# Patient Record
Sex: FEMALE | Race: WHITE | NOT HISPANIC OR LATINO | Employment: UNEMPLOYED | URBAN - NONMETROPOLITAN AREA
[De-identification: names, ages, dates, MRNs, and addresses within clinical notes are randomized per-mention and may not be internally consistent; named-entity substitution may affect disease eponyms.]

---

## 2017-02-01 ENCOUNTER — TRANSCRIBE ORDERS (OUTPATIENT)
Dept: ADMINISTRATIVE | Facility: HOSPITAL | Age: 47
End: 2017-02-01

## 2017-02-01 DIAGNOSIS — Z12.31 ENCOUNTER FOR SCREENING MAMMOGRAM FOR MALIGNANT NEOPLASM OF BREAST: Primary | ICD-10-CM

## 2017-03-16 ENCOUNTER — LAB (OUTPATIENT)
Dept: LAB | Facility: HOSPITAL | Age: 47
End: 2017-03-16
Attending: PEDIATRICS

## 2017-03-16 ENCOUNTER — TRANSCRIBE ORDERS (OUTPATIENT)
Dept: LAB | Facility: HOSPITAL | Age: 47
End: 2017-03-16

## 2017-03-16 DIAGNOSIS — R53.83 LETHARGY: ICD-10-CM

## 2017-03-16 DIAGNOSIS — R53.83 LETHARGY: Primary | ICD-10-CM

## 2017-03-16 LAB
ALBUMIN SERPL-MCNC: 4.4 G/DL (ref 3.5–5)
ALBUMIN/GLOB SERPL: 1.4 G/DL (ref 1.1–2.5)
ALP SERPL-CCNC: 56 U/L (ref 24–120)
ALT SERPL W P-5'-P-CCNC: 53 U/L (ref 0–54)
AMYLASE SERPL-CCNC: <30 U/L (ref 30–110)
ANION GAP SERPL CALCULATED.3IONS-SCNC: 9 MMOL/L (ref 4–13)
AST SERPL-CCNC: 31 U/L (ref 7–45)
AUTO MIXED CELLS #: 0.4 10*3/UL (ref 0.1–2.6)
AUTO MIXED CELLS %: 2.7 % (ref 0.1–24)
BILIRUB SERPL-MCNC: 0.5 MG/DL (ref 0.1–1)
BUN BLD-MCNC: 11 MG/DL (ref 5–21)
BUN/CREAT SERPL: 18.6
CALCIUM SPEC-SCNC: 9.2 MG/DL (ref 8.4–10.4)
CHLORIDE SERPL-SCNC: 103 MMOL/L (ref 98–110)
CO2 SERPL-SCNC: 25 MMOL/L (ref 24–31)
CREAT BLD-MCNC: 0.59 MG/DL (ref 0.5–1.4)
ERYTHROCYTE [DISTWIDTH] IN BLOOD BY AUTOMATED COUNT: 12.6 % (ref 12–15)
GFR SERPL CREATININE-BSD FRML MDRD: 110 ML/MIN/1.73
GLOBULIN UR ELPH-MCNC: 3.1 GM/DL
GLUCOSE BLD-MCNC: 118 MG/DL (ref 70–100)
HCT VFR BLD AUTO: 39.3 % (ref 37–47)
HGB BLD-MCNC: 13.9 G/DL (ref 12–16)
LYMPHOCYTES # BLD AUTO: 1.5 10*3/MM3 (ref 0.8–7)
LYMPHOCYTES NFR BLD AUTO: 9.1 % (ref 15–45)
MCH RBC QN AUTO: 31.4 PG (ref 28–32)
MCHC RBC AUTO-ENTMCNC: 35.4 G/DL (ref 33–36)
MCV RBC AUTO: 88.7 FL (ref 82–98)
NEUTROPHILS # BLD AUTO: 14.3 10*3/MM3 (ref 1.5–8.3)
NEUTROPHILS NFR BLD AUTO: 88.2 % (ref 39–78)
PLATELET # BLD AUTO: 212 10*3/MM3 (ref 130–400)
PMV BLD AUTO: 8.5 FL (ref 6–12)
POTASSIUM BLD-SCNC: 4.8 MMOL/L (ref 3.5–5.3)
PROT SERPL-MCNC: 7.5 G/DL (ref 6.3–8.7)
RBC # BLD AUTO: 4.43 10*6/MM3 (ref 4.2–5.4)
SODIUM BLD-SCNC: 137 MMOL/L (ref 135–145)
T4 FREE SERPL-MCNC: 0.71 NG/DL (ref 0.78–2.19)
TSH SERPL DL<=0.05 MIU/L-ACNC: 0.08 MIU/ML (ref 0.47–4.68)
WBC NRBC COR # BLD: 16.2 10*3/MM3 (ref 4.8–10.8)

## 2017-03-16 PROCEDURE — 82150 ASSAY OF AMYLASE: CPT

## 2017-03-16 PROCEDURE — 36415 COLL VENOUS BLD VENIPUNCTURE: CPT

## 2017-03-16 PROCEDURE — 80053 COMPREHEN METABOLIC PANEL: CPT

## 2017-03-16 PROCEDURE — 85025 COMPLETE CBC W/AUTO DIFF WBC: CPT

## 2017-03-16 PROCEDURE — 84443 ASSAY THYROID STIM HORMONE: CPT | Performed by: PEDIATRICS

## 2017-03-16 PROCEDURE — 84439 ASSAY OF FREE THYROXINE: CPT | Performed by: PEDIATRICS

## 2017-04-11 ENCOUNTER — TRANSCRIBE ORDERS (OUTPATIENT)
Dept: LAB | Facility: HOSPITAL | Age: 47
End: 2017-04-11

## 2017-04-11 ENCOUNTER — LAB (OUTPATIENT)
Dept: LAB | Facility: HOSPITAL | Age: 47
End: 2017-04-11
Attending: PEDIATRICS

## 2017-04-11 DIAGNOSIS — R94.6 ABNORMAL RESULTS OF THYROID FUNCTION STUDIES: ICD-10-CM

## 2017-04-11 DIAGNOSIS — R94.6 ABNORMAL RESULTS OF THYROID FUNCTION STUDIES: Primary | ICD-10-CM

## 2017-04-11 LAB
T4 FREE SERPL-MCNC: 0.98 NG/DL (ref 0.78–2.19)
TSH SERPL DL<=0.05 MIU/L-ACNC: 1.21 MIU/ML (ref 0.47–4.68)

## 2017-04-11 PROCEDURE — 84443 ASSAY THYROID STIM HORMONE: CPT

## 2017-04-11 PROCEDURE — 84480 ASSAY TRIIODOTHYRONINE (T3): CPT

## 2017-04-11 PROCEDURE — 36415 COLL VENOUS BLD VENIPUNCTURE: CPT

## 2017-04-11 PROCEDURE — 84439 ASSAY OF FREE THYROXINE: CPT

## 2017-04-13 LAB — T3 SERPL-MCNC: 113 NG/DL (ref 71–180)

## 2017-08-31 ENCOUNTER — LAB (OUTPATIENT)
Dept: LAB | Facility: HOSPITAL | Age: 47
End: 2017-08-31

## 2017-08-31 ENCOUNTER — TRANSCRIBE ORDERS (OUTPATIENT)
Dept: LAB | Facility: HOSPITAL | Age: 47
End: 2017-08-31

## 2017-08-31 DIAGNOSIS — R53.83 OTHER FATIGUE: Primary | ICD-10-CM

## 2017-08-31 DIAGNOSIS — R53.83 OTHER FATIGUE: ICD-10-CM

## 2017-08-31 PROCEDURE — 84443 ASSAY THYROID STIM HORMONE: CPT | Performed by: NURSE PRACTITIONER

## 2017-08-31 PROCEDURE — 82306 VITAMIN D 25 HYDROXY: CPT | Performed by: NURSE PRACTITIONER

## 2017-08-31 PROCEDURE — 82607 VITAMIN B-12: CPT | Performed by: NURSE PRACTITIONER

## 2017-08-31 PROCEDURE — 84439 ASSAY OF FREE THYROXINE: CPT | Performed by: NURSE PRACTITIONER

## 2017-08-31 PROCEDURE — 84481 FREE ASSAY (FT-3): CPT | Performed by: NURSE PRACTITIONER

## 2017-08-31 PROCEDURE — 36415 COLL VENOUS BLD VENIPUNCTURE: CPT

## 2017-09-01 LAB
25(OH)D3 SERPL-MCNC: 49.3 NG/ML (ref 30–100)
T3FREE SERPL-MCNC: 4.33 PG/ML (ref 2.77–5.27)
T4 FREE SERPL-MCNC: 1.17 NG/DL (ref 0.78–2.19)
TSH SERPL DL<=0.05 MIU/L-ACNC: 0.23 MIU/ML (ref 0.47–4.68)
VIT B12 BLD-MCNC: 401 PG/ML (ref 239–931)

## 2018-03-14 ENCOUNTER — TRANSCRIBE ORDERS (OUTPATIENT)
Dept: ADMINISTRATIVE | Facility: HOSPITAL | Age: 48
End: 2018-03-14

## 2018-03-14 DIAGNOSIS — Z12.31 ENCOUNTER FOR SCREENING MAMMOGRAM FOR MALIGNANT NEOPLASM OF BREAST: Primary | ICD-10-CM

## 2018-03-22 ENCOUNTER — HOSPITAL ENCOUNTER (OUTPATIENT)
Dept: GENERAL RADIOLOGY | Facility: HOSPITAL | Age: 48
Discharge: HOME | End: 2018-03-22
Admitting: INTERNAL MEDICINE

## 2018-03-22 ENCOUNTER — TRANSCRIBE ORDERS (OUTPATIENT)
Dept: ADMINISTRATIVE | Facility: HOSPITAL | Age: 48
End: 2018-03-22

## 2018-03-22 DIAGNOSIS — M25.561 RIGHT KNEE PAIN, UNSPECIFIED CHRONICITY: Primary | ICD-10-CM

## 2018-03-22 DIAGNOSIS — M25.561 RIGHT KNEE PAIN, UNSPECIFIED CHRONICITY: ICD-10-CM

## 2018-03-22 PROCEDURE — 73564 X-RAY EXAM KNEE 4 OR MORE: CPT

## 2018-03-28 ENCOUNTER — APPOINTMENT (OUTPATIENT)
Dept: MAMMOGRAPHY | Facility: HOSPITAL | Age: 48
End: 2018-03-28

## 2018-03-29 ENCOUNTER — HOSPITAL ENCOUNTER (OUTPATIENT)
Dept: MAMMOGRAPHY | Facility: HOSPITAL | Age: 48
Discharge: HOME | End: 2018-03-29
Admitting: INTERNAL MEDICINE

## 2018-03-29 DIAGNOSIS — Z12.31 ENCOUNTER FOR SCREENING MAMMOGRAM FOR MALIGNANT NEOPLASM OF BREAST: ICD-10-CM

## 2018-03-29 PROCEDURE — 77063 BREAST TOMOSYNTHESIS BI: CPT

## 2018-03-29 PROCEDURE — 77067 SCR MAMMO BI INCL CAD: CPT

## 2018-11-28 ENCOUNTER — APPOINTMENT (OUTPATIENT)
Dept: GENERAL RADIOLOGY | Age: 48
DRG: 897 | End: 2018-11-28
Payer: COMMERCIAL

## 2018-11-28 ENCOUNTER — HOSPITAL ENCOUNTER (INPATIENT)
Age: 48
LOS: 3 days | Discharge: HOME | DRG: 897 | End: 2018-12-01
Attending: EMERGENCY MEDICINE | Admitting: FAMILY MEDICINE
Payer: COMMERCIAL

## 2018-11-28 DIAGNOSIS — F10.931 ALCOHOL WITHDRAWAL SYNDROME, WITH DELIRIUM (HCC): ICD-10-CM

## 2018-11-28 DIAGNOSIS — F10.930 ALCOHOL WITHDRAWAL SYNDROME WITHOUT COMPLICATION (HCC): Primary | ICD-10-CM

## 2018-11-28 PROBLEM — Z78.9 ALCOHOL USE: Status: ACTIVE | Noted: 2018-11-28

## 2018-11-28 LAB
ALBUMIN SERPL-MCNC: 4.7 G/DL (ref 3.5–5.2)
ALP BLD-CCNC: 224 U/L (ref 35–104)
ALT SERPL-CCNC: 134 U/L (ref 5–33)
AMPHETAMINE SCREEN, URINE: NEGATIVE
ANION GAP SERPL CALCULATED.3IONS-SCNC: 16 MMOL/L (ref 7–19)
APTT: 31 SEC (ref 26–36.2)
AST SERPL-CCNC: 375 U/L (ref 5–32)
BARBITURATE SCREEN URINE: NEGATIVE
BASOPHILS ABSOLUTE: 0 K/UL (ref 0–0.2)
BASOPHILS RELATIVE PERCENT: 0.5 % (ref 0–1)
BENZODIAZEPINE SCREEN, URINE: NEGATIVE
BILIRUB SERPL-MCNC: 1.1 MG/DL (ref 0.2–1.2)
BILIRUBIN URINE: NEGATIVE
BLOOD, URINE: NEGATIVE
BUN BLDV-MCNC: 3 MG/DL (ref 6–20)
CALCIUM SERPL-MCNC: 9 MG/DL (ref 8.6–10)
CANNABINOID SCREEN URINE: NEGATIVE
CHLORIDE BLD-SCNC: 106 MMOL/L (ref 98–111)
CLARITY: CLEAR
CO2: 23 MMOL/L (ref 22–29)
COCAINE METABOLITE SCREEN URINE: NEGATIVE
COLOR: YELLOW
CREAT SERPL-MCNC: <0.5 MG/DL (ref 0.5–0.9)
EOSINOPHILS ABSOLUTE: 0 K/UL (ref 0–0.6)
EOSINOPHILS RELATIVE PERCENT: 0.4 % (ref 0–5)
ETHANOL: 238 MG/DL (ref 0–0.08)
GFR NON-AFRICAN AMERICAN: >60
GLUCOSE BLD-MCNC: 154 MG/DL (ref 74–109)
GLUCOSE URINE: NEGATIVE MG/DL
HCT VFR BLD CALC: 46.3 % (ref 37–47)
HEMOGLOBIN: 15.9 G/DL (ref 12–16)
INR BLD: 1.1 (ref 0.88–1.18)
KETONES, URINE: NEGATIVE MG/DL
LEUKOCYTE ESTERASE, URINE: NEGATIVE
LYMPHOCYTES ABSOLUTE: 2.4 K/UL (ref 1.1–4.5)
LYMPHOCYTES RELATIVE PERCENT: 28.5 % (ref 20–40)
Lab: NORMAL
MAGNESIUM: 1.9 MG/DL (ref 1.6–2.6)
MCH RBC QN AUTO: 34.6 PG (ref 27–31)
MCHC RBC AUTO-ENTMCNC: 34.3 G/DL (ref 33–37)
MCV RBC AUTO: 100.7 FL (ref 81–99)
MONOCYTES ABSOLUTE: 0.4 K/UL (ref 0–0.9)
MONOCYTES RELATIVE PERCENT: 4.8 % (ref 0–10)
NEUTROPHILS ABSOLUTE: 5.4 K/UL (ref 1.5–7.5)
NEUTROPHILS RELATIVE PERCENT: 65.6 % (ref 50–65)
NITRITE, URINE: NEGATIVE
OPIATE SCREEN URINE: NEGATIVE
PDW BLD-RTO: 12.8 % (ref 11.5–14.5)
PH UA: 6
PLATELET # BLD: 68 K/UL (ref 130–400)
PMV BLD AUTO: 9.7 FL (ref 9.4–12.3)
POTASSIUM REFLEX MAGNESIUM: 3.2 MMOL/L (ref 3.5–5)
PROTEIN UA: ABNORMAL MG/DL
PROTHROMBIN TIME: 14.1 SEC (ref 12–14.6)
RBC # BLD: 4.6 M/UL (ref 4.2–5.4)
SODIUM BLD-SCNC: 145 MMOL/L (ref 136–145)
SPECIFIC GRAVITY UA: 1.01
TOTAL PROTEIN: 7.9 G/DL (ref 6.6–8.7)
TROPONIN: <0.01 NG/ML (ref 0–0.03)
TSH REFLEX FT4: 1.18 UIU/ML (ref 0.35–5.5)
URINE REFLEX TO CULTURE: ABNORMAL
UROBILINOGEN, URINE: 0.2 E.U./DL
WBC # BLD: 8.3 K/UL (ref 4.8–10.8)

## 2018-11-28 PROCEDURE — G0480 DRUG TEST DEF 1-7 CLASSES: HCPCS

## 2018-11-28 PROCEDURE — 99285 EMERGENCY DEPT VISIT HI MDM: CPT | Performed by: EMERGENCY MEDICINE

## 2018-11-28 PROCEDURE — 2500000003 HC RX 250 WO HCPCS: Performed by: INTERNAL MEDICINE

## 2018-11-28 PROCEDURE — 83735 ASSAY OF MAGNESIUM: CPT

## 2018-11-28 PROCEDURE — 71045 X-RAY EXAM CHEST 1 VIEW: CPT

## 2018-11-28 PROCEDURE — 96365 THER/PROPH/DIAG IV INF INIT: CPT

## 2018-11-28 PROCEDURE — 36415 COLL VENOUS BLD VENIPUNCTURE: CPT

## 2018-11-28 PROCEDURE — 96368 THER/DIAG CONCURRENT INF: CPT

## 2018-11-28 PROCEDURE — 85730 THROMBOPLASTIN TIME PARTIAL: CPT

## 2018-11-28 PROCEDURE — 2580000003 HC RX 258: Performed by: EMERGENCY MEDICINE

## 2018-11-28 PROCEDURE — 96376 TX/PRO/DX INJ SAME DRUG ADON: CPT

## 2018-11-28 PROCEDURE — 2580000003 HC RX 258: Performed by: INTERNAL MEDICINE

## 2018-11-28 PROCEDURE — 80307 DRUG TEST PRSMV CHEM ANLYZR: CPT

## 2018-11-28 PROCEDURE — 80053 COMPREHEN METABOLIC PANEL: CPT

## 2018-11-28 PROCEDURE — 84443 ASSAY THYROID STIM HORMONE: CPT

## 2018-11-28 PROCEDURE — 99285 EMERGENCY DEPT VISIT HI MDM: CPT

## 2018-11-28 PROCEDURE — 2000000000 HC ICU R&B

## 2018-11-28 PROCEDURE — 6360000002 HC RX W HCPCS: Performed by: INTERNAL MEDICINE

## 2018-11-28 PROCEDURE — 85025 COMPLETE CBC W/AUTO DIFF WBC: CPT

## 2018-11-28 PROCEDURE — 96375 TX/PRO/DX INJ NEW DRUG ADDON: CPT

## 2018-11-28 PROCEDURE — 84484 ASSAY OF TROPONIN QUANT: CPT

## 2018-11-28 PROCEDURE — 6370000000 HC RX 637 (ALT 250 FOR IP): Performed by: EMERGENCY MEDICINE

## 2018-11-28 PROCEDURE — 6360000002 HC RX W HCPCS: Performed by: EMERGENCY MEDICINE

## 2018-11-28 PROCEDURE — 85610 PROTHROMBIN TIME: CPT

## 2018-11-28 PROCEDURE — 93005 ELECTROCARDIOGRAM TRACING: CPT

## 2018-11-28 PROCEDURE — 81003 URINALYSIS AUTO W/O SCOPE: CPT

## 2018-11-28 PROCEDURE — 99291 CRITICAL CARE FIRST HOUR: CPT | Performed by: INTERNAL MEDICINE

## 2018-11-28 RX ORDER — 0.9 % SODIUM CHLORIDE 0.9 %
2000 INTRAVENOUS SOLUTION INTRAVENOUS ONCE
Status: COMPLETED | OUTPATIENT
Start: 2018-11-28 | End: 2018-11-28

## 2018-11-28 RX ORDER — CHLORDIAZEPOXIDE HYDROCHLORIDE 25 MG/1
50 CAPSULE, GELATIN COATED ORAL ONCE
Status: COMPLETED | OUTPATIENT
Start: 2018-11-28 | End: 2018-11-28

## 2018-11-28 RX ORDER — POTASSIUM CHLORIDE 7.45 MG/ML
10 INJECTION INTRAVENOUS ONCE
Status: COMPLETED | OUTPATIENT
Start: 2018-11-28 | End: 2018-11-28

## 2018-11-28 RX ORDER — LORAZEPAM 2 MG/ML
2 INJECTION INTRAMUSCULAR ONCE
Status: COMPLETED | OUTPATIENT
Start: 2018-11-28 | End: 2018-11-28

## 2018-11-28 RX ORDER — LORAZEPAM 2 MG/ML
4 INJECTION INTRAMUSCULAR ONCE
Status: COMPLETED | OUTPATIENT
Start: 2018-11-28 | End: 2018-11-28

## 2018-11-28 RX ADMIN — POTASSIUM CHLORIDE 10 MEQ: 7.46 INJECTION, SOLUTION INTRAVENOUS at 22:30

## 2018-11-28 RX ADMIN — LORAZEPAM 2 MG: 2 INJECTION INTRAMUSCULAR; INTRAVENOUS at 18:12

## 2018-11-28 RX ADMIN — SODIUM CHLORIDE 2000 ML: 9 INJECTION, SOLUTION INTRAVENOUS at 19:48

## 2018-11-28 RX ADMIN — LORAZEPAM 4 MG: 2 INJECTION INTRAMUSCULAR; INTRAVENOUS at 19:01

## 2018-11-28 RX ADMIN — CHLORDIAZEPOXIDE HYDROCHLORIDE 50 MG: 25 CAPSULE ORAL at 18:12

## 2018-11-28 RX ADMIN — FOLIC ACID: 5 INJECTION, SOLUTION INTRAMUSCULAR; INTRAVENOUS; SUBCUTANEOUS at 22:32

## 2018-11-28 ASSESSMENT — ENCOUNTER SYMPTOMS
CHEST TIGHTNESS: 0
COUGH: 0
RHINORRHEA: 0
VOMITING: 0
EYE PAIN: 0
SHORTNESS OF BREATH: 0
BACK PAIN: 0
PHOTOPHOBIA: 0
DIARRHEA: 0
NAUSEA: 0
SORE THROAT: 0
ABDOMINAL PAIN: 0

## 2018-11-28 NOTE — ED NOTES
Denies SI and HI at this time. Patient states she was medically released from [facility] in [facility] after 1 week, relapsed after getting home.       Blank Garcia RN  11/28/18 6795

## 2018-11-29 PROBLEM — E87.6 HYPOKALEMIA: Status: ACTIVE | Noted: 2018-11-29

## 2018-11-29 PROBLEM — R74.01 TRANSAMINITIS: Status: ACTIVE | Noted: 2018-11-29

## 2018-11-29 PROBLEM — F10.931 DELIRIUM TREMENS (HCC): Status: ACTIVE | Noted: 2018-11-29

## 2018-11-29 PROBLEM — E86.0 DEHYDRATION: Status: ACTIVE | Noted: 2018-11-29

## 2018-11-29 LAB
ALBUMIN SERPL-MCNC: 3.8 G/DL (ref 3.5–5.2)
ALP BLD-CCNC: 163 U/L (ref 35–104)
ALT SERPL-CCNC: 104 U/L (ref 5–33)
ANION GAP SERPL CALCULATED.3IONS-SCNC: 16 MMOL/L (ref 7–19)
AST SERPL-CCNC: 316 U/L (ref 5–32)
BILIRUB SERPL-MCNC: 1.3 MG/DL (ref 0.2–1.2)
BUN BLDV-MCNC: 3 MG/DL (ref 6–20)
CALCIUM SERPL-MCNC: 7.8 MG/DL (ref 8.6–10)
CHLORIDE BLD-SCNC: 108 MMOL/L (ref 98–111)
CO2: 17 MMOL/L (ref 22–29)
CREAT SERPL-MCNC: 0.5 MG/DL (ref 0.5–0.9)
GFR NON-AFRICAN AMERICAN: >60
GLUCOSE BLD-MCNC: 103 MG/DL (ref 74–109)
MAGNESIUM: 1.2 MG/DL (ref 1.6–2.6)
PHOSPHORUS: 3.4 MG/DL (ref 2.5–4.5)
POTASSIUM SERPL-SCNC: 3.5 MMOL/L (ref 3.5–5)
SODIUM BLD-SCNC: 141 MMOL/L (ref 136–145)
TOTAL PROTEIN: 5.8 G/DL (ref 6.6–8.7)

## 2018-11-29 PROCEDURE — 2580000003 HC RX 258: Performed by: FAMILY MEDICINE

## 2018-11-29 PROCEDURE — 6370000000 HC RX 637 (ALT 250 FOR IP): Performed by: INTERNAL MEDICINE

## 2018-11-29 PROCEDURE — 2000000000 HC ICU R&B

## 2018-11-29 PROCEDURE — 6360000002 HC RX W HCPCS: Performed by: INTERNAL MEDICINE

## 2018-11-29 PROCEDURE — 36415 COLL VENOUS BLD VENIPUNCTURE: CPT

## 2018-11-29 PROCEDURE — 6360000002 HC RX W HCPCS: Performed by: FAMILY MEDICINE

## 2018-11-29 PROCEDURE — 80053 COMPREHEN METABOLIC PANEL: CPT

## 2018-11-29 PROCEDURE — 2580000003 HC RX 258: Performed by: INTERNAL MEDICINE

## 2018-11-29 PROCEDURE — 83735 ASSAY OF MAGNESIUM: CPT

## 2018-11-29 PROCEDURE — 6370000000 HC RX 637 (ALT 250 FOR IP): Performed by: FAMILY MEDICINE

## 2018-11-29 PROCEDURE — 87081 CULTURE SCREEN ONLY: CPT

## 2018-11-29 PROCEDURE — 6360000002 HC RX W HCPCS

## 2018-11-29 PROCEDURE — 99233 SBSQ HOSP IP/OBS HIGH 50: CPT | Performed by: FAMILY MEDICINE

## 2018-11-29 PROCEDURE — 84100 ASSAY OF PHOSPHORUS: CPT

## 2018-11-29 RX ORDER — THIAMINE MONONITRATE (VIT B1) 100 MG
100 TABLET ORAL DAILY
Status: DISCONTINUED | OUTPATIENT
Start: 2018-11-29 | End: 2018-12-01 | Stop reason: HOSPADM

## 2018-11-29 RX ORDER — FOLIC ACID 1 MG/1
1 TABLET ORAL DAILY
Status: DISCONTINUED | OUTPATIENT
Start: 2018-11-29 | End: 2018-12-01 | Stop reason: HOSPADM

## 2018-11-29 RX ORDER — DIPHENHYDRAMINE HYDROCHLORIDE 50 MG/ML
25 INJECTION INTRAMUSCULAR; INTRAVENOUS ONCE
Status: COMPLETED | OUTPATIENT
Start: 2018-11-29 | End: 2018-11-29

## 2018-11-29 RX ORDER — CLONIDINE HYDROCHLORIDE 0.1 MG/1
0.1 TABLET ORAL 3 TIMES DAILY
Status: DISCONTINUED | OUTPATIENT
Start: 2018-11-29 | End: 2018-12-01 | Stop reason: HOSPADM

## 2018-11-29 RX ORDER — LORAZEPAM 1 MG/1
4 TABLET ORAL
Status: DISCONTINUED | OUTPATIENT
Start: 2018-11-29 | End: 2018-12-01 | Stop reason: HOSPADM

## 2018-11-29 RX ORDER — POTASSIUM CHLORIDE 7.45 MG/ML
10 INJECTION INTRAVENOUS PRN
Status: DISCONTINUED | OUTPATIENT
Start: 2018-11-29 | End: 2018-12-01 | Stop reason: HOSPADM

## 2018-11-29 RX ORDER — SODIUM CHLORIDE 0.9 % (FLUSH) 0.9 %
10 SYRINGE (ML) INJECTION EVERY 12 HOURS SCHEDULED
Status: DISCONTINUED | OUTPATIENT
Start: 2018-11-29 | End: 2018-12-01 | Stop reason: HOSPADM

## 2018-11-29 RX ORDER — BISACODYL 10 MG
10 SUPPOSITORY, RECTAL RECTAL DAILY PRN
Status: DISCONTINUED | OUTPATIENT
Start: 2018-11-29 | End: 2018-12-01 | Stop reason: HOSPADM

## 2018-11-29 RX ORDER — FLUOXETINE HYDROCHLORIDE 20 MG/1
20 CAPSULE ORAL DAILY
Status: DISCONTINUED | OUTPATIENT
Start: 2018-11-29 | End: 2018-12-01 | Stop reason: HOSPADM

## 2018-11-29 RX ORDER — OXYCODONE HYDROCHLORIDE AND ACETAMINOPHEN 5; 325 MG/1; MG/1
1 TABLET ORAL EVERY 8 HOURS PRN
COMMUNITY
End: 2019-10-05 | Stop reason: ALTCHOICE

## 2018-11-29 RX ORDER — HALOPERIDOL 5 MG/ML
5 INJECTION INTRAMUSCULAR EVERY 6 HOURS PRN
Status: DISCONTINUED | OUTPATIENT
Start: 2018-11-29 | End: 2018-11-30

## 2018-11-29 RX ORDER — DIPHENHYDRAMINE HYDROCHLORIDE 50 MG/ML
25 INJECTION INTRAMUSCULAR; INTRAVENOUS EVERY 6 HOURS PRN
Status: DISCONTINUED | OUTPATIENT
Start: 2018-11-29 | End: 2018-12-01 | Stop reason: HOSPADM

## 2018-11-29 RX ORDER — SODIUM CHLORIDE 0.9 % (FLUSH) 0.9 %
10 SYRINGE (ML) INJECTION PRN
Status: DISCONTINUED | OUTPATIENT
Start: 2018-11-29 | End: 2018-12-01 | Stop reason: HOSPADM

## 2018-11-29 RX ORDER — LORAZEPAM 1 MG/1
3 TABLET ORAL
Status: DISCONTINUED | OUTPATIENT
Start: 2018-11-29 | End: 2018-12-01 | Stop reason: HOSPADM

## 2018-11-29 RX ORDER — NEFAZODONE HYDROCHLORIDE 50 MG/1
150 TABLET ORAL 2 TIMES DAILY
Status: DISCONTINUED | OUTPATIENT
Start: 2018-11-29 | End: 2018-12-01 | Stop reason: HOSPADM

## 2018-11-29 RX ORDER — SODIUM CHLORIDE 0.9 % (FLUSH) 0.9 %
10 SYRINGE (ML) INJECTION PRN
Status: DISCONTINUED | OUTPATIENT
Start: 2018-11-29 | End: 2018-11-29 | Stop reason: SDUPTHER

## 2018-11-29 RX ORDER — HALOPERIDOL 5 MG/ML
INJECTION INTRAMUSCULAR
Status: COMPLETED
Start: 2018-11-29 | End: 2018-11-29

## 2018-11-29 RX ORDER — LORAZEPAM 1 MG/1
2 TABLET ORAL
Status: DISCONTINUED | OUTPATIENT
Start: 2018-11-29 | End: 2018-12-01 | Stop reason: HOSPADM

## 2018-11-29 RX ORDER — HALOPERIDOL 5 MG/ML
5 INJECTION INTRAMUSCULAR ONCE
Status: COMPLETED | OUTPATIENT
Start: 2018-11-29 | End: 2018-11-29

## 2018-11-29 RX ORDER — SODIUM CHLORIDE, SODIUM LACTATE, POTASSIUM CHLORIDE, CALCIUM CHLORIDE 600; 310; 30; 20 MG/100ML; MG/100ML; MG/100ML; MG/100ML
INJECTION, SOLUTION INTRAVENOUS CONTINUOUS
Status: DISCONTINUED | OUTPATIENT
Start: 2018-11-29 | End: 2018-12-01 | Stop reason: HOSPADM

## 2018-11-29 RX ORDER — DIPHENHYDRAMINE HYDROCHLORIDE 50 MG/ML
INJECTION INTRAMUSCULAR; INTRAVENOUS
Status: DISPENSED
Start: 2018-11-29 | End: 2018-11-29

## 2018-11-29 RX ORDER — POTASSIUM CHLORIDE 29.8 MG/ML
20 INJECTION INTRAVENOUS PRN
Status: DISCONTINUED | OUTPATIENT
Start: 2018-11-29 | End: 2018-11-29 | Stop reason: SDUPTHER

## 2018-11-29 RX ORDER — LORAZEPAM 1 MG/1
1 TABLET ORAL
Status: DISCONTINUED | OUTPATIENT
Start: 2018-11-29 | End: 2018-12-01 | Stop reason: HOSPADM

## 2018-11-29 RX ORDER — SODIUM CHLORIDE 0.9 % (FLUSH) 0.9 %
10 SYRINGE (ML) INJECTION EVERY 12 HOURS SCHEDULED
Status: DISCONTINUED | OUTPATIENT
Start: 2018-11-29 | End: 2018-11-29 | Stop reason: SDUPTHER

## 2018-11-29 RX ORDER — MAGNESIUM SULFATE 1 G/100ML
1 INJECTION INTRAVENOUS PRN
Status: DISCONTINUED | OUTPATIENT
Start: 2018-11-29 | End: 2018-12-01 | Stop reason: HOSPADM

## 2018-11-29 RX ORDER — ONDANSETRON 2 MG/ML
4 INJECTION INTRAMUSCULAR; INTRAVENOUS EVERY 6 HOURS PRN
Status: DISCONTINUED | OUTPATIENT
Start: 2018-11-29 | End: 2018-12-01 | Stop reason: HOSPADM

## 2018-11-29 RX ORDER — DOCUSATE SODIUM 100 MG/1
100 CAPSULE, LIQUID FILLED ORAL 2 TIMES DAILY
Status: DISCONTINUED | OUTPATIENT
Start: 2018-11-29 | End: 2018-12-01 | Stop reason: HOSPADM

## 2018-11-29 RX ORDER — LORAZEPAM 2 MG/ML
4 INJECTION INTRAMUSCULAR
Status: DISCONTINUED | OUTPATIENT
Start: 2018-11-29 | End: 2018-12-01 | Stop reason: HOSPADM

## 2018-11-29 RX ORDER — LORAZEPAM 2 MG/ML
1 INJECTION INTRAMUSCULAR
Status: DISCONTINUED | OUTPATIENT
Start: 2018-11-29 | End: 2018-12-01 | Stop reason: HOSPADM

## 2018-11-29 RX ORDER — LORAZEPAM 2 MG/ML
2 INJECTION INTRAMUSCULAR
Status: DISCONTINUED | OUTPATIENT
Start: 2018-11-29 | End: 2018-12-01 | Stop reason: HOSPADM

## 2018-11-29 RX ORDER — LORAZEPAM 2 MG/ML
3 INJECTION INTRAMUSCULAR
Status: DISCONTINUED | OUTPATIENT
Start: 2018-11-29 | End: 2018-12-01 | Stop reason: HOSPADM

## 2018-11-29 RX ORDER — GABAPENTIN 300 MG/1
300 CAPSULE ORAL 3 TIMES DAILY
Status: DISCONTINUED | OUTPATIENT
Start: 2018-11-29 | End: 2018-12-01 | Stop reason: HOSPADM

## 2018-11-29 RX ORDER — VALSARTAN 80 MG/1
80 TABLET ORAL DAILY
Status: DISCONTINUED | OUTPATIENT
Start: 2018-11-29 | End: 2018-12-01 | Stop reason: HOSPADM

## 2018-11-29 RX ADMIN — NEFAZODONE HYDROCHLORIDE 150 MG: 50 TABLET ORAL at 02:29

## 2018-11-29 RX ADMIN — MAGNESIUM SULFATE HEPTAHYDRATE 1 G: 1 INJECTION, SOLUTION INTRAVENOUS at 12:16

## 2018-11-29 RX ADMIN — HALOPERIDOL LACTATE 5 MG: 5 INJECTION, SOLUTION INTRAMUSCULAR at 07:53

## 2018-11-29 RX ADMIN — CLONIDINE HYDROCHLORIDE 0.1 MG: 0.1 TABLET ORAL at 20:17

## 2018-11-29 RX ADMIN — ASPIRIN 325 MG: 325 TABLET, COATED ORAL at 07:27

## 2018-11-29 RX ADMIN — LORAZEPAM 4 MG: 2 INJECTION INTRAMUSCULAR; INTRAVENOUS at 10:45

## 2018-11-29 RX ADMIN — CLONIDINE HYDROCHLORIDE 0.1 MG: 0.1 TABLET ORAL at 14:39

## 2018-11-29 RX ADMIN — LORAZEPAM 4 MG: 2 INJECTION INTRAMUSCULAR; INTRAVENOUS at 08:00

## 2018-11-29 RX ADMIN — ENOXAPARIN SODIUM 40 MG: 40 INJECTION SUBCUTANEOUS at 08:00

## 2018-11-29 RX ADMIN — LORAZEPAM 3 MG: 2 INJECTION INTRAMUSCULAR; INTRAVENOUS at 01:37

## 2018-11-29 RX ADMIN — LORAZEPAM 4 MG: 2 INJECTION INTRAMUSCULAR; INTRAVENOUS at 11:44

## 2018-11-29 RX ADMIN — LORAZEPAM 3 MG: 1 TABLET ORAL at 20:17

## 2018-11-29 RX ADMIN — LAMOTRIGINE 150 MG: 100 TABLET ORAL at 07:26

## 2018-11-29 RX ADMIN — HALOPERIDOL LACTATE 5 MG: 5 INJECTION INTRAMUSCULAR at 18:05

## 2018-11-29 RX ADMIN — NEFAZODONE HYDROCHLORIDE 150 MG: 50 TABLET ORAL at 20:17

## 2018-11-29 RX ADMIN — LORAZEPAM 2 MG: 2 INJECTION INTRAMUSCULAR; INTRAVENOUS at 09:30

## 2018-11-29 RX ADMIN — LORAZEPAM 2 MG: 2 INJECTION INTRAMUSCULAR; INTRAVENOUS at 16:59

## 2018-11-29 RX ADMIN — MAGNESIUM SULFATE HEPTAHYDRATE 1 G: 1 INJECTION, SOLUTION INTRAVENOUS at 10:34

## 2018-11-29 RX ADMIN — LORAZEPAM 3 MG: 2 INJECTION INTRAMUSCULAR; INTRAVENOUS at 05:38

## 2018-11-29 RX ADMIN — DIPHENHYDRAMINE HYDROCHLORIDE 25 MG: 50 INJECTION, SOLUTION INTRAMUSCULAR; INTRAVENOUS at 07:52

## 2018-11-29 RX ADMIN — FLUOXETINE 20 MG: 20 CAPSULE ORAL at 07:27

## 2018-11-29 RX ADMIN — GABAPENTIN 300 MG: 300 CAPSULE ORAL at 14:39

## 2018-11-29 RX ADMIN — GABAPENTIN 300 MG: 300 CAPSULE ORAL at 20:37

## 2018-11-29 RX ADMIN — FOLIC ACID 1 MG: 1 TABLET ORAL at 10:28

## 2018-11-29 RX ADMIN — LORAZEPAM 4 MG: 2 INJECTION INTRAMUSCULAR; INTRAVENOUS at 06:56

## 2018-11-29 RX ADMIN — LORAZEPAM 3 MG: 1 TABLET ORAL at 02:34

## 2018-11-29 RX ADMIN — GABAPENTIN 300 MG: 300 CAPSULE ORAL at 07:26

## 2018-11-29 RX ADMIN — Medication 10 ML: at 20:18

## 2018-11-29 RX ADMIN — VALSARTAN 80 MG: 80 TABLET, FILM COATED ORAL at 07:27

## 2018-11-29 RX ADMIN — CLONIDINE HYDROCHLORIDE 0.1 MG: 0.1 TABLET ORAL at 10:08

## 2018-11-29 RX ADMIN — HALOPERIDOL 5 MG: 5 INJECTION INTRAMUSCULAR at 07:53

## 2018-11-29 RX ADMIN — Medication 100 MG: at 10:28

## 2018-11-29 RX ADMIN — SODIUM CHLORIDE, POTASSIUM CHLORIDE, SODIUM LACTATE AND CALCIUM CHLORIDE: 600; 310; 30; 20 INJECTION, SOLUTION INTRAVENOUS at 10:23

## 2018-11-29 RX ADMIN — NEFAZODONE HYDROCHLORIDE 150 MG: 50 TABLET ORAL at 07:27

## 2018-11-29 ASSESSMENT — PAIN DESCRIPTION - PROGRESSION
CLINICAL_PROGRESSION: NOT CHANGED

## 2018-11-29 ASSESSMENT — PAIN DESCRIPTION - FREQUENCY
FREQUENCY: INTERMITTENT

## 2018-11-29 ASSESSMENT — ENCOUNTER SYMPTOMS
SHORTNESS OF BREATH: 0
BACK PAIN: 0
SPUTUM PRODUCTION: 0
HEMOPTYSIS: 0
NAUSEA: 0
HEARTBURN: 0
COUGH: 0
ORTHOPNEA: 0
EYES NEGATIVE: 1
VOMITING: 0

## 2018-11-29 ASSESSMENT — PAIN SCALES - GENERAL
PAINLEVEL_OUTOF10: 3
PAINLEVEL_OUTOF10: 0
PAINLEVEL_OUTOF10: 3
PAINLEVEL_OUTOF10: 0
PAINLEVEL_OUTOF10: 3
PAINLEVEL_OUTOF10: 4
PAINLEVEL_OUTOF10: 3

## 2018-11-29 ASSESSMENT — PAIN DESCRIPTION - PAIN TYPE
TYPE: CHRONIC PAIN

## 2018-11-29 ASSESSMENT — PAIN DESCRIPTION - DESCRIPTORS
DESCRIPTORS: DISCOMFORT;SORE

## 2018-11-29 ASSESSMENT — PAIN DESCRIPTION - LOCATION
LOCATION: BACK

## 2018-11-29 ASSESSMENT — PAIN DESCRIPTION - ONSET
ONSET: GRADUAL

## 2018-11-29 NOTE — ED PROVIDER NOTES
throughout. RADIOLOGY:   Non-plain film images such as CT, Ultrasound and MRI are read by theradiologist. Plain radiographic images are visualized and preliminarily interpreted by the emergency physician with the below findings:    XR CHEST PORTABLE   Final Result   1. No acute disease. Signed by Dr Dionna Davidson on 11/28/2018 8:33 PM          LABS:  Labs Reviewed   CBC WITH AUTO DIFFERENTIAL - Abnormal; Notable for the following:        Result Value    .7 (*)     MCH 34.6 (*)     Platelets 68 (*)     Neutrophils % 65.6 (*)     All other components within normal limits   COMPREHENSIVE METABOLIC PANEL W/ REFLEX TO MG FOR LOW K - Abnormal; Notable for the following:     Potassium reflex Magnesium 3.2 (*)     Glucose 154 (*)     BUN 3 (*)     Alkaline Phosphatase 224 (*)      (*)      (*)     All other components within normal limits   URINE RT REFLEX TO CULTURE - Abnormal; Notable for the following:     Protein, UA TRACE (*)     All other components within normal limits   TSH WITH REFLEX TO FT4   APTT   PROTIME-INR   MAGNESIUM   URINE DRUG SCREEN   TROPONIN   ETHANOL       All other labs were within normal range or not returned as of this dictation. EMERGENCY DEPARTMENT COURSE and DIFFERENTIAL DIAGNOSIS/MDM:   Vitals:    Vitals:    11/28/18 2031 11/28/18 2101 11/28/18 2132 11/28/18 2226   BP: (!) 154/100 (!) 152/97 (!) 155/101 (!) 147/100   Pulse: 121 119 118 110   Resp: 21 22 20 20   Temp:       SpO2: 95% 95% 95% 95%   Weight:       Height:           MDM  Number of Diagnoses or Management Options  Alcohol withdrawal syndrome without complication [facility]):   Diagnosis management comments: 10year-old female with signs and symptoms concerning for alcohol withdrawal including piloerection, tachycardia, hypertension, tremors and fasciculations and anxiety.  We'll treat symptomatically, labs, fluids, likely admission given the patient has history of DTs or withdrawal seizures in the past.      ED Course  Discussed with Dr. Jones Morris after patient was treated. She was given a total of 6 mg of Ativan, 2 L of fluid. Her heart rate improved. She did remain mildly tachycardic. Workup otherwise was unremarkable. Patient resting comfortably. We'll doses of Ativan. Given patient's history and her multiple medications I feel that she would best be served in the ICU prior to transfer to the floor after her tachycardia is more controlled. Patient agreement with admission. I had a detailed discussion with the patient and/or guarding regarding the historical points, exam findings, and any diagnostic results supporting the admission diagnosis. The patient was educated on care and need for admission. Questions were invited and answered. Patient/guardian shows understanding of admission information and agrees to follow. CONSULTS:  IP CONSULT TO HOSPITALIST    PROCEDURES:  Unless otherwise noted below, none     Procedures    FINAL IMPRESSION      1.  Alcohol withdrawal syndrome without complication [facility])          DISPOSITION/PLAN   DISPOSITION Admitted 11/28/2018 10:33:43 PM      PATIENT REFERRED TO:  Mahendra Ramirez MD  [address] [place] [id number]  [phone]            DISCHARGE MEDICATIONS:  New Prescriptions    No medications on file          (Pleasenote that portions of this note were completed with a voice recognition program.  Efforts were made to edit the dictations but occasionally words are mis-transcribed.)    Salas Fuller MD (electronically signed)  Attending Emergency Physician          Salas Fuller MD  11/28/18 2691

## 2018-11-30 ENCOUNTER — APPOINTMENT (OUTPATIENT)
Dept: ULTRASOUND IMAGING | Age: 48
DRG: 897 | End: 2018-11-30
Payer: COMMERCIAL

## 2018-11-30 ENCOUNTER — LAB REQUISITION (OUTPATIENT)
Dept: LAB | Facility: HOSPITAL | Age: 48
End: 2018-11-30

## 2018-11-30 DIAGNOSIS — Z00.00 ROUTINE GENERAL MEDICAL EXAMINATION AT A HEALTH CARE FACILITY: ICD-10-CM

## 2018-11-30 PROBLEM — E83.42 HYPOMAGNESEMIA: Status: ACTIVE | Noted: 2018-11-30

## 2018-11-30 LAB
ALBUMIN SERPL-MCNC: 3.3 G/DL (ref 3.5–5.2)
ALP BLD-CCNC: 146 U/L (ref 35–104)
ALT SERPL-CCNC: 73 U/L (ref 5–33)
ANION GAP SERPL CALCULATED.3IONS-SCNC: 15 MMOL/L (ref 7–19)
AST SERPL-CCNC: 178 U/L (ref 5–32)
BASOPHILS ABSOLUTE: 0 K/UL (ref 0–0.2)
BASOPHILS RELATIVE PERCENT: 0.4 % (ref 0–1)
BILIRUB SERPL-MCNC: 1.6 MG/DL (ref 0.2–1.2)
BUN BLDV-MCNC: 3 MG/DL (ref 6–20)
C DIFF TOX GENS STL QL NAA+PROBE: NEGATIVE
C DIFFICILE TOXIN, EIA: NORMAL
CALCIUM SERPL-MCNC: 8.3 MG/DL (ref 8.6–10)
CHLORIDE BLD-SCNC: 104 MMOL/L (ref 98–111)
CLOSTRIDIUM DIFFICILE DNA AMPLIFICATION: NORMAL
CO2: 21 MMOL/L (ref 22–29)
CREAT SERPL-MCNC: <0.5 MG/DL (ref 0.5–0.9)
EOSINOPHILS ABSOLUTE: 0.1 K/UL (ref 0–0.6)
EOSINOPHILS RELATIVE PERCENT: 1.9 % (ref 0–5)
GFR NON-AFRICAN AMERICAN: >60
GLUCOSE BLD-MCNC: 109 MG/DL (ref 74–109)
HCT VFR BLD CALC: 31.2 % (ref 37–47)
HEMOGLOBIN: 10.8 G/DL (ref 12–16)
LYMPHOCYTES ABSOLUTE: 0.9 K/UL (ref 1.1–4.5)
LYMPHOCYTES RELATIVE PERCENT: 34.8 % (ref 20–40)
MACROCYTES: ABNORMAL
MAGNESIUM: 1.9 MG/DL (ref 1.6–2.6)
MCH RBC QN AUTO: 35 PG (ref 27–31)
MCHC RBC AUTO-ENTMCNC: 34.6 G/DL (ref 33–37)
MCV RBC AUTO: 101 FL (ref 81–99)
MONOCYTES ABSOLUTE: 0.1 K/UL (ref 0–0.9)
MONOCYTES RELATIVE PERCENT: 4.8 % (ref 0–10)
MRSA CULTURE ONLY: NORMAL
NEUTROPHILS ABSOLUTE: 1.6 K/UL (ref 1.5–7.5)
NEUTROPHILS RELATIVE PERCENT: 57.7 % (ref 50–65)
OVALOCYTES: ABNORMAL
PDW BLD-RTO: 12.7 % (ref 11.5–14.5)
PLATELET # BLD: 39 K/UL (ref 130–400)
PLATELET SLIDE REVIEW: ABNORMAL
PMV BLD AUTO: 10 FL (ref 9.4–12.3)
POTASSIUM SERPL-SCNC: 3.2 MMOL/L (ref 3.5–5)
RBC # BLD: 3.09 M/UL (ref 4.2–5.4)
SODIUM BLD-SCNC: 140 MMOL/L (ref 136–145)
TOTAL PROTEIN: 5.7 G/DL (ref 6.6–8.7)
WBC # BLD: 2.7 K/UL (ref 4.8–10.8)

## 2018-11-30 PROCEDURE — 6370000000 HC RX 637 (ALT 250 FOR IP): Performed by: FAMILY MEDICINE

## 2018-11-30 PROCEDURE — 36415 COLL VENOUS BLD VENIPUNCTURE: CPT

## 2018-11-30 PROCEDURE — 87324 CLOSTRIDIUM AG IA: CPT

## 2018-11-30 PROCEDURE — 87493 C DIFF AMPLIFIED PROBE: CPT

## 2018-11-30 PROCEDURE — 2580000003 HC RX 258: Performed by: INTERNAL MEDICINE

## 2018-11-30 PROCEDURE — 6370000000 HC RX 637 (ALT 250 FOR IP): Performed by: INTERNAL MEDICINE

## 2018-11-30 PROCEDURE — 2580000003 HC RX 258: Performed by: FAMILY MEDICINE

## 2018-11-30 PROCEDURE — 76705 ECHO EXAM OF ABDOMEN: CPT

## 2018-11-30 PROCEDURE — 85025 COMPLETE CBC W/AUTO DIFF WBC: CPT

## 2018-11-30 PROCEDURE — 6360000002 HC RX W HCPCS: Performed by: INTERNAL MEDICINE

## 2018-11-30 PROCEDURE — 83735 ASSAY OF MAGNESIUM: CPT

## 2018-11-30 PROCEDURE — 99233 SBSQ HOSP IP/OBS HIGH 50: CPT | Performed by: FAMILY MEDICINE

## 2018-11-30 PROCEDURE — 1210000000 HC MED SURG R&B

## 2018-11-30 PROCEDURE — 80053 COMPREHEN METABOLIC PANEL: CPT

## 2018-11-30 PROCEDURE — 6360000002 HC RX W HCPCS: Performed by: FAMILY MEDICINE

## 2018-11-30 RX ORDER — POTASSIUM CHLORIDE 20MEQ/15ML
40 LIQUID (ML) ORAL
Status: COMPLETED | OUTPATIENT
Start: 2018-11-30 | End: 2018-11-30

## 2018-11-30 RX ORDER — HYDRALAZINE HYDROCHLORIDE 20 MG/ML
5 INJECTION INTRAMUSCULAR; INTRAVENOUS EVERY 6 HOURS PRN
Status: DISCONTINUED | OUTPATIENT
Start: 2018-11-30 | End: 2018-12-01 | Stop reason: HOSPADM

## 2018-11-30 RX ORDER — LAMOTRIGINE 150 MG/1
150 TABLET ORAL DAILY
Status: DISCONTINUED | OUTPATIENT
Start: 2018-12-01 | End: 2018-12-01 | Stop reason: HOSPADM

## 2018-11-30 RX ORDER — OXYCODONE HYDROCHLORIDE AND ACETAMINOPHEN 5; 325 MG/1; MG/1
1 TABLET ORAL EVERY 6 HOURS PRN
Status: DISCONTINUED | OUTPATIENT
Start: 2018-11-30 | End: 2018-12-01 | Stop reason: HOSPADM

## 2018-11-30 RX ADMIN — LORAZEPAM 1 MG: 1 TABLET ORAL at 07:44

## 2018-11-30 RX ADMIN — CLONIDINE HYDROCHLORIDE 0.1 MG: 0.1 TABLET ORAL at 19:55

## 2018-11-30 RX ADMIN — GABAPENTIN 300 MG: 300 CAPSULE ORAL at 19:56

## 2018-11-30 RX ADMIN — GABAPENTIN 300 MG: 300 CAPSULE ORAL at 08:03

## 2018-11-30 RX ADMIN — GABAPENTIN 300 MG: 300 CAPSULE ORAL at 13:08

## 2018-11-30 RX ADMIN — FOLIC ACID 1 MG: 1 TABLET ORAL at 08:03

## 2018-11-30 RX ADMIN — CLONIDINE HYDROCHLORIDE 0.1 MG: 0.1 TABLET ORAL at 13:08

## 2018-11-30 RX ADMIN — NEFAZODONE HYDROCHLORIDE 150 MG: 50 TABLET ORAL at 08:05

## 2018-11-30 RX ADMIN — Medication 10 ML: at 08:04

## 2018-11-30 RX ADMIN — OXYCODONE HYDROCHLORIDE AND ACETAMINOPHEN 1 TABLET: 5; 325 TABLET ORAL at 18:08

## 2018-11-30 RX ADMIN — POTASSIUM CHLORIDE 40 MEQ: 20 SOLUTION ORAL at 09:37

## 2018-11-30 RX ADMIN — Medication 100 MG: at 08:03

## 2018-11-30 RX ADMIN — HYDRALAZINE HYDROCHLORIDE 5 MG: 20 INJECTION INTRAMUSCULAR; INTRAVENOUS at 18:35

## 2018-11-30 RX ADMIN — DIPHENHYDRAMINE HYDROCHLORIDE 25 MG: 50 INJECTION, SOLUTION INTRAMUSCULAR; INTRAVENOUS at 18:08

## 2018-11-30 RX ADMIN — POTASSIUM CHLORIDE 40 MEQ: 20 SOLUTION ORAL at 12:24

## 2018-11-30 RX ADMIN — LORAZEPAM 3 MG: 1 TABLET ORAL at 04:52

## 2018-11-30 RX ADMIN — ENOXAPARIN SODIUM 40 MG: 40 INJECTION SUBCUTANEOUS at 08:03

## 2018-11-30 RX ADMIN — LORAZEPAM 1 MG: 1 TABLET ORAL at 13:31

## 2018-11-30 RX ADMIN — SODIUM CHLORIDE, POTASSIUM CHLORIDE, SODIUM LACTATE AND CALCIUM CHLORIDE: 600; 310; 30; 20 INJECTION, SOLUTION INTRAVENOUS at 19:54

## 2018-11-30 RX ADMIN — LAMOTRIGINE 150 MG: 100 TABLET ORAL at 08:04

## 2018-11-30 RX ADMIN — DIPHENHYDRAMINE HYDROCHLORIDE 25 MG: 50 INJECTION, SOLUTION INTRAMUSCULAR; INTRAVENOUS at 05:46

## 2018-11-30 RX ADMIN — NEFAZODONE HYDROCHLORIDE 150 MG: 50 TABLET ORAL at 19:55

## 2018-11-30 RX ADMIN — POTASSIUM CHLORIDE 10 MEQ: 7.46 INJECTION, SOLUTION INTRAVENOUS at 08:15

## 2018-11-30 RX ADMIN — Medication 10 ML: at 19:56

## 2018-11-30 RX ADMIN — ASPIRIN 325 MG: 325 TABLET, COATED ORAL at 08:03

## 2018-11-30 RX ADMIN — VALSARTAN 80 MG: 80 TABLET, FILM COATED ORAL at 08:03

## 2018-11-30 RX ADMIN — CLONIDINE HYDROCHLORIDE 0.1 MG: 0.1 TABLET ORAL at 08:05

## 2018-11-30 RX ADMIN — LORAZEPAM 1 MG: 1 TABLET ORAL at 16:59

## 2018-11-30 RX ADMIN — DOCUSATE SODIUM 100 MG: 100 CAPSULE, LIQUID FILLED ORAL at 08:02

## 2018-11-30 RX ADMIN — FLUOXETINE 20 MG: 20 CAPSULE ORAL at 08:03

## 2018-11-30 RX ADMIN — LORAZEPAM 1 MG: 1 TABLET ORAL at 20:02

## 2018-11-30 ASSESSMENT — PAIN DESCRIPTION - PAIN TYPE: TYPE: CHRONIC PAIN

## 2018-11-30 ASSESSMENT — PAIN SCALES - GENERAL
PAINLEVEL_OUTOF10: 8
PAINLEVEL_OUTOF10: 6
PAINLEVEL_OUTOF10: 4

## 2018-11-30 ASSESSMENT — PAIN DESCRIPTION - LOCATION: LOCATION: BACK

## 2018-11-30 NOTE — PROGRESS NOTES
Dr. Kandace Olmos stopped giving her the ADHD medicine but wants it back.
Anxious, denies hallucinations. Medications:      lactated ringers 100 mL/hr at 11/29/18 1023      potassium chloride  40 mEq Oral Q2H    sodium chloride flush  10 mL Intravenous 2 times per day    docusate sodium  100 mg Oral BID    enoxaparin  40 mg Subcutaneous Daily    FLUoxetine  20 mg Oral Daily    gabapentin  300 mg Oral TID    lamoTRIgine  150 mg Oral Daily    nefazodone  150 mg Oral BID    valsartan  80 mg Oral Daily    aspirin  325 mg Oral Daily    cloNIDine  0.1 mg Oral TID    thiamine  100 mg Oral Daily    folic acid  1 mg Oral Daily     sodium chloride flush, LORazepam **OR** LORazepam **OR** LORazepam **OR** LORazepam **OR** LORazepam **OR** LORazepam **OR** LORazepam **OR** LORazepam, potassium chloride, magnesium sulfate, bisacodyl, ondansetron, haloperidol lactate, diphenhydrAMINE  DIET CARDIAC;     DVT Prophylaxis: SCD    Lab and other Data:     Recent Labs      11/28/18   1711  11/30/18   0128   WBC  8.3  2.7*   HGB  15.9  10.8*   PLT  68*  39*     Recent Labs      11/28/18   1711  11/29/18   0906  11/30/18   0128   NA  145  141  140   K  3.2*  3.5  3.2*   CL  106  108  104   CO2  23  17*  21*   BUN  3*  3*  3*   CREATININE  <0.5  0.5  <0.5   GLUCOSE  154*  103  109     Recent Labs      11/28/18   1711  11/29/18   0906  11/30/18   0128   AST  375*  316*  178*   ALT  134*  104*  73*   BILITOT  1.1  1.3*  1.6*   ALKPHOS  224*  163*  146*     Troponin T:   Recent Labs      11/28/18   1711   TROPONINI  <0.01     Pro-BNP: No results for input(s): BNP in the last 72 hours. INR:   Recent Labs      11/28/18   1711   INR  1.10     ABGs: No results found for: PHART, PO2ART, IOT4ATW  UA:  Recent Labs      11/28/18   1745   COLORU  YELLOW   PHUR  6.0   CLARITYU  Clear   SPECGRAV  1.015   LEUKOCYTESUR  Negative   UROBILINOGEN  0.2   BILIRUBINUR  Negative   BLOODU  Negative   GLUCOSEU  Negative       RAD:   XR CHEST PORTABLE 11/28/18  1. No acute disease.      Micro: C Diff pending  Patient

## 2018-12-01 VITALS
SYSTOLIC BLOOD PRESSURE: 125 MMHG | TEMPERATURE: 97.7 F | BODY MASS INDEX: 23.87 KG/M2 | OXYGEN SATURATION: 97 % | RESPIRATION RATE: 20 BRPM | WEIGHT: 134.7 LBS | HEIGHT: 63 IN | HEART RATE: 71 BPM | DIASTOLIC BLOOD PRESSURE: 81 MMHG

## 2018-12-01 LAB
ALBUMIN SERPL-MCNC: 3.5 G/DL (ref 3.5–5.2)
ALP BLD-CCNC: 145 U/L (ref 35–104)
ALT SERPL-CCNC: 56 U/L (ref 5–33)
ANION GAP SERPL CALCULATED.3IONS-SCNC: 10 MMOL/L (ref 7–19)
AST SERPL-CCNC: 102 U/L (ref 5–32)
BASOPHILS ABSOLUTE: 0 K/UL (ref 0–0.2)
BASOPHILS RELATIVE PERCENT: 0.3 % (ref 0–1)
BILIRUB SERPL-MCNC: 1.1 MG/DL (ref 0.2–1.2)
BUN BLDV-MCNC: 3 MG/DL (ref 6–20)
CALCIUM SERPL-MCNC: 8.6 MG/DL (ref 8.6–10)
CHLORIDE BLD-SCNC: 106 MMOL/L (ref 98–111)
CO2: 22 MMOL/L (ref 22–29)
CREAT SERPL-MCNC: <0.5 MG/DL (ref 0.5–0.9)
EOSINOPHILS ABSOLUTE: 0.1 K/UL (ref 0–0.6)
EOSINOPHILS RELATIVE PERCENT: 3.1 % (ref 0–5)
GFR NON-AFRICAN AMERICAN: >60
GLUCOSE BLD-MCNC: 115 MG/DL (ref 74–109)
HCT VFR BLD CALC: 32.8 % (ref 37–47)
HEMOGLOBIN: 11.2 G/DL (ref 12–16)
LYMPHOCYTES ABSOLUTE: 1.1 K/UL (ref 1.1–4.5)
LYMPHOCYTES RELATIVE PERCENT: 35.8 % (ref 20–40)
MCH RBC QN AUTO: 35.6 PG (ref 27–31)
MCHC RBC AUTO-ENTMCNC: 34.1 G/DL (ref 33–37)
MCV RBC AUTO: 104.1 FL (ref 81–99)
MONOCYTES ABSOLUTE: 0.1 K/UL (ref 0–0.9)
MONOCYTES RELATIVE PERCENT: 4.8 % (ref 0–10)
NEUTROPHILS ABSOLUTE: 1.6 K/UL (ref 1.5–7.5)
NEUTROPHILS RELATIVE PERCENT: 55.7 % (ref 50–65)
PDW BLD-RTO: 12.8 % (ref 11.5–14.5)
PLATELET # BLD: 43 K/UL (ref 130–400)
PMV BLD AUTO: 11.2 FL (ref 9.4–12.3)
POTASSIUM SERPL-SCNC: 4 MMOL/L (ref 3.5–5)
RBC # BLD: 3.15 M/UL (ref 4.2–5.4)
SODIUM BLD-SCNC: 138 MMOL/L (ref 136–145)
TOTAL PROTEIN: 6 G/DL (ref 6.6–8.7)
WBC # BLD: 2.9 K/UL (ref 4.8–10.8)

## 2018-12-01 PROCEDURE — 6360000002 HC RX W HCPCS: Performed by: INTERNAL MEDICINE

## 2018-12-01 PROCEDURE — 6370000000 HC RX 637 (ALT 250 FOR IP): Performed by: FAMILY MEDICINE

## 2018-12-01 PROCEDURE — 36415 COLL VENOUS BLD VENIPUNCTURE: CPT

## 2018-12-01 PROCEDURE — 99239 HOSP IP/OBS DSCHRG MGMT >30: CPT | Performed by: FAMILY MEDICINE

## 2018-12-01 PROCEDURE — 2580000003 HC RX 258: Performed by: INTERNAL MEDICINE

## 2018-12-01 PROCEDURE — 80053 COMPREHEN METABOLIC PANEL: CPT

## 2018-12-01 PROCEDURE — 6370000000 HC RX 637 (ALT 250 FOR IP): Performed by: INTERNAL MEDICINE

## 2018-12-01 PROCEDURE — 85025 COMPLETE CBC W/AUTO DIFF WBC: CPT

## 2018-12-01 RX ORDER — DIAZEPAM 5 MG/1
5 TABLET ORAL EVERY 8 HOURS PRN
Qty: 30 TABLET | Refills: 0 | Status: SHIPPED | OUTPATIENT
Start: 2018-12-01 | End: 2018-12-11

## 2018-12-01 RX ADMIN — FLUOXETINE 20 MG: 20 CAPSULE ORAL at 09:03

## 2018-12-01 RX ADMIN — LORAZEPAM 1 MG: 1 TABLET ORAL at 03:24

## 2018-12-01 RX ADMIN — LAMOTRIGINE 150 MG: 150 TABLET ORAL at 09:02

## 2018-12-01 RX ADMIN — DOCUSATE SODIUM 100 MG: 100 CAPSULE, LIQUID FILLED ORAL at 09:03

## 2018-12-01 RX ADMIN — OXYCODONE HYDROCHLORIDE AND ACETAMINOPHEN 1 TABLET: 5; 325 TABLET ORAL at 01:50

## 2018-12-01 RX ADMIN — CLONIDINE HYDROCHLORIDE 0.1 MG: 0.1 TABLET ORAL at 09:03

## 2018-12-01 RX ADMIN — ENOXAPARIN SODIUM 40 MG: 40 INJECTION SUBCUTANEOUS at 09:02

## 2018-12-01 RX ADMIN — CLONIDINE HYDROCHLORIDE 0.1 MG: 0.1 TABLET ORAL at 14:35

## 2018-12-01 RX ADMIN — Medication 10 ML: at 09:03

## 2018-12-01 RX ADMIN — FOLIC ACID 1 MG: 1 TABLET ORAL at 09:02

## 2018-12-01 RX ADMIN — NEFAZODONE HYDROCHLORIDE 150 MG: 50 TABLET ORAL at 09:02

## 2018-12-01 RX ADMIN — GABAPENTIN 300 MG: 300 CAPSULE ORAL at 09:03

## 2018-12-01 RX ADMIN — LORAZEPAM 1 MG: 2 INJECTION INTRAMUSCULAR; INTRAVENOUS at 14:04

## 2018-12-01 RX ADMIN — OXYCODONE HYDROCHLORIDE AND ACETAMINOPHEN 1 TABLET: 5; 325 TABLET ORAL at 08:47

## 2018-12-01 RX ADMIN — VALSARTAN 80 MG: 80 TABLET, FILM COATED ORAL at 09:02

## 2018-12-01 RX ADMIN — OXYCODONE HYDROCHLORIDE AND ACETAMINOPHEN 1 TABLET: 5; 325 TABLET ORAL at 15:12

## 2018-12-01 RX ADMIN — GABAPENTIN 300 MG: 300 CAPSULE ORAL at 14:35

## 2018-12-01 RX ADMIN — Medication 100 MG: at 09:02

## 2018-12-01 RX ADMIN — ASPIRIN 325 MG: 325 TABLET, COATED ORAL at 09:03

## 2018-12-01 ASSESSMENT — PAIN SCALES - GENERAL
PAINLEVEL_OUTOF10: 7
PAINLEVEL_OUTOF10: 7
PAINLEVEL_OUTOF10: 4
PAINLEVEL_OUTOF10: 7

## 2018-12-01 NOTE — DISCHARGE INSTR - COC
Continuity of Care Form    Patient Name: Farshad Lambert   :  1970  MRN:  [id number]    Admit date:  2018  Discharge date:  ***    Code Status Order: Full Code   Advance Directives:   Advance Care Flowsheet Documentation     Date/Time Healthcare Directive Type of Healthcare Directive Copy in [address] Agent's Name Healthcare Agent's Phone Number    [phone]  No, patient does not have an advance directive for healthcare treatment -- -- -- -- --          Admitting Physician:  Charlee Figueredo MD  PCP: Shantanu Blackman MD    Discharging Nurse: [facility] Unit/Room#: 7431/961-91  Discharging Unit Phone Number: ***    Emergency Contact:   Extended Emergency Contact Information  Primary Emergency Contact: Shree Gablane of [address] [place] [address] Phone: [phone]  Relation: Other    Past Surgical History:  Past Surgical History:   Procedure Laterality Date    BRAIN SURGERY      brain bypass for moyamoya       Immunization History: There is no immunization history on file for this patient.     Active Problems:  Patient Active Problem List   Diagnosis Code    Insufficient prenatal care O09.30    Supervision of high-risk pregnancy O09.90    Positive urine drug screen R82.5    Depression F32.9    Migraine headache G43.909    Alcohol use Z78.9    Delirium tremens ([address] F10.231    Hypokalemia E87.6    Dehydration E86.0    Transaminitis R74.0    Hypomagnesemia E83.42       Isolation/Infection:   Isolation          No Isolation            Nurse Assessment:  Last Vital Signs: /81   Pulse 71   Temp 97.7 °F (36.5 °C) (Temporal)   Resp 20   Ht 5' 3\" (1.6 m)   Wt 134 lb 11.2 oz (61.1 kg)   SpO2 97%   BMI 23.86 kg/m²     Last documented pain score (0-10 scale): Pain Level: 7  Last Weight:   Wt Readings from Last 1 Encounters:   18 134 lb 11.2 oz (61.1 kg)     Mental Status:  {IP PT MENTAL STATUS:42173}    IV Access:  {[facility] [place]/SOCIAL WORK SECTION    Inpatient Status Date: ***    Readmission Risk Assessment Score:  Readmission Risk              Risk of Unplanned Readmission:        10           Discharging to Facility/ Agency   · Name:   · Address:  · Phone:  · Fax:    Dialysis Facility (if applicable)   · Name:  · Address:  · Dialysis Schedule:  · Phone:  · Fax:    / signature: {Esignature:407191888}    PHYSICIAN SECTION    Prognosis: {Prognosis:6687377648}    Condition at Discharge: [address] [place] [address] Patient Condition:518433878}    Rehab Potential (if transferring to Rehab): {Prognosis:5769618428}    Recommended Labs or Other Treatments After Discharge: ***    Physician Certification: I certify the above information and transfer of [address]  is necessary for the continuing treatment of the diagnosis listed and that she requires {Admit to Appropriate Level of Care:14902} for {GREATER/LESS:164243066} 30 days.      Update Admission H&P: {CHP DME Changes in OXWRX:935496637}    PHYSICIAN SIGNATURE:  {Esignature:732666515}

## 2018-12-03 LAB
EKG P AXIS: 58 DEGREES
EKG P-R INTERVAL: 164 MS
EKG Q-T INTERVAL: 350 MS
EKG QRS DURATION: 78 MS
EKG QTC CALCULATION (BAZETT): 432 MS
EKG T AXIS: 43 DEGREES

## 2018-12-29 PROBLEM — E86.0 DEHYDRATION: Status: RESOLVED | Noted: 2018-11-29 | Resolved: 2018-12-29

## 2019-02-12 ENCOUNTER — HOSPITAL ENCOUNTER (INPATIENT)
Facility: HOSPITAL | Age: 49
LOS: 8 days | Discharge: HOME | End: 2019-02-20
Attending: INTERNAL MEDICINE | Admitting: INTERNAL MEDICINE

## 2019-02-12 ENCOUNTER — APPOINTMENT (OUTPATIENT)
Dept: GENERAL RADIOLOGY | Facility: HOSPITAL | Age: 49
End: 2019-02-12

## 2019-02-12 ENCOUNTER — APPOINTMENT (OUTPATIENT)
Dept: CT IMAGING | Facility: HOSPITAL | Age: 49
End: 2019-02-12

## 2019-02-12 DIAGNOSIS — K85.20 ALCOHOL-INDUCED ACUTE PANCREATITIS, UNSPECIFIED COMPLICATION STATUS: Primary | ICD-10-CM

## 2019-02-12 PROBLEM — F41.9 ANXIETY AND DEPRESSION: Status: ACTIVE | Noted: 2019-02-12

## 2019-02-12 PROBLEM — I10 ESSENTIAL HYPERTENSION: Status: ACTIVE | Noted: 2019-02-12

## 2019-02-12 PROBLEM — F10.931 DTS (DELIRIUM TREMENS) (HCC): Status: ACTIVE | Noted: 2019-02-12

## 2019-02-12 PROBLEM — R10.13 ACUTE EPIGASTRIC PAIN: Status: ACTIVE | Noted: 2019-02-12

## 2019-02-12 PROBLEM — F32.A ANXIETY AND DEPRESSION: Status: ACTIVE | Noted: 2019-02-12

## 2019-02-12 PROBLEM — E78.00 HYPERCHOLESTEREMIA: Status: ACTIVE | Noted: 2019-02-12

## 2019-02-12 LAB
ALBUMIN SERPL-MCNC: 4.9 G/DL (ref 3.5–5)
ALBUMIN/GLOB SERPL: 1.6 G/DL (ref 1.1–2.5)
ALP SERPL-CCNC: 126 U/L (ref 24–120)
ALT SERPL W P-5'-P-CCNC: 76 U/L (ref 0–54)
AMPHET+METHAMPHET UR QL: NEGATIVE
ANION GAP SERPL CALCULATED.3IONS-SCNC: 11 MMOL/L (ref 4–13)
AST SERPL-CCNC: 70 U/L (ref 7–45)
BARBITURATES UR QL SCN: NEGATIVE
BASOPHILS # BLD AUTO: 0.02 10*3/MM3 (ref 0–0.2)
BASOPHILS NFR BLD AUTO: 0.3 % (ref 0–2)
BENZODIAZ UR QL SCN: NEGATIVE
BILIRUB SERPL-MCNC: 1.1 MG/DL (ref 0.1–1)
BILIRUB UR QL STRIP: NEGATIVE
BUN BLD-MCNC: 4 MG/DL (ref 5–21)
BUN/CREAT SERPL: 8.9 (ref 7–25)
CALCIUM SPEC-SCNC: 9.4 MG/DL (ref 8.4–10.4)
CANNABINOIDS SERPL QL: NEGATIVE
CHLORIDE SERPL-SCNC: 93 MMOL/L (ref 98–110)
CLARITY UR: CLEAR
CO2 SERPL-SCNC: 26 MMOL/L (ref 24–31)
COCAINE UR QL: NEGATIVE
COLOR UR: YELLOW
CREAT BLD-MCNC: 0.45 MG/DL (ref 0.5–1.4)
D-LACTATE SERPL-SCNC: 0.7 MMOL/L (ref 0.5–2)
DEPRECATED RDW RBC AUTO: 60.6 FL (ref 40–54)
EOSINOPHIL # BLD AUTO: 0.06 10*3/MM3 (ref 0–0.7)
EOSINOPHIL NFR BLD AUTO: 0.9 % (ref 0–4)
ERYTHROCYTE [DISTWIDTH] IN BLOOD BY AUTOMATED COUNT: 17.2 % (ref 12–15)
ETHANOL UR QL: <0.01 %
GFR SERPL CREATININE-BSD FRML MDRD: 149 ML/MIN/1.73
GLOBULIN UR ELPH-MCNC: 3.1 GM/DL
GLUCOSE BLD-MCNC: 131 MG/DL (ref 70–100)
GLUCOSE UR STRIP-MCNC: NEGATIVE MG/DL
HCT VFR BLD AUTO: 31.5 % (ref 37–47)
HGB BLD-MCNC: 11.4 G/DL (ref 12–16)
HGB UR QL STRIP.AUTO: NEGATIVE
HOLD SPECIMEN: NORMAL
HOLD SPECIMEN: NORMAL
KETONES UR QL STRIP: NEGATIVE
LEUKOCYTE ESTERASE UR QL STRIP.AUTO: NEGATIVE
LIPASE SERPL-CCNC: 7312 U/L (ref 23–203)
LYMPHOCYTES # BLD AUTO: 1.12 10*3/MM3 (ref 0.72–4.86)
LYMPHOCYTES NFR BLD AUTO: 16.4 % (ref 15–45)
MCH RBC QN AUTO: 34.9 PG (ref 28–32)
MCHC RBC AUTO-ENTMCNC: 36.2 G/DL (ref 33–36)
MCV RBC AUTO: 96.3 FL (ref 82–98)
METHADONE UR QL SCN: NEGATIVE
MONOCYTES # BLD AUTO: 0.51 10*3/MM3 (ref 0.19–1.3)
MONOCYTES NFR BLD AUTO: 7.5 % (ref 4–12)
NEUTROPHILS # BLD AUTO: 5.11 10*3/MM3 (ref 1.87–8.4)
NEUTROPHILS NFR BLD AUTO: 74.6 % (ref 39–78)
NITRITE UR QL STRIP: NEGATIVE
OPIATES UR QL: POSITIVE
PCP UR QL SCN: NEGATIVE
PH UR STRIP.AUTO: 7 [PH] (ref 5–8)
PLATELET # BLD AUTO: 108 10*3/MM3 (ref 130–400)
PMV BLD AUTO: 9.6 FL (ref 6–12)
POTASSIUM BLD-SCNC: 4.1 MMOL/L (ref 3.5–5.3)
PROT SERPL-MCNC: 8 G/DL (ref 6.3–8.7)
PROT UR QL STRIP: NEGATIVE
RBC # BLD AUTO: 3.27 10*6/MM3 (ref 4.2–5.4)
SODIUM BLD-SCNC: 130 MMOL/L (ref 135–145)
SP GR UR STRIP: <=1.005 (ref 1–1.03)
TROPONIN I SERPL-MCNC: <0.012 NG/ML (ref 0–0.03)
UROBILINOGEN UR QL STRIP: NORMAL
WBC NRBC COR # BLD: 6.84 10*3/MM3 (ref 4.8–10.8)
WHOLE BLOOD HOLD SPECIMEN: NORMAL
WHOLE BLOOD HOLD SPECIMEN: NORMAL

## 2019-02-12 PROCEDURE — 81003 URINALYSIS AUTO W/O SCOPE: CPT | Performed by: EMERGENCY MEDICINE

## 2019-02-12 PROCEDURE — 83690 ASSAY OF LIPASE: CPT | Performed by: EMERGENCY MEDICINE

## 2019-02-12 PROCEDURE — 25010000002 ONDANSETRON PER 1 MG: Performed by: EMERGENCY MEDICINE

## 2019-02-12 PROCEDURE — 93005 ELECTROCARDIOGRAM TRACING: CPT

## 2019-02-12 PROCEDURE — 80053 COMPREHEN METABOLIC PANEL: CPT | Performed by: EMERGENCY MEDICINE

## 2019-02-12 PROCEDURE — 80307 DRUG TEST PRSMV CHEM ANLYZR: CPT | Performed by: EMERGENCY MEDICINE

## 2019-02-12 PROCEDURE — 84484 ASSAY OF TROPONIN QUANT: CPT | Performed by: EMERGENCY MEDICINE

## 2019-02-12 PROCEDURE — 25010000002 IOPAMIDOL 61 % SOLUTION: Performed by: PHYSICIAN ASSISTANT

## 2019-02-12 PROCEDURE — 93010 ELECTROCARDIOGRAM REPORT: CPT | Performed by: INTERNAL MEDICINE

## 2019-02-12 PROCEDURE — 85025 COMPLETE CBC W/AUTO DIFF WBC: CPT | Performed by: EMERGENCY MEDICINE

## 2019-02-12 PROCEDURE — 74177 CT ABD & PELVIS W/CONTRAST: CPT

## 2019-02-12 PROCEDURE — 25010000002 MORPHINE PER 10 MG: Performed by: EMERGENCY MEDICINE

## 2019-02-12 PROCEDURE — 93005 ELECTROCARDIOGRAM TRACING: CPT | Performed by: INTERNAL MEDICINE

## 2019-02-12 PROCEDURE — 99284 EMERGENCY DEPT VISIT MOD MDM: CPT

## 2019-02-12 PROCEDURE — 83605 ASSAY OF LACTIC ACID: CPT | Performed by: EMERGENCY MEDICINE

## 2019-02-12 PROCEDURE — 71045 X-RAY EXAM CHEST 1 VIEW: CPT

## 2019-02-12 RX ORDER — VALSARTAN 80 MG/1
80 TABLET ORAL DAILY
Status: ON HOLD | COMMUNITY
End: 2019-02-20 | Stop reason: SDUPTHER

## 2019-02-12 RX ORDER — FLUOXETINE HYDROCHLORIDE 20 MG/1
40 CAPSULE ORAL DAILY
Status: ON HOLD | COMMUNITY
End: 2019-02-15

## 2019-02-12 RX ORDER — HYDROCODONE BITARTRATE AND ACETAMINOPHEN 7.5; 325 MG/1; MG/1
1 TABLET ORAL EVERY 8 HOURS PRN
COMMUNITY
End: 2019-03-15 | Stop reason: SDUPTHER

## 2019-02-12 RX ORDER — ROSUVASTATIN CALCIUM 20 MG/1
20 TABLET, COATED ORAL NIGHTLY
Status: ON HOLD | COMMUNITY
End: 2019-04-18 | Stop reason: DRUGHIGH

## 2019-02-12 RX ORDER — GABAPENTIN 600 MG/1
600 TABLET ORAL 3 TIMES DAILY
COMMUNITY

## 2019-02-12 RX ORDER — ONDANSETRON 2 MG/ML
4 INJECTION INTRAMUSCULAR; INTRAVENOUS ONCE
Status: COMPLETED | OUTPATIENT
Start: 2019-02-12 | End: 2019-02-12

## 2019-02-12 RX ORDER — LAMOTRIGINE 150 MG/1
150 TABLET ORAL 2 TIMES DAILY
COMMUNITY

## 2019-02-12 RX ORDER — VENLAFAXINE HYDROCHLORIDE 150 MG/1
150 CAPSULE, EXTENDED RELEASE ORAL DAILY
COMMUNITY

## 2019-02-12 RX ADMIN — ONDANSETRON HYDROCHLORIDE 4 MG: 2 INJECTION INTRAMUSCULAR; INTRAVENOUS at 22:08

## 2019-02-12 RX ADMIN — ONDANSETRON HYDROCHLORIDE 4 MG: 2 INJECTION, SOLUTION INTRAMUSCULAR; INTRAVENOUS at 23:28

## 2019-02-12 RX ADMIN — SODIUM CHLORIDE 1000 ML: 9 INJECTION, SOLUTION INTRAVENOUS at 22:10

## 2019-02-12 RX ADMIN — IOPAMIDOL 75 ML: 612 INJECTION, SOLUTION INTRAVENOUS at 22:19

## 2019-02-12 RX ADMIN — HYDROMORPHONE HYDROCHLORIDE 1 MG: 1 INJECTION, SOLUTION INTRAMUSCULAR; INTRAVENOUS; SUBCUTANEOUS at 23:27

## 2019-02-12 RX ADMIN — MORPHINE SULFATE 4 MG: 4 INJECTION INTRAVENOUS at 22:08

## 2019-02-13 ENCOUNTER — APPOINTMENT (OUTPATIENT)
Dept: MRI IMAGING | Facility: HOSPITAL | Age: 49
End: 2019-02-13

## 2019-02-13 PROBLEM — F10.20 THROMBOCYTOPENIA CONCURRENT WITH AND DUE TO ALCOHOLISM (HCC): Status: ACTIVE | Noted: 2019-02-13

## 2019-02-13 PROBLEM — D69.59 THROMBOCYTOPENIA CONCURRENT WITH AND DUE TO ALCOHOLISM: Status: ACTIVE | Noted: 2019-02-13

## 2019-02-13 LAB
ALBUMIN SERPL-MCNC: 4.6 G/DL (ref 3.5–5)
ALBUMIN/GLOB SERPL: 1.7 G/DL (ref 1.1–2.5)
ALP SERPL-CCNC: 112 U/L (ref 24–120)
ALT SERPL W P-5'-P-CCNC: 62 U/L (ref 0–54)
AMYLASE SERPL-CCNC: 588 U/L (ref 30–110)
ANION GAP SERPL CALCULATED.3IONS-SCNC: 9 MMOL/L (ref 4–13)
ARTICHOKE IGE QN: 112 MG/DL (ref 0–99)
AST SERPL-CCNC: 61 U/L (ref 7–45)
BILIRUB SERPL-MCNC: 0.9 MG/DL (ref 0.1–1)
BUN BLD-MCNC: 3 MG/DL (ref 5–21)
BUN/CREAT SERPL: 7.5 (ref 7–25)
CALCIUM SPEC-SCNC: 9 MG/DL (ref 8.4–10.4)
CHLORIDE SERPL-SCNC: 100 MMOL/L (ref 98–110)
CHOLEST SERPL-MCNC: 184 MG/DL (ref 130–200)
CO2 SERPL-SCNC: 25 MMOL/L (ref 24–31)
CREAT BLD-MCNC: 0.4 MG/DL (ref 0.5–1.4)
DEPRECATED RDW RBC AUTO: 61.8 FL (ref 40–54)
ERYTHROCYTE [DISTWIDTH] IN BLOOD BY AUTOMATED COUNT: 17.2 % (ref 12–15)
GFR SERPL CREATININE-BSD FRML MDRD: >150 ML/MIN/1.73
GLOBULIN UR ELPH-MCNC: 2.7 GM/DL
GLUCOSE BLD-MCNC: 130 MG/DL (ref 70–100)
HBA1C MFR BLD: 4.6 %
HCT VFR BLD AUTO: 31.6 % (ref 37–47)
HDLC SERPL-MCNC: 63 MG/DL
HGB BLD-MCNC: 11.5 G/DL (ref 12–16)
LDLC/HDLC SERPL: 1.68 {RATIO}
LIPASE SERPL-CCNC: 7170 U/L (ref 23–203)
MCH RBC QN AUTO: 35.4 PG (ref 28–32)
MCHC RBC AUTO-ENTMCNC: 36.4 G/DL (ref 33–36)
MCV RBC AUTO: 97.2 FL (ref 82–98)
PLATELET # BLD AUTO: 107 10*3/MM3 (ref 130–400)
PMV BLD AUTO: 10.2 FL (ref 6–12)
POTASSIUM BLD-SCNC: 4.3 MMOL/L (ref 3.5–5.3)
PROT SERPL-MCNC: 7.3 G/DL (ref 6.3–8.7)
RBC # BLD AUTO: 3.25 10*6/MM3 (ref 4.2–5.4)
SODIUM BLD-SCNC: 134 MMOL/L (ref 135–145)
TRIGL SERPL-MCNC: 75 MG/DL (ref 0–149)
WBC NRBC COR # BLD: 6.21 10*3/MM3 (ref 4.8–10.8)

## 2019-02-13 PROCEDURE — 25010000002 HYDROMORPHONE PER 4 MG: Performed by: NURSE PRACTITIONER

## 2019-02-13 PROCEDURE — 94760 N-INVAS EAR/PLS OXIMETRY 1: CPT

## 2019-02-13 PROCEDURE — 99406 BEHAV CHNG SMOKING 3-10 MIN: CPT

## 2019-02-13 PROCEDURE — 83036 HEMOGLOBIN GLYCOSYLATED A1C: CPT | Performed by: NURSE PRACTITIONER

## 2019-02-13 PROCEDURE — A9577 INJ MULTIHANCE: HCPCS | Performed by: INTERNAL MEDICINE

## 2019-02-13 PROCEDURE — 74183 MRI ABD W/O CNTR FLWD CNTR: CPT

## 2019-02-13 PROCEDURE — 94799 UNLISTED PULMONARY SVC/PX: CPT

## 2019-02-13 PROCEDURE — 0 GADOBENATE DIMEGLUMINE 529 MG/ML SOLUTION: Performed by: INTERNAL MEDICINE

## 2019-02-13 PROCEDURE — 83690 ASSAY OF LIPASE: CPT | Performed by: NURSE PRACTITIONER

## 2019-02-13 PROCEDURE — 82150 ASSAY OF AMYLASE: CPT | Performed by: NURSE PRACTITIONER

## 2019-02-13 PROCEDURE — 25010000002 LORAZEPAM PER 2 MG: Performed by: INTERNAL MEDICINE

## 2019-02-13 PROCEDURE — 80061 LIPID PANEL: CPT | Performed by: NURSE PRACTITIONER

## 2019-02-13 PROCEDURE — 25010000002 LORAZEPAM PER 2 MG: Performed by: NURSE PRACTITIONER

## 2019-02-13 PROCEDURE — 25010000002 ENOXAPARIN PER 10 MG: Performed by: NURSE PRACTITIONER

## 2019-02-13 PROCEDURE — 25010000002 THIAMINE PER 100 MG: Performed by: NURSE PRACTITIONER

## 2019-02-13 PROCEDURE — 80053 COMPREHEN METABOLIC PANEL: CPT | Performed by: NURSE PRACTITIONER

## 2019-02-13 PROCEDURE — 85027 COMPLETE CBC AUTOMATED: CPT | Performed by: NURSE PRACTITIONER

## 2019-02-13 RX ORDER — LORAZEPAM 2 MG/ML
2 INJECTION INTRAMUSCULAR
Status: DISCONTINUED | OUTPATIENT
Start: 2019-02-13 | End: 2019-02-20 | Stop reason: HOSPADM

## 2019-02-13 RX ORDER — LABETALOL HYDROCHLORIDE 5 MG/ML
10 INJECTION, SOLUTION INTRAVENOUS EVERY 4 HOURS PRN
Status: DISCONTINUED | OUTPATIENT
Start: 2019-02-13 | End: 2019-02-20 | Stop reason: HOSPADM

## 2019-02-13 RX ORDER — FLUOXETINE HYDROCHLORIDE 20 MG/1
40 CAPSULE ORAL DAILY
Status: DISCONTINUED | OUTPATIENT
Start: 2019-02-13 | End: 2019-02-20 | Stop reason: HOSPADM

## 2019-02-13 RX ORDER — LORAZEPAM 2 MG/ML
1 INJECTION INTRAMUSCULAR EVERY 6 HOURS
Status: DISCONTINUED | OUTPATIENT
Start: 2019-02-13 | End: 2019-02-13

## 2019-02-13 RX ORDER — VENLAFAXINE HYDROCHLORIDE 75 MG/1
150 CAPSULE, EXTENDED RELEASE ORAL DAILY
Status: DISCONTINUED | OUTPATIENT
Start: 2019-02-13 | End: 2019-02-20 | Stop reason: HOSPADM

## 2019-02-13 RX ORDER — DIAZEPAM 2 MG/1
2 TABLET ORAL EVERY 8 HOURS SCHEDULED
Status: DISCONTINUED | OUTPATIENT
Start: 2019-02-13 | End: 2019-02-14

## 2019-02-13 RX ORDER — SODIUM CHLORIDE 9 MG/ML
50 INJECTION, SOLUTION INTRAVENOUS CONTINUOUS
Status: DISCONTINUED | OUTPATIENT
Start: 2019-02-13 | End: 2019-02-19

## 2019-02-13 RX ORDER — ONDANSETRON 4 MG/1
4 TABLET, ORALLY DISINTEGRATING ORAL EVERY 6 HOURS PRN
Status: DISCONTINUED | OUTPATIENT
Start: 2019-02-13 | End: 2019-02-20 | Stop reason: HOSPADM

## 2019-02-13 RX ORDER — SODIUM CHLORIDE 0.9 % (FLUSH) 0.9 %
3 SYRINGE (ML) INJECTION EVERY 12 HOURS SCHEDULED
Status: DISCONTINUED | OUTPATIENT
Start: 2019-02-13 | End: 2019-02-20 | Stop reason: HOSPADM

## 2019-02-13 RX ORDER — NALOXONE HCL 0.4 MG/ML
0.4 VIAL (ML) INJECTION
Status: DISCONTINUED | OUTPATIENT
Start: 2019-02-13 | End: 2019-02-15

## 2019-02-13 RX ORDER — ONDANSETRON 4 MG/1
4 TABLET, FILM COATED ORAL EVERY 6 HOURS PRN
Status: DISCONTINUED | OUTPATIENT
Start: 2019-02-13 | End: 2019-02-20 | Stop reason: HOSPADM

## 2019-02-13 RX ORDER — LABETALOL HYDROCHLORIDE 5 MG/ML
10 INJECTION, SOLUTION INTRAVENOUS EVERY 6 HOURS PRN
Status: DISCONTINUED | OUTPATIENT
Start: 2019-02-13 | End: 2019-02-13

## 2019-02-13 RX ORDER — LORAZEPAM 2 MG/ML
1 INJECTION INTRAMUSCULAR
Status: DISCONTINUED | OUTPATIENT
Start: 2019-02-13 | End: 2019-02-20 | Stop reason: HOSPADM

## 2019-02-13 RX ORDER — ONDANSETRON 2 MG/ML
4 INJECTION INTRAMUSCULAR; INTRAVENOUS EVERY 6 HOURS PRN
Status: DISCONTINUED | OUTPATIENT
Start: 2019-02-13 | End: 2019-02-20 | Stop reason: HOSPADM

## 2019-02-13 RX ORDER — LORAZEPAM 1 MG/1
1 TABLET ORAL
Status: DISCONTINUED | OUTPATIENT
Start: 2019-02-13 | End: 2019-02-20 | Stop reason: HOSPADM

## 2019-02-13 RX ORDER — SODIUM CHLORIDE 0.9 % (FLUSH) 0.9 %
3-10 SYRINGE (ML) INJECTION AS NEEDED
Status: DISCONTINUED | OUTPATIENT
Start: 2019-02-13 | End: 2019-02-20 | Stop reason: HOSPADM

## 2019-02-13 RX ORDER — LORAZEPAM 2 MG/ML
1 INJECTION INTRAMUSCULAR EVERY 8 HOURS
Status: DISCONTINUED | OUTPATIENT
Start: 2019-02-14 | End: 2019-02-13

## 2019-02-13 RX ORDER — NALOXONE HCL 0.4 MG/ML
0.4 VIAL (ML) INJECTION
Status: DISCONTINUED | OUTPATIENT
Start: 2019-02-13 | End: 2019-02-13

## 2019-02-13 RX ORDER — FAMOTIDINE 10 MG/ML
20 INJECTION, SOLUTION INTRAVENOUS EVERY 12 HOURS SCHEDULED
Status: DISCONTINUED | OUTPATIENT
Start: 2019-02-13 | End: 2019-02-15 | Stop reason: ALTCHOICE

## 2019-02-13 RX ORDER — DIAZEPAM 5 MG/ML
5 INJECTION, SOLUTION INTRAMUSCULAR; INTRAVENOUS EVERY 8 HOURS
Status: DISCONTINUED | OUTPATIENT
Start: 2019-02-13 | End: 2019-02-13

## 2019-02-13 RX ORDER — LORAZEPAM 1 MG/1
2 TABLET ORAL
Status: DISCONTINUED | OUTPATIENT
Start: 2019-02-13 | End: 2019-02-20 | Stop reason: HOSPADM

## 2019-02-13 RX ORDER — HYDROMORPHONE HCL 110MG/55ML
2 PATIENT CONTROLLED ANALGESIA SYRINGE INTRAVENOUS
Status: DISCONTINUED | OUTPATIENT
Start: 2019-02-13 | End: 2019-02-15

## 2019-02-13 RX ADMIN — ENOXAPARIN SODIUM 40 MG: 40 INJECTION SUBCUTANEOUS at 05:14

## 2019-02-13 RX ADMIN — HYDROMORPHONE HYDROCHLORIDE 1 MG: 1 INJECTION, SOLUTION INTRAMUSCULAR; INTRAVENOUS; SUBCUTANEOUS at 07:30

## 2019-02-13 RX ADMIN — HYDROMORPHONE HYDROCHLORIDE 1 MG: 1 INJECTION, SOLUTION INTRAMUSCULAR; INTRAVENOUS; SUBCUTANEOUS at 03:16

## 2019-02-13 RX ADMIN — HYDROMORPHONE HYDROCHLORIDE 1 MG: 1 INJECTION, SOLUTION INTRAMUSCULAR; INTRAVENOUS; SUBCUTANEOUS at 05:14

## 2019-02-13 RX ADMIN — LAMOTRIGINE 150 MG: 100 TABLET ORAL at 09:33

## 2019-02-13 RX ADMIN — SODIUM CHLORIDE, PRESERVATIVE FREE 3 ML: 5 INJECTION INTRAVENOUS at 20:22

## 2019-02-13 RX ADMIN — HYDROMORPHONE HYDROCHLORIDE 2 MG: 2 INJECTION, SOLUTION INTRAMUSCULAR; INTRAVENOUS; SUBCUTANEOUS at 14:07

## 2019-02-13 RX ADMIN — HYDROMORPHONE HYDROCHLORIDE 2 MG: 2 INJECTION, SOLUTION INTRAMUSCULAR; INTRAVENOUS; SUBCUTANEOUS at 12:18

## 2019-02-13 RX ADMIN — LORAZEPAM 2 MG: 2 INJECTION INTRAMUSCULAR; INTRAVENOUS at 22:57

## 2019-02-13 RX ADMIN — FAMOTIDINE 20 MG: 10 INJECTION INTRAVENOUS at 12:18

## 2019-02-13 RX ADMIN — SODIUM CHLORIDE 125 ML/HR: 9 INJECTION, SOLUTION INTRAVENOUS at 01:05

## 2019-02-13 RX ADMIN — LORAZEPAM 1 MG: 2 INJECTION, SOLUTION INTRAMUSCULAR; INTRAVENOUS at 15:20

## 2019-02-13 RX ADMIN — VENLAFAXINE HYDROCHLORIDE 150 MG: 75 CAPSULE, EXTENDED RELEASE ORAL at 09:33

## 2019-02-13 RX ADMIN — DIAZEPAM 2 MG: 2 TABLET ORAL at 20:21

## 2019-02-13 RX ADMIN — HYDROMORPHONE HYDROCHLORIDE 2 MG: 2 INJECTION, SOLUTION INTRAMUSCULAR; INTRAVENOUS; SUBCUTANEOUS at 17:11

## 2019-02-13 RX ADMIN — HYDROMORPHONE HYDROCHLORIDE 2 MG: 2 INJECTION, SOLUTION INTRAMUSCULAR; INTRAVENOUS; SUBCUTANEOUS at 09:08

## 2019-02-13 RX ADMIN — LABETALOL 20 MG/4 ML (5 MG/ML) INTRAVENOUS SYRINGE 10 MG: at 22:06

## 2019-02-13 RX ADMIN — FLUOXETINE HYDROCHLORIDE 40 MG: 20 CAPSULE ORAL at 09:33

## 2019-02-13 RX ADMIN — LABETALOL 20 MG/4 ML (5 MG/ML) INTRAVENOUS SYRINGE 10 MG: at 16:03

## 2019-02-13 RX ADMIN — SODIUM CHLORIDE 125 ML/HR: 9 INJECTION, SOLUTION INTRAVENOUS at 18:05

## 2019-02-13 RX ADMIN — GADOBENATE DIMEGLUMINE 14 ML: 529 INJECTION, SOLUTION INTRAVENOUS at 13:45

## 2019-02-13 RX ADMIN — LAMOTRIGINE 150 MG: 100 TABLET ORAL at 21:09

## 2019-02-13 RX ADMIN — THIAMINE HYDROCHLORIDE 100 MG: 100 INJECTION, SOLUTION INTRAMUSCULAR; INTRAVENOUS at 09:34

## 2019-02-13 RX ADMIN — FAMOTIDINE 20 MG: 10 INJECTION INTRAVENOUS at 21:15

## 2019-02-13 RX ADMIN — HYDROMORPHONE HYDROCHLORIDE 2 MG: 2 INJECTION, SOLUTION INTRAMUSCULAR; INTRAVENOUS; SUBCUTANEOUS at 20:21

## 2019-02-13 RX ADMIN — HYDROMORPHONE HYDROCHLORIDE 1 MG: 1 INJECTION, SOLUTION INTRAMUSCULAR; INTRAVENOUS; SUBCUTANEOUS at 01:14

## 2019-02-13 RX ADMIN — LORAZEPAM 1 MG: 2 INJECTION, SOLUTION INTRAMUSCULAR; INTRAVENOUS at 09:16

## 2019-02-13 RX ADMIN — SODIUM CHLORIDE 125 ML/HR: 9 INJECTION, SOLUTION INTRAVENOUS at 09:40

## 2019-02-13 NOTE — PROGRESS NOTES
Discharge Planning Assessment  [facility]     Patient Name: Maureen Best  MRN: [id number]  Today's Date: 2/13/2019    Admit Date: 2/12/2019    Discharge Needs Assessment    No documentation.       Discharge Plan     Row Name 02/13/19 1042       Plan    Plan Comments  SW tried to meet with patient,but patient was moaning. SW didn't want to make the patient more uncomfortable.  SW will follow up with patient at a later time.  (Pended)         Destination      No service coordination in this encounter.      Durable Medical Equipment      No service coordination in this encounter.      Dialysis/Infusion      No service coordination in this encounter.      Home Medical Care      No service coordination in this encounter.      Community Resources      No service coordination in this encounter.          Demographic Summary    No documentation.       Functional Status    No documentation.       Psychosocial    No documentation.       Abuse/Neglect    No documentation.       Legal    No documentation.       Substance Abuse    No documentation.       Patient Forms    No documentation.           Jessi Barboza

## 2019-02-13 NOTE — PROGRESS NOTES
[facility] Medicine Services  INPATIENT PROGRESS NOTE    Length of Stay: 1  Date of Admission: 2/12/2019  Primary Care Physician: Chaitanya Padilla DO    Subjective   Chief Complaint: Follow-up abdominal pain  HPI   The patient is resting in bed with cruz at bedside. She is visibly distressed and crying.  She tells me that the pain medication only works for about 15 minutes at a time. She has had no nausea or vomiting overnight. Her last bowel movement was yesterday and described as normal. She is unable to take deep breaths due to her pain.    She and her fiancee tell me that since an admission to [facility] in October of 2018 the patient has cut back on her drinking and is now only drinking twice weekly at most. Her last alcoholic beverage was reported to be on Friday, although in the ED she reported that she had been drinking daily until 2 days ago.     Review of Systems   All pertinent negatives and positives are as above. All other systems have been reviewed and are negative unless otherwise stated.     Objective    Temp:  [97.8 °F (36.6 °C)-98.2 °F (36.8 °C)] 98.1 °F (36.7 °C)  Heart Rate:  [] 101  Resp:  [18-20] 20  BP: (145-187)/(80-89) 187/89  Physical Exam   Constitutional: She is oriented to person, place, and time. She appears well-developed and well-nourished. She appears distressed.   HENT:   Head: Normocephalic and atraumatic.   Neck: Normal range of motion. Neck supple. No JVD present. No tracheal deviation present. No thyromegaly present.   Cardiovascular: Normal rate, regular rhythm, normal heart sounds and intact distal pulses. Exam reveals no gallop and no friction rub.   No murmur heard.  Pulmonary/Chest: Effort normal. She has no wheezes. She has no rales.   Shallow respirations   Abdominal: Soft. Bowel sounds are normal. She exhibits distension. There is tenderness (Generalized tenderness, worse in JESSE area). There is guarding.   Musculoskeletal:  Normal range of motion. She exhibits no edema or tenderness.   Neurological: She is alert and oriented to person, place, and time. No cranial nerve deficit.   Skin: Skin is warm and dry. No rash noted. No erythema.   Psychiatric:   Patient is anxious and tearful   Vitals reviewed.    Results Review:  I have reviewed the labs, radiology results, and diagnostic studies.    Laboratory Data:   Results from last 7 days   Lab Units 02/13/19  0503 02/12/19 2128   WBC 10*3/mm3 6.21 6.84   HEMOGLOBIN g/dL 11.5* 11.4*   HEMATOCRIT % 31.6* 31.5*   PLATELETS 10*3/mm3 107* 108*     Results from last 7 days   Lab Units 02/13/19  0503 02/12/19 2128   SODIUM mmol/L 134* 130*   POTASSIUM mmol/L 4.3 4.1   CHLORIDE mmol/L 100 93*   CO2 mmol/L 25.0 26.0   BUN mg/dL 3* 4*   CREATININE mg/dL 0.40* 0.45*   CALCIUM mg/dL 9.0 9.4   BILIRUBIN mg/dL 0.9 1.1*   ALK PHOS U/L 112 126*   ALT (SGPT) U/L 62* 76*   AST (SGOT) U/L 61* 70*   GLUCOSE mg/dL 130* 131*     Radiology Data:   Imaging Results (last 24 hours)     Procedure Component Value Units Date/Time    CT Abdomen Pelvis With Contrast [[id number]] Collected:  02/13/19 0704     Updated:  02/13/19 0713    Narrative:          History:  48-year-old with epigastric abdominal pain. Elevated lipase.     Reference:  None.     Technique  Contrast-enhanced CT abdomen/pelvis was performed with coronal and  sagittal reformatted images provided.     For this CT exam, one or more of the following dose reduction techniques  was employed:  -automated exposure control  -mA and/or kVp adjustment for patient size  -iterative reconstruction      mGy-cm     Findings     Chest  Incidental scanning through the lower chest demonstrates a 5 mm nodule  in the left lower lobe base.     Abdomen  The liver masses. Difficult to exclude early cirrhosis. Spleen is mildly  enlarged with a greatest axial dimension 14 cm. Thickness from hilum to  outer cortex is 6 cm. No adrenal mass. Gallbladder is  unremarkable.  There is peripancreatic edema compatible with acute pancreatitis. No  parenchymal calcification or ductal dilatation. There is dilatation of  the common hepatic duct to 12-13 mm on coronal reformats image 23.     Mild atherosclerosis of the abdominal aorta without aneurysm. No renal  abnormality.     No lymphadenopathy. No bowel obstruction. Large amount of loose stool in  the colon. No free air. No ascites.     Pelvis  Urinary bladder contours unremarkable. Uterus is unremarkable. Bilateral  tubal ligation clips. No pelvic free fluid or lymphadenopathy.     No acute osseous abnormalities..          Impression:          1. Acute pancreatitis without complication.  2. Small 5 mm nodule at the left lower lobe base. Elective CT chest is  recommended.  3. Dilatation of the common hepatic duct. Suggest MRCP.  4. Possible cirrhosis. Mild splenomegaly.     A preliminary report was provided by [facility] No discrepancy.  This report was finalized on 02/13/2019 07:10 by Dr Tino Dumont, .    XR Chest 1 View [[id number]] Collected:  02/12/19 2232     Updated:  02/12/19 2240    Narrative:       EXAM: XR CHEST 1 VW- - 2/12/2019 9:37 PM CST     HISTORY: epigastric pain       COMPARISON: None.      TECHNIQUE:  1 images.  Frontal view of the chest.     FINDINGS:    No pneumothorax, pleural effusion or focal consolidation. Cardiac  mediastinal silhouette within normal limits. No acute findings in the  bones, soft tissues or upper abdomen.          Impression:       1. No acute cardiopulmonary findings.  This report was finalized on 02/12/2019 22:37 by Dr Keisha Chan MD.        I have reviewed the patient current medications.     Assessment/Plan     Active Hospital Problems    Diagnosis   • **Alcohol-induced acute pancreatitis   • Thrombocytopenia concurrent with and due to alcoholism (CMS/HCC)   • Essential hypertension   • Hypercholesteremia   • Acute epigastric pain   • Anxiety and depression   • DTs (delirium  flaca (CMS/[facility])     11/2018       Plan:  1.  CT of the abdomen/pelvis reports dilation of the common hepatic duct. Will obtain MRCP with/without contrast today  2.  Increase Dilaudid to 2 mg every 3 hours as needed for severe pain. Monitor closely. Suspect that her blood pressure will improve as well with control of her pain  3.  Scheduled IV Ativan  4.  IV Thiamine   5.  Continue IV fluids at current rate  6.  Ok for sips of water with medications. Resume home medications Effexor, Prozac, and Lamictal as the patient is very concerned about missing dosages of these medications.  7.  IV Labetalol as needed for systolic blood pressure >170  8.  Labs in AM    Discharge Planning: I expect the patient to be discharged to home in ? days.    OSIEL Ferris   02/13/19   7:53 AM      I personally evaluated and examined the patient in conjunction with OSIEL Rasheed and agree with the assessment, treatment plan, and disposition of the patient as recorded by her. My history, exam, and further recommendations are:     Patient appears to be having intermittent hallucinations.  Feel as though diazepam pharmacokinetics would be more beneficial to her ETOH withdrawal as opposed to IV ativan.  Patient should be able to eat in the next 24-48 hours. Pain improved this evening, patient sitting up in bed and appears comfortable.      Will likely add folic acid supplementation tomorrow.      Todd Chaparro MD  02/13/19  9:06 PM

## 2019-02-13 NOTE — PLAN OF CARE
Problem: Patient Care Overview  Goal: Plan of Care Review  Outcome: Ongoing (interventions implemented as appropriate)   02/13/19 9496   Coping/Psychosocial   Plan of Care Reviewed With patient   Plan of Care Review   Progress no change   OTHER   Outcome Summary Pt c/o constant pain to abd. Medicated with prn meds. IVF. Tele. Fall risk. Bed check on. BP elevated.  notified and said to just watch it due to pt being in pain. Continue to monitor       Problem: Pancreatitis, Acute/Chronic (Adult)  Goal: Signs and Symptoms of Listed Potential Problems Will be Absent, Minimized or Managed (Pancreatitis, Acute/Chronic)  Outcome: Ongoing (interventions implemented as appropriate)

## 2019-02-13 NOTE — PAYOR COMM NOTE
"ADMIT INPT 2-12-19  UR  [phone]    Maureen Best (48 y.o. Female)     Date of Birth Social Security Number Address Home Phone MRN    1970  [id number] [place] [address] [phone] [id number]    Latter-day Marital Status          Caodaism        Admission Date Admission Type Admitting Provider Attending Provider Department, Room/Bed    2/12/19 Emergency Todd Chaparro MD Fleming, John Eric, MD [facility] 3C, 370/1    Discharge Date Discharge Disposition Discharge Destination                       Attending Provider:  Todd Chaparro MD    Allergies:  No Known Allergies    Isolation:  None   Infection:  None   Code Status:  CPR    Ht:  160 cm (63\")   Wt:  65.4 kg (144 lb 3.2 oz)    Admission Cmt:  None   Principal Problem:  Alcohol-induced acute pancreatitis [K85.20]                 Active Insurance as of 2/12/2019     Primary Coverage     Payor Plan Insurance Group Employer/Plan Group    HU[employer]NA MEDICAID HU[employer] [id number]     Payor Plan Address Payor Plan Phone Number Payor Plan Fax Number Effective Dates    PO  [phone]  10/27/2016 - None Entered    [facility] [id number]       Subscriber Name Subscriber Birth Date Member ID       MAUREEN BEST 1970 [id number]                 Emergency Contacts      (Rel.) Home Phone Work Phone Mobile Phone    Fernandez Sommers (Significant Other) [phone] -- --               History & Physical      Alexandre Guevara MD at 2/12/2019 11:06 PM              [facility] Medicine Services  HISTORY AND PHYSICAL    Date of Admission: 2/12/2019  Primary Care Physician: Chaitanya Padilla DO    Subjective     Chief Complaint: Severe epigastric pain    History of Present Illness  Sara Best is a 48-year-old female with a past medical history of alcohol abuse.  Patient was admitted to [facility] 11/2018 with delirium tremens, alcohol abuse and since that " "discharge she has been \"trying to quit\".   at bedside does most of the talking.  As stated she may drink a beer or 2 every other day or so and her last use was 4 days ago.  She also has chronic pain syndrome from back pain is followed by pain management and takes Lortab 7.5 mg 3 times a day, hypertension, hypercholesterolemia, anxiety and depression and history of stroke approximately 12 years ago.  Patient presented today to [facility] emergency department with complaints of severe mid epigastric pain.  Workup has revealed acute pancreatitis.  Currently her pain is scored 10 on a scale of 1-10.  She is unable to sit still.  She has associated nausea and anorexia.  Nurse did pull me aside and states patient told ER provider that she had been drinking daily until the last day or so.  Will monitor closely for DTs.  She is admitted for further evaluation and treatment.    Review of Systems   10 point review of systems was completed all negative except for those discussed in HPI    Past Medical History:   Past Medical History:   Diagnosis Date   • Alcohol abuse    • Anxiety and depression    • Chronic pain     back   • Hypercholesteremia    • Hypertension    • Stroke (cerebrum) (CMS/HCC)        Past Surgical History:   Past Surgical History:   Procedure Laterality Date   • BRAIN SURGERY  2007    Margareth Zuluaga    • TUBAL ABDOMINAL LIGATION         Family History: family history includes Breast cancer in her other and paternal grandmother; Lung cancer in her mother; Lymphoma in her father.    Social History:  reports that she has been smoking cigarettes.  She has been smoking about 0.50 packs per day. She does not have any smokeless tobacco history on file. She reports that she drinks alcohol. She reports that she does not use drugs.    Code Status: Full if unable to speak for herself her  will speak for her      Allergies:  No Known Allergies    Medications:  Patient does not know medication " "doses  Chantix  Valsartan  Crestor  Prozac  Lamictal  Lortab 7.5 mg 3 times    Objective     BP (!) 182/89 (BP Location: Right arm, Patient Position: Sitting)   Pulse 80   Temp 97.8 °F (36.6 °C) (Temporal)   Resp 18   Ht 160 cm (63\")   Wt 65.4 kg (144 lb 3.2 oz)   SpO2 99%   BMI 25.54 kg/m²    Physical Exam   Constitutional: She is oriented to person, place, and time. She appears well-developed and well-nourished. No distress.   HENT:   Head: Normocephalic and atraumatic.   Eyes: Conjunctivae and EOM are normal. Pupils are equal, round, and reactive to light. No scleral icterus.   Neck: Normal range of motion. Neck supple. No JVD present. No tracheal deviation present.   Cardiovascular: Normal rate, regular rhythm, normal heart sounds and intact distal pulses. Exam reveals no gallop.   No murmur heard.  Pulmonary/Chest: Effort normal and breath sounds normal. No respiratory distress. She has no wheezes. She has no rales.   Abdominal: Soft. Bowel sounds are normal. She exhibits no distension. There is tenderness (epigastric). There is no guarding.   Musculoskeletal: Normal range of motion. She exhibits no edema.   Neurological: She is alert and oriented to person, place, and time.   Mild left-sided weakness, residual from stroke 2007   Skin: Skin is warm and dry. No rash noted. She is not diaphoretic. No erythema. No pallor.   Psychiatric: She has a normal mood and affect. Her behavior is normal.   Unable to sit still, fidgety    Vitals reviewed.      Pertinent Data:   Lab Results (last 72 hours)     Procedure Component Value Units Date/Time    Urine Drug Screen - Urine, Clean Catch [[id number]]  (Abnormal) Collected:  02/12/19 2200    Specimen:  Urine, Clean Catch Updated:  02/12/19 2229     Amphetamine Screen, Urine Negative     Barbiturates Screen, Urine Negative     Benzodiazepine Screen, Urine Negative     Cocaine Screen, Urine Negative     Methadone Screen, Urine Negative     Opiate Screen Positive     " Phencyclidine (PCP), Urine Negative     THC, Screen, Urine Negative    Narrative:       Negative Thresholds For Drugs Screened in Urine:    Amphetamines          500 ng/ml  Barbiturates          200 ng/ml  Benzodiazepines       200 ng/ml  Cocaine               150 ng/ml  Methadone             150 ng/ml  Opiates               300 ng/ml  Phencyclidine         25 ng/ml  THC                      50 ng/ml    The normal value for all drugs tested is negative. This report includes final unconfirmed screening results.  A positive result by this assay can be, at your request, sent to the Reference Lab for confirmation by gas chromatography. Unconfirmed results must not be used for non-medical purposes, such as employment or legal testing. Clinical consideration should be applied to any drug of abuse test result, particularly when unconfirmed results are used.    Lipase [[id number]]  (Abnormal) Collected:  02/12/19 2128    Specimen:  Blood Updated:  02/12/19 2223     Lipase 7,312 U/L     Lactic Acid, Plasma [[id number]]  (Normal) Collected:  02/12/19 2207    Specimen:  Blood Updated:  02/12/19 2223     Lactate 0.7 mmol/L     Troponin [[id number]]  (Normal) Collected:  02/12/19 2128    Specimen:  Blood Updated:  02/12/19 2219     Troponin I <0.012 ng/mL     Urinalysis With Culture If Indicated - Urine, Clean Catch [[id number]]  (Normal) Collected:  02/12/19 2200    Specimen:  Urine, Clean Catch Updated:  02/12/19 2209     Color, UA Yellow     Appearance, UA Clear     pH, UA 7.0     Specific Gravity, UA <=1.005     Glucose, UA Negative     Ketones, UA Negative     Bilirubin, UA Negative     Blood, UA Negative     Protein, UA Negative     Leuk Esterase, UA Negative     Nitrite, UA Negative     Urobilinogen, UA 0.2 E.U./dL    Narrative:       Urine microscopic not indicated.    Comprehensive Metabolic Panel [[id number]]  (Abnormal) Collected:  02/12/19 2128    Specimen:  Blood Updated:  02/12/19 2207     Glucose 131 mg/dL      BUN 4  mg/dL      Creatinine 0.45 mg/dL      Sodium 130 mmol/L      Potassium 4.1 mmol/L      Chloride 93 mmol/L      CO2 26.0 mmol/L      Calcium 9.4 mg/dL      Total Protein 8.0 g/dL      Albumin 4.90 g/dL      ALT (SGPT) 76 U/L      AST (SGOT) 70 U/L      Alkaline Phosphatase 126 U/L      Total Bilirubin 1.1 mg/dL      eGFR Non African Amer 149 mL/min/1.73      Globulin 3.1 gm/dL      A/G Ratio 1.6 g/dL      BUN/Creatinine Ratio 8.9     Anion Gap 11.0 mmol/L     CBC Auto Differential [[id number]]  (Abnormal) Collected:  02/12/19 2128    Specimen:  Blood Updated:  02/12/19 2155     WBC 6.84 10*3/mm3      RBC 3.27 10*6/mm3      Hemoglobin 11.4 g/dL      Hematocrit 31.5 %      MCV 96.3 fL      MCH 34.9 pg      MCHC 36.2 g/dL      RDW 17.2 %      RDW-SD 60.6 fl      MPV 9.6 fL      Platelets 108 10*3/mm3      Neutrophil % 74.6 %      Lymphocyte % 16.4 %      Monocyte % 7.5 %      Eosinophil % 0.9 %      Basophil % 0.3 %      Neutrophils, Absolute 5.11 10*3/mm3      Lymphocytes, Absolute 1.12 10*3/mm3      Monocytes, Absolute 0.51 10*3/mm3      Eosinophils, Absolute 0.06 10*3/mm3      Basophils, Absolute 0.02 10*3/mm3     Ethanol [[id number]]  (Normal) Collected:  02/12/19 2128    Specimen:  Blood Updated:  02/12/19 2145     Ethanol % <0.010 %     Narrative:       Not for legal purposes. Chain of Custody not followed.         Imaging Results (last 24 hours)     Procedure Component Value Units Date/Time    XR Chest 1 View [[id number]] Collected:  02/12/19 2232     Updated:  02/12/19 2240    Narrative:       EXAM: XR CHEST 1 VW- - 2/12/2019 9:37 PM CST     HISTORY: epigastric pain       COMPARISON: None.      TECHNIQUE:  1 images.  Frontal view of the chest.     FINDINGS:    No pneumothorax, pleural effusion or focal consolidation. Cardiac  mediastinal silhouette within normal limits. No acute findings in the  bones, soft tissues or upper abdomen.          Impression:       1. No acute cardiopulmonary findings.  This report was  finalized on 02/12/2019 22:37 by Dr Keisha Chan MD.    CT Abdomen Pelvis With Contrast [[id number]] Updated:  02/12/19 1213            I have personally reviewed and interpreted the radiology studies and ECG obtained at time of admission.     Assessment / Plan     Assessment:   Active Hospital Problems    Diagnosis   • **Alcohol-induced acute pancreatitis   • Essential hypertension   • Hypercholesteremia   • Acute epigastric pain   • Anxiety and depression   • DTs (delirium tremens) (CMS/[facility])     11/2018         Plan:   1.  Admit as inpatient  2.  Treat pain and nausea  3.  Hydrate with normal saline 125 mL/hour  4.  Add Pepcid IV every 12 hours  5.  Labs in a.m. CBC with differential, CMP, A1c, lipid panel, lipase, amylase and TSH  6.  DVT prophylaxis Lovenox and SCDs  7.  Nurse to obtain accurate medication list, all on hold for now-patient n.p.o.  8.  Add CIWA prn for DT's      I discussed the patient's findings and my recommendations with: Alexandre Guevara MD  Time spent 35 minutes      OSIEL Tejada  02/12/19   11:36 PM     I personally evaluated and examined the patient in conjunction with OSIEL Cartagena and agree with the assessment, treatment plan, and disposition of the patient as recorded by her. My history, exam, and further recommendations are:   Patient evaluated time of admission agree with plans and examination.  Concern is high risk for going into DTs will use CIWA protocol.      Alexandre Guevara MD  02/13/19  12:03 AM      Electronically signed by Alexandre Guevara MD at 2/13/2019 12:04 AM          Emergency Department Notes      Jass Chan PA-C at 2/12/2019 11:07 PM          Subjective   48-year-old female presents with a chief complaint 5 day history of epigastric abdominal pain.  The patient reports she is a daily alcohol drinker.  She has not been drinking due to the pain.  She reports a physical sharp 7 pain that radiates to her back.  She reports associated  nausea and anorexia.  There is been no fevers vomiting diarrhea cough chest pain or shortness of breath.            Review of Systems   All other systems reviewed and are negative.      Past Medical History:   Diagnosis Date   • Alcohol abuse    • Anxiety and depression    • Chronic pain     back   • Hypercholesteremia    • Hypertension    • Stroke (cerebrum) (CMS/HCC)        No Known Allergies    Past Surgical History:   Procedure Laterality Date   • BRAIN SURGERY  2007    Margareth Zuluaga    • TUBAL ABDOMINAL LIGATION         Family History   Problem Relation Age of Onset   • Breast cancer Paternal Grandmother    • Breast cancer Other    • Lung cancer Mother    • Lymphoma Father        Social History     Socioeconomic History   • Marital status:      Spouse name: Not on file   • Number of children: Not on file   • Years of education: Not on file   • Highest education level: Not on file   Tobacco Use   • Smoking status: Current Every Day Smoker     Packs/day: 0.50     Types: Cigarettes   Substance and Sexual Activity   • Alcohol use: Yes     Comment: occasionally   • Drug use: No           Objective   Physical Exam   Constitutional: She is oriented to person, place, and time. She appears well-developed and well-nourished. She appears distressed.   Eyes: EOM are normal. Pupils are equal, round, and reactive to light.   Cardiovascular: Normal rate and normal heart sounds.   Pulmonary/Chest: Effort normal and breath sounds normal.   Abdominal: Normal appearance. There is tenderness in the epigastric area.   Neurological: She is alert and oriented to person, place, and time.   Skin: Skin is warm and dry. Capillary refill takes less than 2 seconds.   Psychiatric: She has a normal mood and affect. Her behavior is normal.   Nursing note and vitals reviewed.      Procedures          ED Course        Labs Reviewed   COMPREHENSIVE METABOLIC PANEL - Abnormal; Notable for the following components:       Result Value     Glucose 131 (*)     BUN 4 (*)     Creatinine 0.45 (*)     Sodium 130 (*)     Chloride 93 (*)     ALT (SGPT) 76 (*)     AST (SGOT) 70 (*)     Alkaline Phosphatase 126 (*)     Total Bilirubin 1.1 (*)     All other components within normal limits   LIPASE - Abnormal; Notable for the following components:    Lipase 7,312 (*)     All other components within normal limits   URINE DRUG SCREEN - Abnormal; Notable for the following components:    Opiate Screen Positive (*)     All other components within normal limits    Narrative:     Negative Thresholds For Drugs Screened in Urine:    Amphetamines          500 ng/ml  Barbiturates          200 ng/ml  Benzodiazepines       200 ng/ml  Cocaine               150 ng/ml  Methadone             150 ng/ml  Opiates               300 ng/ml  Phencyclidine         25 ng/ml  THC                      50 ng/ml    The normal value for all drugs tested is negative. This report includes final unconfirmed screening results.  A positive result by this assay can be, at your request, sent to the Reference Lab for confirmation by gas chromatography. Unconfirmed results must not be used for non-medical purposes, such as employment or legal testing. Clinical consideration should be applied to any drug of abuse test result, particularly when unconfirmed results are used.   CBC WITH AUTO DIFFERENTIAL - Abnormal; Notable for the following components:    RBC 3.27 (*)     Hemoglobin 11.4 (*)     Hematocrit 31.5 (*)     MCH 34.9 (*)     MCHC 36.2 (*)     RDW 17.2 (*)     RDW-SD 60.6 (*)     Platelets 108 (*)     All other components within normal limits   URINALYSIS W/ CULTURE IF INDICATED - Normal    Narrative:     Urine microscopic not indicated.   TROPONIN (IN-HOUSE) - Normal   LACTIC ACID, PLASMA - Normal   ETHANOL - Normal    Narrative:     Not for legal purposes. Chain of Custody not followed.    RAINBOW DRAW    Narrative:     The following orders were created for panel order Marquez  Draw.  Procedure                               Abnormality         Status                     ---------                               -----------         ------                     Light Blue Top[[id number]]                                   Final result               Green Top (Gel)[[id number]]                                  Final result               Lavender Top[[id number]]                                     Final result               Red Top[[id number]]                                          Final result                 Please view results for these tests on the individual orders.   CBC (NO DIFF)   COMPREHENSIVE METABOLIC PANEL   HEMOGLOBIN A1C   LIPASE   LIPID PANEL   AMYLASE   LIGHT BLUE TOP   GREEN TOP   LAVENDER TOP   RED TOP   CBC AND DIFFERENTIAL    Narrative:     The following orders were created for panel order CBC & Differential.  Procedure                               Abnormality         Status                     ---------                               -----------         ------                     Manual Differential[[id number]]                                                         CBC Auto Differential[[id number]]        Abnormal            Final result                 Please view results for these tests on the individual orders.     XR Chest 1 View   Final Result   1. No acute cardiopulmonary findings.   This report was finalized on 02/12/2019 22:37 by Dr Keisha Chan MD.      CT Abdomen Pelvis With Contrast    (Results Pending)               MDM  Number of Diagnoses or Management Options  Diagnosis management comments: Pancreatitis, requiring IV pain medications, admit for NPO and IV fluid resuscitation        Amount and/or Complexity of Data Reviewed  Clinical lab tests: ordered and reviewed  Tests in the radiology section of CPT®:  reviewed and ordered  Tests in the medicine section of CPT®:  reviewed and ordered    Risk of Complications, Morbidity, and/or Mortality  Presenting problems:  "moderate  Diagnostic procedures: moderate  Management options: moderate    Patient Progress  Patient progress: stable        Final diagnoses:   Alcohol-induced acute pancreatitis, unspecified complication status            Jass Chan PA-C  02/13/19 0222      Electronically signed by Jass Chan PA-C at 2/13/2019  2:22 AM       ICU Vital Signs     Row Name 02/13/19 0717 02/13/19 0342 02/13/19 0323 02/13/19 0020 02/13/19 00:04:16       Vitals    Temp  98.1 °F (36.7 °C)  --  98.1 °F (36.7 °C)  98.2 °F (36.8 °C)  --    Temp src  Oral  --  Oral  Oral  --    Pulse  101  --  94  88  --    Heart Rate Source  Monitor  --  Monitor  Monitor  --    Resp  20  --  20  18  --    Resp Rate Source  Visual  --  Visual  Visual  --    BP  187/89  (Abnormal)   --  174/87  177/88  145/80    BP Location  Left arm  --  Right arm  Left arm  --    BP Method  Automatic  --  Automatic  Automatic  --    Patient Position  Lying  --  Lying  Lying  --       Oxygen Therapy    SpO2  96 %  96 %  95 %  96 %  --    Pulse Oximetry Type  Intermittent  Intermittent  --  --  --    Device (Oxygen Therapy)  room air  room air  room air  room air  --    Row Name 02/13/19 0000 02/12/19 2000                Height and Weight    Height  --  160 cm (63\")       Height Method  --  Stated       Weight  --  65.4 kg (144 lb 3.2 oz)       Weight Method  --  Standing scale       Ideal Body Weight (IBW) (kg)  --  52.72       BSA (Calculated - sq m)  --  1.68 sq meters       BMI (Calculated)  --  25.6       Weight in (lb) to have BMI = 25  --  140.8          Vitals    Temp  --  97.8 °F (36.6 °C)       Temp src  --  Temporal       Pulse  --  80       Heart Rate Source  --  Monitor       Resp  --  18       Resp Rate Source  --  Visual       BP  --  182/89  (Abnormal)        BP Location  --  Right arm       BP Method  --  Automatic       Patient Position  --  Sitting          Oxygen Therapy    SpO2  --  99 %       Pulse Oximetry Type  --  Intermittent       " "Device (Oxygen Therapy)  room air  room air           Intake & Output (last 3 days)       02/10 0701 - 02/11 0700 02/11 0701 - 02/12 0700 02/12 0701 - 02/13 0700 02/13 0701 - 02/14 0700    Urine (mL/kg/hr)   350     Total Output   350     Net   -350             Urine Unmeasured Occurrence   1 x         Lines, Drains & Airways    Active LDAs     Name:   Placement date:   Placement time:   Site:   Days:    Peripheral IV 02/12/19 2128 Left Forearm   02/12/19 2128    Forearm   less than 1         Inactive LDAs     None                Hospital Medications (all)       Dose Frequency Start End    enoxaparin (LOVENOX) syringe 40 mg 40 mg Every 24 Hours 2/13/2019     Sig - Route: Inject 0.4 mL under the skin into the appropriate area as directed Daily. - Subcutaneous    FLUoxetine (PROzac) capsule 40 mg 40 mg Daily 2/13/2019     Sig - Route: Take 2 capsules by mouth Daily. - Oral    HYDROmorphone (DILAUDID) injection 2 mg 2 mg Every 3 Hours PRN 2/13/2019     Sig - Route: Infuse 1 mL into a venous catheter Every 3 (Three) Hours As Needed for Severe Pain . - Intravenous    Cosign for Ordering: Required by Todd Chaparro MD    Linked Group 1:  \"And\" Linked Group Details        HYDROmorphone (DILAUDID) injection solution 1 mg 1 mg Once 2/12/2019 2/12/2019    Sig - Route: Infuse 1 mL into a venous catheter 1 (One) Time. - Intravenous    iopamidol (ISOVUE-300) 61 % injection 100 mL 100 mL Once in Imaging 2/12/2019 2/12/2019    Sig - Route: Infuse 100 mL into a venous catheter Once. - Intravenous    labetalol (NORMODYNE,TRANDATE) injection 10 mg 10 mg Every 6 Hours PRN 2/13/2019     Sig - Route: Infuse 2 mL into a venous catheter Every 6 (Six) Hours As Needed for High Blood Pressure. - Intravenous    lamoTRIgine (LaMICtal) tablet 150 mg 150 mg 2 Times Daily 2/13/2019     Sig - Route: Take 150 mg by mouth 2 (Two) Times a Day. - Oral    LORazepam (ATIVAN) injection 1 mg 1 mg Every 2 Hours PRN 2/13/2019 2/23/2019    Sig - " "Route: Infuse 0.5 mL into a venous catheter Every 2 (Two) Hours As Needed for Withdrawal (For CIWA score 8-10). - Intravenous    Linked Group 2:  \"Or\" Linked Group Details        LORazepam (ATIVAN) injection 1 mg 1 mg Every 6 Hours 2/13/2019 2/14/2019    Sig - Route: Infuse 0.5 mL into a venous catheter Every 6 (Six) Hours. - Intravenous    Linked Group 3:  \"Followed by\" Linked Group Details        LORazepam (ATIVAN) injection 1 mg 1 mg Every 8 Hours 2/14/2019 2/15/2019    Sig - Route: Infuse 0.5 mL into a venous catheter Every 8 (Eight) Hours. - Intravenous    Linked Group 3:  \"Followed by\" Linked Group Details        LORazepam (ATIVAN) injection 2 mg 2 mg Every 1 Hour PRN 2/13/2019 2/23/2019    Sig - Route: Infuse 1 mL into a venous catheter Every 1 (One) Hour As Needed for Withdrawal (For CIWA score 11-15). - Intravenous    Linked Group 2:  \"Or\" Linked Group Details        LORazepam (ATIVAN) injection 2 mg 2 mg Every 15 Minutes PRN 2/13/2019 2/23/2019    Sig - Route: Infuse 1 mL into a venous catheter Every 15 (Fifteen) Minutes As Needed for Anxiety (Use IV route first.  If unable to give IV, use IM.  For CIWA-Ar greater than 15.  Repeat dose in 15 minutes if CIWA-Ar is not decreasing.). - Intravenous    Linked Group 2:  \"Or\" Linked Group Details        LORazepam (ATIVAN) injection 2 mg 2 mg Every 15 Minutes PRN 2/13/2019 2/23/2019    Sig - Route: Inject 1 mL into the appropriate muscle as directed by prescriber Every 15 (Fifteen) Minutes As Needed for Withdrawal (Use IV route first.  If unable to give IV, use IM.  For CIWA-Ar greater than 15.  Repeat dose in 15 minutes if CIWA-Ar is not decreasing.). - Intramuscular    Linked Group 2:  \"Or\" Linked Group Details        LORazepam (ATIVAN) tablet 1 mg 1 mg Every 2 Hours PRN 2/13/2019 2/23/2019    Sig - Route: Take 1 tablet by mouth Every 2 (Two) Hours As Needed for Withdrawal (For CIWA score 8-10). - Oral    Linked Group 2:  \"Or\" Linked Group Details        " "LORazepam (ATIVAN) tablet 2 mg 2 mg Every 1 Hour PRN 2/13/2019 2/23/2019    Sig - Route: Take 2 tablets by mouth Every 1 (One) Hour As Needed for Withdrawal (For CIWA score 11-15). - Oral    Linked Group 2:  \"Or\" Linked Group Details        morphine injection 4 mg 4 mg Once 2/12/2019 2/12/2019    Sig - Route: Infuse 1 mL into a venous catheter 1 (One) Time. - Intravenous    naloxone (NARCAN) injection 0.4 mg 0.4 mg Every 5 Minutes PRN 2/13/2019     Sig - Route: Infuse 1 mL into a venous catheter Every 5 (Five) Minutes As Needed for Respiratory Depression. - Intravenous    Linked Group 1:  \"And\" Linked Group Details        ondansetron (ZOFRAN) injection 4 mg 4 mg Once 2/12/2019 2/12/2019    Sig - Route: Infuse 2 mL into a venous catheter 1 (One) Time. - Intravenous    ondansetron (ZOFRAN) injection 4 mg 4 mg Once 2/12/2019 2/12/2019    Sig - Route: Infuse 2 mL into a venous catheter 1 (One) Time. - Intravenous    ondansetron (ZOFRAN) injection 4 mg 4 mg Every 6 Hours PRN 2/13/2019     Sig - Route: Infuse 2 mL into a venous catheter Every 6 (Six) Hours As Needed for Nausea or Vomiting. - Intravenous    Linked Group 4:  \"Or\" Linked Group Details        ondansetron (ZOFRAN) tablet 4 mg 4 mg Every 6 Hours PRN 2/13/2019     Sig - Route: Take 1 tablet by mouth Every 6 (Six) Hours As Needed for Nausea or Vomiting. - Oral    Linked Group 4:  \"Or\" Linked Group Details        ondansetron ODT (ZOFRAN-ODT) disintegrating tablet 4 mg 4 mg Every 6 Hours PRN 2/13/2019     Sig - Route: Take 1 tablet by mouth Every 6 (Six) Hours As Needed for Nausea or Vomiting. - Oral    Linked Group 4:  \"Or\" Linked Group Details        sodium chloride 0.9 % bolus 1,000 mL 1,000 mL Once 2/12/2019 2/12/2019    Sig - Route: Infuse 1,000 mL into a venous catheter 1 (One) Time. - Intravenous    Cosign for Ordering: Accepted by Jesu Graves MD on 2/13/2019 12:13 AM    sodium chloride 0.9 % flush 3 mL 3 mL Every 12 Hours Scheduled 2/13/2019     " "Sig - Route: Infuse 3 mL into a venous catheter Every 12 (Twelve) Hours. - Intravenous    sodium chloride 0.9 % flush 3-10 mL 3-10 mL As Needed 2/13/2019     Sig - Route: Infuse 3-10 mL into a venous catheter As Needed for Line Care. - Intravenous    sodium chloride 0.9 % infusion 125 mL/hr Continuous 2/13/2019     Sig - Route: Infuse 125 mL/hr into a venous catheter Continuous. - Intravenous    thiamine (B-1) 100 mg in sodium chloride 0.9 % 100 mL IVPB 100 mg Daily 2/13/2019 2/16/2019    Sig - Route: Infuse 100 mg into a venous catheter Daily. - Intravenous    venlafaxine XR (EFFEXOR-XR) 24 hr capsule 150 mg 150 mg Daily 2/13/2019     Sig - Route: Take 2 capsules by mouth Daily. - Oral    diazePAM (VALIUM) injection 5 mg (Discontinued) 5 mg Every 8 Hours 2/13/2019 2/13/2019    Sig - Route: Infuse 1 mL into a venous catheter Every 8 (Eight) Hours. - Intravenous    HYDROmorphone (DILAUDID) injection solution 1 mg (Discontinued) 1 mg Every 2 Hours PRN 2/13/2019 2/13/2019    Sig - Route: Infuse 1 mL into a venous catheter Every 2 (Two) Hours As Needed for Severe Pain . - Intravenous    Linked Group 1:  \"And\" Linked Group Details        naloxone (NARCAN) injection 0.4 mg (Discontinued) 0.4 mg Every 5 Minutes PRN 2/13/2019 2/13/2019    Sig - Route: Infuse 1 mL into a venous catheter Every 5 (Five) Minutes As Needed for Respiratory Depression. - Intravenous    Linked Group 1:  \"And\" Linked Group Details              Blood Administration Record (From admission, onward)    None        Lab Results (last 72 hours)     Procedure Component Value Units Date/Time    Hemoglobin A1c [[id number]] Collected:  02/13/19 0503    Specimen:  Blood Updated:  02/13/19 0643     Hemoglobin A1C 4.6 %     Narrative:       Less than 6.0           Non-Diabetic Range  6.0-7.0                 ADA Therapeutic Target  Greater than 7.0        Action Suggested    Lipase [[id number]]  (Abnormal) Collected:  02/13/19 0503    Specimen:  Blood Updated:  " 02/13/19 0624     Lipase 7,170 U/L     Lipid Panel [[id number]]  (Abnormal) Collected:  02/13/19 0503    Specimen:  Blood Updated:  02/13/19 0618     Total Cholesterol 184 mg/dL      Triglycerides 75 mg/dL      HDL Cholesterol 63 mg/dL      LDL Cholesterol  112 mg/dL      LDL/HDL Ratio 1.68    Comprehensive Metabolic Panel [[id number]]  (Abnormal) Collected:  02/13/19 0503    Specimen:  Blood Updated:  02/13/19 0615     Glucose 130 mg/dL      BUN 3 mg/dL      Creatinine 0.40 mg/dL      Sodium 134 mmol/L      Potassium 4.3 mmol/L      Comment: Specimen hemolyzed.  Results may be affected.        Chloride 100 mmol/L      CO2 25.0 mmol/L      Calcium 9.0 mg/dL      Total Protein 7.3 g/dL      Albumin 4.60 g/dL      ALT (SGPT) 62 U/L      Comment: Specimen hemolyzed.  Results may be affected.        AST (SGOT) 61 U/L      Comment: Specimen hemolyzed.  Results may be affected.        Alkaline Phosphatase 112 U/L      Comment: Specimen hemolyzed. Results may be affected.        Total Bilirubin 0.9 mg/dL      eGFR Non African Amer >150 mL/min/1.73      Globulin 2.7 gm/dL      A/G Ratio 1.7 g/dL      BUN/Creatinine Ratio 7.5     Anion Gap 9.0 mmol/L     Amylase [[id number]]  (Abnormal) Collected:  02/13/19 0503    Specimen:  Blood Updated:  02/13/19 0607     Amylase 588 U/L     CBC (No Diff) [[id number]]  (Abnormal) Collected:  02/13/19 0503    Specimen:  Blood Updated:  02/13/19 0603     WBC 6.21 10*3/mm3      RBC 3.25 10*6/mm3      Hemoglobin 11.5 g/dL      Hematocrit 31.6 %      MCV 97.2 fL      MCH 35.4 pg      MCHC 36.4 g/dL      RDW 17.2 %      RDW-SD 61.8 fl      MPV 10.2 fL      Platelets 107 10*3/mm3     [place] Draw [[id number]] Collected:  02/12/19 2127    Specimen:  Blood Updated:  02/12/19 2230    Narrative:       The following orders were created for panel order [place] Draw.  Procedure                               Abnormality         Status                     ---------                               -----------          ------                     Light Blue Top[[id number]]                                   Final result               Green Top (Gel)[[id number]]                                  Final result               Lavender Top[[id number]]                                     Final result               Red Top[[id number]]                                          Final result                 Please view results for these tests on the individual orders.    Light Blue Top [[id number]] Collected:  02/12/19 2127    Specimen:  Blood Updated:  02/12/19 2230     Extra Tube hold for add-on     Comment: Auto resulted       Green Top (Gel) [[id number]] Collected:  02/12/19 2128    Specimen:  Blood Updated:  02/12/19 2230     Extra Tube Hold for add-ons.     Comment: Auto resulted.       Lavender Top [[id number]] Collected:  02/12/19 2128    Specimen:  Blood Updated:  02/12/19 2230     Extra Tube hold for add-on     Comment: Auto resulted       Red Top [[id number]] Collected:  02/12/19 2127    Specimen:  Blood Updated:  02/12/19 2230     Extra Tube Hold for add-ons.     Comment: Auto resulted.       Urine Drug Screen - Urine, Clean Catch [[id number]]  (Abnormal) Collected:  02/12/19 2200    Specimen:  Urine, Clean Catch Updated:  02/12/19 2229     Amphetamine Screen, Urine Negative     Barbiturates Screen, Urine Negative     Benzodiazepine Screen, Urine Negative     Cocaine Screen, Urine Negative     Methadone Screen, Urine Negative     Opiate Screen Positive     Phencyclidine (PCP), Urine Negative     THC, Screen, Urine Negative    Narrative:       Negative Thresholds For Drugs Screened in Urine:    Amphetamines          500 ng/ml  Barbiturates          200 ng/ml  Benzodiazepines       200 ng/ml  Cocaine               150 ng/ml  Methadone             150 ng/ml  Opiates               300 ng/ml  Phencyclidine         25 ng/ml  THC                      50 ng/ml    The normal value for all drugs tested is negative. This report includes final  unconfirmed screening results.  A positive result by this assay can be, at your request, sent to the Reference Lab for confirmation by gas chromatography. Unconfirmed results must not be used for non-medical purposes, such as employment or legal testing. Clinical consideration should be applied to any drug of abuse test result, particularly when unconfirmed results are used.    Lipase [[id number]]  (Abnormal) Collected:  02/12/19 2128    Specimen:  Blood Updated:  02/12/19 2223     Lipase 7,312 U/L     Lactic Acid, Plasma [[id number]]  (Normal) Collected:  02/12/19 2207    Specimen:  Blood Updated:  02/12/19 2223     Lactate 0.7 mmol/L     Troponin [[id number]]  (Normal) Collected:  02/12/19 2128    Specimen:  Blood Updated:  02/12/19 2219     Troponin I <0.012 ng/mL     Urinalysis With Culture If Indicated - Urine, Clean Catch [[id number]]  (Normal) Collected:  02/12/19 2200    Specimen:  Urine, Clean Catch Updated:  02/12/19 2209     Color, UA Yellow     Appearance, UA Clear     pH, UA 7.0     Specific Gravity, UA <=1.005     Glucose, UA Negative     Ketones, UA Negative     Bilirubin, UA Negative     Blood, UA Negative     Protein, UA Negative     Leuk Esterase, UA Negative     Nitrite, UA Negative     Urobilinogen, UA 0.2 E.U./dL    Narrative:       Urine microscopic not indicated.    Comprehensive Metabolic Panel [[id number]]  (Abnormal) Collected:  02/12/19 2128    Specimen:  Blood Updated:  02/12/19 2207     Glucose 131 mg/dL      BUN 4 mg/dL      Creatinine 0.45 mg/dL      Sodium 130 mmol/L      Potassium 4.1 mmol/L      Chloride 93 mmol/L      CO2 26.0 mmol/L      Calcium 9.4 mg/dL      Total Protein 8.0 g/dL      Albumin 4.90 g/dL      ALT (SGPT) 76 U/L      AST (SGOT) 70 U/L      Alkaline Phosphatase 126 U/L      Total Bilirubin 1.1 mg/dL      eGFR Non African Amer 149 mL/min/1.73      Globulin 3.1 gm/dL      A/G Ratio 1.6 g/dL      BUN/Creatinine Ratio 8.9     Anion Gap 11.0 mmol/L     CBC & Differential  [[id number]] Collected:  02/12/19 2128    Specimen:  Blood Updated:  02/12/19 2156    Narrative:       The following orders were created for panel order CBC & Differential.  Procedure                               Abnormality         Status                     ---------                               -----------         ------                     Manual Differential[[id number]]                                                         CBC Auto Differential[[id number]]        Abnormal            Final result                 Please view results for these tests on the individual orders.    CBC Auto Differential [[id number]]  (Abnormal) Collected:  02/12/19 2128    Specimen:  Blood Updated:  02/12/19 2155     WBC 6.84 10*3/mm3      RBC 3.27 10*6/mm3      Hemoglobin 11.4 g/dL      Hematocrit 31.5 %      MCV 96.3 fL      MCH 34.9 pg      MCHC 36.2 g/dL      RDW 17.2 %      RDW-SD 60.6 fl      MPV 9.6 fL      Platelets 108 10*3/mm3      Neutrophil % 74.6 %      Lymphocyte % 16.4 %      Monocyte % 7.5 %      Eosinophil % 0.9 %      Basophil % 0.3 %      Neutrophils, Absolute 5.11 10*3/mm3      Lymphocytes, Absolute 1.12 10*3/mm3      Monocytes, Absolute 0.51 10*3/mm3      Eosinophils, Absolute 0.06 10*3/mm3      Basophils, Absolute 0.02 10*3/mm3     Ethanol [[id number]]  (Normal) Collected:  02/12/19 2128    Specimen:  Blood Updated:  02/12/19 2145     Ethanol % <0.010 %     Narrative:       Not for legal purposes. Chain of Custody not followed.           Imaging Results (last 72 hours)     Procedure Component Value Units Date/Time    CT Abdomen Pelvis With Contrast [[id number]] Collected:  02/13/19 0704     Updated:  02/13/19 0713    Narrative:          History:  48-year-old with epigastric abdominal pain. Elevated lipase.     Reference:  None.     Technique  Contrast-enhanced CT abdomen/pelvis was performed with coronal and  sagittal reformatted images provided.     For this CT exam, one or more of the following dose reduction  techniques  was employed:  -automated exposure control  -mA and/or kVp adjustment for patient size  -iterative reconstruction      mGy-cm     Findings     Chest  Incidental scanning through the lower chest demonstrates a 5 mm nodule  in the left lower lobe base.     Abdomen  The liver masses. Difficult to exclude early cirrhosis. Spleen is mildly  enlarged with a greatest axial dimension 14 cm. Thickness from hilum to  outer cortex is 6 cm. No adrenal mass. Gallbladder is unremarkable.  There is peripancreatic edema compatible with acute pancreatitis. No  parenchymal calcification or ductal dilatation. There is dilatation of  the common hepatic duct to 12-13 mm on coronal reformats image 23.     Mild atherosclerosis of the abdominal aorta without aneurysm. No renal  abnormality.     No lymphadenopathy. No bowel obstruction. Large amount of loose stool in  the colon. No free air. No ascites.     Pelvis  Urinary bladder contours unremarkable. Uterus is unremarkable. Bilateral  tubal ligation clips. No pelvic free fluid or lymphadenopathy.     No acute osseous abnormalities..          Impression:          1. Acute pancreatitis without complication.  2. Small 5 mm nodule at the left lower lobe base. Elective CT chest is  recommended.  3. Dilatation of the common hepatic duct. Suggest MRCP.  4. Possible cirrhosis. Mild splenomegaly.     A preliminary report was provided by [facility]. No discrepancy.  This report was finalized on 02/13/2019 07:10 by Dr Tino Dumont, .    XR Chest 1 View [[id number]] Collected:  02/12/19 2232     Updated:  02/12/19 2240    Narrative:       EXAM: XR CHEST 1 VW- - 2/12/2019 9:37 PM CST     HISTORY: epigastric pain       COMPARISON: None.      TECHNIQUE:  1 images.  Frontal view of the chest.     FINDINGS:    No pneumothorax, pleural effusion or focal consolidation. Cardiac  mediastinal silhouette within normal limits. No acute findings in the  bones, soft tissues or upper abdomen.           Impression:       1. No acute cardiopulmonary findings.  This report was finalized on 02/12/2019 22:37 by Dr Keisha Chan MD.          Orders (last 72 hrs)     Start     Ordered    02/14/19 0900  LORazepam (ATIVAN) injection 1 mg  Every 8 Hours      02/13/19 0802    02/14/19 0600  CBC (No Diff)  Morning Draw      02/13/19 0805    02/14/19 0600  Comprehensive Metabolic Panel  Morning Draw      02/13/19 0805    02/14/19 0600  Lipase  Morning Draw      02/13/19 0805    02/13/19 0900  thiamine (B-1) 100 mg in sodium chloride 0.9 % 100 mL IVPB  Daily      02/13/19 0750    02/13/19 0900  FLUoxetine (PROzac) capsule 40 mg  Daily      02/13/19 0755    02/13/19 0900  lamoTRIgine (LaMICtal) tablet 150 mg  2 Times Daily      02/13/19 0755    02/13/19 0900  venlafaxine XR (EFFEXOR-XR) 24 hr capsule 150 mg  Daily      02/13/19 0755    02/13/19 0900  LORazepam (ATIVAN) injection 1 mg  Every 6 Hours      02/13/19 0802    02/13/19 0845  diazePAM (VALIUM) injection 5 mg  Every 8 Hours,   Status:  Discontinued      02/13/19 0750    02/13/19 0800  HYDROmorphone (DILAUDID) injection 2 mg  Every 3 Hours PRN      02/13/19 0748    02/13/19 0756  labetalol (NORMODYNE,TRANDATE) injection 10 mg  Every 6 Hours PRN      02/13/19 0800    02/13/19 0751  MRI abdomen w wo contrast mrcp  1 Time Imaging      02/13/19 0750    02/13/19 0748  naloxone (NARCAN) injection 0.4 mg  Every 5 Minutes PRN      02/13/19 0748    02/13/19 0600  enoxaparin (LOVENOX) syringe 40 mg  Every 24 Hours      02/13/19 0048    02/13/19 0600  CBC (No Diff)  Morning Draw      02/13/19 0048    02/13/19 0600  Comprehensive Metabolic Panel  Morning Draw      02/13/19 0048    02/13/19 0600  Hemoglobin A1c  Morning Draw      02/13/19 0048    02/13/19 0600  Lipase  Morning Draw      02/13/19 0048    02/13/19 0600  Lipid Panel  Morning Draw      02/13/19 0048    02/13/19 0600  Amylase  Morning Draw      02/13/19 0048    02/13/19 0400  Vital Signs  Every 4 Hours      02/13/19 0048     02/13/19 0145  sodium chloride 0.9 % infusion  Continuous      02/13/19 0048    02/13/19 0049  Intake & Output  Every Shift      02/13/19 0048    02/13/19 0049  Weigh Patient  Once      02/13/19 0048    02/13/19 0049  Oxygen Therapy- Nasal Cannula; Titrate for SPO2: 90%  Continuous      02/13/19 0048    02/13/19 0049  Insert Peripheral IV  Once      02/13/19 0048    02/13/19 0049  Saline Lock & Maintain IV Access  Continuous      02/13/19 0048    02/13/19 0049  Place Sequential Compression Device  Once      02/13/19 0048    02/13/19 0049  Maintain Sequential Compression Device  Continuous      02/13/19 0048    02/13/19 0049  Cardiac Monitoring  Continuous      02/13/19 0048    02/13/19 0049  NPO Diet NPO Except: Ice Chips  Diet Effective Now      02/13/19 0048    02/13/19 0049  Safety Precautions  Continuous      02/13/19 0048    02/13/19 0049  Vital Signs  Per Hospital Policy      02/13/19 0048    02/13/19 0049  Continuous Pulse Oximetry  Continuous      02/13/19 0048    02/13/19 0049  Obtain Baseline Clinical [place] Withdrawal Assessment - Ar, Sedation Scale & Vital Signs  Once     Comments:  Follow CIWA Scoring Reference Guide    02/13/19 0048    02/13/19 0049  Clinical [place] Withdrawal Assessment (CIWA-Ar)  Per Hospital Policy      02/13/19 0048    02/13/19 0049  If CIWA Score < 8 Monitor q4 hours x 24 hours And Then Discontinue Assessment- Restart Scoring Assessment & Protocol if Symptoms Reappear  Continuous      02/13/19 0048    02/13/19 0049  Obtain Pre & Post Sedation Scores With Every Lorazepam Dose - Hold For POSS Greater Than 2 or RASS of -3 or Less  Continuous      02/13/19 0048    02/13/19 0049  Seizure Precautions  Continuous      02/13/19 0048    02/13/19 0049  Notify Provider - Withdrawal  Until Discontinued      02/13/19 0048    02/13/19 0049  Notify Provider of Abnormal Lab Results  Until Discontinued      02/13/19 0048    02/13/19 0049  Notify Provider - Vitals  Until Discontinued       02/13/19 0048    02/13/19 0048  sodium chloride 0.9 % flush 3 mL  Every 12 Hours Scheduled      02/13/19 0048    02/13/19 0048  sodium chloride 0.9 % flush 3-10 mL  As Needed      02/13/19 0048    02/13/19 0048  HYDROmorphone (DILAUDID) injection solution 1 mg  Every 2 Hours PRN,   Status:  Discontinued      02/13/19 0048    02/13/19 0048  naloxone (NARCAN) injection 0.4 mg  Every 5 Minutes PRN,   Status:  Discontinued      02/13/19 0048    02/13/19 0048  ondansetron (ZOFRAN) tablet 4 mg  Every 6 Hours PRN      02/13/19 0048    02/13/19 0048  ondansetron ODT (ZOFRAN-ODT) disintegrating tablet 4 mg  Every 6 Hours PRN      02/13/19 0048    02/13/19 0048  ondansetron (ZOFRAN) injection 4 mg  Every 6 Hours PRN      02/13/19 0048    02/13/19 0048  LORazepam (ATIVAN) tablet 1 mg  Every 2 Hours PRN      02/13/19 0048    02/13/19 0048  LORazepam (ATIVAN) injection 1 mg  Every 2 Hours PRN      02/13/19 0048    02/13/19 0048  LORazepam (ATIVAN) tablet 2 mg  Every 1 Hour PRN      02/13/19 0048    02/13/19 0048  LORazepam (ATIVAN) injection 2 mg  Every 1 Hour PRN      02/13/19 0048    02/13/19 0048  LORazepam (ATIVAN) injection 2 mg  Every 15 Minutes PRN      02/13/19 0048    02/13/19 0048  LORazepam (ATIVAN) injection 2 mg  Every 15 Minutes PRN      02/13/19 0048    02/12/19 2326  Code Status and Medical Interventions:  Continuous      02/12/19 2328 02/12/19 2325  Ethanol, Urine - Urine, Clean Catch  Once,   Status:  Canceled      02/12/19 2324    02/12/19 2312  Inpatient Admission  Once      02/12/19 2311    02/12/19 2304  HYDROmorphone (DILAUDID) injection solution 1 mg  Once      02/12/19 2302    02/12/19 2304  ondansetron (ZOFRAN) injection 4 mg  Once      02/12/19 2302 02/12/19 2302  Hospitalist (on-call MD unless specified)  Once     Specialty:  Hospitalist  Provider:  Alexandre Guevara MD    02/12/19 2302 02/12/19 2227  iopamidol (ISOVUE-300) 61 % injection 100 mL  Once in Imaging      02/12/19 2225     02/12/19 2159  Comprehensive Metabolic Panel  Once      02/12/19 2129 02/12/19 2159  Lipase  Once      02/12/19 2129 02/12/19 2159  Troponin  Once      02/12/19 2129 02/12/19 2155  sodium chloride 0.9 % bolus 1,000 mL  Once      02/12/19 2153 02/12/19 2152  Manual Differential  Once,   Status:  Canceled      02/12/19 2152 02/12/19 2131  morphine injection 4 mg  Once      02/12/19 2129 02/12/19 2131  ondansetron (ZOFRAN) injection 4 mg  Once      02/12/19 2129 02/12/19 2129  CT Abdomen Pelvis With Contrast  1 Time Imaging      02/12/19 2129 02/12/19 2129  Ethanol  STAT      02/12/19 2129 02/12/19 2129  Urine Drug Screen - Urine, Clean Catch  STAT      02/12/19 2129 02/12/19 2128  Lactic Acid, Plasma  Once      02/12/19 2129 02/12/19 2128  XR Chest 1 View  1 Time Imaging      02/12/19 2129 02/12/19 2126  CBC & Differential  Once      02/12/19 2129 02/12/19 2126  Urinalysis With Culture If Indicated - Urine, Clean Catch  Once      02/12/19 2129 02/12/19 2126  CBC Auto Differential  PROCEDURE ONCE      02/12/19 2129 02/12/19 2113  [place] Draw  Once      02/12/19 2112 02/12/19 2113  Light Blue Top  PROCEDURE ONCE      02/12/19 2112 02/12/19 2113  Green Top (Gel)  PROCEDURE ONCE      02/12/19 2112 02/12/19 2113  Lavender Top  PROCEDURE ONCE      02/12/19 2112 02/12/19 2113  Red Top  PROCEDURE ONCE      02/12/19 2112 02/12/19 2112  ECG 12 Lead  Once      02/12/19 2112    Unscheduled  Up With Assistance  As Needed      02/13/19 0048    --  valsartan (DIOVAN) 80 MG tablet  Daily      02/12/19 2332    --  FLUoxetine (PROzac) 20 MG capsule  Daily      02/12/19 2332    --  lamoTRIgine (LaMICtal) 100 MG tablet  2 Times Daily      02/12/19 2332    --  rosuvastatin (CRESTOR) 20 MG tablet  Nightly      02/12/19 2332    --  gabapentin (NEURONTIN) 600 MG tablet  3 Times Daily      02/12/19 2332    --  HYDROcodone-acetaminophen (NORCO) 7.5-325 MG per tablet  Every 8 Hours  PRN      02/12/19 2332    --  venlafaxine XR (EFFEXOR-XR) 150 MG 24 hr capsule  Daily      02/12/19 2332    Signed and Held  famotidine (PEPCID) injection 20 mg  Every 12 Hours Scheduled      Signed and Held          Ventilator/Non-Invasive Ventilation Settings (From admission, onward)    None           Physician Progress Notes (last 7 days) (Notes from 2/6/2019  8:28 AM through 2/13/2019  8:28 AM)      Lauren Lazaro, APRN at 2/13/2019  7:26 AM              [facility] Medicine Services  INPATIENT PROGRESS NOTE    Length of Stay: 1  Date of Admission: 2/12/2019  Primary Care Physician: Chaitanya Padilla DO    Subjective   Chief Complaint: Follow-up abdominal pain  HPI   The patient is resting in bed with fiancee at bedside. She is visibly distressed and crying.  She tells me that the pain medication only works for about 15 minutes at a time. She has had no nausea or vomiting overnight. Her last bowel movement was yesterday and described as normal. She is unable to take deep breaths due to her pain.    She and her fiancee tell me that since an admission to [facility] in October of 2018 the patient has cut back on her drinking and is now only drinking twice weekly at most. Her last alcoholic beverage was reported to be on Friday, although in the ED she reported that she had been drinking daily until 2 days ago.     Review of Systems   All pertinent negatives and positives are as above. All other systems have been reviewed and are negative unless otherwise stated.     Objective    Temp:  [97.8 °F (36.6 °C)-98.2 °F (36.8 °C)] 98.1 °F (36.7 °C)  Heart Rate:  [] 101  Resp:  [18-20] 20  BP: (145-187)/(80-89) 187/89  Physical Exam   Constitutional: She is oriented to person, place, and time. She appears well-developed and well-nourished. She appears distressed.   HENT:   Head: Normocephalic and atraumatic.   Neck: Normal range of motion. Neck supple. No JVD present. No tracheal  deviation present. No thyromegaly present.   Cardiovascular: Normal rate, regular rhythm, normal heart sounds and intact distal pulses. Exam reveals no gallop and no friction rub.   No murmur heard.  Pulmonary/Chest: Effort normal. She has no wheezes. She has no rales.   Shallow respirations   Abdominal: Soft. Bowel sounds are normal. She exhibits distension. There is tenderness (Generalized tenderness, worse in JESSE area). There is guarding.   Musculoskeletal: Normal range of motion. She exhibits no edema or tenderness.   Neurological: She is alert and oriented to person, place, and time. No cranial nerve deficit.   Skin: Skin is warm and dry. No rash noted. No erythema.   Psychiatric:   Patient is anxious and tearful   Vitals reviewed.    Results Review:  I have reviewed the labs, radiology results, and diagnostic studies.    Laboratory Data:   Results from last 7 days   Lab Units 02/13/19  0503 02/12/19 2128   WBC 10*3/mm3 6.21 6.84   HEMOGLOBIN g/dL 11.5* 11.4*   HEMATOCRIT % 31.6* 31.5*   PLATELETS 10*3/mm3 107* 108*     Results from last 7 days   Lab Units 02/13/19  0503 02/12/19 2128   SODIUM mmol/L 134* 130*   POTASSIUM mmol/L 4.3 4.1   CHLORIDE mmol/L 100 93*   CO2 mmol/L 25.0 26.0   BUN mg/dL 3* 4*   CREATININE mg/dL 0.40* 0.45*   CALCIUM mg/dL 9.0 9.4   BILIRUBIN mg/dL 0.9 1.1*   ALK PHOS U/L 112 126*   ALT (SGPT) U/L 62* 76*   AST (SGOT) U/L 61* 70*   GLUCOSE mg/dL 130* 131*     Radiology Data:   Imaging Results (last 24 hours)     Procedure Component Value Units Date/Time    CT Abdomen Pelvis With Contrast [[id number]] Collected:  02/13/19 0704     Updated:  02/13/19 0713    Narrative:          History:  48-year-old with epigastric abdominal pain. Elevated lipase.     Reference:  None.     Technique  Contrast-enhanced CT abdomen/pelvis was performed with coronal and  sagittal reformatted images provided.     For this CT exam, one or more of the following dose reduction techniques  was  employed:  -automated exposure control  -mA and/or kVp adjustment for patient size  -iterative reconstruction      mGy-cm     Findings     Chest  Incidental scanning through the lower chest demonstrates a 5 mm nodule  in the left lower lobe base.     Abdomen  The liver masses. Difficult to exclude early cirrhosis. Spleen is mildly  enlarged with a greatest axial dimension 14 cm. Thickness from hilum to  outer cortex is 6 cm. No adrenal mass. Gallbladder is unremarkable.  There is peripancreatic edema compatible with acute pancreatitis. No  parenchymal calcification or ductal dilatation. There is dilatation of  the common hepatic duct to 12-13 mm on coronal reformats image 23.     Mild atherosclerosis of the abdominal aorta without aneurysm. No renal  abnormality.     No lymphadenopathy. No bowel obstruction. Large amount of loose stool in  the colon. No free air. No ascites.     Pelvis  Urinary bladder contours unremarkable. Uterus is unremarkable. Bilateral  tubal ligation clips. No pelvic free fluid or lymphadenopathy.     No acute osseous abnormalities..          Impression:          1. Acute pancreatitis without complication.  2. Small 5 mm nodule at the left lower lobe base. Elective CT chest is  recommended.  3. Dilatation of the common hepatic duct. Suggest MRCP.  4. Possible cirrhosis. Mild splenomegaly.     A preliminary report was provided by [facility]. No discrepancy.  This report was finalized on 02/13/2019 07:10 by Dr Tino Dumont, .    XR Chest 1 View [[id number]] Collected:  02/12/19 2232     Updated:  02/12/19 2240    Narrative:       EXAM: XR CHEST 1 VW- - 2/12/2019 9:37 PM CST     HISTORY: epigastric pain       COMPARISON: None.      TECHNIQUE:  1 images.  Frontal view of the chest.     FINDINGS:    No pneumothorax, pleural effusion or focal consolidation. Cardiac  mediastinal silhouette within normal limits. No acute findings in the  bones, soft tissues or upper abdomen.          Impression:        1. No acute cardiopulmonary findings.  This report was finalized on 02/12/2019 22:37 by Dr Keisha Chan MD.        I have reviewed the patient current medications.     Assessment/Plan     Active Hospital Problems    Diagnosis   • **Alcohol-induced acute pancreatitis   • Thrombocytopenia concurrent with and due to alcoholism (CMS/[facility], located within [facility])   • Essential hypertension   • Hypercholesteremia   • Acute epigastric pain   • Anxiety and depression   • DTs (delirium tremens) (CMS/[facility], located within [facility])     11/2018       Plan:  1.  CT of the abdomen/pelvis reports dilation of the common hepatic duct. Will obtain MRCP with/without contrast today  2.  Increase Dilaudid to 2 mg every 3 hours as needed for severe pain. Monitor closely. Suspect that her blood pressure will improve as well with control of her pain  3.  Scheduled IV Ativan  4.  IV Thiamine   5.  Continue IV fluids at current rate  6.  Ok for sips of water with medications. Resume home medications Effexor, Prozac, and Lamictal as the patient is very concerned about missing dosages of these medications.  7.  IV Labetalol as needed for systolic blood pressure >170  8.  Labs in AM    Discharge Planning: I expect the patient to be discharged to home in ? days.    OSIEL Ferris   02/13/19   7:53 AM      Electronically signed by Lauren Lazaro APRN at 2/13/2019  8:05 AM       Consult Notes (last 72 hours) (Notes from 2/10/2019  8:28 AM through 2/13/2019  8:28 AM)     No notes of this type exist for this encounter.        Marika Salgado, RN   Registered Nurse   Oncology   Plan of Care   Signed   Date of Service:  2/13/2019  3:53 AM               Signed           Problem: Patient Care Overview  Goal: Plan of Care Review  Outcome: Ongoing (interventions implemented as appropriate)    02/13/19 2189   Coping/Psychosocial   Plan of Care Reviewed With patient   Plan of Care Review   Progress no change   OTHER   Outcome Summary Pt c/o constant pain to abd. Medicated with prn  meds. IVF. Tele. Fall risk. Bed check on. BP elevated.  notified and said to just watch it due to pt being in pain. Continue to monitor         Problem: Pancreatitis, Acute/Chronic (Adult)  Goal: Signs and Symptoms of Listed Potential Problems Will be Absent, Minimized or Managed (Pancreatitis, Acute/Chronic)  Outcome: Ongoing (interventions implemented as appropriate)

## 2019-02-13 NOTE — H&P
"    [facility] Medicine Services  HISTORY AND PHYSICAL    Date of Admission: 2/12/2019  Primary Care Physician: Chaitanya Padilla DO    Subjective     Chief Complaint: Severe epigastric pain    History of Present Illness  Sara Best is a 48-year-old female with a past medical history of alcohol abuse.  Patient was admitted to [facility] 11/2018 with delirium tremens, alcohol abuse and since that discharge she has been \"trying to quit\".   at bedside does most of the talking.  As stated she may drink a beer or 2 every other day or so and her last use was 4 days ago.  She also has chronic pain syndrome from back pain is followed by pain management and takes Lortab 7.5 mg 3 times a day, hypertension, hypercholesterolemia, anxiety and depression and history of stroke approximately 12 years ago.  Patient presented today to [facility] emergency department with complaints of severe mid epigastric pain.  Workup has revealed acute pancreatitis.  Currently her pain is scored 10 on a scale of 1-10.  She is unable to sit still.  She has associated nausea and anorexia.  Nurse did pull me aside and states patient told ER provider that she had been drinking daily until the last day or so.  Will monitor closely for DTs.  She is admitted for further evaluation and treatment.    Review of Systems   10 point review of systems was completed all negative except for those discussed in HPI    Past Medical History:   Past Medical History:   Diagnosis Date   • Alcohol abuse    • Anxiety and depression    • Chronic pain     back   • Hypercholesteremia    • Hypertension    • Stroke (cerebrum) (CMS/HCC)        Past Surgical History:   Past Surgical History:   Procedure Laterality Date   • BRAIN SURGERY  2007    Margareth Zuluaga    • TUBAL ABDOMINAL LIGATION         Family History: family history includes Breast cancer in her other and paternal grandmother; Lung cancer in her mother; Lymphoma in " "her father.    Social History:  reports that she has been smoking cigarettes.  She has been smoking about 0.50 packs per day. She does not have any smokeless tobacco history on file. She reports that she drinks alcohol. She reports that she does not use drugs.    Code Status: Full if unable to speak for herself her  will speak for her      Allergies:  No Known Allergies    Medications:  Patient does not know medication doses  Chantix  Valsartan  Crestor  Prozac  Lamictal  Lortab 7.5 mg 3 times    Objective     BP (!) 182/89 (BP Location: Right arm, Patient Position: Sitting)   Pulse 80   Temp 97.8 °F (36.6 °C) (Temporal)   Resp 18   Ht 160 cm (63\")   Wt 65.4 kg (144 lb 3.2 oz)   SpO2 99%   BMI 25.54 kg/m²   Physical Exam   Constitutional: She is oriented to person, place, and time. She appears well-developed and well-nourished. No distress.   HENT:   Head: Normocephalic and atraumatic.   Eyes: Conjunctivae and EOM are normal. Pupils are equal, round, and reactive to light. No scleral icterus.   Neck: Normal range of motion. Neck supple. No JVD present. No tracheal deviation present.   Cardiovascular: Normal rate, regular rhythm, normal heart sounds and intact distal pulses. Exam reveals no gallop.   No murmur heard.  Pulmonary/Chest: Effort normal and breath sounds normal. No respiratory distress. She has no wheezes. She has no rales.   Abdominal: Soft. Bowel sounds are normal. She exhibits no distension. There is tenderness (epigastric). There is no guarding.   Musculoskeletal: Normal range of motion. She exhibits no edema.   Neurological: She is alert and oriented to person, place, and time.   Mild left-sided weakness, residual from stroke 2007   Skin: Skin is warm and dry. No rash noted. She is not diaphoretic. No erythema. No pallor.   Psychiatric: She has a normal mood and affect. Her behavior is normal.   Unable to sit still, fidgety    Vitals reviewed.      Pertinent Data:   Lab Results (last 72 " hours)     Procedure Component Value Units Date/Time    Urine Drug Screen - Urine, Clean Catch [[id number]]  (Abnormal) Collected:  02/12/19 2200    Specimen:  Urine, Clean Catch Updated:  02/12/19 2229     Amphetamine Screen, Urine Negative     Barbiturates Screen, Urine Negative     Benzodiazepine Screen, Urine Negative     Cocaine Screen, Urine Negative     Methadone Screen, Urine Negative     Opiate Screen Positive     Phencyclidine (PCP), Urine Negative     THC, Screen, Urine Negative    Narrative:       Negative Thresholds For Drugs Screened in Urine:    Amphetamines          500 ng/ml  Barbiturates          200 ng/ml  Benzodiazepines       200 ng/ml  Cocaine               150 ng/ml  Methadone             150 ng/ml  Opiates               300 ng/ml  Phencyclidine         25 ng/ml  THC                      50 ng/ml    The normal value for all drugs tested is negative. This report includes final unconfirmed screening results.  A positive result by this assay can be, at your request, sent to the Reference Lab for confirmation by gas chromatography. Unconfirmed results must not be used for non-medical purposes, such as employment or legal testing. Clinical consideration should be applied to any drug of abuse test result, particularly when unconfirmed results are used.    Lipase [[id number]]  (Abnormal) Collected:  02/12/19 2128    Specimen:  Blood Updated:  02/12/19 2223     Lipase 7,312 U/L     Lactic Acid, Plasma [[id number]]  (Normal) Collected:  02/12/19 2207    Specimen:  Blood Updated:  02/12/19 2223     Lactate 0.7 mmol/L     Troponin [[id number]]  (Normal) Collected:  02/12/19 2128    Specimen:  Blood Updated:  02/12/19 2219     Troponin I <0.012 ng/mL     Urinalysis With Culture If Indicated - Urine, Clean Catch [[id number]]  (Normal) Collected:  02/12/19 2200    Specimen:  Urine, Clean Catch Updated:  02/12/19 2209     Color, UA Yellow     Appearance, UA Clear     pH, UA 7.0     Specific Gravity, UA <=1.005      Glucose, UA Negative     Ketones, UA Negative     Bilirubin, UA Negative     Blood, UA Negative     Protein, UA Negative     Leuk Esterase, UA Negative     Nitrite, UA Negative     Urobilinogen, UA 0.2 E.U./dL    Narrative:       Urine microscopic not indicated.    Comprehensive Metabolic Panel [[id number]]  (Abnormal) Collected:  02/12/19 2128    Specimen:  Blood Updated:  02/12/19 2207     Glucose 131 mg/dL      BUN 4 mg/dL      Creatinine 0.45 mg/dL      Sodium 130 mmol/L      Potassium 4.1 mmol/L      Chloride 93 mmol/L      CO2 26.0 mmol/L      Calcium 9.4 mg/dL      Total Protein 8.0 g/dL      Albumin 4.90 g/dL      ALT (SGPT) 76 U/L      AST (SGOT) 70 U/L      Alkaline Phosphatase 126 U/L      Total Bilirubin 1.1 mg/dL      eGFR Non African Amer 149 mL/min/1.73      Globulin 3.1 gm/dL      A/G Ratio 1.6 g/dL      BUN/Creatinine Ratio 8.9     Anion Gap 11.0 mmol/L     CBC Auto Differential [[id number]]  (Abnormal) Collected:  02/12/19 2128    Specimen:  Blood Updated:  02/12/19 2155     WBC 6.84 10*3/mm3      RBC 3.27 10*6/mm3      Hemoglobin 11.4 g/dL      Hematocrit 31.5 %      MCV 96.3 fL      MCH 34.9 pg      MCHC 36.2 g/dL      RDW 17.2 %      RDW-SD 60.6 fl      MPV 9.6 fL      Platelets 108 10*3/mm3      Neutrophil % 74.6 %      Lymphocyte % 16.4 %      Monocyte % 7.5 %      Eosinophil % 0.9 %      Basophil % 0.3 %      Neutrophils, Absolute 5.11 10*3/mm3      Lymphocytes, Absolute 1.12 10*3/mm3      Monocytes, Absolute 0.51 10*3/mm3      Eosinophils, Absolute 0.06 10*3/mm3      Basophils, Absolute 0.02 10*3/mm3     Ethanol [[id number]]  (Normal) Collected:  02/12/19 2128    Specimen:  Blood Updated:  02/12/19 2145     Ethanol % <0.010 %     Narrative:       Not for legal purposes. Chain of Custody not followed.         Imaging Results (last 24 hours)     Procedure Component Value Units Date/Time    XR Chest 1 View [[id number]] Collected:  02/12/19 2232     Updated:  02/12/19 2240    Narrative:       EXAM:  XR CHEST 1 VW- - 2/12/2019 9:37 PM CST     HISTORY: epigastric pain       COMPARISON: None.      TECHNIQUE:  1 images.  Frontal view of the chest.     FINDINGS:    No pneumothorax, pleural effusion or focal consolidation. Cardiac  mediastinal silhouette within normal limits. No acute findings in the  bones, soft tissues or upper abdomen.          Impression:       1. No acute cardiopulmonary findings.  This report was finalized on 02/12/2019 22:37 by Dr Keisha Chan MD.    CT Abdomen Pelvis With Contrast [[id number]] Updated:  02/12/19 2225            I have personally reviewed and interpreted the radiology studies and ECG obtained at time of admission.     Assessment / Plan     Assessment:   Active Hospital Problems    Diagnosis   • **Alcohol-induced acute pancreatitis   • Essential hypertension   • Hypercholesteremia   • Acute epigastric pain   • Anxiety and depression   • DTs (delirium tremens) (CMS/[facility])     11/2018         Plan:   1.  Admit as inpatient  2.  Treat pain and nausea  3.  Hydrate with normal saline 125 mL/hour  4.  Add Pepcid IV every 12 hours  5.  Labs in a.m. CBC with differential, CMP, A1c, lipid panel, lipase, amylase and TSH  6.  DVT prophylaxis Lovenox and SCDs  7.  Nurse to obtain accurate medication list, all on hold for now-patient n.p.o.  8.  Add CIWA prn for DT's      I discussed the patient's findings and my recommendations with: Alexandre Guevara MD  Time spent 35 minutes      OSIEL Tejada  02/12/19   11:36 PM     I personally evaluated and examined the patient in conjunction with OSIEL Cartagena and agree with the assessment, treatment plan, and disposition of the patient as recorded by her. My history, exam, and further recommendations are:   Patient evaluated time of admission agree with plans and examination.  Concern is high risk for going into DTs will use CIWA protocol.      Alexandre Guevara MD  02/13/19  12:03 AM

## 2019-02-13 NOTE — ED PROVIDER NOTES
Subjective   48-year-old female presents with a chief complaint 5 day history of epigastric abdominal pain.  The patient reports she is a daily alcohol drinker.  She has not been drinking due to the pain.  She reports a physical sharp 7 pain that radiates to her back.  She reports associated nausea and anorexia.  There is been no fevers vomiting diarrhea cough chest pain or shortness of breath.            Review of Systems   All other systems reviewed and are negative.      Past Medical History:   Diagnosis Date   • Alcohol abuse    • Anxiety and depression    • Chronic pain     back   • Hypercholesteremia    • Hypertension    • Stroke (cerebrum) (CMS/HCC)        No Known Allergies    Past Surgical History:   Procedure Laterality Date   • BRAIN SURGERY  2007    Zuluaga Zuluaga    • TUBAL ABDOMINAL LIGATION         Family History   Problem Relation Age of Onset   • Breast cancer Paternal Grandmother    • Breast cancer Other    • Lung cancer Mother    • Lymphoma Father        Social History     Socioeconomic History   • Marital status:      Spouse name: Not on file   • Number of children: Not on file   • Years of education: Not on file   • Highest education level: Not on file   Tobacco Use   • Smoking status: Current Every Day Smoker     Packs/day: 0.50     Types: Cigarettes   Substance and Sexual Activity   • Alcohol use: Yes     Comment: occasionally   • Drug use: No           Objective   Physical Exam   Constitutional: She is oriented to person, place, and time. She appears well-developed and well-nourished. She appears distressed.   Eyes: EOM are normal. Pupils are equal, round, and reactive to light.   Cardiovascular: Normal rate and normal heart sounds.   Pulmonary/Chest: Effort normal and breath sounds normal.   Abdominal: Normal appearance. There is tenderness in the epigastric area.   Neurological: She is alert and oriented to person, place, and time.   Skin: Skin is warm and dry. Capillary refill takes  less than 2 seconds.   Psychiatric: She has a normal mood and affect. Her behavior is normal.   Nursing note and vitals reviewed.      Procedures           ED Course        Labs Reviewed   COMPREHENSIVE METABOLIC PANEL - Abnormal; Notable for the following components:       Result Value    Glucose 131 (*)     BUN 4 (*)     Creatinine 0.45 (*)     Sodium 130 (*)     Chloride 93 (*)     ALT (SGPT) 76 (*)     AST (SGOT) 70 (*)     Alkaline Phosphatase 126 (*)     Total Bilirubin 1.1 (*)     All other components within normal limits   LIPASE - Abnormal; Notable for the following components:    Lipase 7,312 (*)     All other components within normal limits   URINE DRUG SCREEN - Abnormal; Notable for the following components:    Opiate Screen Positive (*)     All other components within normal limits    Narrative:     Negative Thresholds For Drugs Screened in Urine:    Amphetamines          500 ng/ml  Barbiturates          200 ng/ml  Benzodiazepines       200 ng/ml  Cocaine               150 ng/ml  Methadone             150 ng/ml  Opiates               300 ng/ml  Phencyclidine         25 ng/ml  THC                      50 ng/ml    The normal value for all drugs tested is negative. This report includes final unconfirmed screening results.  A positive result by this assay can be, at your request, sent to the Reference Lab for confirmation by gas chromatography. Unconfirmed results must not be used for non-medical purposes, such as employment or legal testing. Clinical consideration should be applied to any drug of abuse test result, particularly when unconfirmed results are used.   CBC WITH AUTO DIFFERENTIAL - Abnormal; Notable for the following components:    RBC 3.27 (*)     Hemoglobin 11.4 (*)     Hematocrit 31.5 (*)     MCH 34.9 (*)     MCHC 36.2 (*)     RDW 17.2 (*)     RDW-SD 60.6 (*)     Platelets 108 (*)     All other components within normal limits   URINALYSIS W/ CULTURE IF INDICATED - Normal    Narrative:      Urine microscopic not indicated.   TROPONIN (IN-HOUSE) - Normal   LACTIC ACID, PLASMA - Normal   ETHANOL - Normal    Narrative:     Not for legal purposes. Chain of Custody not followed.    RAINBOW DRAW    Narrative:     The following orders were created for panel order [place] Draw.  Procedure                               Abnormality         Status                     ---------                               -----------         ------                     Light Blue Top[[id number]]                                   Final result               Green Top (Gel)[[id number]]                                  Final result               Lavender Top[[id number]]                                     Final result               Red Top[[id number]]                                          Final result                 Please view results for these tests on the individual orders.   CBC (NO DIFF)   COMPREHENSIVE METABOLIC PANEL   HEMOGLOBIN A1C   LIPASE   LIPID PANEL   AMYLASE   LIGHT BLUE TOP   GREEN TOP   LAVENDER TOP   RED TOP   CBC AND DIFFERENTIAL    Narrative:     The following orders were created for panel order CBC & Differential.  Procedure                               Abnormality         Status                     ---------                               -----------         ------                     Manual Differential[[id number]]                                                         CBC Auto Differential[[id number]]        Abnormal            Final result                 Please view results for these tests on the individual orders.     XR Chest 1 View   Final Result   1. No acute cardiopulmonary findings.   This report was finalized on 02/12/2019 22:37 by Dr Keisha Chan MD.      CT Abdomen Pelvis With Contrast    (Results Pending)               MDM  Number of Diagnoses or Management Options  Diagnosis management comments: Pancreatitis, requiring IV pain medications, admit for NPO and IV fluid resuscitation        Amount  and/or Complexity of Data Reviewed  Clinical lab tests: ordered and reviewed  Tests in the radiology section of CPT®: reviewed and ordered  Tests in the medicine section of CPT®: reviewed and ordered    Risk of Complications, Morbidity, and/or Mortality  Presenting problems: moderate  Diagnostic procedures: moderate  Management options: moderate    Patient Progress  Patient progress: stable        Final diagnoses:   Alcohol-induced acute pancreatitis, unspecified complication status            Jass Chan PA-C  02/13/19 0222

## 2019-02-14 PROBLEM — D61.818 PANCYTOPENIA (HCC): Status: ACTIVE | Noted: 2019-02-14

## 2019-02-14 PROBLEM — E87.1 HYPONATREMIA: Status: ACTIVE | Noted: 2019-02-14

## 2019-02-14 LAB
ALBUMIN SERPL-MCNC: 4.2 G/DL (ref 3.5–5)
ALBUMIN/GLOB SERPL: 1.6 G/DL (ref 1.1–2.5)
ALP SERPL-CCNC: 109 U/L (ref 24–120)
ALT SERPL W P-5'-P-CCNC: 47 U/L (ref 0–54)
ANION GAP SERPL CALCULATED.3IONS-SCNC: 9 MMOL/L (ref 4–13)
AST SERPL-CCNC: 40 U/L (ref 7–45)
BILIRUB SERPL-MCNC: 0.7 MG/DL (ref 0.1–1)
BUN BLD-MCNC: 4 MG/DL (ref 5–21)
BUN/CREAT SERPL: 10.8 (ref 7–25)
CALCIUM SPEC-SCNC: 8.3 MG/DL (ref 8.4–10.4)
CHLORIDE SERPL-SCNC: 100 MMOL/L (ref 98–110)
CO2 SERPL-SCNC: 22 MMOL/L (ref 24–31)
CREAT BLD-MCNC: 0.37 MG/DL (ref 0.5–1.4)
DEPRECATED RDW RBC AUTO: 60.9 FL (ref 40–54)
ERYTHROCYTE [DISTWIDTH] IN BLOOD BY AUTOMATED COUNT: 16.5 % (ref 12–15)
GFR SERPL CREATININE-BSD FRML MDRD: >150 ML/MIN/1.73
GLOBULIN UR ELPH-MCNC: 2.6 GM/DL
GLUCOSE BLD-MCNC: 107 MG/DL (ref 70–100)
HCT VFR BLD AUTO: 28.7 % (ref 37–47)
HGB BLD-MCNC: 10.2 G/DL (ref 12–16)
LIPASE SERPL-CCNC: 2332 U/L (ref 23–203)
MCH RBC QN AUTO: 35.8 PG (ref 28–32)
MCHC RBC AUTO-ENTMCNC: 35.5 G/DL (ref 33–36)
MCV RBC AUTO: 100.7 FL (ref 82–98)
PLATELET # BLD AUTO: 100 10*3/MM3 (ref 130–400)
PMV BLD AUTO: 9.9 FL (ref 6–12)
POTASSIUM BLD-SCNC: 4.2 MMOL/L (ref 3.5–5.3)
PROT SERPL-MCNC: 6.8 G/DL (ref 6.3–8.7)
RBC # BLD AUTO: 2.85 10*6/MM3 (ref 4.2–5.4)
SODIUM BLD-SCNC: 131 MMOL/L (ref 135–145)
WBC NRBC COR # BLD: 4.68 10*3/MM3 (ref 4.8–10.8)

## 2019-02-14 PROCEDURE — 25010000002 LORAZEPAM PER 2 MG: Performed by: NURSE PRACTITIONER

## 2019-02-14 PROCEDURE — 25010000002 THIAMINE PER 100 MG: Performed by: NURSE PRACTITIONER

## 2019-02-14 PROCEDURE — 25010000002 ENOXAPARIN PER 10 MG: Performed by: NURSE PRACTITIONER

## 2019-02-14 PROCEDURE — 94799 UNLISTED PULMONARY SVC/PX: CPT

## 2019-02-14 PROCEDURE — 85027 COMPLETE CBC AUTOMATED: CPT | Performed by: NURSE PRACTITIONER

## 2019-02-14 PROCEDURE — 80053 COMPREHEN METABOLIC PANEL: CPT | Performed by: NURSE PRACTITIONER

## 2019-02-14 PROCEDURE — 83690 ASSAY OF LIPASE: CPT | Performed by: NURSE PRACTITIONER

## 2019-02-14 PROCEDURE — 25010000002 HYDROMORPHONE PER 4 MG: Performed by: NURSE PRACTITIONER

## 2019-02-14 PROCEDURE — 94760 N-INVAS EAR/PLS OXIMETRY 1: CPT

## 2019-02-14 RX ORDER — DIAZEPAM 2 MG/1
2 TABLET ORAL ONCE
Status: COMPLETED | OUTPATIENT
Start: 2019-02-14 | End: 2019-02-14

## 2019-02-14 RX ORDER — FOLIC ACID 1 MG/1
1 TABLET ORAL DAILY
Status: DISCONTINUED | OUTPATIENT
Start: 2019-02-14 | End: 2019-02-20 | Stop reason: HOSPADM

## 2019-02-14 RX ORDER — DIAZEPAM 5 MG/1
5 TABLET ORAL EVERY 8 HOURS SCHEDULED
Status: DISCONTINUED | OUTPATIENT
Start: 2019-02-14 | End: 2019-02-16

## 2019-02-14 RX ADMIN — FOLIC ACID 1 MG: 1 TABLET ORAL at 08:53

## 2019-02-14 RX ADMIN — THIAMINE HYDROCHLORIDE 100 MG: 100 INJECTION, SOLUTION INTRAMUSCULAR; INTRAVENOUS at 08:52

## 2019-02-14 RX ADMIN — LABETALOL 20 MG/4 ML (5 MG/ML) INTRAVENOUS SYRINGE 10 MG: at 07:54

## 2019-02-14 RX ADMIN — SODIUM CHLORIDE 125 ML/HR: 9 INJECTION, SOLUTION INTRAVENOUS at 01:36

## 2019-02-14 RX ADMIN — DIAZEPAM 2 MG: 2 TABLET ORAL at 15:09

## 2019-02-14 RX ADMIN — SODIUM CHLORIDE, PRESERVATIVE FREE 3 ML: 5 INJECTION INTRAVENOUS at 20:02

## 2019-02-14 RX ADMIN — LORAZEPAM 1 MG: 1 TABLET ORAL at 13:00

## 2019-02-14 RX ADMIN — HYDROMORPHONE HYDROCHLORIDE 2 MG: 2 INJECTION, SOLUTION INTRAMUSCULAR; INTRAVENOUS; SUBCUTANEOUS at 13:00

## 2019-02-14 RX ADMIN — ENOXAPARIN SODIUM 40 MG: 40 INJECTION SUBCUTANEOUS at 07:53

## 2019-02-14 RX ADMIN — LORAZEPAM 1 MG: 2 INJECTION INTRAMUSCULAR; INTRAVENOUS at 03:30

## 2019-02-14 RX ADMIN — LORAZEPAM 2 MG: 2 INJECTION INTRAMUSCULAR; INTRAVENOUS at 00:06

## 2019-02-14 RX ADMIN — FAMOTIDINE 20 MG: 10 INJECTION INTRAVENOUS at 08:47

## 2019-02-14 RX ADMIN — FAMOTIDINE 20 MG: 10 INJECTION INTRAVENOUS at 20:01

## 2019-02-14 RX ADMIN — DIAZEPAM 2 MG: 2 TABLET ORAL at 20:01

## 2019-02-14 RX ADMIN — HYDROMORPHONE HYDROCHLORIDE 2 MG: 2 INJECTION, SOLUTION INTRAMUSCULAR; INTRAVENOUS; SUBCUTANEOUS at 09:50

## 2019-02-14 RX ADMIN — VENLAFAXINE HYDROCHLORIDE 150 MG: 75 CAPSULE, EXTENDED RELEASE ORAL at 08:47

## 2019-02-14 RX ADMIN — HYDROMORPHONE HYDROCHLORIDE 2 MG: 2 INJECTION, SOLUTION INTRAMUSCULAR; INTRAVENOUS; SUBCUTANEOUS at 23:31

## 2019-02-14 RX ADMIN — LAMOTRIGINE 150 MG: 100 TABLET ORAL at 20:01

## 2019-02-14 RX ADMIN — LORAZEPAM 2 MG: 2 INJECTION INTRAMUSCULAR; INTRAVENOUS at 18:23

## 2019-02-14 RX ADMIN — LAMOTRIGINE 150 MG: 100 TABLET ORAL at 08:47

## 2019-02-14 RX ADMIN — HYDROMORPHONE HYDROCHLORIDE 2 MG: 2 INJECTION, SOLUTION INTRAMUSCULAR; INTRAVENOUS; SUBCUTANEOUS at 20:02

## 2019-02-14 RX ADMIN — LORAZEPAM 2 MG: 2 INJECTION INTRAMUSCULAR; INTRAVENOUS at 15:11

## 2019-02-14 RX ADMIN — HYDROMORPHONE HYDROCHLORIDE 2 MG: 2 INJECTION, SOLUTION INTRAMUSCULAR; INTRAVENOUS; SUBCUTANEOUS at 05:32

## 2019-02-14 RX ADMIN — LORAZEPAM 2 MG: 2 INJECTION INTRAMUSCULAR; INTRAVENOUS at 16:53

## 2019-02-14 RX ADMIN — FLUOXETINE HYDROCHLORIDE 40 MG: 20 CAPSULE ORAL at 08:47

## 2019-02-14 RX ADMIN — LORAZEPAM 2 MG: 2 INJECTION INTRAMUSCULAR; INTRAVENOUS at 01:42

## 2019-02-14 RX ADMIN — SODIUM CHLORIDE 125 ML/HR: 9 INJECTION, SOLUTION INTRAVENOUS at 18:41

## 2019-02-14 RX ADMIN — DIAZEPAM 2 MG: 2 TABLET ORAL at 07:53

## 2019-02-14 RX ADMIN — DIAZEPAM 5 MG: 5 TABLET ORAL at 22:58

## 2019-02-14 NOTE — PLAN OF CARE
Problem: Pancreatitis, Acute/Chronic (Adult)  Goal: Signs and Symptoms of Listed Potential Problems Will be Absent, Minimized or Managed (Pancreatitis, Acute/Chronic)  Outcome: Ongoing (interventions implemented as appropriate)      Problem: Anxiety (Adult)  Goal: Reduction/Resolution  Outcome: Ongoing (interventions implemented as appropriate)

## 2019-02-14 NOTE — PROGRESS NOTES
[facility] Medicine Services  INPATIENT PROGRESS NOTE    Patient Name: Maureen Best  Date of Admission: 2/12/2019  Today's Date: 02/14/19  Length of Stay: 2  Primary Care Physician: Chaitanya Padilla DO    Subjective   Chief Complaint: follow up pancreatitis  HPI   Pt is lying in bed. Twitching and restless.  Drowsy from Ativan. She does awaken.No family at bedside. Mumbles her pain is somewhat better. Has elevated CIWA score.     Review of Systems   Unable to obtain full review of systems given her ETOH withdrawal.     Objective    Temp:  [98 °F (36.7 °C)-98.9 °F (37.2 °C)] 98.7 °F (37.1 °C)  Heart Rate:  [87-99] 95  Resp:  [18-20] 18  BP: (139-192)/(83-98) 170/94  Physical Exam   Constitutional: She appears well-developed and well-nourished.   HENT:   Head: Normocephalic and atraumatic.   Eyes: Conjunctivae and EOM are normal. Pupils are equal, round, and reactive to light.   Neck: Neck supple. No JVD present. No thyromegaly present.   Cardiovascular: Normal rate, regular rhythm, normal heart sounds and intact distal pulses. Exam reveals no gallop and no friction rub.   No murmur heard.  Sinus to sinus tachycardia  on telemetry.    Pulmonary/Chest: Effort normal and breath sounds normal. No respiratory distress. She has no wheezes. She has no rales. She exhibits no tenderness.   Abdominal: Soft. She exhibits distension (mild with faint bowel sounds only. ). There is no tenderness. There is no rebound and no guarding.   Musculoskeletal: Normal range of motion. She exhibits no edema, tenderness or deformity.   Lymphadenopathy:     She has no cervical adenopathy.   Neurological: She displays normal reflexes. No cranial nerve deficit. She exhibits normal muscle tone.   Drowsy and lethargic.    Skin: Skin is warm and dry. No rash noted.   Psychiatric: She has a normal mood and affect. Her behavior is normal. Judgment and thought content normal.     Results Review:  I  have reviewed the labs, radiology results, and diagnostic studies.    Laboratory Data:   Results from last 7 days   Lab Units 02/14/19  0506 02/13/19  0503 02/12/19 2128   WBC 10*3/mm3 4.68* 6.21 6.84   HEMOGLOBIN g/dL 10.2* 11.5* 11.4*   HEMATOCRIT % 28.7* 31.6* 31.5*   PLATELETS 10*3/mm3 100* 107* 108*        Results from last 7 days   Lab Units 02/14/19  0506 02/13/19  0503 02/12/19 2128   SODIUM mmol/L 131* 134* 130*   POTASSIUM mmol/L 4.2 4.3 4.1   CHLORIDE mmol/L 100 100 93*   CO2 mmol/L 22.0* 25.0 26.0   BUN mg/dL 4* 3* 4*   CREATININE mg/dL 0.37* 0.40* 0.45*   CALCIUM mg/dL 8.3* 9.0 9.4   BILIRUBIN mg/dL 0.7 0.9 1.1*   ALK PHOS U/L 109 112 126*   ALT (SGPT) U/L 47 62* 76*   AST (SGOT) U/L 40 61* 70*   GLUCOSE mg/dL 107* 130* 131*     Radiology Data:   Imaging Results (last 24 hours)     Procedure Component Value Units Date/Time    MRI abdomen w wo contrast mrcp [[id number]] Collected:  02/13/19 1624     Updated:  02/13/19 1635    Narrative:       PROCEDURE: MRI ABDOMEN and MRCP     CLINICAL INDICATION: 48-year-old with biliary dilatation. Pancreatitis.     COMPARISON: CT abdomen pelvis yesterday.     TECHNIQUE: Multiplanar, multisequence MR images of the abdomen were  obtained utilizing an MRCP protocol. Postcontrast images were also  obtained. These include MIP and 3D images of the biliary tree.      FINDINGS:  MRCP sequence is significantly motion degraded.     Correlating to yesterday, there is dilatation of the common hepatic and  common bile ducts with a maximal diameter of 12 mm. Mild intrahepatic  biliary ductal dilatation as well. No gallstones identified. There is a  questionable filling defect in the distal common bile duct at the  sphincter. No abnormal enhancement in this area.     Redemonstrated peripancreatic edema compatible with acute pancreatitis.  No parenchymal necrosis no vascular complication.     No liver lesions. Hepatic vasculature is patent. Splenomegaly is seen,  approximately 14  cm diameter.     Normal kidneys and adrenal. Incidental tiny left basilar pulmonary  nodule corresponding with yesterday's CT.          Impression:          1. Questionable filling defect of the distal CBD at the sphincter. No  abnormal enhancement. A small obstructing stone is not excluded.  Stricture is also a possibility. ERCP may be needed. Biliary dilatation  again noted.  2. No gallstones are seen.  3. Acute pancreatitis without complication.     This report was finalized on 02/13/2019 16:32 by Dr Tino Dumont, .          I have reviewed the patient's current medications.     Assessment/Plan     Active Hospital Problems    Diagnosis   • **Alcohol-induced acute pancreatitis   • Pancytopenia (CMS/HCC)     Likely related to hypersplenism. Pt with splenomegaly on MRI     • Hyponatremia   • Thrombocytopenia concurrent with and due to alcoholism (CMS/HCC)   • Essential hypertension   • Hypercholesteremia   • Acute epigastric pain   • Anxiety and depression   • DTs (delirium tremens) (CMS/HCC)     11/2018       Plan:  1. Continue Ativan per CIWA.   2. IV thiamine  3. MRCP shows possible filling defect vs stricture. Her AKLP is normal, thus doubt stone. She would likely benefit from ERCP at later date. Will need to get through ETOH withdrawal first. No signs of sepsis to concern about cholangitis.   4. Add folic acid oral.    5. PRN labetalol.   6. Labs in am.   7. NPO until she is more awake.     Discharge Planning: I expect the patient to be discharged to home in ? days.    OSIEL Silva   02/14/19   7:50 AM     I personally evaluated and examined the patient in conjunction with OSIEL Campos and agree with the assessment, treatment plan, and disposition of the patient as recorded by her. My history, exam, and further recommendations are:     Seen and examined.  Low suspicion for choledocolithiasis given normal bilirubin and normal alkalkine phosphotase.  Patient scoring high on CIWA and requiring  frequent PRN.  Found patient wondering through the hospital on another unit and she did not know where she was.  She did not recall her room number.  Increased her diazepam to 5 mg TID and gave an additional diazepam 2 mg.       Todd Chaparro MD  02/14/19  9:31 PM

## 2019-02-14 NOTE — PROGRESS NOTES
Discharge Planning Assessment  [facility]     Patient Name: Maureen Best  MRN: [id number]  Today's Date: 2/14/2019    Admit Date: 2/12/2019    Discharge Needs Assessment     Row Name 02/14/19 1131       Living Environment    Lives With  significant other     Name(s) of Who Lives With Patient  Fernandez Sommers      Current Living Arrangements  home/apartment/condo      Primary Care Provided by  self      Provides Primary Care For  no one      Family Caregiver if Needed  significant other      Quality of Family Relationships  helpful;involved;supportive      Able to Return to Prior Arrangements  yes         Resource/Environmental Concerns    Resource/Environmental Concerns  none      Transportation Concerns  car, none         Transition Planning    Patient/Family Anticipates Transition to  home with family      Patient/Family Anticipated Services at Transition  none      Transportation Anticipated  family or friend will provide         Discharge Needs Assessment    Readmission Within the Last 30 Days  no previous admission in last 30 days      Concerns to be Addressed  substance/tobacco abuse/use      Equipment Currently Used at Home  walker, rolling;shower chair      Anticipated Changes Related to Illness  none     Equipment Needed After Discharge  none          Discharge Plan     Row Name 02/14/19 1139       Plan    Plan  Home       Patient/Family in Agreement with Plan  yes      Plan Comments  Pt states that she has a PCP and RX coverage. Pt states that she lives at home with twan Fernandez Sommers. Pt states that she is not caring for anyone at this time but she is fighting for custody for her 8 year old daughter, Alessandra Best. SW provided pt with chemical dependancy packet in regards to her continuing to use alcohol and going through DT's during stay. SW will follow.          Destination      No service coordination in this encounter.      Durable Medical Equipment      No service coordination in this encounter.       Dialysis/Infusion      No service coordination in this encounter.      Home Medical Care      No service coordination in this encounter.      Community Resources      No service coordination in this encounter.          Demographic Summary    No documentation.       Functional Status    No documentation.       Psychosocial    No documentation.       Abuse/Neglect    No documentation.       Legal    No documentation.       Substance Abuse    No documentation.       Patient Forms    No documentation.           Barb Sanders

## 2019-02-14 NOTE — PLAN OF CARE
Problem: Patient Care Overview  Goal: Plan of Care Review  Outcome: Ongoing (interventions implemented as appropriate)   02/14/19 0551   Coping/Psychosocial   Plan of Care Reviewed With patient   Plan of Care Review   Progress no change   OTHER   Outcome Summary Pt scored between 10-14 on CIWA, prn Ativan given as ordered with little relief. C/o of abdominal pain x2, prn given. Continues on IV fluids, NPO except ice chips/sips. Elevated bp this shift, prn Labetalol given. Bed alarm on, will continue to monitor.      Goal: Discharge Needs Assessment  Outcome: Ongoing (interventions implemented as appropriate)      Problem: Pancreatitis, Acute/Chronic (Adult)  Goal: Signs and Symptoms of Listed Potential Problems Will be Absent, Minimized or Managed (Pancreatitis, Acute/Chronic)  Outcome: Ongoing (interventions implemented as appropriate)

## 2019-02-15 LAB
ALBUMIN SERPL-MCNC: 3.9 G/DL (ref 3.5–5)
ALBUMIN/GLOB SERPL: 1.8 G/DL (ref 1.1–2.5)
ALP SERPL-CCNC: 102 U/L (ref 24–120)
ALT SERPL W P-5'-P-CCNC: 43 U/L (ref 0–54)
AMYLASE SERPL-CCNC: 121 U/L (ref 30–110)
ANION GAP SERPL CALCULATED.3IONS-SCNC: 10 MMOL/L (ref 4–13)
AST SERPL-CCNC: 32 U/L (ref 7–45)
BILIRUB SERPL-MCNC: 0.6 MG/DL (ref 0.1–1)
BUN BLD-MCNC: 5 MG/DL (ref 5–21)
BUN/CREAT SERPL: 12.8 (ref 7–25)
CALCIUM SPEC-SCNC: 8.6 MG/DL (ref 8.4–10.4)
CHLORIDE SERPL-SCNC: 102 MMOL/L (ref 98–110)
CO2 SERPL-SCNC: 21 MMOL/L (ref 24–31)
CREAT BLD-MCNC: 0.39 MG/DL (ref 0.5–1.4)
CRP SERPL-MCNC: 2.99 MG/DL (ref 0–0.99)
DEPRECATED RDW RBC AUTO: 58.3 FL (ref 40–54)
ERYTHROCYTE [DISTWIDTH] IN BLOOD BY AUTOMATED COUNT: 16.1 % (ref 12–15)
GFR SERPL CREATININE-BSD FRML MDRD: >150 ML/MIN/1.73
GLOBULIN UR ELPH-MCNC: 2.2 GM/DL
GLUCOSE BLD-MCNC: 84 MG/DL (ref 70–100)
HCT VFR BLD AUTO: 29.3 % (ref 37–47)
HGB BLD-MCNC: 10.2 G/DL (ref 12–16)
LIPASE SERPL-CCNC: 1843 U/L (ref 23–203)
MCH RBC QN AUTO: 34.2 PG (ref 28–32)
MCHC RBC AUTO-ENTMCNC: 34.8 G/DL (ref 33–36)
MCV RBC AUTO: 98.3 FL (ref 82–98)
PLATELET # BLD AUTO: 116 10*3/MM3 (ref 130–400)
PMV BLD AUTO: 9.3 FL (ref 6–12)
POTASSIUM BLD-SCNC: 4 MMOL/L (ref 3.5–5.3)
PROT SERPL-MCNC: 6.1 G/DL (ref 6.3–8.7)
RBC # BLD AUTO: 2.98 10*6/MM3 (ref 4.2–5.4)
SODIUM BLD-SCNC: 133 MMOL/L (ref 135–145)
WBC NRBC COR # BLD: 4.23 10*3/MM3 (ref 4.8–10.8)

## 2019-02-15 PROCEDURE — 86140 C-REACTIVE PROTEIN: CPT | Performed by: NURSE PRACTITIONER

## 2019-02-15 PROCEDURE — 94799 UNLISTED PULMONARY SVC/PX: CPT

## 2019-02-15 PROCEDURE — 80053 COMPREHEN METABOLIC PANEL: CPT | Performed by: NURSE PRACTITIONER

## 2019-02-15 PROCEDURE — 83690 ASSAY OF LIPASE: CPT | Performed by: NURSE PRACTITIONER

## 2019-02-15 PROCEDURE — 94760 N-INVAS EAR/PLS OXIMETRY 1: CPT

## 2019-02-15 PROCEDURE — 25010000002 ENOXAPARIN PER 10 MG: Performed by: NURSE PRACTITIONER

## 2019-02-15 PROCEDURE — 85027 COMPLETE CBC AUTOMATED: CPT | Performed by: NURSE PRACTITIONER

## 2019-02-15 PROCEDURE — 25010000002 THIAMINE PER 100 MG: Performed by: NURSE PRACTITIONER

## 2019-02-15 PROCEDURE — 94640 AIRWAY INHALATION TREATMENT: CPT

## 2019-02-15 PROCEDURE — 82150 ASSAY OF AMYLASE: CPT | Performed by: NURSE PRACTITIONER

## 2019-02-15 PROCEDURE — 25010000002 HYDROMORPHONE PER 4 MG: Performed by: NURSE PRACTITIONER

## 2019-02-15 RX ORDER — IPRATROPIUM BROMIDE AND ALBUTEROL SULFATE 2.5; .5 MG/3ML; MG/3ML
3 SOLUTION RESPIRATORY (INHALATION)
Status: DISCONTINUED | OUTPATIENT
Start: 2019-02-15 | End: 2019-02-15

## 2019-02-15 RX ORDER — NALOXONE HCL 0.4 MG/ML
0.4 VIAL (ML) INJECTION
Status: DISCONTINUED | OUTPATIENT
Start: 2019-02-15 | End: 2019-02-16

## 2019-02-15 RX ORDER — HYDROCODONE BITARTRATE AND ACETAMINOPHEN 7.5; 325 MG/1; MG/1
1 TABLET ORAL EVERY 4 HOURS PRN
Status: DISCONTINUED | OUTPATIENT
Start: 2019-02-15 | End: 2019-02-20 | Stop reason: HOSPADM

## 2019-02-15 RX ORDER — IPRATROPIUM BROMIDE AND ALBUTEROL SULFATE 2.5; .5 MG/3ML; MG/3ML
3 SOLUTION RESPIRATORY (INHALATION) EVERY 4 HOURS PRN
Status: DISCONTINUED | OUTPATIENT
Start: 2019-02-15 | End: 2019-02-20 | Stop reason: HOSPADM

## 2019-02-15 RX ORDER — ERGOCALCIFEROL 1.25 MG/1
50000 CAPSULE ORAL
Status: ON HOLD | COMMUNITY
End: 2019-05-29

## 2019-02-15 RX ORDER — TIZANIDINE 4 MG/1
4 TABLET ORAL EVERY 8 HOURS PRN
COMMUNITY

## 2019-02-15 RX ORDER — FAMOTIDINE 20 MG/1
20 TABLET, FILM COATED ORAL 2 TIMES DAILY
Status: DISCONTINUED | OUTPATIENT
Start: 2019-02-15 | End: 2019-02-20 | Stop reason: HOSPADM

## 2019-02-15 RX ADMIN — DIAZEPAM 5 MG: 5 TABLET ORAL at 21:02

## 2019-02-15 RX ADMIN — LAMOTRIGINE 150 MG: 100 TABLET ORAL at 21:02

## 2019-02-15 RX ADMIN — ENOXAPARIN SODIUM 40 MG: 40 INJECTION SUBCUTANEOUS at 06:21

## 2019-02-15 RX ADMIN — HYDROMORPHONE HYDROCHLORIDE 2 MG: 2 INJECTION, SOLUTION INTRAMUSCULAR; INTRAVENOUS; SUBCUTANEOUS at 14:07

## 2019-02-15 RX ADMIN — HYDROMORPHONE HYDROCHLORIDE 2 MG: 2 INJECTION, SOLUTION INTRAMUSCULAR; INTRAVENOUS; SUBCUTANEOUS at 08:06

## 2019-02-15 RX ADMIN — HYDROCODONE BITARTRATE AND ACETAMINOPHEN 1 TABLET: 7.5; 325 TABLET ORAL at 21:02

## 2019-02-15 RX ADMIN — THIAMINE HYDROCHLORIDE 100 MG: 100 INJECTION, SOLUTION INTRAMUSCULAR; INTRAVENOUS at 09:00

## 2019-02-15 RX ADMIN — FAMOTIDINE 20 MG: 20 TABLET, FILM COATED ORAL at 21:02

## 2019-02-15 RX ADMIN — HYDROMORPHONE HYDROCHLORIDE 2 MG: 2 INJECTION, SOLUTION INTRAMUSCULAR; INTRAVENOUS; SUBCUTANEOUS at 03:12

## 2019-02-15 RX ADMIN — DIAZEPAM 5 MG: 5 TABLET ORAL at 14:07

## 2019-02-15 RX ADMIN — LAMOTRIGINE 150 MG: 100 TABLET ORAL at 08:06

## 2019-02-15 RX ADMIN — VENLAFAXINE HYDROCHLORIDE 150 MG: 75 CAPSULE, EXTENDED RELEASE ORAL at 08:06

## 2019-02-15 RX ADMIN — SODIUM CHLORIDE 125 ML/HR: 9 INJECTION, SOLUTION INTRAVENOUS at 02:30

## 2019-02-15 RX ADMIN — SODIUM CHLORIDE 100 ML/HR: 9 INJECTION, SOLUTION INTRAVENOUS at 21:02

## 2019-02-15 RX ADMIN — HYDROMORPHONE HYDROCHLORIDE 2 MG: 2 INJECTION, SOLUTION INTRAMUSCULAR; INTRAVENOUS; SUBCUTANEOUS at 11:02

## 2019-02-15 RX ADMIN — FOLIC ACID 1 MG: 1 TABLET ORAL at 08:06

## 2019-02-15 RX ADMIN — HYDROMORPHONE HYDROCHLORIDE 1 MG: 1 INJECTION, SOLUTION INTRAMUSCULAR; INTRAVENOUS; SUBCUTANEOUS at 18:46

## 2019-02-15 RX ADMIN — HYDROCODONE BITARTRATE AND ACETAMINOPHEN 1 TABLET: 7.5; 325 TABLET ORAL at 16:07

## 2019-02-15 RX ADMIN — DIAZEPAM 5 MG: 5 TABLET ORAL at 06:21

## 2019-02-15 RX ADMIN — FLUOXETINE HYDROCHLORIDE 40 MG: 20 CAPSULE ORAL at 08:07

## 2019-02-15 RX ADMIN — SODIUM CHLORIDE 100 ML/HR: 9 INJECTION, SOLUTION INTRAVENOUS at 11:21

## 2019-02-15 RX ADMIN — IPRATROPIUM BROMIDE AND ALBUTEROL SULFATE 3 ML: 2.5; .5 SOLUTION RESPIRATORY (INHALATION) at 10:53

## 2019-02-15 RX ADMIN — FAMOTIDINE 20 MG: 10 INJECTION INTRAVENOUS at 08:06

## 2019-02-15 NOTE — PROGRESS NOTES
Continued Stay Note   Addi     Patient Name: Maureen Best  MRN: [id number]  Today's Date: 2/15/2019    Admit Date: 2/12/2019    Discharge Plan     Row Name 02/15/19 0944       Plan    Plan  Home    Patient/Family in Agreement with Plan  yes    Plan Comments  Patient plans to return home with fiance' upon discharge from hospital.  Pt was provided dependency packet yesterday. Will follow.         Discharge Codes    No documentation.             JESSICA Pinon

## 2019-02-15 NOTE — PROGRESS NOTES
Maureen Best is a 48 y.o. female who qualifies for IV to PO therapy conversion.    Pepcid has been changed from IV to PO per System Policy.       SALVADOR Erickson RP

## 2019-02-15 NOTE — PLAN OF CARE
Problem: Pancreatitis, Acute/Chronic (Adult)  Goal: Signs and Symptoms of Listed Potential Problems Will be Absent, Minimized or Managed (Pancreatitis, Acute/Chronic)  Outcome: Ongoing (interventions implemented as appropriate)      Problem: Anxiety (Adult)  Goal: Reduction/Resolution  Outcome: Ongoing (interventions implemented as appropriate)      Problem: Fall Risk (Adult)  Goal: Identify Related Risk Factors and Signs and Symptoms  Outcome: Ongoing (interventions implemented as appropriate)    Goal: Absence of Fall  Outcome: Ongoing (interventions implemented as appropriate)

## 2019-02-15 NOTE — PAYOR COMM NOTE
"2/15/19 [facility] [phone]  [phone]    FAXING CLINICAL FOR CONT STAY.            Maureen Best (48 y.o. Female)     Date of Birth Social Security Number Address Home Phone MRN    1970  [address] [place] [address] [phone] [id number]    Spiritism Marital Status          P[employer]ism        Admission Date Admission Type Admitting Provider Attending Provider Department, Room/Bed    2/12/19 Emergency Todd Chaparro MD Fleming, John Eric, MD [facility] 4C, 486/1    Discharge Date Discharge Disposition Discharge Destination                       Attending Provider:  Todd Chaparro MD    Allergies:  No Known Allergies    Isolation:  None   Infection:  None   Code Status:  CPR    Ht:  160 cm (63\")   Wt:  65.4 kg (144 lb 3.2 oz)    Admission Cmt:  None   Principal Problem:  Alcohol-induced acute pancreatitis [K85.20]                 Active Insurance as of 2/12/2019     Primary Coverage     Payor Plan Insurance Group Employer/Plan Group    [employer] MEDICAID [employer] [id number]     Payor Plan Address Payor Plan Phone Number Payor Plan Fax Number Effective Dates    PO  [phone]  10/27/2016 - None Entered    [facility] [id number]       Subscriber Name Subscriber Birth Date Member ID       MAUREEN BEST 1970 [id number]                 Emergency Contacts      (Rel.) Home Phone Work Phone Mobile Phone    Fernandez Sommers (Significant Other) [phone] -- --        Date of Service:  2/15/2019  4:39 AM               Signed           Problem: Patient Care Overview  Goal: Plan of Care Review  Outcome: Ongoing (interventions implemented as appropriate)    02/15/19 0305   Coping/Psychosocial   Plan of Care Reviewed With patient   Plan of Care Review   Progress improving   OTHER   Outcome Summary pt alert oriented x 3 most of the time, able to follow commands and ambulate to bathroom, pt has attempted to exit bed " without assistance multiple times, bed exit alarm set, pt closly monitored, fall precautions in place, VSS, NSR, room air, pt has C/O abd pain, relieved by PRN medications, no BM this shift, SO updated at bedside, pt NPO, will continue to monitor                     Monik Hood APRN   Nurse Practitioner   Medicine   Progress Notes   Cosign Needed   Date of Service:  2/15/2019  7:40 AM               Cosign Needed                   Show:Clear all  [x]Manual[x]Template[]Copied    Added by:  [x]Monik Hood APRN      []Rosario for details           [facility] Medicine Services  INPATIENT PROGRESS NOTE     Patient Name: Maureen Best  Date of Admission: 2/12/2019  Today's Date: 02/15/19  Length of Stay: 3  Primary Care Physician: Chaitanya Padilla DO     Subjective   Chief Complaint: follow up pancreatitis  HPI   Pt continues to exhibit evidence of ETOH withdrawals. She was found ambulating down a different hallway yesterday. Labs are pending from this am. She still complains of pain. Epigastric pain. No nausea. Boyfriend at bedside.      Review of Systems   All pertinent negatives and positives are as above. All other systems have been reviewed and are negative unless otherwise stated.      Objective    Temp:  [97.7 °F (36.5 °C)-98.5 °F (36.9 °C)] 98.1 °F (36.7 °C)  Heart Rate:  [78-95] 94  Resp:  [16-18] 16  BP: (147-162)/(81-98) 148/92      Physical Exam   Constitutional: She is oriented to person, place, and time. She appears well-developed and well-nourished.   HENT:   Head: Normocephalic and atraumatic.   Eyes: Conjunctivae and EOM are normal. Pupils are equal, round, and reactive to light.   Neck: Neck supple. No JVD present. No thyromegaly present.   Cardiovascular: Normal rate, regular rhythm, normal heart sounds and intact distal pulses. Exam reveals no gallop and no friction rub.   No murmur heard.  Pulmonary/Chest: Effort normal. No respiratory  distress. She has no wheezes. She has rales (expiratory). She exhibits no tenderness.   Abdominal: Soft. She exhibits no distension. There is tenderness (epigastric). There is no rebound and no guarding.   Musculoskeletal: Normal range of motion. She exhibits no edema, tenderness or deformity.   Lymphadenopathy:     She has no cervical adenopathy.   Neurological: She is alert and oriented to person, place, and time. She displays normal reflexes. No cranial nerve deficit. She exhibits normal muscle tone.   Skin: Skin is warm and dry. No rash noted.   Psychiatric: She has a normal mood and affect. Her behavior is normal. Judgment and thought content normal.      Results Review:  I have reviewed the labs, radiology results, and diagnostic studies.     Laboratory Data:          Results from last 7 days   Lab Units 02/15/19  0715 02/14/19  0506 02/13/19  0503   WBC 10*3/mm3 4.23* 4.68* 6.21   HEMOGLOBIN g/dL 10.2* 10.2* 11.5*   HEMATOCRIT % 29.3* 28.7* 31.6*   PLATELETS 10*3/mm3 116* 100* 107*                Results from last 7 days   Lab Units 02/14/19  0506 02/13/19  0503 02/12/19  2128   SODIUM mmol/L 131* 134* 130*   POTASSIUM mmol/L 4.2 4.3 4.1   CHLORIDE mmol/L 100 100 93*   CO2 mmol/L 22.0* 25.0 26.0   BUN mg/dL 4* 3* 4*   CREATININE mg/dL 0.37* 0.40* 0.45*   CALCIUM mg/dL 8.3* 9.0 9.4   BILIRUBIN mg/dL 0.7 0.9 1.1*   ALK PHOS U/L 109 112 126*   ALT (SGPT) U/L 47 62* 76*   AST (SGOT) U/L 40 61* 70*   GLUCOSE mg/dL 107* 130* 131*      Radiology Data:       Imaging Results (last 24 hours)      ** No results found for the last 24 hours. **             I have reviewed the patient's current medications.      Assessment/Plan           Active Hospital Problems     Diagnosis   • **Alcohol-induced acute pancreatitis   • Pancytopenia (CMS/HCC)       Likely related to hypersplenism. Pt with splenomegaly on MRI      • Hyponatremia   • Thrombocytopenia concurrent with and due to alcoholism (CMS/HCC)   • Essential hypertension    • Hypercholesteremia   • Acute epigastric pain   • Anxiety and depression   • DTs (delirium tremens) (CMS/[facility])       11/2018         Plan:  1. Await this am labs.   2. If her lipase is improving, will start sips of clears.   3. Repeat labs in am.      Discharge Planning: I expect the patient to be discharged to home in ? days.     OSIEL Silva   02/15/19   7:41 AM                     Contains abnormal data Lipase   Order: [id number]   Status:  Final result   Visible to patient:  No (Not Released)    Ref Range & Units 07:15   Lipase 23 - 203 U/L 1,843 Abnormally high     Resulting Agency  [facility]         Specimen Collected: 02/15/19 07:15 Last Resulted: 02/15/19 08:21        Lab Flowsheet      Order Details      View Encounter      Lab and Collection Details      Routing      Result History               2/15/2019  8:21 AM     Component Value Flag Ref Range Units Status   Lipase 1,843 Abnormally high        2019  8:21 AM     Component Value Flag Ref Range Units Status   Amylase 121 Abnormally high   H            Lab Flowsheet       Order Details       View Encounter       Lab and Collection Details       Routing       Result History               2/15/2019  8:20 AM     Component Value Flag Ref Range Units Status   C-Reactive Protein 2.99 Abnormally high   H  0.00 - 0.99 mg/dL Final      Ref Range & Units 07:15   Glucose 70 - 100 mg/dL 84    BUN 5 - 21 mg/dL 5    Creatinine 0.50 - 1.40 mg/dL 0.39 Abnormally low     Sodium 135 - 145 mmol/L 133 Abnormally low     Potassium 3.5 - 5.3 mmol/L 4.0    Chloride 98 - 110 mmol/L 102    CO2 24.0 - 31.0 mmol/L 21.0 Abnormally low     Calcium 8.4 - 10.4 mg/dL 8.6    Total Protein 6.3 - 8.7 g/dL 6.1 Abnormally low     Albumin         CBC (No Diff) [[id number]] (Order [id number])   Order   Date: 2/15/2019 Department: [address] Released By/Authorizing: Monik Hood APRN (auto-released)   Reprint Order Requisition     CBC (No Diff) (Order  #[id number]) on 2/15/19       Contains abnormal data CBC (No Diff)   Order: [id number]   Status:  Final result   Visible to patient:  No (Not Released)    Ref Range & Units 07:15 1d ago 2d ago 3d ago   WBC 4.80 - 10.80 10*3/mm3 4.23 Abnormally low   4.68 Abnormally low   6.21  6.84    RBC 4.20 - 5.40 10*6/mm3 2.98 Abnormally low   2.85 Abnormally low   3.25 Abnormally low   3.27 Abnormally low     Hemoglobin 12.0 - 16.0 g/dL 10.2 Abnormally low   10.2 Abnormally low   11.5 Abnormally low   11.4 Abnormally low     Hematocrit 37.0 - 47.0 % 29.3 Abnormally low   28.7 Abnormally low   31.6 Abnormally low   31.5 Abnormally low     MCV 82.0 - 98.0 fL 98.3 Abnormally high   100.7 Abnormally high   97.2  96.3    MCH 28.0 - 32.0 pg 34.2 Abnormally high   35.8 Abnormally high   35.4 Abnormally high   34.9 Abnormally high     MCHC 33.0 - 36.0 g/dL 34.8  35.5  36.4 Abnormally high   36.2 Abnormally high     RDW 12.0 - 15.0 % 16.1 Abnormally high   16.5 Abnormally high   17.2 Abnormally high   17.2 Abnormally high     RDW-SD 40.0 - 54.0 fl 58.3 Abnormally high   60.9 Abnormally high   61.8 Abnormally high   60.6 Abnormally high     MPV 6.0 - 12.0 fL 9.3  9.9  10.2  9.6    Platelets 130 - 400 10*3/mm3 116 Abnormally low   100 Abnormally low   107 Abnormally low   108 Abnormally low     [facility]ing Agency   [facility]         Specimen Collected: 02/15/19 07:15 Last Resulted: 02/15/19 07:31         Lab Flowsheet       Order Details       View Encounter       Lab and Collection Details       Routing       Result History               2/15/2019  7:31 AM     Component Value Flag Ref Range Units Status   WBC 4.23 Abnormally low   L  4.80 - 10.80 10*3/mm3 Final   RBC 2.98 Abnormally low   L  4.20 - 5.40 10*6/mm3 Final   Hemoglobin 10.2 Abnormally low   L  12.0 - 16.0 g/dL Final   Hematocrit 29.3 Abnormally low   L  37.0 - 47.0 % Final   MCV 98.3 Abnormally high   H  82.0 - 98.0 fL Final   MCH 34.2 Abnormally  "high   H  28.0 - 32.0 pg Final   MCHC 34.8   33.0 - 36.0 g/dL Final   RDW 16.1 Abnormally high   H  12.0 - 15.0 % Final   RDW-SD 58.3 Abnormally high   H  40.0 - 54.0 fl Final   MPV 9.3   6.0 - 12.0 fL Final   Platelets 116 Abnormally low   L  130 - 400 10*3/mm3 Final   Lab and Collection         Hospital Medications (active)       Dose Frequency Start End    diazePAM (VALIUM) tablet 2 mg 2 mg Once 2/14/2019 2/14/2019    Sig - Route: Take 1 tablet by mouth 1 (One) Time. - Oral    diazePAM (VALIUM) tablet 5 mg 5 mg Every 8 Hours Scheduled 2/14/2019 2/23/2019    Sig - Route: Take 1 tablet by mouth Every 8 (Eight) Hours. - Oral    enoxaparin (LOVENOX) syringe 40 mg 40 mg Every 24 Hours 2/13/2019     Sig - Route: Inject 0.4 mL under the skin into the appropriate area as directed Daily. - Subcutaneous    famotidine (PEPCID) injection 20 mg 20 mg Every 12 Hours Scheduled 2/13/2019     Sig - Route: Infuse 2 mL into a venous catheter Every 12 (Twelve) Hours. - Intravenous    FLUoxetine (PROzac) capsule 40 mg 40 mg Daily 2/13/2019     Sig - Route: Take 2 capsules by mouth Daily. - Oral    folic acid (FOLVITE) tablet 1 mg 1 mg Daily 2/14/2019     Sig - Route: Take 1 tablet by mouth Daily. - Oral    HYDROmorphone (DILAUDID) injection 2 mg 2 mg Every 3 Hours PRN 2/13/2019     Sig - Route: Infuse 1 mL into a venous catheter Every 3 (Three) Hours As Needed for Severe Pain . - Intravenous    Cosign for Ordering: Accepted by Todd Chaparro MD on 2/13/2019  8:30 PM    Linked Group 1:  \"And\" Linked Group Details        ipratropium-albuterol (DUO-NEB) nebulizer solution 3 mL 3 mL 4 Times Daily - RT 2/15/2019     Sig - Route: Take 3 mL by nebulization 4 (Four) Times a Day. - Nebulization    labetalol (NORMODYNE,TRANDATE) injection 10 mg 10 mg Every 4 Hours PRN 2/13/2019     Sig - Route: Infuse 2 mL into a venous catheter Every 4 (Four) Hours As Needed for High Blood Pressure. - Intravenous    lamoTRIgine (LaMICtal) tablet 150 mg " "150 mg 2 Times Daily 2/13/2019     Sig - Route: Take 150 mg by mouth 2 (Two) Times a Day. - Oral    LORazepam (ATIVAN) injection 1 mg 1 mg Every 2 Hours PRN 2/13/2019 2/23/2019    Sig - Route: Infuse 0.5 mL into a venous catheter Every 2 (Two) Hours As Needed for Withdrawal (For CIWA score 8-10). - Intravenous    Linked Group 2:  \"Or\" Linked Group Details        LORazepam (ATIVAN) injection 2 mg 2 mg Every 1 Hour PRN 2/13/2019 2/23/2019    Sig - Route: Infuse 1 mL into a venous catheter Every 1 (One) Hour As Needed for Withdrawal (For CIWA score 11-15). - Intravenous    Linked Group 2:  \"Or\" Linked Group Details        LORazepam (ATIVAN) injection 2 mg 2 mg Every 15 Minutes PRN 2/13/2019 2/23/2019    Sig - Route: Infuse 1 mL into a venous catheter Every 15 (Fifteen) Minutes As Needed for Anxiety (Use IV route first.  If unable to give IV, use IM.  For CIWA-Ar greater than 15.  Repeat dose in 15 minutes if CIWA-Ar is not decreasing.). - Intravenous    Linked Group 2:  \"Or\" Linked Group Details        LORazepam (ATIVAN) injection 2 mg 2 mg Every 15 Minutes PRN 2/13/2019 2/23/2019    Sig - Route: Inject 1 mL into the appropriate muscle as directed by prescriber Every 15 (Fifteen) Minutes As Needed for Withdrawal (Use IV route first.  If unable to give IV, use IM.  For CIWA-Ar greater than 15.  Repeat dose in 15 minutes if CIWA-Ar is not decreasing.). - Intramuscular    Linked Group 2:  \"Or\" Linked Group Details        LORazepam (ATIVAN) tablet 1 mg 1 mg Every 2 Hours PRN 2/13/2019 2/23/2019    Sig - Route: Take 1 tablet by mouth Every 2 (Two) Hours As Needed for Withdrawal (For CIWA score 8-10). - Oral    Linked Group 2:  \"Or\" Linked Group Details        LORazepam (ATIVAN) tablet 2 mg 2 mg Every 1 Hour PRN 2/13/2019 2/23/2019    Sig - Route: Take 2 tablets by mouth Every 1 (One) Hour As Needed for Withdrawal (For CIWA score 11-15). - Oral    Linked Group 2:  \"Or\" Linked Group Details        naloxone (NARCAN) injection " "0.4 mg 0.4 mg Every 5 Minutes PRN 2/13/2019     Sig - Route: Infuse 1 mL into a venous catheter Every 5 (Five) Minutes As Needed for Respiratory Depression. - Intravenous    Linked Group 1:  \"And\" Linked Group Details        ondansetron (ZOFRAN) injection 4 mg 4 mg Every 6 Hours PRN 2/13/2019     Sig - Route: Infuse 2 mL into a venous catheter Every 6 (Six) Hours As Needed for Nausea or Vomiting. - Intravenous    Linked Group 3:  \"Or\" Linked Group Details        ondansetron (ZOFRAN) tablet 4 mg 4 mg Every 6 Hours PRN 2/13/2019     Sig - Route: Take 1 tablet by mouth Every 6 (Six) Hours As Needed for Nausea or Vomiting. - Oral    Linked Group 3:  \"Or\" Linked Group Details        ondansetron ODT (ZOFRAN-ODT) disintegrating tablet 4 mg 4 mg Every 6 Hours PRN 2/13/2019     Sig - Route: Take 1 tablet by mouth Every 6 (Six) Hours As Needed for Nausea or Vomiting. - Oral    Linked Group 3:  \"Or\" Linked Group Details        sodium chloride 0.9 % flush 3 mL 3 mL Every 12 Hours Scheduled 2/13/2019     Sig - Route: Infuse 3 mL into a venous catheter Every 12 (Twelve) Hours. - Intravenous    sodium chloride 0.9 % flush 3-10 mL 3-10 mL As Needed 2/13/2019     Sig - Route: Infuse 3-10 mL into a venous catheter As Needed for Line Care. - Intravenous    sodium chloride 0.9 % infusion 100 mL/hr Continuous 2/13/2019     Sig - Route: Infuse 100 mL/hr into a venous catheter Continuous. - Intravenous    thiamine (B-1) 100 mg in sodium chloride 0.9 % 100 mL IVPB 100 mg Daily 2/13/2019 2/16/2019    Sig - Route: Infuse 100 mg into a venous catheter Daily. - Intravenous    venlafaxine XR (EFFEXOR-XR) 24 hr capsule 150 mg 150 mg Daily 2/13/2019     Sig - Route: Take 2 capsules by mouth Daily. - Oral    diazePAM (VALIUM) tablet 2 mg (Discontinued) 2 mg Every 8 Hours Scheduled 2/13/2019 2/14/2019    Sig - Route: Take 1 tablet by mouth Every 8 (Eight) Hours. - Oral          "

## 2019-02-15 NOTE — PLAN OF CARE
Problem: Patient Care Overview  Goal: Plan of Care Review  Outcome: Ongoing (interventions implemented as appropriate)   02/15/19 4559   Coping/Psychosocial   Plan of Care Reviewed With patient   Plan of Care Review   Progress improving   OTHER   Outcome Summary pt alert oriented x 3 most of the time, able to follow commands and ambulate to bathroom, pt has attempted to exit bed without assistance multiple times, bed exit alarm set, pt closly monitored, fall precautions in place, VSS, NSR, room air, pt has C/O abd pain, relieved by PRN medications, no BM this shift, SO updated at bedside, pt NPO, will continue to monitor

## 2019-02-15 NOTE — PLAN OF CARE
Problem: Patient Care Overview  Goal: Plan of Care Review   02/14/19 1922   Coping/Psychosocial   Plan of Care Reviewed With patient   Plan of Care Review   Progress no change   OTHER   Outcome Summary Patient CIWA scores have increased during this shift. Patient remains NPO with IV NS and ice chips. Dilaudid given for PRN pain. Labetolol given once this shift. Safety maintained, will continue to follow

## 2019-02-15 NOTE — PLAN OF CARE
Problem: Patient Care Overview  Goal: Plan of Care Review  Outcome: Ongoing (interventions implemented as appropriate)   02/15/19 8046   Coping/Psychosocial   Plan of Care Reviewed With patient   Plan of Care Review   Progress improving   OTHER   Outcome Summary Patient is more orientated this shift. Complains of abd pain, PRN dilaudid and scheduled valium giiven. NO family at bedside. Safety maintained, vss will follow       Problem: Anxiety (Adult)  Goal: Identify Related Risk Factors and Signs and Symptoms  Outcome: Ongoing (interventions implemented as appropriate)    Goal: Reduction/Resolution  Outcome: Ongoing (interventions implemented as appropriate)      Problem: Fall Risk (Adult)  Goal: Identify Related Risk Factors and Signs and Symptoms  Outcome: Ongoing (interventions implemented as appropriate)    Goal: Absence of Fall  Outcome: Ongoing (interventions implemented as appropriate)

## 2019-02-15 NOTE — PROGRESS NOTES
[facility] Medicine Services  INPATIENT PROGRESS NOTE    Patient Name: Maureen Best  Date of Admission: 2/12/2019  Today's Date: 02/15/19  Length of Stay: 3  Primary Care Physician: Chaitanya Padilla DO    Subjective   Chief Complaint: follow up pancreatitis  HPI   Pt continues to exhibit evidence of ETOH withdrawals. She was found ambulating down a different hallway yesterday. Labs are pending from this am. She still complains of pain. Epigastric pain. No nausea. Boyfriend at bedside.     Review of Systems   All pertinent negatives and positives are as above. All other systems have been reviewed and are negative unless otherwise stated.     Objective    Temp:  [97.7 °F (36.5 °C)-98.5 °F (36.9 °C)] 98.1 °F (36.7 °C)  Heart Rate:  [78-95] 94  Resp:  [16-18] 16  BP: (147-162)/(81-98) 148/92     Physical Exam   Constitutional: She is oriented to person, place, and time. She appears well-developed and well-nourished.   HENT:   Head: Normocephalic and atraumatic.   Eyes: Conjunctivae and EOM are normal. Pupils are equal, round, and reactive to light.   Neck: Neck supple. No JVD present. No thyromegaly present.   Cardiovascular: Normal rate, regular rhythm, normal heart sounds and intact distal pulses. Exam reveals no gallop and no friction rub.   No murmur heard.  Pulmonary/Chest: Effort normal. No respiratory distress. She has no wheezes. She has rales (expiratory). She exhibits no tenderness.   Abdominal: Soft. She exhibits no distension. There is tenderness (epigastric). There is no rebound and no guarding.   Musculoskeletal: Normal range of motion. She exhibits no edema, tenderness or deformity.   Lymphadenopathy:     She has no cervical adenopathy.   Neurological: She is alert and oriented to person, place, and time. She displays normal reflexes. No cranial nerve deficit. She exhibits normal muscle tone.   Skin: Skin is warm and dry. No rash noted.   Psychiatric: She has a  normal mood and affect. Her behavior is normal. Judgment and thought content normal.     Results Review:  I have reviewed the labs, radiology results, and diagnostic studies.    Laboratory Data:   Results from last 7 days   Lab Units 02/15/19  0715 02/14/19  0506 02/13/19  0503   WBC 10*3/mm3 4.23* 4.68* 6.21   HEMOGLOBIN g/dL 10.2* 10.2* 11.5*   HEMATOCRIT % 29.3* 28.7* 31.6*   PLATELETS 10*3/mm3 116* 100* 107*        Results from last 7 days   Lab Units 02/14/19  0506 02/13/19  0503 02/12/19  2128   SODIUM mmol/L 131* 134* 130*   POTASSIUM mmol/L 4.2 4.3 4.1   CHLORIDE mmol/L 100 100 93*   CO2 mmol/L 22.0* 25.0 26.0   BUN mg/dL 4* 3* 4*   CREATININE mg/dL 0.37* 0.40* 0.45*   CALCIUM mg/dL 8.3* 9.0 9.4   BILIRUBIN mg/dL 0.7 0.9 1.1*   ALK PHOS U/L 109 112 126*   ALT (SGPT) U/L 47 62* 76*   AST (SGOT) U/L 40 61* 70*   GLUCOSE mg/dL 107* 130* 131*     Radiology Data:   Imaging Results (last 24 hours)     ** No results found for the last 24 hours. **          I have reviewed the patient's current medications.     Assessment/Plan     Active Hospital Problems    Diagnosis   • **Alcohol-induced acute pancreatitis   • Pancytopenia (CMS/HCC)     Likely related to hypersplenism. Pt with splenomegaly on MRI     • Hyponatremia   • Thrombocytopenia concurrent with and due to alcoholism (CMS/HCC)   • Essential hypertension   • Hypercholesteremia   • Acute epigastric pain   • Anxiety and depression   • DTs (delirium tremens) (CMS/HCC)     11/2018       Plan:  1. Await this am labs.   2. If her lipase is improving, will start sips of clears.   3. Repeat labs in am.     Discharge Planning: I expect the patient to be discharged to home in ? days.    OSIEL Silva   02/15/19   7:41 AM     I personally evaluated and examined the patient in conjunction with OSIEL Campos and agree with the assessment, treatment plan, and disposition of the patient as recorded by her. My history, exam, and further recommendations  are:     Clear liquid diet today.  Patient tolerating.  Advance as tolerated per OSIEL Sanders.  Patient is doing better on the diazepam 5 mg 3 times daily.  We will taper down to 2 mg 3 times daily tomorrow.    Todd Chaparro MD  02/15/19  6:44 PM

## 2019-02-16 LAB
CRP SERPL-MCNC: 1.58 MG/DL (ref 0–0.99)
LIPASE SERPL-CCNC: 1282 U/L (ref 23–203)

## 2019-02-16 PROCEDURE — 94799 UNLISTED PULMONARY SVC/PX: CPT

## 2019-02-16 PROCEDURE — 94760 N-INVAS EAR/PLS OXIMETRY 1: CPT

## 2019-02-16 PROCEDURE — 83690 ASSAY OF LIPASE: CPT | Performed by: NURSE PRACTITIONER

## 2019-02-16 PROCEDURE — 86140 C-REACTIVE PROTEIN: CPT | Performed by: NURSE PRACTITIONER

## 2019-02-16 PROCEDURE — 25010000002 ENOXAPARIN PER 10 MG: Performed by: NURSE PRACTITIONER

## 2019-02-16 RX ORDER — DIAZEPAM 2 MG/1
2 TABLET ORAL EVERY 8 HOURS SCHEDULED
Status: DISCONTINUED | OUTPATIENT
Start: 2019-02-16 | End: 2019-02-18

## 2019-02-16 RX ORDER — VALSARTAN 80 MG/1
80 TABLET ORAL
Status: DISCONTINUED | OUTPATIENT
Start: 2019-02-16 | End: 2019-02-17

## 2019-02-16 RX ORDER — KETOROLAC TROMETHAMINE 30 MG/ML
30 INJECTION, SOLUTION INTRAMUSCULAR; INTRAVENOUS EVERY 6 HOURS PRN
Status: DISCONTINUED | OUTPATIENT
Start: 2019-02-16 | End: 2019-02-20 | Stop reason: HOSPADM

## 2019-02-16 RX ADMIN — SODIUM CHLORIDE 100 ML/HR: 9 INJECTION, SOLUTION INTRAVENOUS at 18:05

## 2019-02-16 RX ADMIN — DIAZEPAM 5 MG: 5 TABLET ORAL at 06:05

## 2019-02-16 RX ADMIN — HYDROCODONE BITARTRATE AND ACETAMINOPHEN 1 TABLET: 7.5; 325 TABLET ORAL at 04:03

## 2019-02-16 RX ADMIN — FAMOTIDINE 20 MG: 20 TABLET, FILM COATED ORAL at 22:20

## 2019-02-16 RX ADMIN — HYDROMORPHONE HYDROCHLORIDE 1 MG: 1 INJECTION, SOLUTION INTRAMUSCULAR; INTRAVENOUS; SUBCUTANEOUS at 01:33

## 2019-02-16 RX ADMIN — ENOXAPARIN SODIUM 40 MG: 40 INJECTION SUBCUTANEOUS at 06:05

## 2019-02-16 RX ADMIN — HYDROMORPHONE HYDROCHLORIDE 1 MG: 1 INJECTION, SOLUTION INTRAMUSCULAR; INTRAVENOUS; SUBCUTANEOUS at 12:07

## 2019-02-16 RX ADMIN — HYDROCODONE BITARTRATE AND ACETAMINOPHEN 1 TABLET: 7.5; 325 TABLET ORAL at 08:19

## 2019-02-16 RX ADMIN — HYDROCODONE BITARTRATE AND ACETAMINOPHEN 1 TABLET: 7.5; 325 TABLET ORAL at 17:28

## 2019-02-16 RX ADMIN — DIAZEPAM 2 MG: 2 TABLET ORAL at 22:20

## 2019-02-16 RX ADMIN — FOLIC ACID 1 MG: 1 TABLET ORAL at 08:20

## 2019-02-16 RX ADMIN — LAMOTRIGINE 150 MG: 100 TABLET ORAL at 08:20

## 2019-02-16 RX ADMIN — FLUOXETINE HYDROCHLORIDE 40 MG: 20 CAPSULE ORAL at 08:19

## 2019-02-16 RX ADMIN — FAMOTIDINE 20 MG: 20 TABLET, FILM COATED ORAL at 08:19

## 2019-02-16 RX ADMIN — SODIUM CHLORIDE 100 ML/HR: 9 INJECTION, SOLUTION INTRAVENOUS at 07:50

## 2019-02-16 RX ADMIN — LAMOTRIGINE 150 MG: 100 TABLET ORAL at 22:21

## 2019-02-16 RX ADMIN — VALSARTAN 80 MG: 80 TABLET, FILM COATED ORAL at 17:28

## 2019-02-16 RX ADMIN — HYDROMORPHONE HYDROCHLORIDE 1 MG: 1 INJECTION, SOLUTION INTRAMUSCULAR; INTRAVENOUS; SUBCUTANEOUS at 18:58

## 2019-02-16 RX ADMIN — LABETALOL 20 MG/4 ML (5 MG/ML) INTRAVENOUS SYRINGE 10 MG: at 15:03

## 2019-02-16 RX ADMIN — VENLAFAXINE HYDROCHLORIDE 150 MG: 75 CAPSULE, EXTENDED RELEASE ORAL at 08:19

## 2019-02-16 RX ADMIN — HYDROCODONE BITARTRATE AND ACETAMINOPHEN 1 TABLET: 7.5; 325 TABLET ORAL at 22:20

## 2019-02-16 RX ADMIN — DIAZEPAM 2 MG: 2 TABLET ORAL at 15:03

## 2019-02-16 NOTE — PLAN OF CARE
Problem: Pancreatitis, Acute/Chronic (Adult)  Goal: Signs and Symptoms of Listed Potential Problems Will be Absent, Minimized or Managed (Pancreatitis, Acute/Chronic)  Outcome: Ongoing (interventions implemented as appropriate)      Problem: Anxiety (Adult)  Goal: Identify Related Risk Factors and Signs and Symptoms  Outcome: Ongoing (interventions implemented as appropriate)      Problem: Fall Risk (Adult)  Goal: Identify Related Risk Factors and Signs and Symptoms  Outcome: Ongoing (interventions implemented as appropriate)    Goal: Absence of Fall  Outcome: Ongoing (interventions implemented as appropriate)

## 2019-02-16 NOTE — PLAN OF CARE
Problem: Patient Care Overview  Goal: Plan of Care Review  Outcome: Ongoing (interventions implemented as appropriate)   02/16/19 0546   Coping/Psychosocial   Plan of Care Reviewed With patient   Plan of Care Review   Progress no change   OTHER   Outcome Summary Patient continues to become more alert and oriented this shift. Continues to c/o pain and requests pain meds before they are due. Scheduled Valium given as ordered. Safety maintined this shift. VSS. Safety maintained.     Goal: Individualization and Mutuality  Outcome: Ongoing (interventions implemented as appropriate)   02/13/19 1506   Individualization   Patient Specific Interventions PRN pain meds     Goal: Interprofessional Rounds/Family Conf  Outcome: Ongoing (interventions implemented as appropriate)   02/16/19 0546   Interdisciplinary Rounds/Family Conf   Participants nursing;patient       Problem: Pancreatitis, Acute/Chronic (Adult)  Goal: Signs and Symptoms of Listed Potential Problems Will be Absent, Minimized or Managed (Pancreatitis, Acute/Chronic)  Outcome: Ongoing (interventions implemented as appropriate)   02/16/19 0546   Goal/Outcome Evaluation   Problems Assessed (Pancreatitis) all   Problems Present (Pancreatitis) pain;situational response       Problem: Anxiety (Adult)  Goal: Identify Related Risk Factors and Signs and Symptoms  Outcome: Ongoing (interventions implemented as appropriate)   02/16/19 0546   Anxiety (Adult)   Related Risk Factors (Anxiety) knowledge deficit;pain;psychosocial factor;situational/maturational crisis   Signs and Symptoms (Anxiety) dependence increased;physical complaints/symptoms     Goal: Reduction/Resolution  Outcome: Ongoing (interventions implemented as appropriate)   02/16/19 0546   Anxiety (Adult)   Reduction/Resolution making progress toward outcome       Problem: Fall Risk (Adult)  Intervention: Monitor/Assist with Self Care   02/16/19 0546   Activity   Activity Assistance Provided assistance, 1 person    Daily Care Interventions   Self-Care Promotion BADL personal objects within reach       Goal: Identify Related Risk Factors and Signs and Symptoms  Outcome: Ongoing (interventions implemented as appropriate)   02/16/19 0546   Fall Risk (Adult)   Related Risk Factors (Fall Risk) environment unfamiliar   Signs and Symptoms (Fall Risk) presence of risk factors     Goal: Absence of Fall  Outcome: Ongoing (interventions implemented as appropriate)   02/16/19 0546   Fall Risk (Adult)   Absence of Fall making progress toward outcome

## 2019-02-16 NOTE — PROGRESS NOTES
[facility] Medicine Services  INPATIENT PROGRESS NOTE    Patient Name: Maureen Best  Date of Admission: 2/12/2019  Today's Date: 02/16/19  Length of Stay: 4  Primary Care Physician: Chaitanya Padilla DO    Subjective   Chief Complaint: follow up pancreatitis  HPI   Patient is awake lying in bed. States she tolerated clear liquids yesterday. Denies nausea and vomiting. Complains of epigastric pain. Lipase and CRP are improving. Will advance diet to full liquids.    Review of Systems   All pertinent negatives and positives are as above. All other systems have been reviewed and are negative unless otherwise stated.     Objective    Temp:  [97.9 °F (36.6 °C)-98.7 °F (37.1 °C)] 98.2 °F (36.8 °C)  Heart Rate:  [77-94] 79  Resp:  [16-20] 16  BP: (159-173)/(73-86) 167/73  Physical Exam   Constitutional: She is oriented to person, place, and time. She appears well-developed and well-nourished.   HENT:   Head: Normocephalic and atraumatic.   Eyes: Conjunctivae and EOM are normal. Pupils are equal, round, and reactive to light.   Neck: Neck supple. No JVD present. No thyromegaly present.   Cardiovascular: Normal rate, regular rhythm, normal heart sounds and intact distal pulses. Exam reveals no gallop and no friction rub.   No murmur heard.  Normal sinus rhythm 77-94   Pulmonary/Chest: Effort normal and breath sounds normal. No respiratory distress. She has no wheezes. She has no rales. She exhibits no tenderness.   Abdominal: Soft. Bowel sounds are normal. She exhibits no distension. There is tenderness (Epigastric). There is no rebound and no guarding.   Musculoskeletal: Normal range of motion. She exhibits no edema, tenderness or deformity.   Lymphadenopathy:     She has no cervical adenopathy.   Neurological: She is alert and oriented to person, place, and time. She displays normal reflexes. No cranial nerve deficit. She exhibits normal muscle tone.   Skin: Skin is warm and dry.  No rash noted.   Psychiatric: She has a normal mood and affect. Her behavior is normal. Judgment and thought content normal.     Results Review:  I have reviewed the labs, radiology results, and diagnostic studies.    Laboratory Data:   Results from last 7 days   Lab Units 02/15/19  0715 02/14/19  0506 02/13/19  0503   WBC 10*3/mm3 4.23* 4.68* 6.21   HEMOGLOBIN g/dL 10.2* 10.2* 11.5*   HEMATOCRIT % 29.3* 28.7* 31.6*   PLATELETS 10*3/mm3 116* 100* 107*        Results from last 7 days   Lab Units 02/15/19  0715 02/14/19  0506 02/13/19  0503   SODIUM mmol/L 133* 131* 134*   POTASSIUM mmol/L 4.0 4.2 4.3   CHLORIDE mmol/L 102 100 100   CO2 mmol/L 21.0* 22.0* 25.0   BUN mg/dL 5 4* 3*   CREATININE mg/dL 0.39* 0.37* 0.40*   CALCIUM mg/dL 8.6 8.3* 9.0   BILIRUBIN mg/dL 0.6 0.7 0.9   ALK PHOS U/L 102 109 112   ALT (SGPT) U/L 43 47 62*   AST (SGOT) U/L 32 40 61*   GLUCOSE mg/dL 84 107* 130*     Radiology Data:   Imaging Results (last 24 hours)     ** No results found for the last 24 hours. **        I have reviewed the patient's current medications.     Assessment/Plan     Active Hospital Problems    Diagnosis   • **Alcohol-induced acute pancreatitis   • Pancytopenia (CMS/HCC)     Likely related to hypersplenism. Pt with splenomegaly on MRI     • Hyponatremia   • Thrombocytopenia concurrent with and due to alcoholism (CMS/HCC)   • Essential hypertension   • Hypercholesteremia   • Acute epigastric pain   • Anxiety and depression   • DTs (delirium tremens) (CMS/HCC)     11/2018       Plan:  1. Advance diet to full liquids.  2. Discontinue IV dilaudid.  3. Repeat labs in AM.  4. Decrease scheduled valium to 2 mg TID.    Discharge Planning: I expect the patient to be discharged to home in 1-2 days.    OSIEL Silva   02/16/19   7:49 AM     I personally evaluated and examined the patient in conjunction with OSIEL Campos and agree with the assessment, treatment plan, and disposition of the patient as recorded by  "her. My history, exam, and further recommendations are:     Seen and examined.    Very lengthy discussion (45 minutes) with patient and significant other.  Significant other concerned that we fed her and she developed pain.  Discussed with him that she ate 2 meals and indicated no change in her pain to our providers and our staff.  He also indicated his daughter is the \"head of the department\" that treats pancreatitis at [facility], and this daughter indicated that should not feed with lipase >500 and mentioned TPN.  I discussed with him and cited most evidence supports early enteral feeding, there is no magic lipase number before beginning.  Patient also told her significant other that she had not been walking and was in significant pain.  I personally walked with the patient on 2 occassions yesterday, one time was >150 yards from her room as she was confused and ambulating.  Also patient has been awoken from sleep and immediately indicated her pain was 8/10.  We discussed concern of her inappropriate use of pain medication as she was walking laughing and joking yesterday some (although she was confused), when I walked her back to her room she indicated she needed her dilaudid.  We spent a lot of time talking about pain expectations and concern for abuse potential given her history of pain medication abuse and alcohol abuse.  They voiced understanding.      Todd Chaparro MD  02/16/19  9:14 PM      "

## 2019-02-17 LAB
CRP SERPL-MCNC: 1.1 MG/DL (ref 0–0.99)
LIPASE SERPL-CCNC: 940 U/L (ref 23–203)

## 2019-02-17 PROCEDURE — 94799 UNLISTED PULMONARY SVC/PX: CPT

## 2019-02-17 PROCEDURE — 83690 ASSAY OF LIPASE: CPT | Performed by: NURSE PRACTITIONER

## 2019-02-17 PROCEDURE — 86140 C-REACTIVE PROTEIN: CPT | Performed by: NURSE PRACTITIONER

## 2019-02-17 PROCEDURE — 94760 N-INVAS EAR/PLS OXIMETRY 1: CPT

## 2019-02-17 PROCEDURE — 25010000002 ENOXAPARIN PER 10 MG: Performed by: NURSE PRACTITIONER

## 2019-02-17 RX ORDER — VALSARTAN 80 MG/1
80 TABLET ORAL ONCE
Status: COMPLETED | OUTPATIENT
Start: 2019-02-17 | End: 2019-02-17

## 2019-02-17 RX ORDER — VALSARTAN 80 MG/1
160 TABLET ORAL
Status: DISCONTINUED | OUTPATIENT
Start: 2019-02-18 | End: 2019-02-20 | Stop reason: HOSPADM

## 2019-02-17 RX ADMIN — ENOXAPARIN SODIUM 40 MG: 40 INJECTION SUBCUTANEOUS at 05:13

## 2019-02-17 RX ADMIN — FOLIC ACID 1 MG: 1 TABLET ORAL at 08:20

## 2019-02-17 RX ADMIN — HYDROCODONE BITARTRATE AND ACETAMINOPHEN 1 TABLET: 7.5; 325 TABLET ORAL at 13:14

## 2019-02-17 RX ADMIN — HYDROMORPHONE HYDROCHLORIDE 1 MG: 1 INJECTION, SOLUTION INTRAMUSCULAR; INTRAVENOUS; SUBCUTANEOUS at 02:18

## 2019-02-17 RX ADMIN — FAMOTIDINE 20 MG: 20 TABLET, FILM COATED ORAL at 08:21

## 2019-02-17 RX ADMIN — FLUOXETINE HYDROCHLORIDE 40 MG: 20 CAPSULE ORAL at 08:21

## 2019-02-17 RX ADMIN — FAMOTIDINE 20 MG: 20 TABLET, FILM COATED ORAL at 20:24

## 2019-02-17 RX ADMIN — VALSARTAN 80 MG: 80 TABLET, FILM COATED ORAL at 11:07

## 2019-02-17 RX ADMIN — HYDROMORPHONE HYDROCHLORIDE 1 MG: 1 INJECTION, SOLUTION INTRAMUSCULAR; INTRAVENOUS; SUBCUTANEOUS at 15:05

## 2019-02-17 RX ADMIN — VENLAFAXINE HYDROCHLORIDE 150 MG: 75 CAPSULE, EXTENDED RELEASE ORAL at 11:06

## 2019-02-17 RX ADMIN — DIAZEPAM 2 MG: 2 TABLET ORAL at 05:13

## 2019-02-17 RX ADMIN — DIAZEPAM 2 MG: 2 TABLET ORAL at 14:19

## 2019-02-17 RX ADMIN — SODIUM CHLORIDE, PRESERVATIVE FREE 3 ML: 5 INJECTION INTRAVENOUS at 20:25

## 2019-02-17 RX ADMIN — HYDROCODONE BITARTRATE AND ACETAMINOPHEN 1 TABLET: 7.5; 325 TABLET ORAL at 05:13

## 2019-02-17 RX ADMIN — SODIUM CHLORIDE 100 ML/HR: 9 INJECTION, SOLUTION INTRAVENOUS at 05:13

## 2019-02-17 RX ADMIN — LAMOTRIGINE 150 MG: 100 TABLET ORAL at 08:21

## 2019-02-17 RX ADMIN — DIAZEPAM 2 MG: 2 TABLET ORAL at 20:24

## 2019-02-17 RX ADMIN — HYDROCODONE BITARTRATE AND ACETAMINOPHEN 1 TABLET: 7.5; 325 TABLET ORAL at 20:25

## 2019-02-17 RX ADMIN — HYDROMORPHONE HYDROCHLORIDE 1 MG: 1 INJECTION, SOLUTION INTRAMUSCULAR; INTRAVENOUS; SUBCUTANEOUS at 08:26

## 2019-02-17 RX ADMIN — LAMOTRIGINE 150 MG: 100 TABLET ORAL at 20:24

## 2019-02-17 RX ADMIN — VALSARTAN 80 MG: 80 TABLET, FILM COATED ORAL at 08:21

## 2019-02-17 RX ADMIN — HYDROMORPHONE HYDROCHLORIDE 1 MG: 1 INJECTION, SOLUTION INTRAMUSCULAR; INTRAVENOUS; SUBCUTANEOUS at 23:33

## 2019-02-17 NOTE — PROGRESS NOTES
"    [facility] Medicine Services  INPATIENT PROGRESS NOTE    Patient Name: Maureen Best  Date of Admission: 2/12/2019  Today's Date: 02/17/19  Length of Stay: 5  Primary Care Physician: Chaitanya Padilla DO    Subjective   Chief Complaint: follow up pancreatitis  HPI   Patient awake lying in bed. Last night her stomach hurt after eating a popsicle, however, she asked for another one because she is \"starving\".  She is still requiring IV and PO pain medication. She had 2 doses of IV dilaudid and 2 doses of Norco overnight. Her diet is NPO.No family at bedside. She is not complaining of pain at present. She just received norco at 0545    Review of Systems   All pertinent negatives and positives are as above. All other systems have been reviewed and are negative unless otherwise stated.     Objective    Temp:  [97.9 °F (36.6 °C)-98.6 °F (37 °C)] 98.2 °F (36.8 °C)  Heart Rate:  [73-85] 74  Resp:  [14-20] 20  BP: (162-189)/(78-98) 184/97  Physical Exam   Constitutional: She is oriented to person, place, and time. She appears well-developed and well-nourished.   HENT:   Head: Normocephalic and atraumatic.   Eyes: Conjunctivae and EOM are normal. Pupils are equal, round, and reactive to light.   Neck: Neck supple. No JVD present. No thyromegaly present.   Cardiovascular: Normal rate, regular rhythm, normal heart sounds and intact distal pulses. Exam reveals no gallop and no friction rub.   No murmur heard.  Normal sinus rhythm 72-79   Pulmonary/Chest: Effort normal and breath sounds normal. No respiratory distress. She has no wheezes. She has no rales. She exhibits no tenderness.   Abdominal: Soft. Bowel sounds are normal. She exhibits no distension. There is tenderness (epigastric). There is no rebound and no guarding.   Musculoskeletal: Normal range of motion. She exhibits no edema, tenderness or deformity.   Lymphadenopathy:     She has no cervical adenopathy.   Neurological: She " is alert and oriented to person, place, and time. She displays normal reflexes. No cranial nerve deficit. She exhibits normal muscle tone.   Skin: Skin is warm and dry. No rash noted.   Psychiatric: She has a normal mood and affect. Her behavior is normal. Judgment and thought content normal.       Results Review:  I have reviewed the labs, radiology results, and diagnostic studies.    Laboratory Data:   Results from last 7 days   Lab Units 02/15/19  0715 02/14/19  0506 02/13/19  0503   WBC 10*3/mm3 4.23* 4.68* 6.21   HEMOGLOBIN g/dL 10.2* 10.2* 11.5*   HEMATOCRIT % 29.3* 28.7* 31.6*   PLATELETS 10*3/mm3 116* 100* 107*        Results from last 7 days   Lab Units 02/15/19  0715 02/14/19  0506 02/13/19  0503   SODIUM mmol/L 133* 131* 134*   POTASSIUM mmol/L 4.0 4.2 4.3   CHLORIDE mmol/L 102 100 100   CO2 mmol/L 21.0* 22.0* 25.0   BUN mg/dL 5 4* 3*   CREATININE mg/dL 0.39* 0.37* 0.40*   CALCIUM mg/dL 8.6 8.3* 9.0   BILIRUBIN mg/dL 0.6 0.7 0.9   ALK PHOS U/L 102 109 112   ALT (SGPT) U/L 43 47 62*   AST (SGOT) U/L 32 40 61*   GLUCOSE mg/dL 84 107* 130*       Radiology Data:   Imaging Results (last 24 hours)     ** No results found for the last 24 hours. **          I have reviewed the patient's current medications.     Assessment/Plan     Active Hospital Problems    Diagnosis   • **Alcohol-induced acute pancreatitis   • Pancytopenia (CMS/HCC)     Likely related to hypersplenism. Pt with splenomegaly on MRI     • Hyponatremia   • Thrombocytopenia concurrent with and due to alcoholism (CMS/HCC)   • Essential hypertension   • Hypercholesteremia   • Acute epigastric pain   • Anxiety and depression   • DTs (delirium tremens) (CMS/HCC)     11/2018       Plan:  1. NPO  2. Valsartan will be increased to 160 mg today. Discussed with pharmacy.   3. PRN labetalol added for SBP > 170.  4. Repeat labs in AM    Discharge Planning: I expect the patient to be discharged to home in 1-2 days.    Monik Hood, APRN   02/17/19  "  8:41 AM     I personally evaluated and examined the patient in conjunction with OSIEL Campos and agree with the assessment, treatment plan, and disposition of the patient as recorded by her. My history, exam, and further recommendations are:     Patient is sitting very comfortably, laughing at the TV, and watching \"Melvin girls\".  She indicates that her pain is an 8 out of 10, however she was laughing and moving around in the bed prior to me completely entering the room.  Had a discussion about setting expectations for pain management.      Todd Chaparro MD  02/17/19  4:15 PM        "

## 2019-02-17 NOTE — PLAN OF CARE
Problem: Patient Care Overview  Goal: Plan of Care Review  Outcome: Ongoing (interventions implemented as appropriate)   02/17/19 0614   Coping/Psychosocial   Plan of Care Reviewed With patient   Plan of Care Review   Progress no change   OTHER   Outcome Summary Pt. has slightly improved pain level this shift. Pt. rates abd. pain at a 5 on 0-10 pain scale. NPO except for small amt. ice chips this shift. Pt. states she is wanting to try food this a.m. IVF's continue. SBP in the 160's this shift. Safety maintained. Continue to monitor.     Goal: Individualization and Mutuality  Outcome: Ongoing (interventions implemented as appropriate)   02/13/19 1506   Individualization   Patient Specific Goals (Include Timeframe) Goal is to be discharged   Patient Specific Interventions PRN pain meds     Goal: Interprofessional Rounds/Family Conf  Outcome: Ongoing (interventions implemented as appropriate)   02/17/19 0614   Interdisciplinary Rounds/Family Conf   Participants nursing;patient       Problem: Pancreatitis, Acute/Chronic (Adult)  Goal: Signs and Symptoms of Listed Potential Problems Will be Absent, Minimized or Managed (Pancreatitis, Acute/Chronic)  Outcome: Ongoing (interventions implemented as appropriate)   02/17/19 0614   Goal/Outcome Evaluation   Problems Assessed (Pancreatitis) all   Problems Present (Pancreatitis) pain;situational response       Problem: Anxiety (Adult)  Goal: Identify Related Risk Factors and Signs and Symptoms  Outcome: Ongoing (interventions implemented as appropriate)   02/17/19 0614   Anxiety (Adult)   Related Risk Factors (Anxiety) knowledge deficit;psychosocial factor;situational/maturational crisis   Signs and Symptoms (Anxiety) dependence increased;physical complaints/symptoms     Goal: Reduction/Resolution  Outcome: Ongoing (interventions implemented as appropriate)   02/17/19 0614   Anxiety (Adult)   Reduction/Resolution making progress toward outcome       Problem: Fall Risk  (Adult)  Goal: Identify Related Risk Factors and Signs and Symptoms  Outcome: Ongoing (interventions implemented as appropriate)   02/17/19 0614   Fall Risk (Adult)   Related Risk Factors (Fall Risk) environment unfamiliar   Signs and Symptoms (Fall Risk) presence of risk factors     Goal: Absence of Fall  Outcome: Ongoing (interventions implemented as appropriate)   02/17/19 0614   Fall Risk (Adult)   Absence of Fall making progress toward outcome

## 2019-02-18 LAB
BASOPHILS # BLD AUTO: 0.01 10*3/MM3 (ref 0–0.2)
BASOPHILS NFR BLD AUTO: 0.2 % (ref 0–2)
CRP SERPL-MCNC: 0.79 MG/DL (ref 0–0.99)
DEPRECATED RDW RBC AUTO: 55.8 FL (ref 40–54)
EOSINOPHIL # BLD AUTO: 0.06 10*3/MM3 (ref 0–0.7)
EOSINOPHIL NFR BLD AUTO: 1.4 % (ref 0–4)
ERYTHROCYTE [DISTWIDTH] IN BLOOD BY AUTOMATED COUNT: 15.9 % (ref 12–15)
HCT VFR BLD AUTO: 31.7 % (ref 37–47)
HGB BLD-MCNC: 11.3 G/DL (ref 12–16)
IMM GRANULOCYTES # BLD AUTO: 0.02 10*3/MM3 (ref 0–0.05)
IMM GRANULOCYTES NFR BLD AUTO: 0.5 % (ref 0–5)
LIPASE SERPL-CCNC: 1000 U/L (ref 23–203)
LYMPHOCYTES # BLD AUTO: 0.88 10*3/MM3 (ref 0.72–4.86)
LYMPHOCYTES NFR BLD AUTO: 20.6 % (ref 15–45)
MCH RBC QN AUTO: 33.9 PG (ref 28–32)
MCHC RBC AUTO-ENTMCNC: 35.6 G/DL (ref 33–36)
MCV RBC AUTO: 95.2 FL (ref 82–98)
MONOCYTES # BLD AUTO: 0.31 10*3/MM3 (ref 0.19–1.3)
MONOCYTES NFR BLD AUTO: 7.3 % (ref 4–12)
NEUTROPHILS # BLD AUTO: 2.99 10*3/MM3 (ref 1.87–8.4)
NEUTROPHILS NFR BLD AUTO: 70 % (ref 39–78)
NRBC BLD AUTO-RTO: 0 /100 WBC (ref 0–0)
PLATELET # BLD AUTO: 176 10*3/MM3 (ref 130–400)
PMV BLD AUTO: 9.3 FL (ref 6–12)
RBC # BLD AUTO: 3.33 10*6/MM3 (ref 4.2–5.4)
WBC NRBC COR # BLD: 4.27 10*3/MM3 (ref 4.8–10.8)

## 2019-02-18 PROCEDURE — 86140 C-REACTIVE PROTEIN: CPT | Performed by: NURSE PRACTITIONER

## 2019-02-18 PROCEDURE — 25010000002 ENOXAPARIN PER 10 MG: Performed by: NURSE PRACTITIONER

## 2019-02-18 PROCEDURE — 85025 COMPLETE CBC W/AUTO DIFF WBC: CPT | Performed by: NURSE PRACTITIONER

## 2019-02-18 PROCEDURE — 83690 ASSAY OF LIPASE: CPT | Performed by: NURSE PRACTITIONER

## 2019-02-18 RX ORDER — DIAZEPAM 2 MG/1
2 TABLET ORAL EVERY 12 HOURS
Status: COMPLETED | OUTPATIENT
Start: 2019-02-18 | End: 2019-02-19

## 2019-02-18 RX ADMIN — SODIUM CHLORIDE 50 ML/HR: 9 INJECTION, SOLUTION INTRAVENOUS at 11:28

## 2019-02-18 RX ADMIN — DIAZEPAM 2 MG: 2 TABLET ORAL at 21:30

## 2019-02-18 RX ADMIN — ENOXAPARIN SODIUM 40 MG: 40 INJECTION SUBCUTANEOUS at 06:31

## 2019-02-18 RX ADMIN — FAMOTIDINE 20 MG: 20 TABLET, FILM COATED ORAL at 08:28

## 2019-02-18 RX ADMIN — SODIUM CHLORIDE, PRESERVATIVE FREE 3 ML: 5 INJECTION INTRAVENOUS at 08:28

## 2019-02-18 RX ADMIN — DIAZEPAM 2 MG: 2 TABLET ORAL at 08:26

## 2019-02-18 RX ADMIN — VALSARTAN 160 MG: 80 TABLET, FILM COATED ORAL at 08:26

## 2019-02-18 RX ADMIN — HYDROMORPHONE HYDROCHLORIDE 1 MG: 1 INJECTION, SOLUTION INTRAMUSCULAR; INTRAVENOUS; SUBCUTANEOUS at 04:22

## 2019-02-18 RX ADMIN — HYDROMORPHONE HYDROCHLORIDE 1 MG: 1 INJECTION, SOLUTION INTRAMUSCULAR; INTRAVENOUS; SUBCUTANEOUS at 21:31

## 2019-02-18 RX ADMIN — LAMOTRIGINE 150 MG: 100 TABLET ORAL at 21:30

## 2019-02-18 RX ADMIN — HYDROMORPHONE HYDROCHLORIDE 1 MG: 1 INJECTION, SOLUTION INTRAMUSCULAR; INTRAVENOUS; SUBCUTANEOUS at 12:36

## 2019-02-18 RX ADMIN — FOLIC ACID 1 MG: 1 TABLET ORAL at 08:28

## 2019-02-18 RX ADMIN — FAMOTIDINE 20 MG: 20 TABLET, FILM COATED ORAL at 21:30

## 2019-02-18 RX ADMIN — LAMOTRIGINE 150 MG: 100 TABLET ORAL at 08:28

## 2019-02-18 RX ADMIN — HYDROCODONE BITARTRATE AND ACETAMINOPHEN 1 TABLET: 7.5; 325 TABLET ORAL at 16:12

## 2019-02-18 RX ADMIN — HYDROCODONE BITARTRATE AND ACETAMINOPHEN 1 TABLET: 7.5; 325 TABLET ORAL at 10:18

## 2019-02-18 RX ADMIN — VENLAFAXINE HYDROCHLORIDE 150 MG: 75 CAPSULE, EXTENDED RELEASE ORAL at 08:27

## 2019-02-18 RX ADMIN — FLUOXETINE HYDROCHLORIDE 40 MG: 20 CAPSULE ORAL at 08:27

## 2019-02-18 NOTE — PLAN OF CARE
Problem: Patient Care Overview  Goal: Plan of Care Review  Outcome: Ongoing (interventions implemented as appropriate)   02/17/19 0614 02/17/19 2000   Coping/Psychosocial   Plan of Care Reviewed With patient --    Plan of Care Review   Progress no change --    OTHER   Outcome Summary --  pt continues to c/o sever pain at npo status but insits she is starving and wants popsicles and chicken broth. BP appears more controlled this shift, will continue to monitor       Problem: Pancreatitis, Acute/Chronic (Adult)  Goal: Signs and Symptoms of Listed Potential Problems Will be Absent, Minimized or Managed (Pancreatitis, Acute/Chronic)  Outcome: Ongoing (interventions implemented as appropriate)      Problem: Anxiety (Adult)  Goal: Identify Related Risk Factors and Signs and Symptoms  Outcome: Ongoing (interventions implemented as appropriate)    Goal: Reduction/Resolution  Outcome: Ongoing (interventions implemented as appropriate)      Problem: Fall Risk (Adult)  Goal: Identify Related Risk Factors and Signs and Symptoms  Outcome: Ongoing (interventions implemented as appropriate)    Goal: Absence of Fall  Outcome: Ongoing (interventions implemented as appropriate)

## 2019-02-18 NOTE — PAYOR COMM NOTE
"2/18 CLINICAL UPDATE  [id number]  UR  [phone]    Maureen Best (48 y.o. Female)     Date of Birth Social Security Number Address Home Phone MRN    1970  [address] [place] [address] [phone] [id number]    Confucianism Marital Status          Amish        Admission Date Admission Type Admitting Provider Attending Provider Department, Room/Bed    2/12/19 Emergency Todd Chaparro MD Fleming, John Eric, MD [facility] 4C, 486/1    Discharge Date Discharge Disposition Discharge Destination                       Attending Provider:  Todd Chaparro MD    Allergies:  No Known Allergies    Isolation:  None   Infection:  None   Code Status:  CPR    Ht:  160 cm (63\")   Wt:  61 kg (134 lb 6.4 oz)    Admission Cmt:  None   Principal Problem:  Alcohol-induced acute pancreatitis [K85.20]                 Active Insurance as of 2/12/2019     Primary Coverage     Payor Plan Insurance Group Employer/Plan Group    [employer] MEDICAID [employer] [id number]     Payor Plan Address Payor Plan Phone Number Payor Plan Fax Number Effective Dates    PO  [phone]  10/27/2016 - None Entered    [facility] [id number]       Subscriber Name Subscriber Birth Date Member ID       MAUREEN BEST 1970 [id number]                 Emergency Contacts      (Rel.) Home Phone Work Phone Mobile Phone    Fernandez Sommers (Significant Other) [phone] -- --            ICU Vital Signs     Row Name 02/18/19 1109 02/18/19 1107 02/18/19 0800 02/18/19 0739 02/18/19 0329       Vitals    Temp  97.7 °F (36.5 °C)  --  --  97.9 °F (36.6 °C)  98.4 °F (36.9 °C)    Temp src  Oral  --  --  Oral  Oral    Pulse  72  --  --  70  74    Heart Rate Source  Monitor  --  --  Monitor  Monitor    Resp  18  --  --  18  18    Resp Rate Source  Visual  --  --  Visual  Visual    BP  136/87  --  --  172/85 Gauri RN notified  145/91 Gabby T LPN NOtified    BP Location  Left arm  --  --  Left arm  Left " arm    BP Method  Automatic  --  --  Automatic  Automatic    Patient Position  Sitting  --  --  Lying  Lying       Oxygen Therapy    SpO2  98 %  100 %  --  98 %  99 %    Pulse Oximetry Type  Intermittent  Intermittent  --  Intermittent  Intermittent    Device (Oxygen Therapy)  room air  room air  room air  room air  --    Row Name 02/17/19 2306 02/17/19 2025 02/17/19 2010 02/17/19 1630          Height and Weight    Weight  --  --  61 kg (134 lb 6.4 oz)  --     Weight Method  --  --  Standing scale  --        Vitals    Temp  98.7 °F (37.1 °C)  --  98.3 °F (36.8 °C)  98.3 °F (36.8 °C)     Temp src  Oral  --  Oral  Oral     Pulse  88  --  81  73     Heart Rate Source  Monitor  --  Monitor  Monitor     Resp  16  --  18  20     Resp Rate Source  Visual  --  Visual  Visual     BP  149/84  --  166/89 Gabby T LPN Notified  167/77     BP Location  Left arm  --  Right arm  Left arm     BP Method  Automatic  --  Automatic  Automatic     Patient Position  --  --  Lying  Lying        Oxygen Therapy    SpO2  97 %  --  98 %  97 %     Pulse Oximetry Type  Intermittent  --  Intermittent  --     Device (Oxygen Therapy)  room air  room air  room air  --         Intake & Output (last day)       02/17 0701 - 02/18 0700 02/18 0701 - 02/19 0700    P.O.  660    I.V. (mL/kg)      Total Intake(mL/kg)  660 (10.8)    Net  +660          Stool Unmeasured Occurrence  3 x        Lines, Drains & Airways    Active LDAs     Name:   Placement date:   Placement time:   Site:   Days:    Peripheral IV 02/14/19 1330 Anterior;Right Forearm   02/14/19    1330    Forearm   4                Hospital Medications (active)       Dose Frequency Start End    diazePAM (VALIUM) tablet 2 mg 2 mg Every 12 Hours 2/18/2019     Sig - Route: Take 1 tablet by mouth Every 12 (Twelve) Hours. - Oral    enoxaparin (LOVENOX) syringe 40 mg 40 mg Every 24 Hours 2/13/2019     Sig - Route: Inject 0.4 mL under the skin into the appropriate area as directed Daily. - Subcutaneous     "famotidine (PEPCID) tablet 20 mg 20 mg 2 Times Daily 2/15/2019     Sig - Route: Take 1 tablet by mouth 2 (Two) Times a Day. - Oral    FLUoxetine (PROzac) capsule 40 mg 40 mg Daily 2/13/2019     Sig - Route: Take 2 capsules by mouth Daily. - Oral    folic acid (FOLVITE) tablet 1 mg 1 mg Daily 2/14/2019     Sig - Route: Take 1 tablet by mouth Daily. - Oral    HYDROcodone-acetaminophen (NORCO) 7.5-325 MG per tablet 1 tablet 1 tablet Every 4 Hours PRN 2/15/2019 2/25/2019    Sig - Route: Take 1 tablet by mouth Every 4 (Four) Hours As Needed for Moderate Pain . - Oral    HYDROmorphone (DILAUDID) injection solution 1 mg 1 mg Every 8 Hours PRN 2/18/2019     Sig - Route: Infuse 1 mL into a venous catheter Every 8 (Eight) Hours As Needed for Severe Pain . - Intravenous    ipratropium-albuterol (DUO-NEB) nebulizer solution 3 mL 3 mL Every 4 Hours PRN 2/15/2019     Sig - Route: Take 3 mL by nebulization Every 4 (Four) Hours As Needed for Shortness of Air. - Nebulization    ketorolac (TORADOL) injection 30 mg 30 mg Every 6 Hours PRN 2/16/2019 2/21/2019    Sig - Route: Infuse 1 mL into a venous catheter Every 6 (Six) Hours As Needed for Moderate Pain . - Intravenous    labetalol (NORMODYNE,TRANDATE) injection 10 mg 10 mg Every 4 Hours PRN 2/13/2019     Sig - Route: Infuse 2 mL into a venous catheter Every 4 (Four) Hours As Needed for High Blood Pressure. - Intravenous    lamoTRIgine (LaMICtal) tablet 150 mg 150 mg 2 Times Daily 2/13/2019     Sig - Route: Take 150 mg by mouth 2 (Two) Times a Day. - Oral    LORazepam (ATIVAN) injection 1 mg 1 mg Every 2 Hours PRN 2/13/2019 2/23/2019    Sig - Route: Infuse 0.5 mL into a venous catheter Every 2 (Two) Hours As Needed for Withdrawal (For CIWA score 8-10). - Intravenous    Linked Group 1:  \"Or\" Linked Group Details        LORazepam (ATIVAN) injection 2 mg 2 mg Every 1 Hour PRN 2/13/2019 2/23/2019    Sig - Route: Infuse 1 mL into a venous catheter Every 1 (One) Hour As Needed for " "Withdrawal (For CIWA score 11-15). - Intravenous    Linked Group 1:  \"Or\" Linked Group Details        LORazepam (ATIVAN) injection 2 mg 2 mg Every 15 Minutes PRN 2/13/2019 2/23/2019    Sig - Route: Infuse 1 mL into a venous catheter Every 15 (Fifteen) Minutes As Needed for Anxiety (Use IV route first.  If unable to give IV, use IM.  For CIWA-Ar greater than 15.  Repeat dose in 15 minutes if CIWA-Ar is not decreasing.). - Intravenous    Linked Group 1:  \"Or\" Linked Group Details        LORazepam (ATIVAN) injection 2 mg 2 mg Every 15 Minutes PRN 2/13/2019 2/23/2019    Sig - Route: Inject 1 mL into the appropriate muscle as directed by prescriber Every 15 (Fifteen) Minutes As Needed for Withdrawal (Use IV route first.  If unable to give IV, use IM.  For CIWA-Ar greater than 15.  Repeat dose in 15 minutes if CIWA-Ar is not decreasing.). - Intramuscular    Linked Group 1:  \"Or\" Linked Group Details        LORazepam (ATIVAN) tablet 1 mg 1 mg Every 2 Hours PRN 2/13/2019 2/23/2019    Sig - Route: Take 1 tablet by mouth Every 2 (Two) Hours As Needed for Withdrawal (For CIWA score 8-10). - Oral    Linked Group 1:  \"Or\" Linked Group Details        LORazepam (ATIVAN) tablet 2 mg 2 mg Every 1 Hour PRN 2/13/2019 2/23/2019    Sig - Route: Take 2 tablets by mouth Every 1 (One) Hour As Needed for Withdrawal (For CIWA score 11-15). - Oral    Linked Group 1:  \"Or\" Linked Group Details        ondansetron (ZOFRAN) injection 4 mg 4 mg Every 6 Hours PRN 2/13/2019     Sig - Route: Infuse 2 mL into a venous catheter Every 6 (Six) Hours As Needed for Nausea or Vomiting. - Intravenous    Linked Group 2:  \"Or\" Linked Group Details        ondansetron (ZOFRAN) tablet 4 mg 4 mg Every 6 Hours PRN 2/13/2019     Sig - Route: Take 1 tablet by mouth Every 6 (Six) Hours As Needed for Nausea or Vomiting. - Oral    Linked Group 2:  \"Or\" Linked Group Details        ondansetron ODT (ZOFRAN-ODT) disintegrating tablet 4 mg 4 mg Every 6 Hours PRN 2/13/2019  " "   Sig - Route: Take 1 tablet by mouth Every 6 (Six) Hours As Needed for Nausea or Vomiting. - Oral    Linked Group 2:  \"Or\" Linked Group Details        sodium chloride 0.9 % flush 3 mL 3 mL Every 12 Hours Scheduled 2/13/2019     Sig - Route: Infuse 3 mL into a venous catheter Every 12 (Twelve) Hours. - Intravenous    sodium chloride 0.9 % flush 3-10 mL 3-10 mL As Needed 2/13/2019     Sig - Route: Infuse 3-10 mL into a venous catheter As Needed for Line Care. - Intravenous    sodium chloride 0.9 % infusion 50 mL/hr Continuous 2/13/2019     Sig - Route: Infuse 50 mL/hr into a venous catheter Continuous. - Intravenous    valsartan (DIOVAN) tablet 160 mg 160 mg Every 24 Hours Scheduled 2/18/2019     Sig - Route: Take 2 tablets by mouth Daily. - Oral    venlafaxine XR (EFFEXOR-XR) 24 hr capsule 150 mg 150 mg Daily 2/13/2019     Sig - Route: Take 2 capsules by mouth Daily. - Oral    diazePAM (VALIUM) tablet 2 mg (Discontinued) 2 mg Every 8 Hours Scheduled 2/16/2019 2/18/2019    Sig - Route: Take 1 tablet by mouth Every 8 (Eight) Hours. - Oral    HYDROmorphone (DILAUDID) injection solution 1 mg (Discontinued) 1 mg Every 4 Hours PRN 2/16/2019 2/18/2019    Sig - Route: Infuse 1 mL into a venous catheter Every 4 (Four) Hours As Needed for Severe Pain . - Intravenous          Blood Administration Record (From admission, onward)    None          Lab Results (last 24 hours)     Procedure Component Value Units Date/Time    C-reactive Protein [[id number]]  (Normal) Collected:  02/18/19 0552    Specimen:  Blood Updated:  02/18/19 0639     C-Reactive Protein 0.79 mg/dL     CBC & Differential [[id number]] Collected:  02/18/19 0552    Specimen:  Blood Updated:  02/18/19 0627    Narrative:       The following orders were created for panel order CBC & Differential.  Procedure                               Abnormality         Status                     ---------                               -----------         ------                   "   CBC Auto Differential[[id number]]        Abnormal            Final result                 Please view results for these tests on the individual orders.    CBC Auto Differential [[id number]]  (Abnormal) Collected:  02/18/19 0552    Specimen:  Blood Updated:  02/18/19 0627     WBC 4.27 10*3/mm3      RBC 3.33 10*6/mm3      Hemoglobin 11.3 g/dL      Hematocrit 31.7 %      MCV 95.2 fL      MCH 33.9 pg      MCHC 35.6 g/dL      RDW 15.9 %      RDW-SD 55.8 fl      MPV 9.3 fL      Platelets 176 10*3/mm3      Neutrophil % 70.0 %      Lymphocyte % 20.6 %      Monocyte % 7.3 %      Eosinophil % 1.4 %      Basophil % 0.2 %      Immature Grans % 0.5 %      Neutrophils, Absolute 2.99 10*3/mm3      Lymphocytes, Absolute 0.88 10*3/mm3      Monocytes, Absolute 0.31 10*3/mm3      Eosinophils, Absolute 0.06 10*3/mm3      Basophils, Absolute 0.01 10*3/mm3      Immature Grans, Absolute 0.02 10*3/mm3      nRBC 0.0 /100 WBC     Lipase [[id number]]  (Abnormal) Collected:  02/18/19 0552    Specimen:  Blood Updated:  02/18/19 0624     Lipase 1,000 U/L         Imaging Results (last 24 hours)     ** No results found for the last 24 hours. **        Orders (last 24 hrs)     Start     Ordered    02/19/19 0600  Lipase  Morning Draw      02/18/19 0803    02/19/19 0600  Comprehensive Metabolic Panel  Morning Draw      02/18/19 0803    02/19/19 0600  CBC & Differential  Morning Draw      02/18/19 0803    02/18/19 0900  valsartan (DIOVAN) tablet 160 mg  Every 24 Hours Scheduled      02/17/19 0823    02/18/19 0900  diazePAM (VALIUM) tablet 2 mg  Every 12 Hours      02/18/19 0538    02/18/19 0801  Diet Clear Liquid  Diet Effective Now      02/18/19 0800    02/18/19 0600  C-reactive Protein  Morning Draw      02/17/19 0849    02/18/19 0600  Lipase  Morning Draw      02/17/19 0849    02/18/19 0545  HYDROmorphone (DILAUDID) injection solution 1 mg  Every 8 Hours PRN      02/18/19 0539    02/18/19 0538  CBC & Differential  STAT      02/18/19 0537    02/18/19  0538  CBC Auto Differential  PROCEDURE ONCE      02/18/19 0537    02/16/19 2118  ketorolac (TORADOL) injection 30 mg  Every 6 Hours PRN      02/16/19 2118    02/16/19 1400  diazePAM (VALIUM) tablet 2 mg  Every 8 Hours Scheduled,   Status:  Discontinued      02/16/19 0756    02/16/19 1106  HYDROmorphone (DILAUDID) injection solution 1 mg  Every 4 Hours PRN,   Status:  Discontinued      02/16/19 1106    02/15/19 2100  famotidine (PEPCID) tablet 20 mg  2 Times Daily      02/15/19 0951    02/15/19 1453  HYDROcodone-acetaminophen (NORCO) 7.5-325 MG per tablet 1 tablet  Every 4 Hours PRN      02/15/19 1453    02/15/19 1100  ipratropium-albuterol (DUO-NEB) nebulizer solution 3 mL  Every 4 Hours PRN      02/15/19 1056    02/14/19 0900  folic acid (FOLVITE) tablet 1 mg  Daily      02/14/19 0748    02/13/19 2030  labetalol (NORMODYNE,TRANDATE) injection 10 mg  Every 4 Hours PRN      02/13/19 2016    02/13/19 0900  FLUoxetine (PROzac) capsule 40 mg  Daily      02/13/19 0755    02/13/19 0900  lamoTRIgine (LaMICtal) tablet 150 mg  2 Times Daily      02/13/19 0755    02/13/19 0900  venlafaxine XR (EFFEXOR-XR) 24 hr capsule 150 mg  Daily      02/13/19 0755    02/13/19 0600  enoxaparin (LOVENOX) syringe 40 mg  Every 24 Hours      02/13/19 0048    02/13/19 0145  sodium chloride 0.9 % infusion  Continuous      02/13/19 0048    02/13/19 0048  sodium chloride 0.9 % flush 3 mL  Every 12 Hours Scheduled      02/13/19 0048    02/13/19 0048  sodium chloride 0.9 % flush 3-10 mL  As Needed      02/13/19 0048    02/13/19 0048  ondansetron (ZOFRAN) tablet 4 mg  Every 6 Hours PRN      02/13/19 0048    02/13/19 0048  ondansetron ODT (ZOFRAN-ODT) disintegrating tablet 4 mg  Every 6 Hours PRN      02/13/19 0048    02/13/19 0048  ondansetron (ZOFRAN) injection 4 mg  Every 6 Hours PRN      02/13/19 0048    02/13/19 0048  LORazepam (ATIVAN) tablet 1 mg  Every 2 Hours PRN      02/13/19 0048    02/13/19 0048  LORazepam (ATIVAN) injection 1 mg  Every 2  Hours PRN      02/13/19 0048    02/13/19 0048  LORazepam (ATIVAN) tablet 2 mg  Every 1 Hour PRN      02/13/19 0048    02/13/19 0048  LORazepam (ATIVAN) injection 2 mg  Every 1 Hour PRN      02/13/19 0048    02/13/19 0048  LORazepam (ATIVAN) injection 2 mg  Every 15 Minutes PRN      02/13/19 0048    02/13/19 0048  LORazepam (ATIVAN) injection 2 mg  Every 15 Minutes PRN      02/13/19 0048    Unscheduled  Up With Assistance  As Needed      02/13/19 0048    --  valsartan (DIOVAN) 80 MG tablet  Daily      02/12/19 2332    --  lamoTRIgine (LaMICtal) 150 MG tablet  2 Times Daily      02/12/19 2332    --  rosuvastatin (CRESTOR) 20 MG tablet  Nightly      02/12/19 2332    --  gabapentin (NEURONTIN) 600 MG tablet  3 Times Daily      02/12/19 2332    --  HYDROcodone-acetaminophen (NORCO) 7.5-325 MG per tablet  Every 8 Hours PRN      02/12/19 2332    --  venlafaxine XR (EFFEXOR-XR) 150 MG 24 hr capsule  Daily      02/12/19 2332    --  SCANNED EKG      02/12/19 0000    --  SCANNED - TELEMETRY        02/12/19 0000    --  SCANNED - TELEMETRY        02/12/19 0000    --  vitamin D (ERGOCALCIFEROL) 05456 units capsule capsule  Every 7 Days      02/15/19 1631    --  tiZANidine (ZANAFLEX) 4 MG tablet  Every 8 Hours PRN      02/15/19 1631    --  SCANNED - TELEMETRY        02/12/19 0000    --  SCANNED - TELEMETRY        02/12/19 0000    --  SCANNED - TELEMETRY        02/12/19 0000    --  SCANNED - TELEMETRY        02/12/19 0000    --  SCANNED - TELEMETRY        02/12/19 0000          Ventilator/Non-Invasive Ventilation Settings (From admission, onward)    None           Physician Progress Notes (last 24 hours) (Notes from 2/17/2019  2:13 PM through 2/18/2019  2:13 PM)      Monik Hood APRN at 2/18/2019  5:37 AM              [facility] Medicine Services  INPATIENT PROGRESS NOTE    Patient Name: Maureen Best  Date of Admission: 2/12/2019  Today's Date: 02/18/19  Length of Stay: 6  Primary  "Care Physician: Chaitanya Padilla DO    Subjective   Chief Complaint: follow up pancreatitis  HPI   Pt is still taking IV pain medications. Apparently she ate a Reeses peanut butter cup last night. Blood presure overall much better with the increase in Diovan. When I entered the room, she was sleeping on her stomach. States, \"if everything is Ok, I want to try to eat.\" No family at bedside. She took IV dilaudid at 0422 (2333 prior to that). Took Norco at 2025 then dilaudid at 2333, then dilaudid at 0422. She has taken 5 doses of IV dilaudid and 3 doses of Norco in the past 24 hours. No nausea/vomiting.     Review of Systems   All pertinent negatives and positives are as above. All other systems have been reviewed and are negative unless otherwise stated.     Objective    Temp:  [97.5 °F (36.4 °C)-98.7 °F (37.1 °C)] 97.9 °F (36.6 °C)  Heart Rate:  [70-88] 70  Resp:  [16-20] 18  BP: (145-184)/(72-97) 172/85  Physical Exam   Constitutional: She is oriented to person, place, and time. She appears well-developed and well-nourished.   HENT:   Head: Normocephalic and atraumatic.   Eyes: Conjunctivae and EOM are normal. Pupils are equal, round, and reactive to light.   Neck: Neck supple. No JVD present. No thyromegaly present.   Cardiovascular: Normal rate, regular rhythm, normal heart sounds and intact distal pulses. Exam reveals no gallop and no friction rub.   No murmur heard.  Pulmonary/Chest: Effort normal and breath sounds normal. No respiratory distress. She has no wheezes. She has no rales. She exhibits no tenderness.   Abdominal: Soft. Bowel sounds are normal. She exhibits no distension. There is no tenderness. There is no rebound and no guarding.   Musculoskeletal: Normal range of motion. She exhibits no edema, tenderness or deformity.   Lymphadenopathy:     She has no cervical adenopathy.   Neurological: She is alert and oriented to person, place, and time. She displays normal reflexes. No cranial nerve " deficit. She exhibits normal muscle tone.   Skin: Skin is warm and dry. No rash noted.   Psychiatric: She has a normal mood and affect. Her behavior is normal. Judgment and thought content normal.     Results Review:  I have reviewed the labs, radiology results, and diagnostic studies.    Laboratory Data:   Results from last 7 days   Lab Units 02/18/19  0552 02/15/19  0715 02/14/19  0506   WBC 10*3/mm3 4.27* 4.23* 4.68*   HEMOGLOBIN g/dL 11.3* 10.2* 10.2*   HEMATOCRIT % 31.7* 29.3* 28.7*   PLATELETS 10*3/mm3 176 116* 100*        Results from last 7 days   Lab Units 02/15/19  0715 02/14/19  0506 02/13/19  0503   SODIUM mmol/L 133* 131* 134*   POTASSIUM mmol/L 4.0 4.2 4.3   CHLORIDE mmol/L 102 100 100   CO2 mmol/L 21.0* 22.0* 25.0   BUN mg/dL 5 4* 3*   CREATININE mg/dL 0.39* 0.37* 0.40*   CALCIUM mg/dL 8.6 8.3* 9.0   BILIRUBIN mg/dL 0.6 0.7 0.9   ALK PHOS U/L 102 109 112   ALT (SGPT) U/L 43 47 62*   AST (SGOT) U/L 32 40 61*   GLUCOSE mg/dL 84 107* 130*     Radiology Data:   Imaging Results (last 24 hours)     ** No results found for the last 24 hours. **          I have reviewed the patient's current medications.     Assessment/Plan     Active Hospital Problems    Diagnosis   • **Alcohol-induced acute pancreatitis   • Pancytopenia (CMS/HCC)     Likely related to hypersplenism. Pt with splenomegaly on MRI     • Hyponatremia   • Thrombocytopenia concurrent with and due to alcoholism (CMS/HCC)   • Essential hypertension   • Hypercholesteremia   • Acute epigastric pain   • Anxiety and depression   • DTs (delirium tremens) (CMS/HCC)     11/2018       Plan:  1. Will decrease frequency of IV dilaudid  2. Wean valium to every 12 hours with anticipation of stopping tomorrow  3. Clear liquid for now.   4. Repeat labs in am.     Discharge Planning: I expect the patient to be discharged to home in ? days.    Monik Hodo, APRN   02/18/19   7:44 AM    Electronically signed by Monik Hood APRN at 2/18/2019   8:02 AM       Consult Notes (last 24 hours) (Notes from 2/17/2019  2:13 PM through 2/18/2019  2:13 PM)     No notes of this type exist for this encounter.        Maria Esther Cedillo   Registered Dietitian   Nutrition   Plan of Care   Signed   Date of Service:  2/18/2019 12:41 PM               Signed           Problem: Patient Care Overview  Goal: Plan of Care Review  Outcome: Ongoing (interventions implemented as appropriate)    02/18/19 1240   Plan of Care Review   Progress no change   OTHER   Outcome Summary RD nutrition follow-up completed. Pt remains on clear liquid diet, but has consumed reeses peanut butter cups and orange sherbet. Will contiinue to follow for d/c needs, however no needs are noted at this time

## 2019-02-18 NOTE — PLAN OF CARE
Problem: Patient Care Overview  Goal: Plan of Care Review  Outcome: Ongoing (interventions implemented as appropriate)   02/18/19 1240   Plan of Care Review   Progress no change   OTHER   Outcome Summary RD nutrition follow-up completed. Pt remains on clear liquid diet, but has consumed reeses peanut butter cups and orange sherbet. Will contiinue to follow for d/c needs, however no needs are noted at this time

## 2019-02-18 NOTE — PROGRESS NOTES
"    [facility] Medicine Services  INPATIENT PROGRESS NOTE    Patient Name: Maureen Best  Date of Admission: 2/12/2019  Today's Date: 02/18/19  Length of Stay: 6  Primary Care Physician: Chaitanya Padilla DO    Subjective   Chief Complaint: follow up pancreatitis  HPI   Pt is still taking IV pain medications. Apparently she ate a Reeses peanut butter cup last night. Blood presure overall much better with the increase in Diovan. When I entered the room, she was sleeping on her stomach. States, \"if everything is Ok, I want to try to eat.\" No family at bedside. She took IV dilaudid at 0422 (2333 prior to that). Took Norco at 2025 then dilaudid at 2333, then dilaudid at 0422. She has taken 5 doses of IV dilaudid and 3 doses of Norco in the past 24 hours. No nausea/vomiting.     Review of Systems   All pertinent negatives and positives are as above. All other systems have been reviewed and are negative unless otherwise stated.     Objective    Temp:  [97.5 °F (36.4 °C)-98.7 °F (37.1 °C)] 97.9 °F (36.6 °C)  Heart Rate:  [70-88] 70  Resp:  [16-20] 18  BP: (145-184)/(72-97) 172/85  Physical Exam   Constitutional: She is oriented to person, place, and time. She appears well-developed and well-nourished.   HENT:   Head: Normocephalic and atraumatic.   Eyes: Conjunctivae and EOM are normal. Pupils are equal, round, and reactive to light.   Neck: Neck supple. No JVD present. No thyromegaly present.   Cardiovascular: Normal rate, regular rhythm, normal heart sounds and intact distal pulses. Exam reveals no gallop and no friction rub.   No murmur heard.  Pulmonary/Chest: Effort normal and breath sounds normal. No respiratory distress. She has no wheezes. She has no rales. She exhibits no tenderness.   Abdominal: Soft. Bowel sounds are normal. She exhibits no distension. There is no tenderness. There is no rebound and no guarding.   Musculoskeletal: Normal range of motion. She exhibits no " edema, tenderness or deformity.   Lymphadenopathy:     She has no cervical adenopathy.   Neurological: She is alert and oriented to person, place, and time. She displays normal reflexes. No cranial nerve deficit. She exhibits normal muscle tone.   Skin: Skin is warm and dry. No rash noted.   Psychiatric: She has a normal mood and affect. Her behavior is normal. Judgment and thought content normal.     Results Review:  I have reviewed the labs, radiology results, and diagnostic studies.    Laboratory Data:   Results from last 7 days   Lab Units 02/18/19  0552 02/15/19  0715 02/14/19  0506   WBC 10*3/mm3 4.27* 4.23* 4.68*   HEMOGLOBIN g/dL 11.3* 10.2* 10.2*   HEMATOCRIT % 31.7* 29.3* 28.7*   PLATELETS 10*3/mm3 176 116* 100*        Results from last 7 days   Lab Units 02/15/19  0715 02/14/19  0506 02/13/19  0503   SODIUM mmol/L 133* 131* 134*   POTASSIUM mmol/L 4.0 4.2 4.3   CHLORIDE mmol/L 102 100 100   CO2 mmol/L 21.0* 22.0* 25.0   BUN mg/dL 5 4* 3*   CREATININE mg/dL 0.39* 0.37* 0.40*   CALCIUM mg/dL 8.6 8.3* 9.0   BILIRUBIN mg/dL 0.6 0.7 0.9   ALK PHOS U/L 102 109 112   ALT (SGPT) U/L 43 47 62*   AST (SGOT) U/L 32 40 61*   GLUCOSE mg/dL 84 107* 130*     Radiology Data:   Imaging Results (last 24 hours)     ** No results found for the last 24 hours. **          I have reviewed the patient's current medications.     Assessment/Plan     Active Hospital Problems    Diagnosis   • **Alcohol-induced acute pancreatitis   • Pancytopenia (CMS/HCC)     Likely related to hypersplenism. Pt with splenomegaly on MRI     • Hyponatremia   • Thrombocytopenia concurrent with and due to alcoholism (CMS/HCC)   • Essential hypertension   • Hypercholesteremia   • Acute epigastric pain   • Anxiety and depression   • DTs (delirium tremens) (CMS/HCC)     11/2018       Plan:  1. Will decrease frequency of IV dilaudid  2. Wean valium to every 12 hours with anticipation of stopping tomorrow  3. Clear liquid for now.   4. Repeat labs in am.      Discharge Planning: I expect the patient to be discharged to home in ? days.    OSIEL Silva   02/18/19   7:44 AM     I personally evaluated and examined the patient in conjunction with OSIEL Campos and agree with the assessment, treatment plan, and disposition of the patient as recorded by her. My history, exam, and further recommendations are:     Patient seen and examined at bedside.  Patient noted to be sneaking some food.  Patient is giving significantly different story to her boyfriend as opposed to what she is telling us and nursing staff.  Concerned that the patient is having drug-seeking behavior.  She was significantly somnolent and tired this morning but was still requesting pain medication.      Todd Chaparro MD  02/18/19  7:01 PM

## 2019-02-18 NOTE — PLAN OF CARE
Problem: Patient Care Overview  Goal: Plan of Care Review  Outcome: Ongoing (interventions implemented as appropriate)   02/18/19 0446   Coping/Psychosocial   Plan of Care Reviewed With patient   Plan of Care Review   Progress  02/18/19 0446   Coping/Psychosocial   Plan of Care Reviewed With patient   Plan of Care Review   Progress no change   OTHER   Outcome Summary Continues to c/o abd pain and request IV pain meds. Pt. told PCA this shift that she ate reeses peanut butter cups tonight but not to tell nurse. BP somewhat elevated this shift. No s/s distress noted. Safety maintained. Continue to monitor.   no change     Goal: Individualization and Mutuality  Outcome: Ongoing (interventions implemented as appropriate)   02/18/19 0446   Individualization   Patient Specific Interventions PRN pain meds     Goal: Interprofessional Rounds/Family Conf  Outcome: Ongoing (interventions implemented as appropriate)   02/18/19 0446   Interdisciplinary Rounds/Family Conf   Participants nursing;patient       Problem: Pancreatitis, Acute/Chronic (Adult)  Goal: Signs and Symptoms of Listed Potential Problems Will be Absent, Minimized or Managed (Pancreatitis, Acute/Chronic)  Outcome: Ongoing (interventions implemented as appropriate)   02/18/19 0446   Goal/Outcome Evaluation   Problems Assessed (Pancreatitis) all   Problems Present (Pancreatitis) pain;situational response       Problem: Anxiety (Adult)  Goal: Identify Related Risk Factors and Signs and Symptoms  Outcome: Ongoing (interventions implemented as appropriate)   02/18/19 0446   Anxiety (Adult)   Related Risk Factors (Anxiety) knowledge deficit;psychosocial factor;situational/maturational crisis   Signs and Symptoms (Anxiety) dependence increased     Goal: Reduction/Resolution  Outcome: Ongoing (interventions implemented as appropriate)   02/18/19 0446   Anxiety (Adult)   Reduction/Resolution making progress toward outcome       Problem: Fall Risk (Adult)  Goal: Identify  Related Risk Factors and Signs and Symptoms  Outcome: Ongoing (interventions implemented as appropriate)   02/18/19 0446   Fall Risk (Adult)   Related Risk Factors (Fall Risk) environment unfamiliar   Signs and Symptoms (Fall Risk) presence of risk factors     Goal: Absence of Fall  Outcome: Ongoing (interventions implemented as appropriate)   02/18/19 0446   Fall Risk (Adult)   Absence of Fall making progress toward outcome

## 2019-02-18 NOTE — PROGRESS NOTES
Continued Stay Note   Addi     Patient Name: Maureen Best  MRN: [id number]  Today's Date: 2/18/2019    Admit Date: 2/12/2019    Discharge Plan     Row Name 02/18/19 0915       Plan    Plan  Home    Patient/Family in Agreement with Plan  yes    Plan Comments  Patient plans to return home with fiance' as before.  Pt was provided dependency packet during previous visit.  Will follow.         Discharge Codes    No documentation.             JESSICA Pinon

## 2019-02-19 LAB
ALBUMIN SERPL-MCNC: 4.2 G/DL (ref 3.5–5)
ALBUMIN/GLOB SERPL: 1.9 G/DL (ref 1.1–2.5)
ALP SERPL-CCNC: 90 U/L (ref 24–120)
ALT SERPL W P-5'-P-CCNC: 24 U/L (ref 0–54)
ANION GAP SERPL CALCULATED.3IONS-SCNC: 9 MMOL/L (ref 4–13)
AST SERPL-CCNC: 25 U/L (ref 7–45)
BASOPHILS # BLD AUTO: 0.04 10*3/MM3 (ref 0–0.2)
BASOPHILS NFR BLD AUTO: 1 % (ref 0–2)
BILIRUB SERPL-MCNC: 0.4 MG/DL (ref 0.1–1)
BUN BLD-MCNC: 4 MG/DL (ref 5–21)
BUN/CREAT SERPL: 8.5 (ref 7–25)
CALCIUM SPEC-SCNC: 9.2 MG/DL (ref 8.4–10.4)
CHLORIDE SERPL-SCNC: 105 MMOL/L (ref 98–110)
CO2 SERPL-SCNC: 24 MMOL/L (ref 24–31)
CREAT BLD-MCNC: 0.47 MG/DL (ref 0.5–1.4)
DEPRECATED RDW RBC AUTO: 55.3 FL (ref 40–54)
EOSINOPHIL # BLD AUTO: 0.05 10*3/MM3 (ref 0–0.7)
EOSINOPHIL NFR BLD AUTO: 1.2 % (ref 0–4)
ERYTHROCYTE [DISTWIDTH] IN BLOOD BY AUTOMATED COUNT: 15.6 % (ref 12–15)
GFR SERPL CREATININE-BSD FRML MDRD: 142 ML/MIN/1.73
GLOBULIN UR ELPH-MCNC: 2.2 GM/DL
GLUCOSE BLD-MCNC: 112 MG/DL (ref 70–100)
HCT VFR BLD AUTO: 34.1 % (ref 37–47)
HGB BLD-MCNC: 11.8 G/DL (ref 12–16)
IMM GRANULOCYTES # BLD AUTO: 0.02 10*3/MM3 (ref 0–0.05)
IMM GRANULOCYTES NFR BLD AUTO: 0.5 % (ref 0–5)
LIPASE SERPL-CCNC: 1089 U/L (ref 23–203)
LYMPHOCYTES # BLD AUTO: 1.01 10*3/MM3 (ref 0.72–4.86)
LYMPHOCYTES NFR BLD AUTO: 24.4 % (ref 15–45)
MCH RBC QN AUTO: 33.3 PG (ref 28–32)
MCHC RBC AUTO-ENTMCNC: 34.6 G/DL (ref 33–36)
MCV RBC AUTO: 96.3 FL (ref 82–98)
MONOCYTES # BLD AUTO: 0.27 10*3/MM3 (ref 0.19–1.3)
MONOCYTES NFR BLD AUTO: 6.5 % (ref 4–12)
NEUTROPHILS # BLD AUTO: 2.75 10*3/MM3 (ref 1.87–8.4)
NEUTROPHILS NFR BLD AUTO: 66.4 % (ref 39–78)
NRBC BLD AUTO-RTO: 0 /100 WBC (ref 0–0)
PLATELET # BLD AUTO: 176 10*3/MM3 (ref 130–400)
PMV BLD AUTO: 9.2 FL (ref 6–12)
POTASSIUM BLD-SCNC: 3.5 MMOL/L (ref 3.5–5.3)
PROT SERPL-MCNC: 6.4 G/DL (ref 6.3–8.7)
RBC # BLD AUTO: 3.54 10*6/MM3 (ref 4.2–5.4)
SODIUM BLD-SCNC: 138 MMOL/L (ref 135–145)
WBC NRBC COR # BLD: 4.14 10*3/MM3 (ref 4.8–10.8)

## 2019-02-19 PROCEDURE — 85025 COMPLETE CBC W/AUTO DIFF WBC: CPT | Performed by: NURSE PRACTITIONER

## 2019-02-19 PROCEDURE — 25010000002 ENOXAPARIN PER 10 MG: Performed by: NURSE PRACTITIONER

## 2019-02-19 PROCEDURE — 80053 COMPREHEN METABOLIC PANEL: CPT | Performed by: NURSE PRACTITIONER

## 2019-02-19 PROCEDURE — 83690 ASSAY OF LIPASE: CPT | Performed by: NURSE PRACTITIONER

## 2019-02-19 RX ORDER — VARENICLINE TARTRATE 1 MG/1
1 TABLET, FILM COATED ORAL 2 TIMES DAILY WITH MEALS
Status: DISCONTINUED | OUTPATIENT
Start: 2019-02-19 | End: 2019-02-20 | Stop reason: HOSPADM

## 2019-02-19 RX ORDER — GABAPENTIN 300 MG/1
600 CAPSULE ORAL EVERY 8 HOURS SCHEDULED
Status: DISCONTINUED | OUTPATIENT
Start: 2019-02-19 | End: 2019-02-20 | Stop reason: HOSPADM

## 2019-02-19 RX ADMIN — LAMOTRIGINE 150 MG: 100 TABLET ORAL at 21:32

## 2019-02-19 RX ADMIN — LABETALOL 20 MG/4 ML (5 MG/ML) INTRAVENOUS SYRINGE 10 MG: at 23:46

## 2019-02-19 RX ADMIN — VARENICLINE TARTRATE 1 MG: 1 TABLET, FILM COATED ORAL at 09:48

## 2019-02-19 RX ADMIN — HYDROMORPHONE HYDROCHLORIDE 1 MG: 1 INJECTION, SOLUTION INTRAMUSCULAR; INTRAVENOUS; SUBCUTANEOUS at 21:34

## 2019-02-19 RX ADMIN — HYDROMORPHONE HYDROCHLORIDE 1 MG: 1 INJECTION, SOLUTION INTRAMUSCULAR; INTRAVENOUS; SUBCUTANEOUS at 05:32

## 2019-02-19 RX ADMIN — FOLIC ACID 1 MG: 1 TABLET ORAL at 09:45

## 2019-02-19 RX ADMIN — ENOXAPARIN SODIUM 40 MG: 40 INJECTION SUBCUTANEOUS at 05:32

## 2019-02-19 RX ADMIN — VALSARTAN 160 MG: 80 TABLET, FILM COATED ORAL at 09:47

## 2019-02-19 RX ADMIN — HYDROCODONE BITARTRATE AND ACETAMINOPHEN 1 TABLET: 7.5; 325 TABLET ORAL at 15:22

## 2019-02-19 RX ADMIN — FAMOTIDINE 20 MG: 20 TABLET, FILM COATED ORAL at 20:43

## 2019-02-19 RX ADMIN — DIAZEPAM 2 MG: 2 TABLET ORAL at 09:53

## 2019-02-19 RX ADMIN — HYDROMORPHONE HYDROCHLORIDE 1 MG: 1 INJECTION, SOLUTION INTRAMUSCULAR; INTRAVENOUS; SUBCUTANEOUS at 13:34

## 2019-02-19 RX ADMIN — FAMOTIDINE 20 MG: 20 TABLET, FILM COATED ORAL at 09:54

## 2019-02-19 RX ADMIN — SODIUM CHLORIDE, PRESERVATIVE FREE 3 ML: 5 INJECTION INTRAVENOUS at 20:43

## 2019-02-19 RX ADMIN — VARENICLINE TARTRATE 1 MG: 1 TABLET, FILM COATED ORAL at 18:35

## 2019-02-19 RX ADMIN — VENLAFAXINE HYDROCHLORIDE 150 MG: 75 CAPSULE, EXTENDED RELEASE ORAL at 09:47

## 2019-02-19 RX ADMIN — SODIUM CHLORIDE, PRESERVATIVE FREE 3 ML: 5 INJECTION INTRAVENOUS at 09:55

## 2019-02-19 RX ADMIN — LAMOTRIGINE 150 MG: 100 TABLET ORAL at 09:46

## 2019-02-19 RX ADMIN — HYDROCODONE BITARTRATE AND ACETAMINOPHEN 1 TABLET: 7.5; 325 TABLET ORAL at 09:48

## 2019-02-19 RX ADMIN — FLUOXETINE HYDROCHLORIDE 40 MG: 20 CAPSULE ORAL at 09:45

## 2019-02-19 RX ADMIN — GABAPENTIN 600 MG: 300 CAPSULE ORAL at 20:42

## 2019-02-19 RX ADMIN — HYDROCODONE BITARTRATE AND ACETAMINOPHEN 1 TABLET: 7.5; 325 TABLET ORAL at 20:50

## 2019-02-19 RX ADMIN — GABAPENTIN 600 MG: 300 CAPSULE ORAL at 15:24

## 2019-02-19 NOTE — PLAN OF CARE
Problem: Patient Care Overview  Goal: Plan of Care Review   02/18/19 6232   Coping/Psychosocial   Plan of Care Reviewed With patient   OTHER   Outcome Summary VSS, cont to c/o of epigastric pain, pt states apin not as intense as previous days, has tolerated liquids, no c/o of nausea/vomiting, has had x4 oraange sherberts, two sprites and 4 popsicles in additon to cosuming most of liquids on her meal trays today, walked in goyal several times, pt calm and cooperative, has had hydrocodone x2 and dilaudid x1 today

## 2019-02-19 NOTE — PROGRESS NOTES
[facility] Medicine Services  INPATIENT PROGRESS NOTE    Length of Stay: 7  Date of Admission: 2/12/2019  Primary Care Physician: Chaitanya Padilla DO    Subjective   Chief Complaint: Follow-up pain  HPI   The patient is resting a bit.  There is no family at bedside at present.  She tells me that she is feeling better this morning than she was last night.  She does admit that she ate a little bit of salad last night, even though she knew she should not have.  She states this was a huge mistake as she did have increased pain last night requiring IV Dilaudid.  Her abdominal pain is improved this morning.  She denies any chest pain or shortness of breath.  She denies nausea.  Her bowels move last night.  She has been ambulating in the goyal without difficulty.  She tells me that she is ready to go home.    Review of Systems   All pertinent negatives and positives are as above. All other systems have been reviewed and are negative unless otherwise stated.     Objective    Temp:  [97.6 °F (36.4 °C)-97.9 °F (36.6 °C)] 97.9 °F (36.6 °C)  Heart Rate:  [72-85] 85  Resp:  [16-18] 16  BP: (136-139)/(77-89) 136/77  Physical Exam   Constitutional: She is oriented to person, place, and time. She appears well-developed and well-nourished. No distress.   HENT:   Head: Normocephalic and atraumatic.   Neck: Normal range of motion. Neck supple. No JVD present. No tracheal deviation present. No thyromegaly present.   Cardiovascular: Normal rate, regular rhythm, normal heart sounds and intact distal pulses. Exam reveals no gallop and no friction rub.   No murmur heard.  Normal sinus rhythm 75-94   Pulmonary/Chest: Effort normal and breath sounds normal. She has no wheezes. She has no rales.   Abdominal: Soft. Bowel sounds are normal. She exhibits no distension. There is tenderness (JESSE tenderness with deep palpation). There is no guarding.   Musculoskeletal: Normal range of motion. She exhibits no  edema or tenderness.   Lymphadenopathy:     She has no cervical adenopathy.   Neurological: She is alert and oriented to person, place, and time. No cranial nerve deficit.   Skin: Skin is warm and dry. No rash noted. No erythema.   Psychiatric: She has a normal mood and affect. Her behavior is normal. Judgment and thought content normal.   Vitals reviewed.    Results Review:  I have reviewed the labs, radiology results, and diagnostic studies.    Laboratory Data:   Results from last 7 days   Lab Units 02/19/19  0707 02/18/19  0552 02/15/19  0715   WBC 10*3/mm3 4.14* 4.27* 4.23*   HEMOGLOBIN g/dL 11.8* 11.3* 10.2*   HEMATOCRIT % 34.1* 31.7* 29.3*   PLATELETS 10*3/mm3 176 176 116*     Results from last 7 days   Lab Units 02/15/19  0715 02/14/19  0506 02/13/19  0503   SODIUM mmol/L 133* 131* 134*   POTASSIUM mmol/L 4.0 4.2 4.3   CHLORIDE mmol/L 102 100 100   CO2 mmol/L 21.0* 22.0* 25.0   BUN mg/dL 5 4* 3*   CREATININE mg/dL 0.39* 0.37* 0.40*   CALCIUM mg/dL 8.6 8.3* 9.0   BILIRUBIN mg/dL 0.6 0.7 0.9   ALK PHOS U/L 102 109 112   ALT (SGPT) U/L 43 47 62*   AST (SGOT) U/L 32 40 61*   GLUCOSE mg/dL 84 107* 130*     I have reviewed the patient current medications.     Assessment/Plan     Active Hospital Problems    Diagnosis   • **Alcohol-induced acute pancreatitis   • Pancytopenia (CMS/HCC)     Likely related to hypersplenism. Pt with splenomegaly on MRI     • Hyponatremia   • Thrombocytopenia concurrent with and due to alcoholism (CMS/HCC)   • Essential hypertension   • Hypercholesteremia   • Acute epigastric pain   • Anxiety and depression   • DTs (delirium tremens) (CMS/HCC)     11/2018       Plan:  1.  The patient's lipase is slightly more elevated today at 1089 (1000 yesterday), although the patient reports an improvement in her symptoms.  Her CRP has normalized.  Advance to full liquid diet.  If tolerated, will likely plan to advance to GI soft/bland diet in a.m.  2.  I have encouraged the patient to take only oral  "pain medication today if possible with use of Dilaudid only for breakthrough pain.  She relates understanding  3.  Discontinue IV fluids  4.  Discontinue Valium after this morning's dose.  As needed Ativan as per [facility] protocol.  Counseled for alcohol cessation   5.  Okay to discontinue telemetry  6.  Resume home medications gabapentin and Chantix  7.  Continue to encourage ambulation  8.  Labs in AM  9.  Will continue to monitor blood pressure closely, it is much improved in comparison to admission with increase in Diovan    Discharge Planning: I expect the patient to be discharged to home in 1-2 days.    OSIEL Ferris   02/19/19   7:51 AM    I personally evaluated and examined the patient in conjunction with OSIEL Rasheed and agree with the assessment, treatment plan, and disposition of the patient as recorded by her. My history, exam, and further recommendations are: Clinically, patient reports that she is feeling much, much better.  Her abdominal pain continues to improve.  She is currently on a full liquid diet, reports some increase in pain with lunch this afternoon, however this is markedly improved overall.  In fact, she even inquires about timing of when she can go home.  She does admit to drinking quite a bit of alcohol preceding this hospitalization as she reports that she is going through a \"nasty divorce\" and was using alcohol to numb the pain from this experience.  Her MRCP results noted.  She follows up with Dr. Chaitanya Padilla on an outpatient basis, and we discussed that if her symptoms persist, recur, or worsen despite complete cessation off of EtOH that there are gastroenterologist in town that perform ERCP and EUS if needed.  Of note, LFTs are completely normal on most recent laboratory assessment.  Lipase is trended down to approximately 1000, after peaking at approximately 7300.        Boyd العراقي MD  02/19/19  4:13 PM  "

## 2019-02-20 VITALS
DIASTOLIC BLOOD PRESSURE: 82 MMHG | OXYGEN SATURATION: 97 % | WEIGHT: 135.2 LBS | HEART RATE: 76 BPM | HEIGHT: 63 IN | TEMPERATURE: 98.1 F | SYSTOLIC BLOOD PRESSURE: 144 MMHG | BODY MASS INDEX: 23.96 KG/M2 | RESPIRATION RATE: 16 BRPM

## 2019-02-20 LAB
ANION GAP SERPL CALCULATED.3IONS-SCNC: 11 MMOL/L (ref 4–13)
BUN BLD-MCNC: 3 MG/DL (ref 5–21)
BUN/CREAT SERPL: 6.8 (ref 7–25)
CALCIUM SPEC-SCNC: 9.4 MG/DL (ref 8.4–10.4)
CHLORIDE SERPL-SCNC: 105 MMOL/L (ref 98–110)
CO2 SERPL-SCNC: 23 MMOL/L (ref 24–31)
CREAT BLD-MCNC: 0.44 MG/DL (ref 0.5–1.4)
GFR SERPL CREATININE-BSD FRML MDRD: >150 ML/MIN/1.73
GLUCOSE BLD-MCNC: 107 MG/DL (ref 70–100)
LIPASE SERPL-CCNC: 787 U/L (ref 23–203)
POTASSIUM BLD-SCNC: 3.5 MMOL/L (ref 3.5–5.3)
SODIUM BLD-SCNC: 139 MMOL/L (ref 135–145)

## 2019-02-20 PROCEDURE — 83690 ASSAY OF LIPASE: CPT | Performed by: NURSE PRACTITIONER

## 2019-02-20 PROCEDURE — 25010000002 ENOXAPARIN PER 10 MG: Performed by: NURSE PRACTITIONER

## 2019-02-20 PROCEDURE — 80048 BASIC METABOLIC PNL TOTAL CA: CPT | Performed by: NURSE PRACTITIONER

## 2019-02-20 RX ORDER — VALSARTAN 160 MG/1
160 TABLET ORAL DAILY
Qty: 30 TABLET | Refills: 1 | Status: SHIPPED | OUTPATIENT
Start: 2019-02-20

## 2019-02-20 RX ADMIN — LAMOTRIGINE 150 MG: 100 TABLET ORAL at 08:46

## 2019-02-20 RX ADMIN — FAMOTIDINE 20 MG: 20 TABLET, FILM COATED ORAL at 08:46

## 2019-02-20 RX ADMIN — SODIUM CHLORIDE, PRESERVATIVE FREE 3 ML: 5 INJECTION INTRAVENOUS at 08:47

## 2019-02-20 RX ADMIN — HYDROCODONE BITARTRATE AND ACETAMINOPHEN 1 TABLET: 7.5; 325 TABLET ORAL at 06:37

## 2019-02-20 RX ADMIN — VALSARTAN 160 MG: 80 TABLET, FILM COATED ORAL at 08:46

## 2019-02-20 RX ADMIN — VENLAFAXINE HYDROCHLORIDE 150 MG: 75 CAPSULE, EXTENDED RELEASE ORAL at 08:46

## 2019-02-20 RX ADMIN — FLUOXETINE HYDROCHLORIDE 40 MG: 20 CAPSULE ORAL at 08:46

## 2019-02-20 RX ADMIN — ENOXAPARIN SODIUM 40 MG: 40 INJECTION SUBCUTANEOUS at 06:30

## 2019-02-20 RX ADMIN — GABAPENTIN 600 MG: 300 CAPSULE ORAL at 06:30

## 2019-02-20 RX ADMIN — FOLIC ACID 1 MG: 1 TABLET ORAL at 08:46

## 2019-02-20 RX ADMIN — VARENICLINE TARTRATE 1 MG: 1 TABLET, FILM COATED ORAL at 08:46

## 2019-02-20 NOTE — PAYOR COMM NOTE
"[facility]  [place]   [phone]  OR   FAX   [phone]    REF:[id number]      Maureen Best (48 y.o. Female)     Date of Birth Social Security Number Address Home Phone MRN    1970  [address] [place] [address] [phone] [id number]    Druze Marital Status          Mormonism        Admission Date Admission Type Admitting Provider Attending Provider Department, Room/Bed    2/12/19 Emergency Todd Bobo DO  [facility] 4C, 486/1    Discharge Date Discharge Disposition Discharge Destination        2/20/2019 Home or Self Care              Attending Provider:  (none)   Allergies:  No Known Allergies    Isolation:  None   Infection:  None   Code Status:  CPR    Ht:  160 cm (63\")   Wt:  61.3 kg (135 lb 3.2 oz)    Admission Cmt:  None   Principal Problem:  Alcohol-induced acute pancreatitis [K85.20]                 Active Insurance as of 2/12/2019     Primary Coverage     Payor Plan Insurance Group Employer/Plan Group    [employer] MEDICA[employer] [id number]     Payor Plan Address Payor Plan Phone Number Payor Plan Fax Number Effective Dates    PO  [phone]  10/27/2016 - None Entered    [facility] [id number]       Subscriber Name Subscriber Birth Date Member ID       MAUREEN BEST 1970 [id number]                 Emergency Contacts      (Rel.) Home Phone Work Phone Mobile Phone    Fernandze Sommers (Significant Other) [phone] -- --               Discharge Summary      Todd Bobo DO at 2/20/2019  8:16 AM              [facility]  DISCHARGE SUMMARY       Date of Admission: 2/12/2019  Date of Discharge:  2/20/2019  Primary Care Physician: Chaitanya Padilla DO    Presenting Problem/History of Present Illness:  Epigastric pain     Final Discharge Diagnoses:  Active Hospital Problems    Diagnosis   • **Alcohol-induced acute pancreatitis   • Pancytopenia (CMS/HCC)     " Likely related to hypersplenism. Pt with splenomegaly on MRI     • Hyponatremia   • Thrombocytopenia concurrent with and due to alcoholism (CMS/HCC)   • Essential hypertension   • Hypercholesteremia   • Acute epigastric pain   • Anxiety and depression   • DTs (delirium tremens) (CMS/HCC)     11/2018       Consults: None    Procedures Performed: None    Pertinent Test Results:   Lab Results (all)     Procedure Component Value Units Date/Time    Basic Metabolic Panel [[id number]]  (Abnormal) Collected:  02/20/19 0654    Specimen:  Blood Updated:  02/20/19 0719     Glucose 107 mg/dL      BUN 3 mg/dL      Creatinine 0.44 mg/dL      Sodium 139 mmol/L      Potassium 3.5 mmol/L      Chloride 105 mmol/L      CO2 23.0 mmol/L      Calcium 9.4 mg/dL      eGFR Non African Amer >150 mL/min/1.73      BUN/Creatinine Ratio 6.8     Anion Gap 11.0 mmol/L     Lipase [[id number]]  (Abnormal) Collected:  02/20/19 0654    Specimen:  Blood Updated:  02/20/19 0719     Lipase 787 U/L     Lipase [[id number]]  (Abnormal) Collected:  02/19/19 0707    Specimen:  Blood Updated:  02/19/19 0753     Lipase 1,089 U/L     Comprehensive Metabolic Panel [[id number]]  (Abnormal) Collected:  02/19/19 0707    Specimen:  Blood Updated:  02/19/19 0753     Glucose 112 mg/dL      BUN 4 mg/dL      Creatinine 0.47 mg/dL      Sodium 138 mmol/L      Potassium 3.5 mmol/L      Chloride 105 mmol/L      CO2 24.0 mmol/L      Calcium 9.2 mg/dL      Total Protein 6.4 g/dL      Albumin 4.20 g/dL      ALT (SGPT) 24 U/L      AST (SGOT) 25 U/L      Alkaline Phosphatase 90 U/L      Total Bilirubin 0.4 mg/dL      eGFR Non African Amer 142 mL/min/1.73      Globulin 2.2 gm/dL      A/G Ratio 1.9 g/dL      BUN/Creatinine Ratio 8.5     Anion Gap 9.0 mmol/L     CBC Auto Differential [[id number]]  (Abnormal) Collected:  02/19/19 0707    Specimen:  Blood Updated:  02/19/19 0724     WBC 4.14 10*3/mm3      RBC 3.54 10*6/mm3      Hemoglobin 11.8 g/dL      Hematocrit 34.1 %      MCV 96.3 fL       MCH 33.3 pg      MCHC 34.6 g/dL      RDW 15.6 %      RDW-SD 55.3 fl      MPV 9.2 fL      Platelets 176 10*3/mm3      Neutrophil % 66.4 %      Lymphocyte % 24.4 %      Monocyte % 6.5 %      Eosinophil % 1.2 %      Basophil % 1.0 %      Immature Grans % 0.5 %      Neutrophils, Absolute 2.75 10*3/mm3      Lymphocytes, Absolute 1.01 10*3/mm3      Monocytes, Absolute 0.27 10*3/mm3      Eosinophils, Absolute 0.05 10*3/mm3      Basophils, Absolute 0.04 10*3/mm3      Immature Grans, Absolute 0.02 10*3/mm3      nRBC 0.0 /100 WBC     C-reactive Protein [[id number]]  (Normal) Collected:  02/18/19 0552    Specimen:  Blood Updated:  02/18/19 0639     C-Reactive Protein 0.79 mg/dL     CBC Auto Differential [[id number]]  (Abnormal) Collected:  02/18/19 0552    Specimen:  Blood Updated:  02/18/19 0627     WBC 4.27 10*3/mm3      RBC 3.33 10*6/mm3      Hemoglobin 11.3 g/dL      Hematocrit 31.7 %      MCV 95.2 fL      MCH 33.9 pg      MCHC 35.6 g/dL      RDW 15.9 %      RDW-SD 55.8 fl      MPV 9.3 fL      Platelets 176 10*3/mm3      Neutrophil % 70.0 %      Lymphocyte % 20.6 %      Monocyte % 7.3 %      Eosinophil % 1.4 %      Basophil % 0.2 %      Immature Grans % 0.5 %      Neutrophils, Absolute 2.99 10*3/mm3      Lymphocytes, Absolute 0.88 10*3/mm3      Monocytes, Absolute 0.31 10*3/mm3      Eosinophils, Absolute 0.06 10*3/mm3      Basophils, Absolute 0.01 10*3/mm3      Immature Grans, Absolute 0.02 10*3/mm3      nRBC 0.0 /100 WBC     Lipase [[id number]]  (Abnormal) Collected:  02/18/19 0552    Specimen:  Blood Updated:  02/18/19 0624     Lipase 1,000 U/L     C-reactive Protein [[id number]]  (Abnormal) Collected:  02/17/19 0628    Specimen:  Blood Updated:  02/17/19 0759     C-Reactive Protein 1.10 mg/dL     Lipase [[id number]]  (Abnormal) Collected:  02/17/19 0628    Specimen:  Blood Updated:  02/17/19 0703     Lipase 940 U/L     Lipase [[id number]]  (Abnormal) Collected:  02/16/19 0542    Specimen:  Blood Updated:  02/16/19 0622      Lipase 1,282 U/L     C-reactive Protein [[id number]]  (Abnormal) Collected:  02/16/19 0542    Specimen:  Blood Updated:  02/16/19 0622     C-Reactive Protein 1.58 mg/dL     Amylase [[id number]]  (Abnormal) Collected:  02/15/19 0715    Specimen:  Blood Updated:  02/15/19 0821     Amylase 121 U/L     Lipase [[id number]]  (Abnormal) Collected:  02/15/19 0715    Specimen:  Blood Updated:  02/15/19 0821     Lipase 1,843 U/L     C-reactive Protein [[id number]]  (Abnormal) Collected:  02/15/19 0715    Specimen:  Blood Updated:  02/15/19 0820     C-Reactive Protein 2.99 mg/dL     Comprehensive Metabolic Panel [[id number]]  (Abnormal) Collected:  02/15/19 0715    Specimen:  Blood Updated:  02/15/19 0820     Glucose 84 mg/dL      BUN 5 mg/dL      Creatinine 0.39 mg/dL      Sodium 133 mmol/L      Potassium 4.0 mmol/L      Chloride 102 mmol/L      CO2 21.0 mmol/L      Calcium 8.6 mg/dL      Total Protein 6.1 g/dL      Albumin 3.90 g/dL      ALT (SGPT) 43 U/L      AST (SGOT) 32 U/L      Alkaline Phosphatase 102 U/L      Total Bilirubin 0.6 mg/dL      eGFR Non African Amer >150 mL/min/1.73      Globulin 2.2 gm/dL      A/G Ratio 1.8 g/dL      BUN/Creatinine Ratio 12.8     Anion Gap 10.0 mmol/L     CBC (No Diff) [[id number]]  (Abnormal) Collected:  02/15/19 0715    Specimen:  Blood Updated:  02/15/19 0731     WBC 4.23 10*3/mm3      RBC 2.98 10*6/mm3      Hemoglobin 10.2 g/dL      Hematocrit 29.3 %      MCV 98.3 fL      MCH 34.2 pg      MCHC 34.8 g/dL      RDW 16.1 %      RDW-SD 58.3 fl      MPV 9.3 fL      Platelets 116 10*3/mm3     Lipase [[id number]]  (Abnormal) Collected:  02/14/19 0506    Specimen:  Blood Updated:  02/14/19 0622     Lipase 2,332 U/L     Comprehensive Metabolic Panel [[id number]]  (Abnormal) Collected:  02/14/19 0506    Specimen:  Blood Updated:  02/14/19 0612     Glucose 107 mg/dL      BUN 4 mg/dL      Creatinine 0.37 mg/dL      Sodium 131 mmol/L      Potassium 4.2 mmol/L      Chloride 100 mmol/L      CO2 22.0 mmol/L       Calcium 8.3 mg/dL      Total Protein 6.8 g/dL      Albumin 4.20 g/dL      ALT (SGPT) 47 U/L      AST (SGOT) 40 U/L      Alkaline Phosphatase 109 U/L      Total Bilirubin 0.7 mg/dL      eGFR Non African Amer >150 mL/min/1.73      Globulin 2.6 gm/dL      A/G Ratio 1.6 g/dL      BUN/Creatinine Ratio 10.8     Anion Gap 9.0 mmol/L     CBC (No Diff) [[id number]]  (Abnormal) Collected:  02/14/19 0506    Specimen:  Blood Updated:  02/14/19 0604     WBC 4.68 10*3/mm3      RBC 2.85 10*6/mm3      Hemoglobin 10.2 g/dL      Hematocrit 28.7 %      .7 fL      MCH 35.8 pg      MCHC 35.5 g/dL      RDW 16.5 %      RDW-SD 60.9 fl      MPV 9.9 fL      Platelets 100 10*3/mm3     Hemoglobin A1c [[id number]] Collected:  02/13/19 0503    Specimen:  Blood Updated:  02/13/19 0643     Hemoglobin A1C 4.6 %     Lipase [[id number]]  (Abnormal) Collected:  02/13/19 0503    Specimen:  Blood Updated:  02/13/19 0624     Lipase 7,170 U/L     Lipid Panel [[id number]]  (Abnormal) Collected:  02/13/19 0503    Specimen:  Blood Updated:  02/13/19 0618     Total Cholesterol 184 mg/dL      Triglycerides 75 mg/dL      HDL Cholesterol 63 mg/dL      LDL Cholesterol  112 mg/dL      LDL/HDL Ratio 1.68    Comprehensive Metabolic Panel [[id number]]  (Abnormal) Collected:  02/13/19 0503    Specimen:  Blood Updated:  02/13/19 0615     Glucose 130 mg/dL      BUN 3 mg/dL      Creatinine 0.40 mg/dL      Sodium 134 mmol/L      Potassium 4.3 mmol/L      Comment: Specimen hemolyzed.  Results may be affected.        Chloride 100 mmol/L      CO2 25.0 mmol/L      Calcium 9.0 mg/dL      Total Protein 7.3 g/dL      Albumin 4.60 g/dL      ALT (SGPT) 62 U/L      Comment: Specimen hemolyzed.  Results may be affected.        AST (SGOT) 61 U/L      Comment: Specimen hemolyzed.  Results may be affected.        Alkaline Phosphatase 112 U/L      Comment: Specimen hemolyzed. Results may be affected.        Total Bilirubin 0.9 mg/dL      eGFR Non African Amer >150 mL/min/1.73       Globulin 2.7 gm/dL      A/G Ratio 1.7 g/dL      BUN/Creatinine Ratio 7.5     Anion Gap 9.0 mmol/L     Amylase [[id number]]  (Abnormal) Collected:  02/13/19 0503    Specimen:  Blood Updated:  02/13/19 0607     Amylase 588 U/L     CBC (No Diff) [[id number]]  (Abnormal) Collected:  02/13/19 0503    Specimen:  Blood Updated:  02/13/19 0603     WBC 6.21 10*3/mm3      RBC 3.25 10*6/mm3      Hemoglobin 11.5 g/dL      Hematocrit 31.6 %      MCV 97.2 fL      MCH 35.4 pg      MCHC 36.4 g/dL      RDW 17.2 %      RDW-SD 61.8 fl      MPV 10.2 fL      Platelets 107 10*3/mm3      Comment: Auto resulted.       Urine Drug Screen - Urine, Clean Catch [[id number]]  (Abnormal) Collected:  02/12/19 2200    Specimen:  Urine, Clean Catch Updated:  02/12/19 2229     Amphetamine Screen, Urine Negative     Barbiturates Screen, Urine Negative     Benzodiazepine Screen, Urine Negative     Cocaine Screen, Urine Negative     Methadone Screen, Urine Negative     Opiate Screen Positive     Phencyclidine (PCP), Urine Negative     THC, Screen, Urine Negative    Lipase [[id number]]  (Abnormal) Collected:  02/12/19 2128    Specimen:  Blood Updated:  02/12/19 2223     Lipase 7,312 U/L     Lactic Acid, Plasma [[id number]]  (Normal) Collected:  02/12/19 2207    Specimen:  Blood Updated:  02/12/19 2223     Lactate 0.7 mmol/L     Troponin [[id number]]  (Normal) Collected:  02/12/19 2128    Specimen:  Blood Updated:  02/12/19 2219     Troponin I <0.012 ng/mL     Urinalysis With Culture If Indicated - Urine, Clean Catch [[id number]]  (Normal) Collected:  02/12/19 2200    Specimen:  Urine, Clean Catch Updated:  02/12/19 2209     Color, UA Yellow     Appearance, UA Clear     pH, UA 7.0     Specific Gravity, UA <=1.005     Glucose, UA Negative     Ketones, UA Negative     Bilirubin, UA Negative     Blood, UA Negative     Protein, UA Negative     Leuk Esterase, UA Negative     Nitrite, UA Negative     Urobilinogen, UA 0.2 E.U./dL    Narrative:       Urine microscopic  not indicated.    Comprehensive Metabolic Panel [[id number]]  (Abnormal) Collected:  02/12/19 2128    Specimen:  Blood Updated:  02/12/19 2207     Glucose 131 mg/dL      BUN 4 mg/dL      Creatinine 0.45 mg/dL      Sodium 130 mmol/L      Potassium 4.1 mmol/L      Chloride 93 mmol/L      CO2 26.0 mmol/L      Calcium 9.4 mg/dL      Total Protein 8.0 g/dL      Albumin 4.90 g/dL      ALT (SGPT) 76 U/L      AST (SGOT) 70 U/L      Alkaline Phosphatase 126 U/L      Total Bilirubin 1.1 mg/dL      eGFR Non African Amer 149 mL/min/1.73      Globulin 3.1 gm/dL      A/G Ratio 1.6 g/dL      BUN/Creatinine Ratio 8.9     Anion Gap 11.0 mmol/L     CBC Auto Differential [[id number]]  (Abnormal) Collected:  02/12/19 2128    Specimen:  Blood Updated:  02/12/19 2155     WBC 6.84 10*3/mm3      RBC 3.27 10*6/mm3      Hemoglobin 11.4 g/dL      Hematocrit 31.5 %      MCV 96.3 fL      MCH 34.9 pg      MCHC 36.2 g/dL      RDW 17.2 %      RDW-SD 60.6 fl      MPV 9.6 fL      Platelets 108 10*3/mm3      Neutrophil % 74.6 %      Lymphocyte % 16.4 %      Monocyte % 7.5 %      Eosinophil % 0.9 %      Basophil % 0.3 %      Neutrophils, Absolute 5.11 10*3/mm3      Lymphocytes, Absolute 1.12 10*3/mm3      Monocytes, Absolute 0.51 10*3/mm3      Eosinophils, Absolute 0.06 10*3/mm3      Basophils, Absolute 0.02 10*3/mm3     Ethanol [[id number]]  (Normal) Collected:  02/12/19 2128    Specimen:  Blood Updated:  02/12/19 2145     Ethanol % <0.010 %     Narrative:       Not for legal purposes. Chain of Custody not followed.         Imaging Results (all)     Procedure Component Value Units Date/Time    MRI abdomen w wo contrast mrcp [[id number]] Collected:  02/13/19 1624     Updated:  02/13/19 1635    Narrative:       PROCEDURE: MRI ABDOMEN and MRCP     CLINICAL INDICATION: 48-year-old with biliary dilatation. Pancreatitis.     COMPARISON: CT abdomen pelvis yesterday.     TECHNIQUE: Multiplanar, multisequence MR images of the abdomen were  obtained utilizing an  MRCP protocol. Postcontrast images were also  obtained. These include MIP and 3D images of the biliary tree.      FINDINGS:  MRCP sequence is significantly motion degraded.     Correlating to yesterday, there is dilatation of the common hepatic and  common bile ducts with a maximal diameter of 12 mm. Mild intrahepatic  biliary ductal dilatation as well. No gallstones identified. There is a  questionable filling defect in the distal common bile duct at the  sphincter. No abnormal enhancement in this area.     Redemonstrated peripancreatic edema compatible with acute pancreatitis.  No parenchymal necrosis no vascular complication.     No liver lesions. Hepatic vasculature is patent. Splenomegaly is seen,  approximately 14 cm diameter.     Normal kidneys and adrenal. Incidental tiny left basilar pulmonary  nodule corresponding with yesterday's CT.          Impression:          1. Questionable filling defect of the distal CBD at the sphincter. No  abnormal enhancement. A small obstructing stone is not excluded.  Stricture is also a possibility. ERCP may be needed. Biliary dilatation  again noted.  2. No gallstones are seen.  3. Acute pancreatitis without complication.     This report was finalized on 02/13/2019 16:32 by Dr Tino Dumont, .    CT Abdomen Pelvis With Contrast [[id number]] Collected:  02/13/19 0704     Updated:  02/13/19 0713    Narrative:          History:  48-year-old with epigastric abdominal pain. Elevated lipase.     Reference:  None.     Technique  Contrast-enhanced CT abdomen/pelvis was performed with coronal and  sagittal reformatted images provided.     For this CT exam, one or more of the following dose reduction techniques  was employed:  -automated exposure control  -mA and/or kVp adjustment for patient size  -iterative reconstruction      mGy-cm     Findings     Chest  Incidental scanning through the lower chest demonstrates a 5 mm nodule  in the left lower lobe base.     Abdomen  The liver  masses. Difficult to exclude early cirrhosis. Spleen is mildly  enlarged with a greatest axial dimension 14 cm. Thickness from hilum to  outer cortex is 6 cm. No adrenal mass. Gallbladder is unremarkable.  There is peripancreatic edema compatible with acute pancreatitis. No  parenchymal calcification or ductal dilatation. There is dilatation of  the common hepatic duct to 12-13 mm on coronal reformats image 23.     Mild atherosclerosis of the abdominal aorta without aneurysm. No renal  abnormality.     No lymphadenopathy. No bowel obstruction. Large amount of loose stool in  the colon. No free air. No ascites.     Pelvis  Urinary bladder contours unremarkable. Uterus is unremarkable. Bilateral  tubal ligation clips. No pelvic free fluid or lymphadenopathy.     No acute osseous abnormalities..          Impression:          1. Acute pancreatitis without complication.  2. Small 5 mm nodule at the left lower lobe base. Elective CT chest is  recommended.  3. Dilatation of the common hepatic duct. Suggest MRCP.  4. Possible cirrhosis. Mild splenomegaly.     A preliminary report was provided by [facility]. No discrepancy.  This report was finalized on 02/13/2019 07:10 by Dr Tino Dumont, .    XR Chest 1 View [[id number]] Collected:  02/12/19 2232     Updated:  02/12/19 2240    Narrative:       EXAM: XR CHEST 1 VW- - 2/12/2019 9:37 PM CST     HISTORY: epigastric pain       COMPARISON: None.      TECHNIQUE:  1 images.  Frontal view of the chest.     FINDINGS:    No pneumothorax, pleural effusion or focal consolidation. Cardiac  mediastinal silhouette within normal limits. No acute findings in the  bones, soft tissues or upper abdomen.          Impression:       1. No acute cardiopulmonary findings.  This report was finalized on 02/12/2019 22:37 by Dr Keisha Chan MD.        Chief Complaint on Day of Discharge: Epigastric pain    History of Present Illness on Day of Discharge: The patient is resting in bed.  She reports feeling  much better in comparison to admission, and is very eager for discharge home today.     Hospital Course:  Mrs. Best is a 48-year-old  female who follows Dr. Chaitanya Padilla for primary care.  She has a past medical history significant for hypertension, depression, chronic pain, and alcohol and tobacco abuse.  She presented to the [facility] emergency department on 02/12/2019 with complaints of severe midepigastric pain.  The patient reported that she had been cutting back on her drinking, but was still drinking alcohol daily up until the time of admission.  Emergency department, lipase level was noted to be 7312.  Liver enzymes were mildly elevated as well.  Serum sodium was 130.  CT of abdomen and pelvis showed acute pancreatitis, dilatation of the common hepatic duct, and possible cirrhosis with mild splenomegaly.  Patient was admitted to the hospitalist service for further evaluation and management.    The patient was given IV fluid hydration.  She was initially made nothing by mouth, and once her lipase and pain started to improve she was started on clear liquids.  This has been very slowly advanced and she is now tolerating a low fat GI bland diet.  Her lipase has trended down nicely and is now 787 today.  Her CRP has normalized as well.  The patient was given scheduled oral Valium and vitamin replacement on admission, however despite this she did exhibit symptoms of alcohol withdrawal.  We have discussed alcohol cessation at length with the patient and she does express understanding and states she does desire to quit.    Given concerns for dilatation of the common hepatic duct on CT of abdomen and pelvis, MRCP was performed.  This showed questionable filling defect of the distal common bile duct at the sphincter, in which a small obstructing stone could not be excluded.  Stricture was also a possibility.  There were no gallstones seen.  Despite these findings, her liver function tests have  "been completely normal on her most recent laboratory assessment.  We have discussed with the patient that should her symptoms persist or recur despite her cessation of alcohol then she should seek evaluation from her primary care provider to refer her for ERCP.    The patient is overall hemodynamically stable and appropriate for discharge home today.  She will follow up with her primary care provider in 1 week.  As there was an incidental finding of a 5 mm nodule at the left lower lobe base on CT of the abdomen and pelvis, we have asked her primary care provider to set up an outpatient CT of the chest for further evaluation of this.  We have also asked the patient to monitor her blood pressure readings twice daily at home and take this log to her primary care provider as we have increased her valsartan while hospitalized.    Condition on Discharge:  Stable    Physical Exam on Discharge:  /82 (BP Location: Right arm, Patient Position: Lying)   Pulse 76   Temp 98.1 °F (36.7 °C) (Oral)   Resp 16   Ht 160 cm (63\")   Wt 61.3 kg (135 lb 3.2 oz)   SpO2 97%   BMI 23.95 kg/m²    Physical Exam  Constitutional: She is oriented to person, place, and time. She appears well-developed and well-nourished. No distress.   HENT:   Head: Normocephalic and atraumatic.   Neck: Normal range of motion. Neck supple. No JVD present. No tracheal deviation present. No thyromegaly present.   Cardiovascular: Normal rate, regular rhythm, normal heart sounds and intact distal pulses. Exam reveals no gallop and no friction rub.   No murmur heard.  Pulmonary/Chest: Effort normal and breath sounds normal. She has no wheezes. She has no rales.   Abdominal: Soft. Bowel sounds are normal. She exhibits no distension. There is tenderness (JESSE tenderness with deep palpation). There is no guarding.   Musculoskeletal: Normal range of motion. She exhibits no edema or tenderness.   Lymphadenopathy:     She has no cervical adenopathy. "   Neurological: She is alert and oriented to person, place, and time. No cranial nerve deficit.   Skin: Skin is warm and dry. No rash noted. No erythema.   Psychiatric: She has a normal mood and affect. Her behavior is normal. Judgment and thought content normal.   Vitals reviewed.    Discharge Disposition:  Home or Self Care    Discharge Medications:     Discharge Medications      Changes to Medications      Instructions Start Date   valsartan 160 MG tablet  Commonly known as:  DIOVAN  What changed:    · medication strength  · how much to take   160 mg, Oral, Daily         Continue These Medications      Instructions Start Date   gabapentin 600 MG tablet  Commonly known as:  NEURONTIN   600 mg, Oral, 3 Times Daily      HYDROcodone-acetaminophen 7.5-325 MG per tablet  Commonly known as:  NORCO   1 tablet, Oral, Every 8 Hours PRN      lamoTRIgine 150 MG tablet  Commonly known as:  LaMICtal   150 mg, Oral, 2 Times Daily      rosuvastatin 20 MG tablet  Commonly known as:  CRESTOR   20 mg, Oral, Nightly      tiZANidine 4 MG tablet  Commonly known as:  ZANAFLEX   4 mg, Oral, Every 8 Hours PRN      venlafaxine  MG 24 hr capsule  Commonly known as:  EFFEXOR-XR   150 mg, Oral, Daily      vitamin D 05661 units capsule capsule  Commonly known as:  ERGOCALCIFEROL   50,000 Units, Oral, Every 7 Days           Discharge Diet:   Diet Instructions     Diet: Specialty Diet; Thin Liquids, No Restrictions; Low Fat      Discharge Diet:  Specialty Diet    Fluid Consistency:  Thin Liquids, No Restrictions    Specialty Diets:  Low Fat        Activity at Discharge:   Activity Instructions     Activity as Tolerated      Measure Blood Pressure      Measure blood pressure twice daily. Record these measurements to take to follow-up with primary care provider        Discharge Care Plan/Instructions:   1.  Return for any acute or worsening symptoms  2.  Alcohol and tobacco cessation  3.  Measure blood pressure twice daily at home.  Record  these measurements to take to follow-up with primary care provider.  Valsartan increased to 160 mg.  4.  Low-fat diet  5.  Patient's primary care provider will need to schedule for an outpatient CT of the chest for further evaluation of incidental finding of 5 mm left lower lobe    Follow-up Appointments:   1.  Primary care provider in 1 week    Test Results Pending at Discharge: None    OSIEL Ferris  02/20/19  8:17 AM    Time: 40 minutes    I personally evaluated and examined the patient in conjunction with OSIEL Rasheed and agree with the assessment, treatment plan, and disposition of the patient as recorded by her. My history, exam, and further recommendations are:     Discussed with her nurse, Mindy.     She apparently ate half of her breakfast this morning and was asymptomatic with it.  She does not like the food.    She has been readied for discharge over the last several days by Haylie and Dr. العراقي/Dr. Chaparro.  She has been in the hospital for 8 days and this is my first encounter with her.  She feels ready to go.  She will have follow-up as above.    Todd Bobo DO  02/20/19  9:45 AM            Electronically signed by Todd Bobo DO at 2/20/2019  9:48 AM

## 2019-02-20 NOTE — PROGRESS NOTES
Continued Stay Note  MARY Fontana     Patient Name: Maureen Best  MRN: [id number]  Today's Date: 2/20/2019    Admit Date: 2/12/2019    Discharge Plan     Row Name 02/20/19 1005       Plan    Final Discharge Disposition Code  01 - home or self-care     Final Note  Pt is discharging home with ficesar' today. SW provided dependancy packet during stay. Pt denies any needs or concerns.           Discharge Codes    No documentation.       Expected Discharge Date and Time     Expected Discharge Date Expected Discharge Time    Feb 20, 2019             Barb Sanders

## 2019-02-20 NOTE — PLAN OF CARE
Problem: Patient Care Overview  Goal: Plan of Care Review  Outcome: Ongoing (interventions implemented as appropriate)   02/20/19 8484   Coping/Psychosocial   Plan of Care Reviewed With patient   Plan of Care Review   Progress no change   OTHER   Outcome Summary pt c/o pain in epigastric region; pain meds given with relief reported; pt is tolerating a full liquid diet; pt does eat solid food even after being told not to; pt does c/o pain after eating solid food; pt rested well through the night; VSS; safety maintained; pt stated she is hoping to go home today; will continue to monitor

## 2019-02-20 NOTE — DISCHARGE SUMMARY
[facility] Medicine Services  DISCHARGE SUMMARY       Date of Admission: 2/12/2019  Date of Discharge:  2/20/2019  Primary Care Physician: Chaitanya Padilla DO    Presenting Problem/History of Present Illness:  Epigastric pain     Final Discharge Diagnoses:  Active Hospital Problems    Diagnosis   • **Alcohol-induced acute pancreatitis   • Pancytopenia (CMS/HCC)     Likely related to hypersplenism. Pt with splenomegaly on MRI     • Hyponatremia   • Thrombocytopenia concurrent with and due to alcoholism (CMS/HCC)   • Essential hypertension   • Hypercholesteremia   • Acute epigastric pain   • Anxiety and depression   • DTs (delirium tremens) (CMS/HCC)     11/2018       Consults: None    Procedures Performed: None    Pertinent Test Results:   Lab Results (all)     Procedure Component Value Units Date/Time    Basic Metabolic Panel [[id number]]  (Abnormal) Collected:  02/20/19 0654    Specimen:  Blood Updated:  02/20/19 0719     Glucose 107 mg/dL      BUN 3 mg/dL      Creatinine 0.44 mg/dL      Sodium 139 mmol/L      Potassium 3.5 mmol/L      Chloride 105 mmol/L      CO2 23.0 mmol/L      Calcium 9.4 mg/dL      eGFR Non African Amer >150 mL/min/1.73      BUN/Creatinine Ratio 6.8     Anion Gap 11.0 mmol/L     Lipase [[id number]]  (Abnormal) Collected:  02/20/19 0654    Specimen:  Blood Updated:  02/20/19 0719     Lipase 787 U/L     Lipase [[id number]]  (Abnormal) Collected:  02/19/19 0707    Specimen:  Blood Updated:  02/19/19 0753     Lipase 1,089 U/L     Comprehensive Metabolic Panel [[id number]]  (Abnormal) Collected:  02/19/19 0707    Specimen:  Blood Updated:  02/19/19 0753     Glucose 112 mg/dL      BUN 4 mg/dL      Creatinine 0.47 mg/dL      Sodium 138 mmol/L      Potassium 3.5 mmol/L      Chloride 105 mmol/L      CO2 24.0 mmol/L      Calcium 9.2 mg/dL      Total Protein 6.4 g/dL      Albumin 4.20 g/dL      ALT (SGPT) 24 U/L      AST (SGOT) 25 U/L      Alkaline Phosphatase 90 U/L       Total Bilirubin 0.4 mg/dL      eGFR Non African Amer 142 mL/min/1.73      Globulin 2.2 gm/dL      A/G Ratio 1.9 g/dL      BUN/Creatinine Ratio 8.5     Anion Gap 9.0 mmol/L     CBC Auto Differential [[id number]]  (Abnormal) Collected:  02/19/19 0707    Specimen:  Blood Updated:  02/19/19 0724     WBC 4.14 10*3/mm3      RBC 3.54 10*6/mm3      Hemoglobin 11.8 g/dL      Hematocrit 34.1 %      MCV 96.3 fL      MCH 33.3 pg      MCHC 34.6 g/dL      RDW 15.6 %      RDW-SD 55.3 fl      MPV 9.2 fL      Platelets 176 10*3/mm3      Neutrophil % 66.4 %      Lymphocyte % 24.4 %      Monocyte % 6.5 %      Eosinophil % 1.2 %      Basophil % 1.0 %      Immature Grans % 0.5 %      Neutrophils, Absolute 2.75 10*3/mm3      Lymphocytes, Absolute 1.01 10*3/mm3      Monocytes, Absolute 0.27 10*3/mm3      Eosinophils, Absolute 0.05 10*3/mm3      Basophils, Absolute 0.04 10*3/mm3      Immature Grans, Absolute 0.02 10*3/mm3      nRBC 0.0 /100 WBC     C-reactive Protein [[id number]]  (Normal) Collected:  02/18/19 0552    Specimen:  Blood Updated:  02/18/19 0639     C-Reactive Protein 0.79 mg/dL     CBC Auto Differential [[id number]]  (Abnormal) Collected:  02/18/19 0552    Specimen:  Blood Updated:  02/18/19 0627     WBC 4.27 10*3/mm3      RBC 3.33 10*6/mm3      Hemoglobin 11.3 g/dL      Hematocrit 31.7 %      MCV 95.2 fL      MCH 33.9 pg      MCHC 35.6 g/dL      RDW 15.9 %      RDW-SD 55.8 fl      MPV 9.3 fL      Platelets 176 10*3/mm3      Neutrophil % 70.0 %      Lymphocyte % 20.6 %      Monocyte % 7.3 %      Eosinophil % 1.4 %      Basophil % 0.2 %      Immature Grans % 0.5 %      Neutrophils, Absolute 2.99 10*3/mm3      Lymphocytes, Absolute 0.88 10*3/mm3      Monocytes, Absolute 0.31 10*3/mm3      Eosinophils, Absolute 0.06 10*3/mm3      Basophils, Absolute 0.01 10*3/mm3      Immature Grans, Absolute 0.02 10*3/mm3      nRBC 0.0 /100 WBC     Lipase [[id number]]  (Abnormal) Collected:  02/18/19 0552    Specimen:  Blood Updated:  02/18/19  0624     Lipase 1,000 U/L     C-reactive Protein [[id number]]  (Abnormal) Collected:  02/17/19 0628    Specimen:  Blood Updated:  02/17/19 0759     C-Reactive Protein 1.10 mg/dL     Lipase [[id number]]  (Abnormal) Collected:  02/17/19 0628    Specimen:  Blood Updated:  02/17/19 0703     Lipase 940 U/L     Lipase [[id number]]  (Abnormal) Collected:  02/16/19 0542    Specimen:  Blood Updated:  02/16/19 0622     Lipase 1,282 U/L     C-reactive Protein [[id number]]  (Abnormal) Collected:  02/16/19 0542    Specimen:  Blood Updated:  02/16/19 0622     C-Reactive Protein 1.58 mg/dL     Amylase [[id number]]  (Abnormal) Collected:  02/15/19 0715    Specimen:  Blood Updated:  02/15/19 0821     Amylase 121 U/L     Lipase [[id number]]  (Abnormal) Collected:  02/15/19 0715    Specimen:  Blood Updated:  02/15/19 0821     Lipase 1,843 U/L     C-reactive Protein [[id number]]  (Abnormal) Collected:  02/15/19 0715    Specimen:  Blood Updated:  02/15/19 0820     C-Reactive Protein 2.99 mg/dL     Comprehensive Metabolic Panel [[id number]]  (Abnormal) Collected:  02/15/19 0715    Specimen:  Blood Updated:  02/15/19 0820     Glucose 84 mg/dL      BUN 5 mg/dL      Creatinine 0.39 mg/dL      Sodium 133 mmol/L      Potassium 4.0 mmol/L      Chloride 102 mmol/L      CO2 21.0 mmol/L      Calcium 8.6 mg/dL      Total Protein 6.1 g/dL      Albumin 3.90 g/dL      ALT (SGPT) 43 U/L      AST (SGOT) 32 U/L      Alkaline Phosphatase 102 U/L      Total Bilirubin 0.6 mg/dL      eGFR Non African Amer >150 mL/min/1.73      Globulin 2.2 gm/dL      A/G Ratio 1.8 g/dL      BUN/Creatinine Ratio 12.8     Anion Gap 10.0 mmol/L     CBC (No Diff) [[id number]]  (Abnormal) Collected:  02/15/19 0715    Specimen:  Blood Updated:  02/15/19 0731     WBC 4.23 10*3/mm3      RBC 2.98 10*6/mm3      Hemoglobin 10.2 g/dL      Hematocrit 29.3 %      MCV 98.3 fL      MCH 34.2 pg      MCHC 34.8 g/dL      RDW 16.1 %      RDW-SD 58.3 fl      MPV 9.3 fL      Platelets 116 10*3/mm3      Lipase [[id number]]  (Abnormal) Collected:  02/14/19 0506    Specimen:  Blood Updated:  02/14/19 0622     Lipase 2,332 U/L     Comprehensive Metabolic Panel [[id number]]  (Abnormal) Collected:  02/14/19 0506    Specimen:  Blood Updated:  02/14/19 0612     Glucose 107 mg/dL      BUN 4 mg/dL      Creatinine 0.37 mg/dL      Sodium 131 mmol/L      Potassium 4.2 mmol/L      Chloride 100 mmol/L      CO2 22.0 mmol/L      Calcium 8.3 mg/dL      Total Protein 6.8 g/dL      Albumin 4.20 g/dL      ALT (SGPT) 47 U/L      AST (SGOT) 40 U/L      Alkaline Phosphatase 109 U/L      Total Bilirubin 0.7 mg/dL      eGFR Non African Amer >150 mL/min/1.73      Globulin 2.6 gm/dL      A/G Ratio 1.6 g/dL      BUN/Creatinine Ratio 10.8     Anion Gap 9.0 mmol/L     CBC (No Diff) [[id number]]  (Abnormal) Collected:  02/14/19 0506    Specimen:  Blood Updated:  02/14/19 0604     WBC 4.68 10*3/mm3      RBC 2.85 10*6/mm3      Hemoglobin 10.2 g/dL      Hematocrit 28.7 %      .7 fL      MCH 35.8 pg      MCHC 35.5 g/dL      RDW 16.5 %      RDW-SD 60.9 fl      MPV 9.9 fL      Platelets 100 10*3/mm3     Hemoglobin A1c [[id number]] Collected:  02/13/19 0503    Specimen:  Blood Updated:  02/13/19 0643     Hemoglobin A1C 4.6 %     Lipase [[id number]]  (Abnormal) Collected:  02/13/19 0503    Specimen:  Blood Updated:  02/13/19 0624     Lipase 7,170 U/L     Lipid Panel [[id number]]  (Abnormal) Collected:  02/13/19 0503    Specimen:  Blood Updated:  02/13/19 0618     Total Cholesterol 184 mg/dL      Triglycerides 75 mg/dL      HDL Cholesterol 63 mg/dL      LDL Cholesterol  112 mg/dL      LDL/HDL Ratio 1.68    Comprehensive Metabolic Panel [[id number]]  (Abnormal) Collected:  02/13/19 0503    Specimen:  Blood Updated:  02/13/19 0615     Glucose 130 mg/dL      BUN 3 mg/dL      Creatinine 0.40 mg/dL      Sodium 134 mmol/L      Potassium 4.3 mmol/L      Comment: Specimen hemolyzed.  Results may be affected.        Chloride 100 mmol/L      CO2 25.0 mmol/L       Calcium 9.0 mg/dL      Total Protein 7.3 g/dL      Albumin 4.60 g/dL      ALT (SGPT) 62 U/L      Comment: Specimen hemolyzed.  Results may be affected.        AST (SGOT) 61 U/L      Comment: Specimen hemolyzed.  Results may be affected.        Alkaline Phosphatase 112 U/L      Comment: Specimen hemolyzed. Results may be affected.        Total Bilirubin 0.9 mg/dL      eGFR Non African Amer >150 mL/min/1.73      Globulin 2.7 gm/dL      A/G Ratio 1.7 g/dL      BUN/Creatinine Ratio 7.5     Anion Gap 9.0 mmol/L     Amylase [[id number]]  (Abnormal) Collected:  02/13/19 0503    Specimen:  Blood Updated:  02/13/19 0607     Amylase 588 U/L     CBC (No Diff) [[id number]]  (Abnormal) Collected:  02/13/19 0503    Specimen:  Blood Updated:  02/13/19 0603     WBC 6.21 10*3/mm3      RBC 3.25 10*6/mm3      Hemoglobin 11.5 g/dL      Hematocrit 31.6 %      MCV 97.2 fL      MCH 35.4 pg      MCHC 36.4 g/dL      RDW 17.2 %      RDW-SD 61.8 fl      MPV 10.2 fL      Platelets 107 10*3/mm3      Comment: Auto resulted.       Urine Drug Screen - Urine, Clean Catch [[id number]]  (Abnormal) Collected:  02/12/19 2200    Specimen:  Urine, Clean Catch Updated:  02/12/19 2229     Amphetamine Screen, Urine Negative     Barbiturates Screen, Urine Negative     Benzodiazepine Screen, Urine Negative     Cocaine Screen, Urine Negative     Methadone Screen, Urine Negative     Opiate Screen Positive     Phencyclidine (PCP), Urine Negative     THC, Screen, Urine Negative    Lipase [[id number]]  (Abnormal) Collected:  02/12/19 2128    Specimen:  Blood Updated:  02/12/19 2223     Lipase 7,312 U/L     Lactic Acid, Plasma [[id number]]  (Normal) Collected:  02/12/19 2207    Specimen:  Blood Updated:  02/12/19 2223     Lactate 0.7 mmol/L     Troponin [[id number]]  (Normal) Collected:  02/12/19 2128    Specimen:  Blood Updated:  02/12/19 2219     Troponin I <0.012 ng/mL     Urinalysis With Culture If Indicated - Urine, Clean Catch [[id number]]  (Normal) Collected:   02/12/19 2200    Specimen:  Urine, Clean Catch Updated:  02/12/19 2209     Color, UA Yellow     Appearance, UA Clear     pH, UA 7.0     Specific Gravity, UA <=1.005     Glucose, UA Negative     Ketones, UA Negative     Bilirubin, UA Negative     Blood, UA Negative     Protein, UA Negative     Leuk Esterase, UA Negative     Nitrite, UA Negative     Urobilinogen, UA 0.2 E.U./dL    Narrative:       Urine microscopic not indicated.    Comprehensive Metabolic Panel [[id number]]  (Abnormal) Collected:  02/12/19 2128    Specimen:  Blood Updated:  02/12/19 2207     Glucose 131 mg/dL      BUN 4 mg/dL      Creatinine 0.45 mg/dL      Sodium 130 mmol/L      Potassium 4.1 mmol/L      Chloride 93 mmol/L      CO2 26.0 mmol/L      Calcium 9.4 mg/dL      Total Protein 8.0 g/dL      Albumin 4.90 g/dL      ALT (SGPT) 76 U/L      AST (SGOT) 70 U/L      Alkaline Phosphatase 126 U/L      Total Bilirubin 1.1 mg/dL      eGFR Non African Amer 149 mL/min/1.73      Globulin 3.1 gm/dL      A/G Ratio 1.6 g/dL      BUN/Creatinine Ratio 8.9     Anion Gap 11.0 mmol/L     CBC Auto Differential [[id number]]  (Abnormal) Collected:  02/12/19 2128    Specimen:  Blood Updated:  02/12/19 2155     WBC 6.84 10*3/mm3      RBC 3.27 10*6/mm3      Hemoglobin 11.4 g/dL      Hematocrit 31.5 %      MCV 96.3 fL      MCH 34.9 pg      MCHC 36.2 g/dL      RDW 17.2 %      RDW-SD 60.6 fl      MPV 9.6 fL      Platelets 108 10*3/mm3      Neutrophil % 74.6 %      Lymphocyte % 16.4 %      Monocyte % 7.5 %      Eosinophil % 0.9 %      Basophil % 0.3 %      Neutrophils, Absolute 5.11 10*3/mm3      Lymphocytes, Absolute 1.12 10*3/mm3      Monocytes, Absolute 0.51 10*3/mm3      Eosinophils, Absolute 0.06 10*3/mm3      Basophils, Absolute 0.02 10*3/mm3     Ethanol [[id number]]  (Normal) Collected:  02/12/19 2128    Specimen:  Blood Updated:  02/12/19 2145     Ethanol % <0.010 %     Narrative:       Not for legal purposes. Chain of Custody not followed.         Imaging Results  (all)     Procedure Component Value Units Date/Time    MRI abdomen w wo contrast mrcp [[id number]] Collected:  02/13/19 1624     Updated:  02/13/19 1635    Narrative:       PROCEDURE: MRI ABDOMEN and MRCP     CLINICAL INDICATION: 48-year-old with biliary dilatation. Pancreatitis.     COMPARISON: CT abdomen pelvis yesterday.     TECHNIQUE: Multiplanar, multisequence MR images of the abdomen were  obtained utilizing an MRCP protocol. Postcontrast images were also  obtained. These include MIP and 3D images of the biliary tree.      FINDINGS:  MRCP sequence is significantly motion degraded.     Correlating to yesterday, there is dilatation of the common hepatic and  common bile ducts with a maximal diameter of 12 mm. Mild intrahepatic  biliary ductal dilatation as well. No gallstones identified. There is a  questionable filling defect in the distal common bile duct at the  sphincter. No abnormal enhancement in this area.     Redemonstrated peripancreatic edema compatible with acute pancreatitis.  No parenchymal necrosis no vascular complication.     No liver lesions. Hepatic vasculature is patent. Splenomegaly is seen,  approximately 14 cm diameter.     Normal kidneys and adrenal. Incidental tiny left basilar pulmonary  nodule corresponding with yesterday's CT.          Impression:          1. Questionable filling defect of the distal CBD at the sphincter. No  abnormal enhancement. A small obstructing stone is not excluded.  Stricture is also a possibility. ERCP may be needed. Biliary dilatation  again noted.  2. No gallstones are seen.  3. Acute pancreatitis without complication.     This report was finalized on 02/13/2019 16:32 by Dr Tino Dumont, .    CT Abdomen Pelvis With Contrast [[id number]] Collected:  02/13/19 0704     Updated:  02/13/19 0713    Narrative:          History:  48-year-old with epigastric abdominal pain. Elevated lipase.     Reference:  None.     Technique  Contrast-enhanced CT abdomen/pelvis was  performed with coronal and  sagittal reformatted images provided.     For this CT exam, one or more of the following dose reduction techniques  was employed:  -automated exposure control  -mA and/or kVp adjustment for patient size  -iterative reconstruction      mGy-cm     Findings     Chest  Incidental scanning through the lower chest demonstrates a 5 mm nodule  in the left lower lobe base.     Abdomen  The liver masses. Difficult to exclude early cirrhosis. Spleen is mildly  enlarged with a greatest axial dimension 14 cm. Thickness from hilum to  outer cortex is 6 cm. No adrenal mass. Gallbladder is unremarkable.  There is peripancreatic edema compatible with acute pancreatitis. No  parenchymal calcification or ductal dilatation. There is dilatation of  the common hepatic duct to 12-13 mm on coronal reformats image 23.     Mild atherosclerosis of the abdominal aorta without aneurysm. No renal  abnormality.     No lymphadenopathy. No bowel obstruction. Large amount of loose stool in  the colon. No free air. No ascites.     Pelvis  Urinary bladder contours unremarkable. Uterus is unremarkable. Bilateral  tubal ligation clips. No pelvic free fluid or lymphadenopathy.     No acute osseous abnormalities..          Impression:          1. Acute pancreatitis without complication.  2. Small 5 mm nodule at the left lower lobe base. Elective CT chest is  recommended.  3. Dilatation of the common hepatic duct. Suggest MRCP.  4. Possible cirrhosis. Mild splenomegaly.     A preliminary report was provided by [employer]. No discrepancy.  This report was finalized on 02/13/2019 07:10 by Dr Tino Dumont, .    XR Chest 1 View [[id number]] Collected:  02/12/19 2232     Updated:  02/12/19 2240    Narrative:       EXAM: XR CHEST 1 VW- - 2/12/2019 9:37 PM CST     HISTORY: epigastric pain       COMPARISON: None.      TECHNIQUE:  1 images.  Frontal view of the chest.     FINDINGS:    No pneumothorax, pleural effusion or focal  consolidation. Cardiac  mediastinal silhouette within normal limits. No acute findings in the  bones, soft tissues or upper abdomen.          Impression:       1. No acute cardiopulmonary findings.  This report was finalized on 02/12/2019 22:37 by Dr Keisha Chan MD.        Chief Complaint on Day of Discharge: Epigastric pain    History of Present Illness on Day of Discharge: The patient is resting in bed.  She reports feeling much better in comparison to admission, and is very eager for discharge home today.     Hospital Course:  Mrs. Best is a 48-year-old  female who follows Dr. Chaitanya Padilla for primary care.  She has a past medical history significant for hypertension, depression, chronic pain, and alcohol and tobacco abuse.  She presented to the [facility] emergency department on 02/12/2019 with complaints of severe midepigastric pain.  The patient reported that she had been cutting back on her drinking, but was still drinking alcohol daily up until the time of admission.  Emergency department, lipase level was noted to be 7312.  Liver enzymes were mildly elevated as well.  Serum sodium was 130.  CT of abdomen and pelvis showed acute pancreatitis, dilatation of the common hepatic duct, and possible cirrhosis with mild splenomegaly.  Patient was admitted to the hospitalist service for further evaluation and management.    The patient was given IV fluid hydration.  She was initially made nothing by mouth, and once her lipase and pain started to improve she was started on clear liquids.  This has been very slowly advanced and she is now tolerating a low fat GI bland diet.  Her lipase has trended down nicely and is now 787 today.  Her CRP has normalized as well.  The patient was given scheduled oral Valium and vitamin replacement on admission, however despite this she did exhibit symptoms of alcohol withdrawal.  We have discussed alcohol cessation at length with the patient and she does  "express understanding and states she does desire to quit.    Given concerns for dilatation of the common hepatic duct on CT of abdomen and pelvis, MRCP was performed.  This showed questionable filling defect of the distal common bile duct at the sphincter, in which a small obstructing stone could not be excluded.  Stricture was also a possibility.  There were no gallstones seen.  Despite these findings, her liver function tests have been completely normal on her most recent laboratory assessment.  We have discussed with the patient that should her symptoms persist or recur despite her cessation of alcohol then she should seek evaluation from her primary care provider to refer her for ERCP.    The patient is overall hemodynamically stable and appropriate for discharge home today.  She will follow up with her primary care provider in 1 week.  As there was an incidental finding of a 5 mm nodule at the left lower lobe base on CT of the abdomen and pelvis, we have asked her primary care provider to set up an outpatient CT of the chest for further evaluation of this.  We have also asked the patient to monitor her blood pressure readings twice daily at home and take this log to her primary care provider as we have increased her valsartan while hospitalized.    Condition on Discharge:  Stable    Physical Exam on Discharge:  /82 (BP Location: Right arm, Patient Position: Lying)   Pulse 76   Temp 98.1 °F (36.7 °C) (Oral)   Resp 16   Ht 160 cm (63\")   Wt 61.3 kg (135 lb 3.2 oz)   SpO2 97%   BMI 23.95 kg/m²   Physical Exam  Constitutional: She is oriented to person, place, and time. She appears well-developed and well-nourished. No distress.   HENT:   Head: Normocephalic and atraumatic.   Neck: Normal range of motion. Neck supple. No JVD present. No tracheal deviation present. No thyromegaly present.   Cardiovascular: Normal rate, regular rhythm, normal heart sounds and intact distal pulses. Exam reveals no gallop " and no friction rub.   No murmur heard.  Pulmonary/Chest: Effort normal and breath sounds normal. She has no wheezes. She has no rales.   Abdominal: Soft. Bowel sounds are normal. She exhibits no distension. There is tenderness (JESSE tenderness with deep palpation). There is no guarding.   Musculoskeletal: Normal range of motion. She exhibits no edema or tenderness.   Lymphadenopathy:     She has no cervical adenopathy.   Neurological: She is alert and oriented to person, place, and time. No cranial nerve deficit.   Skin: Skin is warm and dry. No rash noted. No erythema.   Psychiatric: She has a normal mood and affect. Her behavior is normal. Judgment and thought content normal.   Vitals reviewed.    Discharge Disposition:  Home or Self Care    Discharge Medications:     Discharge Medications      Changes to Medications      Instructions Start Date   valsartan 160 MG tablet  Commonly known as:  DIOVAN  What changed:    · medication strength  · how much to take   160 mg, Oral, Daily         Continue These Medications      Instructions Start Date   gabapentin 600 MG tablet  Commonly known as:  NEURONTIN   600 mg, Oral, 3 Times Daily      HYDROcodone-acetaminophen 7.5-325 MG per tablet  Commonly known as:  NORCO   1 tablet, Oral, Every 8 Hours PRN      lamoTRIgine 150 MG tablet  Commonly known as:  LaMICtal   150 mg, Oral, 2 Times Daily      rosuvastatin 20 MG tablet  Commonly known as:  CRESTOR   20 mg, Oral, Nightly      tiZANidine 4 MG tablet  Commonly known as:  ZANAFLEX   4 mg, Oral, Every 8 Hours PRN      venlafaxine  MG 24 hr capsule  Commonly known as:  EFFEXOR-XR   150 mg, Oral, Daily      vitamin D 69863 units capsule capsule  Commonly known as:  ERGOCALCIFEROL   50,000 Units, Oral, Every 7 Days           Discharge Diet:   Diet Instructions     Diet: Specialty Diet; Thin Liquids, No Restrictions; Low Fat      Discharge Diet:  Specialty Diet    Fluid Consistency:  Thin Liquids, No Restrictions     Specialty Diets:  Low Fat        Activity at Discharge:   Activity Instructions     Activity as Tolerated      Measure Blood Pressure      Measure blood pressure twice daily. Record these measurements to take to follow-up with primary care provider        Discharge Care Plan/Instructions:   1.  Return for any acute or worsening symptoms  2.  Alcohol and tobacco cessation  3.  Measure blood pressure twice daily at home.  Record these measurements to take to follow-up with primary care provider.  Valsartan increased to 160 mg.  4.  Low-fat diet  5.  Patient's primary care provider will need to schedule for an outpatient CT of the chest for further evaluation of incidental finding of 5 mm left lower lobe    Follow-up Appointments:   1.  Primary care provider in 1 week    Test Results Pending at Discharge: None    OSIEL Ferris  02/20/19  8:17 AM    Time: 40 minutes    I personally evaluated and examined the patient in conjunction with OSIEL Rasheed and agree with the assessment, treatment plan, and disposition of the patient as recorded by her. My history, exam, and further recommendations are:     Discussed with her nurse, Mindy.     She apparently ate half of her breakfast this morning and was asymptomatic with it.  She does not like the food.    She has been readied for discharge over the last several days by Haylie and Dr. العراقي/Dr. Chaparro.  She has been in the hospital for 8 days and this is my first encounter with her.  She feels ready to go.  She will have follow-up as above.    Todd Bobo,   02/20/19  9:45 AM

## 2019-02-21 ENCOUNTER — READMISSION MANAGEMENT (OUTPATIENT)
Dept: CALL CENTER | Facility: HOSPITAL | Age: 49
End: 2019-02-21

## 2019-02-21 NOTE — OUTREACH NOTE
Prep Survey      Responses   Facility patient discharged from?  [place]   Is patient eligible?  Yes   Discharge diagnosis  Alcohol induced pancreatitis   Does the patient have one of the following disease processes/diagnoses(primary or secondary)?  Other   Does the patient have Home health ordered?  No   Is there a DME ordered?  No   Prep survey completed?  Yes          Linda Pollock RN

## 2019-02-22 ENCOUNTER — READMISSION MANAGEMENT (OUTPATIENT)
Dept: CALL CENTER | Facility: HOSPITAL | Age: 49
End: 2019-02-22

## 2019-02-22 NOTE — OUTREACH NOTE
Medical Week 1 Survey      Responses   Facility patient discharged from?  [facility]   Does the patient have one of the following disease processes/diagnoses(primary or secondary)?  Other   Is there a successful TCM telephone encounter documented?  No   Week 1 attempt successful?  Yes   Call start time  1409   Call end time  1411   Discharge diagnosis  Alcohol induced pancreatitis   Meds reviewed with patient/caregiver?  Yes   Is the patient having any side effects they believe may be caused by any medication additions or changes?  No   Does the patient have all medications ordered at discharge?  Yes   Is the patient taking all medications as directed (includes completed medication regime)?  Yes   Does the patient have a primary care provider?   Yes   Does the patient have an appointment with their PCP within 7 days of discharge?  Yes   Has the patient kept scheduled appointments due by today?  Yes   Has home health visited the patient within 72 hours of discharge?  N/A   Psychosocial issues?  No   Did the patient receive a copy of their discharge instructions?  Yes   Nursing interventions  Reviewed instructions with patient   What is the patient's perception of their health status since discharge?  Improving   Is the patient/caregiver able to teach back signs and symptoms related to disease process for when to call PCP?  Yes   Is the patient/caregiver able to teach back signs and symptoms related to disease process for when to call 911?  Yes   Is the patient/caregiver able to teach back the hierarchy of who to call/visit for symptoms/problems? PCP, Specialist, Home health nurse, Urgent Care, ED, 911  Yes   Week 1 call completed?  Yes          David Michele RN

## 2019-03-04 ENCOUNTER — READMISSION MANAGEMENT (OUTPATIENT)
Dept: CALL CENTER | Facility: HOSPITAL | Age: 49
End: 2019-03-04

## 2019-03-04 NOTE — OUTREACH NOTE
Medical Week 2 Survey      Responses   Facility patient discharged from?  [facility]   Does the patient have one of the following disease processes/diagnoses(primary or secondary)?  Other   Week 2 attempt successful?  Yes   Call start time  1133   Discharge diagnosis  Alcohol induced pancreatitis   Rescheduled  Rescheduled-pt requested [States she is doing better but is running errands. Requested to call back tomorrow. ]   Call end time  1133          Monik Monaco RN

## 2019-03-05 ENCOUNTER — READMISSION MANAGEMENT (OUTPATIENT)
Dept: CALL CENTER | Facility: HOSPITAL | Age: 49
End: 2019-03-05

## 2019-03-05 NOTE — OUTREACH NOTE
Medical Week 2 Survey      Responses   Facility patient discharged from?  [facility]   Does the patient have one of the following disease processes/diagnoses(primary or secondary)?  Other   Week 2 attempt successful?  No   Unsuccessful attempts  Attempt 1   Rescheduled  Revoked   Revoke  Decline to participate          Melissa Purcell RN

## 2019-03-15 ENCOUNTER — APPOINTMENT (OUTPATIENT)
Dept: CT IMAGING | Facility: HOSPITAL | Age: 49
End: 2019-03-15

## 2019-03-15 ENCOUNTER — APPOINTMENT (OUTPATIENT)
Dept: GENERAL RADIOLOGY | Facility: HOSPITAL | Age: 49
End: 2019-03-15

## 2019-03-15 ENCOUNTER — HOSPITAL ENCOUNTER (EMERGENCY)
Facility: HOSPITAL | Age: 49
Discharge: HOME | End: 2019-03-15
Attending: EMERGENCY MEDICINE | Admitting: EMERGENCY MEDICINE

## 2019-03-15 VITALS
RESPIRATION RATE: 20 BRPM | HEART RATE: 84 BPM | SYSTOLIC BLOOD PRESSURE: 142 MMHG | HEIGHT: 63 IN | TEMPERATURE: 99 F | WEIGHT: 142.8 LBS | DIASTOLIC BLOOD PRESSURE: 84 MMHG | BODY MASS INDEX: 25.3 KG/M2 | OXYGEN SATURATION: 98 %
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
## 2019-03-15 DIAGNOSIS — S60.211A CONTUSION OF RIGHT WRIST, INITIAL ENCOUNTER: ICD-10-CM

## 2019-03-15 DIAGNOSIS — S00.81XA ABRASION OF FACE, INITIAL ENCOUNTER: ICD-10-CM

## 2019-03-15 DIAGNOSIS — S80.02XA CONTUSION OF LEFT KNEE, INITIAL ENCOUNTER: ICD-10-CM

## 2019-03-15 PROCEDURE — 70450 CT HEAD/BRAIN W/O DYE: CPT

## 2019-03-15 PROCEDURE — 73562 X-RAY EXAM OF KNEE 3: CPT

## 2019-03-15 PROCEDURE — 99284 EMERGENCY DEPT VISIT MOD MDM: CPT

## 2019-03-15 PROCEDURE — 73110 X-RAY EXAM OF WRIST: CPT

## 2019-03-15 PROCEDURE — 70486 CT MAXILLOFACIAL W/O DYE: CPT

## 2019-03-15 PROCEDURE — 72125 CT NECK SPINE W/O DYE: CPT

## 2019-03-15 RX ORDER — OXYCODONE HYDROCHLORIDE AND ACETAMINOPHEN 5; 325 MG/1; MG/1
1 TABLET ORAL ONCE
Status: COMPLETED | OUTPATIENT
Start: 2019-03-15 | End: 2019-03-15

## 2019-03-15 RX ORDER — HYDROCODONE BITARTRATE AND ACETAMINOPHEN 7.5; 325 MG/1; MG/1
1 TABLET ORAL EVERY 8 HOURS PRN
Qty: 12 TABLET | Refills: 0 | Status: ON HOLD | OUTPATIENT
Start: 2019-03-15 | End: 2019-04-18 | Stop reason: DRUGHIGH

## 2019-03-15 RX ADMIN — OXYCODONE HYDROCHLORIDE AND ACETAMINOPHEN 1 TABLET: 5; 325 TABLET ORAL at 20:08

## 2019-03-16 NOTE — DISCHARGE INSTRUCTIONS
Maureen,     Please keep the abrasions on your face covered until they are fully healed. Please apply antibiotic ointment to the areas at least 2 times daily for the next 10 days.

## 2019-03-16 NOTE — ED PROVIDER NOTES
Subjective   48 y/o female arrives for evaluation of mechanical fall and abrasions to the face and contusion to the left knee that occurred just prior to arrival where by she was outside with her dog and got tangled up with the dog causing her to fall down her back steps (3) striking her face and knee on the concrete without LOC. She denies etoh usage. She denies cp, sob, palpitations, numbness, tingling, loss of sensation, weakness, vision or hearing changes, abdominal pain, nausea, vomiting or any other issues prior to or after the fall. She states her tetanus is up-to-date. She arrives in [facility].         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) (CMS/[facility])    Past Surgical History:  2007: BRAIN SURGERY      Comment:  Margareth Zuluaga   No date: TUBAL ABDOMINAL LIGATION    Social History    Socioeconomic History      Marital status:       Spouse name: Not on file      Number of children: Not on file      Years of education: Not on file      Highest education level: Not on file    Social Needs      Financial resource strain: Not on file      Food insecurity - worry: Not on file      Food insecurity - inability: Not on file      Transportation needs - medical: Not on file      Transportation needs - non-medical: Not on file    Occupational History      Not on file    Tobacco Use      Smoking status: Current Every Day Smoker        Packs/day: 0.50        Types: Cigarettes    Substance and Sexual Activity      Alcohol use: Yes        Comment: occasionally      Drug use: No      Sexual activity: Not on file    Other Topics      Concerns:        Not on file    Social History Narrative      Not on file      Family history: reviewed and  noncontributory             Review of Systems   All other systems reviewed and are negative.      Past Medical History:   Diagnosis Date   • Alcohol abuse    • Anxiety and depression    • Chronic pain     back   • Hypercholesteremia    • Hypertension    • Stroke (cerebrum) (CMS/HCC)        No Known Allergies    Past Surgical History:   Procedure Laterality Date   • BRAIN SURGERY  2007    Margareth Zuluaga    • TUBAL ABDOMINAL LIGATION         Family History   Problem Relation Age of Onset   • Breast cancer Paternal Grandmother    • Breast cancer Other    • Lung cancer Mother    • Lymphoma Father        Social History     Socioeconomic History   • Marital status:      Spouse name: Not on file   • Number of children: Not on file   • Years of education: Not on file   • Highest education level: Not on file   Tobacco Use   • Smoking status: Current Every Day Smoker     Packs/day: 0.50     Types: Cigarettes   Substance and Sexual Activity   • Alcohol use: Yes     Comment: occasionally   • Drug use: No           Objective   Physical Exam   Constitutional: She is oriented to person, place, and time. She appears well-developed and well-nourished.   HENT:   Head: Normocephalic.   Right Ear: External ear normal.   Left Ear: External ear normal.   Nose: Nose normal.   Mouth/Throat: Oropharynx is clear and moist.   No osei's signs, no raccon eyes    No septal hematoma    NO loose teeth  No pain across the jaw, jaw does line up when she closes her mouth, no drooling    She has several abrasions across the bridge of her nose, under her nose and to her left cheek. There are no lacerations or bleeding.    Eyes: Conjunctivae and EOM are normal. Pupils are equal, round, and reactive to light.   Neck: Normal range of motion. Neck supple.   No midline tenderness, no step offs, no defomrities.    Musculoskeletal: Normal range of motion.   Small anterior contusion to the left knee, no effusions, full ROM, negative angie and varus and  valgus straining bilaterally    Minimal pain to the right wrist, no step offs, no deforimties  No pain to the elbow or shoulder    Radial and unlar are 2/4 bilaterally, sensation is intact.    Neurological: She is alert and oriented to person, place, and time. She displays normal reflexes. No cranial nerve deficit or sensory deficit. She exhibits normal muscle tone. Coordination normal.   Skin: Skin is warm. Capillary refill takes less than 2 seconds.   Psychiatric: She has a normal mood and affect. Her behavior is normal.   Vitals reviewed.      Procedures           ED Course    Request # : [id number] no suspec activity     Mechanical fall, no acute findings, will dc.       CT Cervical Spine Without Contrast   Final Result   No acute cervical spine fracture or subluxation.   This report was finalized on 03/15/2019 21:44 by Dr Tino Dumont, .      XR Knee 3 View Left   Final Result   Negative left knee.   This report was finalized on 03/15/2019 20:51 by Dr Tino Dumont, .      XR Wrist 3+ View Right   Final Result   No fracture or dislocation of the right wrist.   This report was finalized on 03/15/2019 20:50 by Dr Tino Dumont, .      CT Facial Bones Without Contrast   Final Result   No facial bone fracture.   This report was finalized on 03/15/2019 20:49 by Dr Tino Dumont, .      CT Head Without Contrast   Final Result   No acute intracranial injury. Multifocal encephalomalacia in the right   cerebral hemisphere, unchanged.   This report was finalized on 03/15/2019 20:45 by Dr Tino Dumont, .        Labs Reviewed - No data to display              MDM      Final diagnoses:   Fall, initial encounter   Abrasion of face, initial encounter   Contusion of left knee, initial encounter   Contusion of right wrist, initial encounter            Jesu Graves MD  03/15/19 1510

## 2019-04-15 ENCOUNTER — APPOINTMENT (OUTPATIENT)
Dept: PREADMISSION TESTING | Facility: HOSPITAL | Age: 49
End: 2019-04-15

## 2019-04-15 VITALS
HEIGHT: 64 IN | DIASTOLIC BLOOD PRESSURE: 88 MMHG | HEART RATE: 98 BPM | WEIGHT: 139.11 LBS | OXYGEN SATURATION: 99 % | SYSTOLIC BLOOD PRESSURE: 151 MMHG | BODY MASS INDEX: 23.75 KG/M2 | RESPIRATION RATE: 16 BRPM

## 2019-04-15 LAB
ALBUMIN SERPL-MCNC: 4.9 G/DL (ref 3.5–5)
ALBUMIN/GLOB SERPL: 1.7 G/DL (ref 1.1–2.5)
ALP SERPL-CCNC: 125 U/L (ref 24–120)
ALT SERPL W P-5'-P-CCNC: 74 U/L (ref 0–54)
ANION GAP SERPL CALCULATED.3IONS-SCNC: 16 MMOL/L (ref 4–13)
AST SERPL-CCNC: 102 U/L (ref 7–45)
BASOPHILS # BLD AUTO: 0.03 10*3/MM3 (ref 0–0.2)
BASOPHILS NFR BLD AUTO: 0.5 % (ref 0–2)
BILIRUB SERPL-MCNC: 1.9 MG/DL (ref 0.1–1)
BUN BLD-MCNC: 7 MG/DL (ref 5–21)
BUN/CREAT SERPL: 13.5 (ref 7–25)
CALCIUM SPEC-SCNC: 9.7 MG/DL (ref 8.4–10.4)
CHLORIDE SERPL-SCNC: 105 MMOL/L (ref 98–110)
CO2 SERPL-SCNC: 18 MMOL/L (ref 24–31)
CREAT BLD-MCNC: 0.52 MG/DL (ref 0.5–1.4)
DEPRECATED RDW RBC AUTO: 51.5 FL (ref 40–54)
EOSINOPHIL # BLD AUTO: 0.05 10*3/MM3 (ref 0–0.7)
EOSINOPHIL NFR BLD AUTO: 0.8 % (ref 0–4)
ERYTHROCYTE [DISTWIDTH] IN BLOOD BY AUTOMATED COUNT: 15.6 % (ref 12–15)
GFR SERPL CREATININE-BSD FRML MDRD: 126 ML/MIN/1.73
GLOBULIN UR ELPH-MCNC: 2.9 GM/DL
GLUCOSE BLD-MCNC: 146 MG/DL (ref 70–100)
HCT VFR BLD AUTO: 35.8 % (ref 37–47)
HGB BLD-MCNC: 12.7 G/DL (ref 12–16)
IMM GRANULOCYTES # BLD AUTO: 0.01 10*3/MM3 (ref 0–0.05)
IMM GRANULOCYTES NFR BLD AUTO: 0.2 % (ref 0–5)
LYMPHOCYTES # BLD AUTO: 0.97 10*3/MM3 (ref 0.72–4.86)
LYMPHOCYTES NFR BLD AUTO: 16.1 % (ref 15–45)
MCH RBC QN AUTO: 32.5 PG (ref 28–32)
MCHC RBC AUTO-ENTMCNC: 35.5 G/DL (ref 33–36)
MCV RBC AUTO: 91.6 FL (ref 82–98)
MONOCYTES # BLD AUTO: 0.19 10*3/MM3 (ref 0.19–1.3)
MONOCYTES NFR BLD AUTO: 3.2 % (ref 4–12)
NEUTROPHILS # BLD AUTO: 4.77 10*3/MM3 (ref 1.87–8.4)
NEUTROPHILS NFR BLD AUTO: 79.2 % (ref 39–78)
NRBC BLD AUTO-RTO: 0 /100 WBC (ref 0–0)
PLATELET # BLD AUTO: 90 10*3/MM3 (ref 130–400)
PMV BLD AUTO: 11.2 FL (ref 6–12)
POTASSIUM BLD-SCNC: 3.7 MMOL/L (ref 3.5–5.3)
PROT SERPL-MCNC: 7.8 G/DL (ref 6.3–8.7)
RBC # BLD AUTO: 3.91 10*6/MM3 (ref 4.2–5.4)
SODIUM BLD-SCNC: 139 MMOL/L (ref 135–145)
WBC NRBC COR # BLD: 6.02 10*3/MM3 (ref 4.8–10.8)

## 2019-04-15 PROCEDURE — 85025 COMPLETE CBC W/AUTO DIFF WBC: CPT | Performed by: SPECIALIST

## 2019-04-15 PROCEDURE — 36415 COLL VENOUS BLD VENIPUNCTURE: CPT

## 2019-04-15 PROCEDURE — 80053 COMPREHEN METABOLIC PANEL: CPT | Performed by: SPECIALIST

## 2019-04-15 NOTE — DISCHARGE INSTRUCTIONS
DAY OF SURGERY INSTRUCTIONS        YOUR SURGEON: Dr. Viviana Boles    PROCEDURE: Cholecystectomy laparoscopic, intraoperative cholangiogram    DATE OF SURGERY: 4/18/19    ARRIVAL TIME: AS DIRECTED BY OFFICE    YOU MAY TAKE THE FOLLOWING MEDICATION(S) THE MORNING OF SURGERY WITH A SIP OF WATER: Gabapentin (Neurontin), Tizanidine (Zanaflex), Hydrocodone/Acetaminophen (Norco)      ALL OTHER HOME MEDICATION CHECK WITH YOUR PHYSICIAN                MANAGING PAIN AFTER SURGERY    We know you are probably wondering what your pain will be like after surgery.  Following surgery it is unrealistic to expect you will not have pain.   Pain is how our bodies let us know that something is wrong or cautions us to be careful.  That said, our goal is to make your pain tolerable.    Methods we may use to treat your pain include (oral or IV medications, PCAs, epidurals, nerve blocks, etc.)   While some procedures require IV pain medications for a short time after surgery, transitioning to pain medications by mouth allows for better management of pain.   Your nurse will encourage you to take oral pain medications whenever possible.  IV medications work almost immediately, but only last a short while.  Taking medications by mouth allows for a more constant level of medication in your blood stream for a longer period of time.      Once your pain is out of control it is harder to get back under control.  It is important you are aware when your next dose of pain medication is due.  If you are admitted, your nurse may write the time of your next dose on the white board in your room to help you remember.      We are interested in your pain and encourage you to inform us about aggravating factors during your visit.   Many times a simple repositioning every few hours can make a big difference.    If your physician says it is okay, do not let your pain prevent you from getting out of bed. Be sure to call your nurse for assistance prior to  getting up so you do not fall.      Before surgery, please decide your tolerable pain goal.  These faces help describe the pain ratings we use on a 0-10 scale.   Be prepared to tell us your goal and whether or not you take pain or anxiety medications at home.          BEFORE YOU COME TO THE HOSPITAL  (Pre-op instructions)  • Do not eat, drink, smoke or chew gum after midnight the night before surgery.  This also includes no mints.  • Morning of surgery take only the medicines you have been instructed with a sip of water unless otherwise instructed  by your physician.  • Do not shave, wear makeup or dark nail polish.  • Remove all jewelry including rings.  • Leave anything you consider valuable at home.  • Leave your suitcase in the car until after your surgery.  • Bring the following with you if applicable:  o Picture ID and insurance, Medicare or Medicaid cards  o Co-pay/deductible required by insurance (cash, check, credit card)  o Copy of advance directive, living will or power-of- documents if not brought to PAT  o CPAP or BIPAP mask and tubing  o Relaxation aids (MP3 player, book, magazine)  • On the day of surgery check in at registration located at the main entrance of the hospital.       Outpatient Surgery Guidelines, Adult  Outpatient procedures are those for which the person having the procedure is allowed to go home the same day as the procedure. Various procedures are done on an outpatient basis. You should follow some general guidelines if you will be having an outpatient procedure.  LET YOUR HEALTH CARE PROVIDER KNOW ABOUT:  · Any allergies you have.  · All medicines you are taking, including vitamins, herbs, eye drops, creams, and over-the-counter medicines.  · Previous problems you or members of your family have had with the use of anesthetics.  · Any blood disorders you have.  · Previous surgeries you have had.  · Medical conditions you have.  RISKS AND COMPLICATIONS  Your health care  provider will discuss possible risks and complications with you before surgery. Common risks and complications include:    · Problems due to the use of anesthetics.  · Blood loss and replacement (does not apply to minor surgical procedures).  · Temporary increase in pain due to surgery.  · Uncorrected pain or problems that the surgery was meant to correct.  · Infection.  · New damage.  BEFORE THE PROCEDURE  · Ask your health care provider about changing or stopping your regular medicines. You may need to stop taking certain medicines in the days or weeks before the procedure.  · Stop smoking at least 2 weeks before surgery. This lowers your risk for complications during and after surgery. Ask your health care provider for help with this if needed.  · Eat your usual meals and a light supper the day before surgery. Continue fluid intake. Do not drink alcohol.  · Do not eat or drink after midnight the night before your surgery.   · Arrange for someone to take you home and to stay with you for 24 hours after the procedure. Medicine given for your procedure may affect your ability to drive or to care for yourself.  · Call your health care provider's office if you develop an illness or problem that may prevent you from safely having your procedure.  AFTER THE PROCEDURE  After surgery, you will be taken to a recovery area, where your progress will be monitored. If there are no complications, you will be allowed to go home when you are awake, stable, and taking fluids well. You may have numbness around the surgical site. Healing will take some time. You will have tenderness at the surgical site and may have some swelling and bruising. You may also have some nausea.  HOME CARE INSTRUCTIONS  · Do not drive for 24 hours, or as directed by your health care provider. Do not drive while taking prescription pain medicines.  · Do not drink alcohol for 24 hours.  · Do not make important decisions or sign legal documents for 24  hours.  · You may resume a normal diet and activities as directed.  · Do not lift anything heavier than 10 pounds (4.5 kg) or play contact sports until your health care provider says it is okay.  · Change your bandages (dressings) as directed.  · Only take over-the-counter or prescription medicines as directed by your health care provider.  · Follow up with your health care provider as directed.  SEEK MEDICAL CARE IF:  · You have increased bleeding (more than a small spot) from the surgical site.  · You have redness, swelling, or increasing pain in the wound.  · You see pus coming from the wound.  · You have a fever.  · You notice a bad smell coming from the wound or dressing.  · You feel lightheaded or faint.  · You develop a rash.  · You have trouble breathing.  · You develop allergies.  MAKE SURE YOU:  · Understand these instructions.  · Will watch your condition.  · Will get help right away if you are not doing well or get worse.     This information is not intended to replace advice given to you by your health care provider. Make sure you discuss any questions you have with your health care provider.     Document Released: 09/12/2002 Document Revised: 05/03/2016 Document Reviewed: 05/22/2014  [employer] Interactive Patient Education ©2016 [employer]       Fall Prevention in Hospitals, Adult  As a hospital patient, your condition and the treatments you receive can increase your risk for falls. Some additional risk factors for falls in a hospital include:  · Being in an unfamiliar environment.  · Being on bed rest.  · Your surgery.  · Taking certain medicines.  · Your tubing requirements, such as intravenous (IV) therapy or catheters.  It is important that you learn how to decrease fall risks while at the hospital. Below are important tips that can help prevent falls.  SAFETY TIPS FOR PREVENTING FALLS  Talk about your risk of falling.  · Ask your health care provider why you are at risk for falling. Is it your  medicine, illness, tubing placement, or something else?  · Make a plan with your health care provider to keep you safe from falls.  · Ask your health care provider or pharmacist about side effects of your medicines. Some medicines can make you dizzy or affect your coordination.  Ask for help.  · Ask for help before getting out of bed. You may need to press your call button.  · Ask for assistance in getting safely to the toilet.  · Ask for a walker or cane to be put at your bedside. Ask that most of the side rails on your bed be placed up before your health care provider leaves the room.  · Ask family or friends to sit with you.  · Ask for things that are out of your reach, such as your glasses, hearing aids, telephone, bedside table, or call button.  Follow these tips to avoid falling:  · Stay lying or seated, rather than standing, while waiting for help.  · Wear rubber-soled slippers or shoes whenever you walk in the hospital.  · Avoid quick, sudden movements.  ¨ Change positions slowly.  ¨ Sit on the side of your bed before standing.  ¨ Stand up slowly and wait before you start to walk.  · Let your health care provider know if there is a spill on the floor.  · Pay careful attention to the medical equipment, electrical cords, and tubes around you.  · When you need help, use your call button by your bed or in the bathroom. Wait for one of your health care providers to help you.  · If you feel dizzy or unsure of your footing, return to bed and wait for assistance.  · Avoid being distracted by the TV, telephone, or another person in your room.  · Do not lean or support yourself on rolling objects, such as IV poles or bedside tables.     This information is not intended to replace advice given to you by your health care provider. Make sure you discuss any questions you have with your health care provider.     Document Released: 12/15/2001 Document Revised: 01/08/2016 Document Reviewed: 08/25/2013  [employer]  Patient Education ©2016 [employer]       Surgical Site Infections FAQs  What is a Surgical Site Infection (SSI)?  A surgical site infection is an infection that occurs after surgery in the part of the body where the surgery took place. Most patients who have surgery do not develop an infection. However, infections develop in about 1 to 3 out of every 100 patients who have surgery.  Some of the common symptoms of a surgical site infection are:  · Redness and pain around the area where you had surgery  · Drainage of cloudy fluid from your surgical wound  · Fever  Can SSIs be treated?  Yes. Most surgical site infections can be treated with antibiotics. The antibiotic given to you depends on the bacteria (germs) causing the infection. Sometimes patients with SSIs also need another surgery to treat the infection.  What are some of the things that hospitals are doing to prevent SSIs?  To prevent SSIs, doctors, nurses, and other healthcare providers:  · Clean their hands and arms up to their elbows with an antiseptic agent just before the surgery.  · Clean their hands with soap and water or an alcohol-based hand rub before and after caring for each patient.  · May remove some of your hair immediately before your surgery using electric clippers if the hair is in the same area where the procedure will occur. They should not shave you with a razor.  · Wear special hair covers, masks, gowns, and gloves during surgery to keep the surgery area clean.  · Give you antibiotics before your surgery starts. In most cases, you should get antibiotics within 60 minutes before the surgery starts and the antibiotics should be stopped within 24 hours after surgery.  · Clean the skin at the site of your surgery with a special soap that kills germs.  What can I do to help prevent SSIs?  Before your surgery:  · Tell your doctor about other medical problems you may have. Health problems such as allergies, diabetes, and obesity could affect  your surgery and your treatment.  · Quit smoking. Patients who smoke get more infections. Talk to your doctor about how you can quit before your surgery.  · Do not shave near where you will have surgery. Shaving with a razor can irritate your skin and make it easier to develop an infection.  At the time of your surgery:  · Speak up if someone tries to shave you with a razor before surgery. Ask why you need to be shaved and talk with your surgeon if you have any concerns.  · Ask if you will get antibiotics before surgery.  After your surgery:  · Make sure that your healthcare providers clean their hands before examining you, either with soap and water or an alcohol-based hand rub.  · If you do not see your providers clean their hands, please ask them to do so.  · Family and friends who visit you should not touch the surgical wound or dressings.  · Family and friends should clean their hands with soap and water or an alcohol-based hand rub before and after visiting you. If you do not see them clean their hands, ask them to clean their hands.  What do I need to do when I go home from the hospital?  · Before you go home, your doctor or nurse should explain everything you need to know about taking care of your wound. Make sure you understand how to care for your wound before you leave the hospital.  · Always clean your hands before and after caring for your wound.  · Before you go home, make sure you know who to contact if you have questions or problems after you get home.  · If you have any symptoms of an infection, such as redness and pain at the surgery site, drainage, or fever, call your doctor immediately.  If you have additional questions, please ask your doctor or nurse.  Developed and co-sponsored by The Society for Healthcare Epidemiology of [place] (SHEA); Infectious Diseases Society of [place] (IDSA); American Hospital Association; Association for Professionals in Infection Control and Epidemiology (APIC);  Centers for Disease Control and Prevention (CDC); and The Joint Commission.     This information is not intended to replace advice given to you by your health care provider. Make sure you discuss any questions you have with your health care provider.     Document Released: 12/23/2014 Document Revised: 01/08/2016 Document Reviewed: 03/02/2016  [employer] Interactive Patient Education ©2016 [employer].       [facility]  CHG 4% Patient Instruction Sheet    Preparing the Skin Before Surgery  Preparing or “prepping” skin before surgery can reduce the risk of infection at the surgical site. To make the process easier,[facility] has chosen 4% Chlorhexidine Gluconate (CHG) antiseptic solution.   The steps below outline the prepping process and should be carefully followed.                                                                                                                                                      Prep the skin at the following time(s):                                                      We recommend you shower the night before surgery, and again the morning of surgery with the 4% CHG antiseptic solution using half of the bottle and a cloth each time.  Dress in clean clothes/sleepwear after showering.  See instructions below for application.          Do not apply any lotions or moisturizers.       Do not shave the area to be prepped for at least 2 days prior to surgery.    Clipping the hair may be done immediately prior to your surgery at the hospital    if needed.    Directions:  Thoroughly rinse your body with water.  Apply 4% CHG to a cloth and wash skin gently, paying special attention to the operative site.  Rinse again thoroughly.  Once you have begun using this product do not apply anything else to your skin. If itching or redness persists, rinse affected areas and discontinue use.    When using this product:  • Keep out of eyes, ears, and mouth.  • If solution should contact these areas,  rinse out promptly and thoroughly with water.  • For external use only.  • Do not use in genital area, on your face or head.      PATIENT/FAMILY/RESPONSIBLE PARTY VERBALIZES UNDERSTANDING OF ABOVE EDUCATION.  COPY OF PAIN SCALE GIVEN AND REVIEWED WITH VERBALIZED UNDERSTANDING.

## 2019-04-18 ENCOUNTER — ANESTHESIA (OUTPATIENT)
Dept: PERIOP | Facility: HOSPITAL | Age: 49
End: 2019-04-18

## 2019-04-18 ENCOUNTER — ANESTHESIA EVENT (OUTPATIENT)
Dept: PERIOP | Facility: HOSPITAL | Age: 49
End: 2019-04-18

## 2019-04-18 ENCOUNTER — HOSPITAL ENCOUNTER (INPATIENT)
Facility: HOSPITAL | Age: 49
LOS: 8 days | Discharge: HOME | End: 2019-04-26
Attending: SPECIALIST | Admitting: SPECIALIST

## 2019-04-18 DIAGNOSIS — R52 PAIN: ICD-10-CM

## 2019-04-18 LAB
HAV IGM SERPL QL IA: NEGATIVE
HBV CORE IGM SERPL QL IA: NEGATIVE
HBV SURFACE AG SERPL QL IA: NEGATIVE
HCV AB SER DONR QL: NEGATIVE
HCV S/C RATIO: 0.02 (ref 0–0.99)

## 2019-04-18 PROCEDURE — 25010000002 ONDANSETRON PER 1 MG: Performed by: NURSE ANESTHETIST, CERTIFIED REGISTERED

## 2019-04-18 PROCEDURE — 88304 TISSUE EXAM BY PATHOLOGIST: CPT | Performed by: SPECIALIST

## 2019-04-18 PROCEDURE — 25010000002 DEXAMETHASONE PER 1 MG: Performed by: NURSE ANESTHETIST, CERTIFIED REGISTERED

## 2019-04-18 PROCEDURE — 0FT44ZZ RESECTION OF GALLBLADDER, PERCUTANEOUS ENDOSCOPIC APPROACH: ICD-10-PCS | Performed by: SPECIALIST

## 2019-04-18 PROCEDURE — G0378 HOSPITAL OBSERVATION PER HR: HCPCS

## 2019-04-18 PROCEDURE — 94799 UNLISTED PULMONARY SVC/PX: CPT

## 2019-04-18 PROCEDURE — 25010000002 NEOSTIGMINE PER 0.5 MG: Performed by: NURSE ANESTHETIST, CERTIFIED REGISTERED

## 2019-04-18 PROCEDURE — 25010000002 PROPOFOL 10 MG/ML EMULSION: Performed by: NURSE ANESTHETIST, CERTIFIED REGISTERED

## 2019-04-18 PROCEDURE — 25010000002 MORPHINE PER 10 MG: Performed by: SPECIALIST

## 2019-04-18 PROCEDURE — 88307 TISSUE EXAM BY PATHOLOGIST: CPT | Performed by: SPECIALIST

## 2019-04-18 PROCEDURE — 25010000002 PIPERACILLIN SOD-TAZOBACTAM PER 1 G: Performed by: SPECIALIST

## 2019-04-18 PROCEDURE — 25010000002 ONDANSETRON PER 1 MG: Performed by: ANESTHESIOLOGY

## 2019-04-18 PROCEDURE — 25010000002 HYDROMORPHONE PER 4 MG: Performed by: SPECIALIST

## 2019-04-18 PROCEDURE — 80074 ACUTE HEPATITIS PANEL: CPT | Performed by: SPECIALIST

## 2019-04-18 PROCEDURE — 25010000002 IOPAMIDOL 61 % SOLUTION: Performed by: SPECIALIST

## 2019-04-18 PROCEDURE — 25010000002 FENTANYL CITRATE (PF) 100 MCG/2ML SOLUTION: Performed by: NURSE ANESTHETIST, CERTIFIED REGISTERED

## 2019-04-18 PROCEDURE — 25010000002 DEXAMETHASONE PER 1 MG: Performed by: ANESTHESIOLOGY

## 2019-04-18 PROCEDURE — 25010000002 FENTANYL CITRATE (PF) 100 MCG/2ML SOLUTION: Performed by: ANESTHESIOLOGY

## 2019-04-18 PROCEDURE — 94760 N-INVAS EAR/PLS OXIMETRY 1: CPT

## 2019-04-18 RX ORDER — ONDANSETRON 2 MG/ML
INJECTION INTRAMUSCULAR; INTRAVENOUS AS NEEDED
Status: DISCONTINUED | OUTPATIENT
Start: 2019-04-18 | End: 2019-04-18 | Stop reason: SURG

## 2019-04-18 RX ORDER — HYDROMORPHONE HYDROCHLORIDE 1 MG/ML
0.5 INJECTION, SOLUTION INTRAMUSCULAR; INTRAVENOUS; SUBCUTANEOUS
Status: DISCONTINUED | OUTPATIENT
Start: 2019-04-18 | End: 2019-04-25

## 2019-04-18 RX ORDER — SODIUM CHLORIDE 0.9 % (FLUSH) 0.9 %
1-10 SYRINGE (ML) INJECTION AS NEEDED
Status: DISCONTINUED | OUTPATIENT
Start: 2019-04-18 | End: 2019-04-18 | Stop reason: HOSPADM

## 2019-04-18 RX ORDER — VALSARTAN 80 MG/1
160 TABLET ORAL DAILY
Status: DISCONTINUED | OUTPATIENT
Start: 2019-04-18 | End: 2019-04-26 | Stop reason: HOSPADM

## 2019-04-18 RX ORDER — SODIUM CHLORIDE, SODIUM LACTATE, POTASSIUM CHLORIDE, CALCIUM CHLORIDE 600; 310; 30; 20 MG/100ML; MG/100ML; MG/100ML; MG/100ML
1000 INJECTION, SOLUTION INTRAVENOUS CONTINUOUS
Status: DISCONTINUED | OUTPATIENT
Start: 2019-04-18 | End: 2019-04-18

## 2019-04-18 RX ORDER — SODIUM CHLORIDE 0.9 % (FLUSH) 0.9 %
3 SYRINGE (ML) INJECTION EVERY 12 HOURS SCHEDULED
Status: DISCONTINUED | OUTPATIENT
Start: 2019-04-18 | End: 2019-04-18 | Stop reason: HOSPADM

## 2019-04-18 RX ORDER — SODIUM CHLORIDE, SODIUM LACTATE, POTASSIUM CHLORIDE, CALCIUM CHLORIDE 600; 310; 30; 20 MG/100ML; MG/100ML; MG/100ML; MG/100ML
100 INJECTION, SOLUTION INTRAVENOUS CONTINUOUS
Status: DISCONTINUED | OUTPATIENT
Start: 2019-04-18 | End: 2019-04-18

## 2019-04-18 RX ORDER — MAGNESIUM HYDROXIDE 1200 MG/15ML
LIQUID ORAL AS NEEDED
Status: DISCONTINUED | OUTPATIENT
Start: 2019-04-18 | End: 2019-04-18 | Stop reason: HOSPADM

## 2019-04-18 RX ORDER — OXYCODONE AND ACETAMINOPHEN 10; 325 MG/1; MG/1
1 TABLET ORAL ONCE AS NEEDED
Status: DISCONTINUED | OUTPATIENT
Start: 2019-04-18 | End: 2019-04-18 | Stop reason: HOSPADM

## 2019-04-18 RX ORDER — DEXAMETHASONE SODIUM PHOSPHATE 4 MG/ML
INJECTION, SOLUTION INTRA-ARTICULAR; INTRALESIONAL; INTRAMUSCULAR; INTRAVENOUS; SOFT TISSUE AS NEEDED
Status: DISCONTINUED | OUTPATIENT
Start: 2019-04-18 | End: 2019-04-18 | Stop reason: SURG

## 2019-04-18 RX ORDER — PROPOFOL 10 MG/ML
VIAL (ML) INTRAVENOUS AS NEEDED
Status: DISCONTINUED | OUTPATIENT
Start: 2019-04-18 | End: 2019-04-18 | Stop reason: SURG

## 2019-04-18 RX ORDER — OXYCODONE AND ACETAMINOPHEN 7.5; 325 MG/1; MG/1
2 TABLET ORAL EVERY 4 HOURS PRN
Status: DISCONTINUED | OUTPATIENT
Start: 2019-04-18 | End: 2019-04-18 | Stop reason: HOSPADM

## 2019-04-18 RX ORDER — VENLAFAXINE HYDROCHLORIDE 75 MG/1
150 CAPSULE, EXTENDED RELEASE ORAL DAILY
Status: DISCONTINUED | OUTPATIENT
Start: 2019-04-18 | End: 2019-04-26 | Stop reason: HOSPADM

## 2019-04-18 RX ORDER — SODIUM CHLORIDE 0.9 % (FLUSH) 0.9 %
3 SYRINGE (ML) INJECTION AS NEEDED
Status: DISCONTINUED | OUTPATIENT
Start: 2019-04-18 | End: 2019-04-18 | Stop reason: HOSPADM

## 2019-04-18 RX ORDER — MEPERIDINE HYDROCHLORIDE 25 MG/ML
12.5 INJECTION INTRAMUSCULAR; INTRAVENOUS; SUBCUTANEOUS
Status: DISCONTINUED | OUTPATIENT
Start: 2019-04-18 | End: 2019-04-18 | Stop reason: HOSPADM

## 2019-04-18 RX ORDER — BUPIVACAINE HYDROCHLORIDE AND EPINEPHRINE 2.5; 5 MG/ML; UG/ML
INJECTION, SOLUTION INFILTRATION; PERINEURAL AS NEEDED
Status: DISCONTINUED | OUTPATIENT
Start: 2019-04-18 | End: 2019-04-18 | Stop reason: HOSPADM

## 2019-04-18 RX ORDER — DEXAMETHASONE SODIUM PHOSPHATE 4 MG/ML
4 INJECTION, SOLUTION INTRA-ARTICULAR; INTRALESIONAL; INTRAMUSCULAR; INTRAVENOUS; SOFT TISSUE ONCE AS NEEDED
Status: COMPLETED | OUTPATIENT
Start: 2019-04-18 | End: 2019-04-18

## 2019-04-18 RX ORDER — METOPROLOL TARTRATE 5 MG/5ML
INJECTION INTRAVENOUS AS NEEDED
Status: DISCONTINUED | OUTPATIENT
Start: 2019-04-18 | End: 2019-04-18 | Stop reason: SURG

## 2019-04-18 RX ORDER — ROCURONIUM BROMIDE 10 MG/ML
INJECTION, SOLUTION INTRAVENOUS AS NEEDED
Status: DISCONTINUED | OUTPATIENT
Start: 2019-04-18 | End: 2019-04-18 | Stop reason: SURG

## 2019-04-18 RX ORDER — LABETALOL HYDROCHLORIDE 5 MG/ML
5 INJECTION, SOLUTION INTRAVENOUS
Status: DISCONTINUED | OUTPATIENT
Start: 2019-04-18 | End: 2019-04-18 | Stop reason: HOSPADM

## 2019-04-18 RX ORDER — IPRATROPIUM BROMIDE AND ALBUTEROL SULFATE 2.5; .5 MG/3ML; MG/3ML
3 SOLUTION RESPIRATORY (INHALATION) ONCE AS NEEDED
Status: DISCONTINUED | OUTPATIENT
Start: 2019-04-18 | End: 2019-04-18 | Stop reason: HOSPADM

## 2019-04-18 RX ORDER — CHLORTHALIDONE 25 MG/1
25 TABLET ORAL DAILY
COMMUNITY
End: 2019-05-30 | Stop reason: HOSPADM

## 2019-04-18 RX ORDER — DICLOFENAC SODIUM 75 MG/1
75 TABLET, DELAYED RELEASE ORAL 2 TIMES DAILY
COMMUNITY

## 2019-04-18 RX ORDER — FENTANYL CITRATE 50 UG/ML
25 INJECTION, SOLUTION INTRAMUSCULAR; INTRAVENOUS AS NEEDED
Status: COMPLETED | OUTPATIENT
Start: 2019-04-18 | End: 2019-04-18

## 2019-04-18 RX ORDER — METOCLOPRAMIDE HYDROCHLORIDE 5 MG/ML
5 INJECTION INTRAMUSCULAR; INTRAVENOUS
Status: DISCONTINUED | OUTPATIENT
Start: 2019-04-18 | End: 2019-04-18 | Stop reason: HOSPADM

## 2019-04-18 RX ORDER — FAMOTIDINE 10 MG/ML
20 INJECTION, SOLUTION INTRAVENOUS
Status: COMPLETED | OUTPATIENT
Start: 2019-04-18 | End: 2019-04-18

## 2019-04-18 RX ORDER — FENTANYL CITRATE 50 UG/ML
INJECTION, SOLUTION INTRAMUSCULAR; INTRAVENOUS AS NEEDED
Status: DISCONTINUED | OUTPATIENT
Start: 2019-04-18 | End: 2019-04-18 | Stop reason: SURG

## 2019-04-18 RX ORDER — NALOXONE HCL 0.4 MG/ML
0.4 VIAL (ML) INJECTION AS NEEDED
Status: DISCONTINUED | OUTPATIENT
Start: 2019-04-18 | End: 2019-04-18 | Stop reason: HOSPADM

## 2019-04-18 RX ORDER — LIDOCAINE HYDROCHLORIDE 20 MG/ML
INJECTION, SOLUTION INFILTRATION; PERINEURAL AS NEEDED
Status: DISCONTINUED | OUTPATIENT
Start: 2019-04-18 | End: 2019-04-18 | Stop reason: SURG

## 2019-04-18 RX ORDER — IBUPROFEN 600 MG/1
600 TABLET ORAL ONCE AS NEEDED
Status: DISCONTINUED | OUTPATIENT
Start: 2019-04-18 | End: 2019-04-18 | Stop reason: HOSPADM

## 2019-04-18 RX ORDER — SODIUM CHLORIDE 9 MG/ML
INJECTION, SOLUTION INTRAVENOUS AS NEEDED
Status: DISCONTINUED | OUTPATIENT
Start: 2019-04-18 | End: 2019-04-18 | Stop reason: HOSPADM

## 2019-04-18 RX ORDER — MIDAZOLAM HYDROCHLORIDE 1 MG/ML
1 INJECTION INTRAMUSCULAR; INTRAVENOUS
Status: DISCONTINUED | OUTPATIENT
Start: 2019-04-18 | End: 2019-04-18 | Stop reason: HOSPADM

## 2019-04-18 RX ORDER — MIDAZOLAM HYDROCHLORIDE 1 MG/ML
2 INJECTION INTRAMUSCULAR; INTRAVENOUS
Status: DISCONTINUED | OUTPATIENT
Start: 2019-04-18 | End: 2019-04-18 | Stop reason: HOSPADM

## 2019-04-18 RX ORDER — GLYCOPYRROLATE 0.2 MG/ML
INJECTION INTRAMUSCULAR; INTRAVENOUS AS NEEDED
Status: DISCONTINUED | OUTPATIENT
Start: 2019-04-18 | End: 2019-04-18 | Stop reason: SURG

## 2019-04-18 RX ORDER — LIDOCAINE HYDROCHLORIDE 40 MG/ML
SOLUTION TOPICAL AS NEEDED
Status: DISCONTINUED | OUTPATIENT
Start: 2019-04-18 | End: 2019-04-18 | Stop reason: SURG

## 2019-04-18 RX ORDER — ONDANSETRON 2 MG/ML
4 INJECTION INTRAMUSCULAR; INTRAVENOUS ONCE AS NEEDED
Status: COMPLETED | OUTPATIENT
Start: 2019-04-18 | End: 2019-04-18

## 2019-04-18 RX ORDER — ACETAMINOPHEN 500 MG
1000 TABLET ORAL ONCE
Status: COMPLETED | OUTPATIENT
Start: 2019-04-18 | End: 2019-04-18

## 2019-04-18 RX ORDER — HYDROCODONE BITARTRATE AND ACETAMINOPHEN 7.5; 325 MG/1; MG/1
1 TABLET ORAL EVERY 8 HOURS PRN
COMMUNITY

## 2019-04-18 RX ORDER — ONDANSETRON 2 MG/ML
4 INJECTION INTRAMUSCULAR; INTRAVENOUS EVERY 4 HOURS PRN
Status: DISCONTINUED | OUTPATIENT
Start: 2019-04-18 | End: 2019-04-26 | Stop reason: HOSPADM

## 2019-04-18 RX ORDER — ROSUVASTATIN CALCIUM 10 MG/1
10 TABLET, COATED ORAL DAILY
COMMUNITY

## 2019-04-18 RX ORDER — SODIUM CHLORIDE, SODIUM LACTATE, POTASSIUM CHLORIDE, CALCIUM CHLORIDE 600; 310; 30; 20 MG/100ML; MG/100ML; MG/100ML; MG/100ML
50 INJECTION, SOLUTION INTRAVENOUS CONTINUOUS
Status: DISCONTINUED | OUTPATIENT
Start: 2019-04-18 | End: 2019-04-26 | Stop reason: HOSPADM

## 2019-04-18 RX ADMIN — TAZOBACTAM SODIUM AND PIPERACILLIN SODIUM 3.38 G: 375; 3 INJECTION, SOLUTION INTRAVENOUS at 14:21

## 2019-04-18 RX ADMIN — VALSARTAN 160 MG: 80 TABLET, FILM COATED ORAL at 20:52

## 2019-04-18 RX ADMIN — SODIUM CHLORIDE, POTASSIUM CHLORIDE, SODIUM LACTATE AND CALCIUM CHLORIDE: 600; 310; 30; 20 INJECTION, SOLUTION INTRAVENOUS at 08:00

## 2019-04-18 RX ADMIN — FENTANYL CITRATE 100 MCG: 50 INJECTION, SOLUTION INTRAMUSCULAR; INTRAVENOUS at 07:42

## 2019-04-18 RX ADMIN — FENTANYL CITRATE 100 MCG: 50 INJECTION, SOLUTION INTRAMUSCULAR; INTRAVENOUS at 07:50

## 2019-04-18 RX ADMIN — ONDANSETRON 4 MG: 2 INJECTION INTRAMUSCULAR; INTRAVENOUS at 09:24

## 2019-04-18 RX ADMIN — GLYCOPYRROLATE 0.3 MG: 0.2 INJECTION, SOLUTION INTRAMUSCULAR; INTRAVENOUS at 08:24

## 2019-04-18 RX ADMIN — DEXAMETHASONE SODIUM PHOSPHATE 4 MG: 4 INJECTION, SOLUTION INTRAMUSCULAR; INTRAVENOUS at 07:12

## 2019-04-18 RX ADMIN — FAMOTIDINE 20 MG: 10 INJECTION INTRAVENOUS at 07:12

## 2019-04-18 RX ADMIN — ACETAMINOPHEN 1000 MG: 500 TABLET, FILM COATED ORAL at 07:12

## 2019-04-18 RX ADMIN — SODIUM CHLORIDE, POTASSIUM CHLORIDE, SODIUM LACTATE AND CALCIUM CHLORIDE 150 ML/HR: 600; 310; 30; 20 INJECTION, SOLUTION INTRAVENOUS at 11:07

## 2019-04-18 RX ADMIN — SODIUM CHLORIDE, POTASSIUM CHLORIDE, SODIUM LACTATE AND CALCIUM CHLORIDE 100 ML/HR: 600; 310; 30; 20 INJECTION, SOLUTION INTRAVENOUS at 09:00

## 2019-04-18 RX ADMIN — ONDANSETRON HYDROCHLORIDE 4 MG: 2 SOLUTION INTRAMUSCULAR; INTRAVENOUS at 08:13

## 2019-04-18 RX ADMIN — ROCURONIUM BROMIDE 30 MG: 10 INJECTION INTRAVENOUS at 07:42

## 2019-04-18 RX ADMIN — LIDOCAINE HYDROCHLORIDE 100 MG: 20 INJECTION, SOLUTION INFILTRATION; PERINEURAL at 07:42

## 2019-04-18 RX ADMIN — Medication 3 MG: at 08:24

## 2019-04-18 RX ADMIN — PROPOFOL 150 MG: 10 INJECTION, EMULSION INTRAVENOUS at 07:42

## 2019-04-18 RX ADMIN — HYDROMORPHONE HYDROCHLORIDE 0.5 MG: 1 INJECTION, SOLUTION INTRAMUSCULAR; INTRAVENOUS; SUBCUTANEOUS at 18:58

## 2019-04-18 RX ADMIN — HYDROMORPHONE HYDROCHLORIDE 0.5 MG: 1 INJECTION, SOLUTION INTRAMUSCULAR; INTRAVENOUS; SUBCUTANEOUS at 16:02

## 2019-04-18 RX ADMIN — FENTANYL CITRATE 25 MCG: 50 INJECTION, SOLUTION INTRAMUSCULAR; INTRAVENOUS at 09:23

## 2019-04-18 RX ADMIN — FENTANYL CITRATE 100 MCG: 50 INJECTION, SOLUTION INTRAMUSCULAR; INTRAVENOUS at 08:03

## 2019-04-18 RX ADMIN — SODIUM CHLORIDE, POTASSIUM CHLORIDE, SODIUM LACTATE AND CALCIUM CHLORIDE 1000 ML: 600; 310; 30; 20 INJECTION, SOLUTION INTRAVENOUS at 06:18

## 2019-04-18 RX ADMIN — DEXAMETHASONE SODIUM PHOSPHATE 4 MG: 4 INJECTION, SOLUTION INTRAMUSCULAR; INTRAVENOUS at 08:13

## 2019-04-18 RX ADMIN — MORPHINE SULFATE 4 MG: 4 INJECTION INTRAVENOUS at 14:20

## 2019-04-18 RX ADMIN — VENLAFAXINE HYDROCHLORIDE 150 MG: 75 CAPSULE, EXTENDED RELEASE ORAL at 20:52

## 2019-04-18 RX ADMIN — FENTANYL CITRATE 25 MCG: 50 INJECTION, SOLUTION INTRAMUSCULAR; INTRAVENOUS at 09:29

## 2019-04-18 RX ADMIN — FENTANYL CITRATE 25 MCG: 50 INJECTION, SOLUTION INTRAMUSCULAR; INTRAVENOUS at 09:28

## 2019-04-18 RX ADMIN — LAMOTRIGINE 150 MG: 100 TABLET ORAL at 20:51

## 2019-04-18 RX ADMIN — LIDOCAINE HYDROCHLORIDE 1 EACH: 40 SOLUTION TOPICAL at 07:42

## 2019-04-18 RX ADMIN — METOPROLOL TARTRATE 2 MG: 5 INJECTION INTRAVENOUS at 08:18

## 2019-04-18 RX ADMIN — FENTANYL CITRATE 25 MCG: 50 INJECTION, SOLUTION INTRAMUSCULAR; INTRAVENOUS at 09:26

## 2019-04-18 RX ADMIN — TAZOBACTAM SODIUM AND PIPERACILLIN SODIUM 3.38 G: 375; 3 INJECTION, SOLUTION INTRAVENOUS at 20:55

## 2019-04-18 RX ADMIN — OXYCODONE HYDROCHLORIDE AND ACETAMINOPHEN 2 TABLET: 7.5; 325 TABLET ORAL at 09:27

## 2019-04-18 RX ADMIN — HYDROMORPHONE HYDROCHLORIDE 1 MG: 1 INJECTION, SOLUTION INTRAMUSCULAR; INTRAVENOUS; SUBCUTANEOUS at 20:50

## 2019-04-18 RX ADMIN — CEFAZOLIN 1 G: 1 INJECTION, POWDER, FOR SOLUTION INTRAMUSCULAR; INTRAVENOUS; PARENTERAL at 07:46

## 2019-04-18 NOTE — ANESTHESIA PROCEDURE NOTES
Airway  Airway not difficult    General Information and Staff    Patient location during procedure: OR  CRNA: Junior Valdez CRNA    Indications and Patient Condition  Indications for airway management: airway protection    Preoxygenated: yes  Mask difficulty assessment: 1 - vent by mask    Final Airway Details  Final airway type: endotracheal airway      Successful airway: ETT  Cuffed: yes   Successful intubation technique: direct laryngoscopy  Blade: Chacko  Blade size: 2  ETT size (mm): 7.0  Cormack-Lehane Classification: grade I - full view of glottis  Placement verified by: chest auscultation and capnometry   Cuff volume (mL): 8  Measured from: teeth  ETT to teeth (cm): 21

## 2019-04-18 NOTE — ANESTHESIA PREPROCEDURE EVALUATION
Anesthesia Evaluation     Patient summary reviewed and Nursing notes reviewed   no history of anesthetic complications:  NPO Solid Status: > 8 hours  NPO Liquid Status: > 8 hours           Airway   Mallampati: I  TM distance: >3 FB  Neck ROM: full  No difficulty expected  Dental      Pulmonary    (+) a smoker (quit 3 months ago) Former,   Cardiovascular     (+) hypertension, hyperlipidemia,       Neuro/Psych  (+) CVA (2006, mild left sided weakness), psychiatric history Anxiety and Depression,     GI/Hepatic/Renal/Endo    (-) liver disease, no renal disease, diabetes    Musculoskeletal     (+) chronic pain,   Abdominal    Substance History   (+) alcohol use (two montsha go, etoh pancreatitis, DT's, resolved, no etoh use),      OB/GYN          Other                        Anesthesia Plan    ASA 3     general     intravenous induction   Anesthetic plan, all risks, benefits, and alternatives have been provided, discussed and informed consent has been obtained with: patient.

## 2019-04-18 NOTE — ANESTHESIA POSTPROCEDURE EVALUATION
Patient: Maureen Best    Procedure Summary     Date:  04/18/19 Room / Location:   [facility]  /  [facility]    Anesthesia Start:  0736 Anesthesia Stop:  0843    Procedure:  CHOLECYSTECTOMY LAPAROSCOPIC WITH LIVER BIOPSY (N/A Abdomen) Diagnosis:  (GALLSTONES)    Surgeon:  Viviana Boles MD Provider:  Junior Valdez CRNA    Anesthesia Type:  general ASA Status:  3          Anesthesia Type: general  Last vitals  BP   147/78 (04/18/19 1005)   Temp   98 °F (36.7 °C) (04/18/19 1005)   Pulse   77 (04/18/19 1005)   Resp   18 (04/18/19 1005)     SpO2   100 % (04/18/19 1005)     Post Anesthesia Care and Evaluation    Patient location during evaluation: PACU  Patient participation: complete - patient participated  Level of consciousness: awake and alert  Pain management: adequate  Airway patency: patent  Anesthetic complications: No anesthetic complications  PONV Status: none  Cardiovascular status: acceptable and hemodynamically stable  Respiratory status: acceptable  Hydration status: acceptable    Comments: Blood pressure 147/78, pulse 77, temperature 98 °F (36.7 °C), resp. rate 18, SpO2 100 %, not currently breastfeeding.    Patient discharged from PACU based upon Paulie score. Please see RN notes for further details       Cryotherapy Text: The wound bed was treated with cryotherapy after the biopsy was performed.

## 2019-04-19 ENCOUNTER — APPOINTMENT (OUTPATIENT)
Dept: CT IMAGING | Facility: HOSPITAL | Age: 49
End: 2019-04-19

## 2019-04-19 LAB
ABO GROUP BLD: NORMAL
ALBUMIN SERPL-MCNC: 4 G/DL (ref 3.5–5)
ALBUMIN/GLOB SERPL: 1.8 G/DL (ref 1.1–2.5)
ALP SERPL-CCNC: 86 U/L (ref 24–120)
ALT SERPL W P-5'-P-CCNC: 98 U/L (ref 0–54)
AMYLASE SERPL-CCNC: 445 U/L (ref 30–110)
ANION GAP SERPL CALCULATED.3IONS-SCNC: 11 MMOL/L (ref 4–13)
AST SERPL-CCNC: 132 U/L (ref 7–45)
BILIRUB SERPL-MCNC: 0.8 MG/DL (ref 0.1–1)
BLD GP AB SCN SERPL QL: NEGATIVE
BUN BLD-MCNC: 11 MG/DL (ref 5–21)
BUN/CREAT SERPL: 22.9 (ref 7–25)
CALCIUM SPEC-SCNC: 8.8 MG/DL (ref 8.4–10.4)
CHLORIDE SERPL-SCNC: 98 MMOL/L (ref 98–110)
CO2 SERPL-SCNC: 26 MMOL/L (ref 24–31)
CREAT BLD-MCNC: 0.48 MG/DL (ref 0.5–1.4)
CYTO UR: NORMAL
DEPRECATED RDW RBC AUTO: 50.9 FL (ref 40–54)
ERYTHROCYTE [DISTWIDTH] IN BLOOD BY AUTOMATED COUNT: 15.7 % (ref 12–15)
FERRITIN SERPL-MCNC: 198 NG/ML (ref 6.24–137)
GFR SERPL CREATININE-BSD FRML MDRD: 138 ML/MIN/1.73
GLOBULIN UR ELPH-MCNC: 2.2 GM/DL
GLUCOSE BLD-MCNC: 133 MG/DL (ref 70–100)
HCT VFR BLD AUTO: 27.9 % (ref 37–47)
HGB BLD-MCNC: 10.1 G/DL (ref 12–16)
INR PPP: 1.07 (ref 0.91–1.09)
IRON 24H UR-MRATE: 58 MCG/DL (ref 42–180)
IRON SATN MFR SERPL: 17 % (ref 20–45)
LAB AP CASE REPORT: NORMAL
LAB AP CLINICAL INFORMATION: NORMAL
LIPASE SERPL-CCNC: 3197 U/L (ref 23–203)
MCH RBC QN AUTO: 33.1 PG (ref 28–32)
MCHC RBC AUTO-ENTMCNC: 36.2 G/DL (ref 33–36)
MCV RBC AUTO: 91.5 FL (ref 82–98)
PATH REPORT.FINAL DX SPEC: NORMAL
PATH REPORT.GROSS SPEC: NORMAL
PLATELET # BLD AUTO: 16 10*3/MM3 (ref 130–400)
POTASSIUM BLD-SCNC: 4 MMOL/L (ref 3.5–5.3)
PROT SERPL-MCNC: 6.2 G/DL (ref 6.3–8.7)
PROTHROMBIN TIME: 14.2 SECONDS (ref 11.9–14.6)
RBC # BLD AUTO: 3.05 10*6/MM3 (ref 4.2–5.4)
RH BLD: POSITIVE
SODIUM BLD-SCNC: 135 MMOL/L (ref 135–145)
T&S EXPIRATION DATE: NORMAL
TIBC SERPL-MCNC: 334 MCG/DL (ref 225–420)
WBC NRBC COR # BLD: 6.16 10*3/MM3 (ref 4.8–10.8)

## 2019-04-19 PROCEDURE — 86235 NUCLEAR ANTIGEN ANTIBODY: CPT | Performed by: CLINICAL NURSE SPECIALIST

## 2019-04-19 PROCEDURE — 83516 IMMUNOASSAY NONANTIBODY: CPT | Performed by: CLINICAL NURSE SPECIALIST

## 2019-04-19 PROCEDURE — 85610 PROTHROMBIN TIME: CPT | Performed by: SPECIALIST

## 2019-04-19 PROCEDURE — 82728 ASSAY OF FERRITIN: CPT | Performed by: CLINICAL NURSE SPECIALIST

## 2019-04-19 PROCEDURE — 83550 IRON BINDING TEST: CPT | Performed by: CLINICAL NURSE SPECIALIST

## 2019-04-19 PROCEDURE — 82390 ASSAY OF CERULOPLASMIN: CPT | Performed by: CLINICAL NURSE SPECIALIST

## 2019-04-19 PROCEDURE — 82103 ALPHA-1-ANTITRYPSIN TOTAL: CPT | Performed by: CLINICAL NURSE SPECIALIST

## 2019-04-19 PROCEDURE — 0 IOPAMIDOL PER 1 ML: Performed by: SPECIALIST

## 2019-04-19 PROCEDURE — 86901 BLOOD TYPING SEROLOGIC RH(D): CPT | Performed by: SPECIALIST

## 2019-04-19 PROCEDURE — 80053 COMPREHEN METABOLIC PANEL: CPT | Performed by: SPECIALIST

## 2019-04-19 PROCEDURE — G0378 HOSPITAL OBSERVATION PER HR: HCPCS

## 2019-04-19 PROCEDURE — 83690 ASSAY OF LIPASE: CPT | Performed by: SPECIALIST

## 2019-04-19 PROCEDURE — 86900 BLOOD TYPING SEROLOGIC ABO: CPT | Performed by: SPECIALIST

## 2019-04-19 PROCEDURE — 85027 COMPLETE CBC AUTOMATED: CPT | Performed by: SPECIALIST

## 2019-04-19 PROCEDURE — 82150 ASSAY OF AMYLASE: CPT | Performed by: SPECIALIST

## 2019-04-19 PROCEDURE — 25010000002 PIPERACILLIN SOD-TAZOBACTAM PER 1 G: Performed by: SPECIALIST

## 2019-04-19 PROCEDURE — 86850 RBC ANTIBODY SCREEN: CPT | Performed by: SPECIALIST

## 2019-04-19 PROCEDURE — 86225 DNA ANTIBODY NATIVE: CPT | Performed by: CLINICAL NURSE SPECIALIST

## 2019-04-19 PROCEDURE — 86376 MICROSOMAL ANTIBODY EACH: CPT | Performed by: CLINICAL NURSE SPECIALIST

## 2019-04-19 PROCEDURE — 94760 N-INVAS EAR/PLS OXIMETRY 1: CPT

## 2019-04-19 PROCEDURE — 99222 1ST HOSP IP/OBS MODERATE 55: CPT | Performed by: CLINICAL NURSE SPECIALIST

## 2019-04-19 PROCEDURE — 83540 ASSAY OF IRON: CPT | Performed by: CLINICAL NURSE SPECIALIST

## 2019-04-19 PROCEDURE — 94799 UNLISTED PULMONARY SVC/PX: CPT

## 2019-04-19 PROCEDURE — 74177 CT ABD & PELVIS W/CONTRAST: CPT

## 2019-04-19 PROCEDURE — 25010000002 HYDROMORPHONE PER 4 MG: Performed by: SPECIALIST

## 2019-04-19 RX ORDER — FAMOTIDINE 10 MG/ML
20 INJECTION, SOLUTION INTRAVENOUS EVERY 12 HOURS SCHEDULED
Status: DISCONTINUED | OUTPATIENT
Start: 2019-04-19 | End: 2019-04-24

## 2019-04-19 RX ORDER — CHLORTHALIDONE 25 MG/1
25 TABLET ORAL DAILY
Status: DISCONTINUED | OUTPATIENT
Start: 2019-04-19 | End: 2019-04-26 | Stop reason: HOSPADM

## 2019-04-19 RX ORDER — GABAPENTIN 300 MG/1
600 CAPSULE ORAL EVERY 8 HOURS SCHEDULED
Status: DISCONTINUED | OUTPATIENT
Start: 2019-04-19 | End: 2019-04-26 | Stop reason: HOSPADM

## 2019-04-19 RX ADMIN — HYDROMORPHONE HYDROCHLORIDE 0.5 MG: 1 INJECTION, SOLUTION INTRAMUSCULAR; INTRAVENOUS; SUBCUTANEOUS at 10:42

## 2019-04-19 RX ADMIN — HYDROMORPHONE HYDROCHLORIDE 0.5 MG: 1 INJECTION, SOLUTION INTRAMUSCULAR; INTRAVENOUS; SUBCUTANEOUS at 20:01

## 2019-04-19 RX ADMIN — HYDROMORPHONE HYDROCHLORIDE 0.5 MG: 1 INJECTION, SOLUTION INTRAMUSCULAR; INTRAVENOUS; SUBCUTANEOUS at 03:05

## 2019-04-19 RX ADMIN — TAZOBACTAM SODIUM AND PIPERACILLIN SODIUM 3.38 G: 375; 3 INJECTION, SOLUTION INTRAVENOUS at 15:32

## 2019-04-19 RX ADMIN — HYDROMORPHONE HYDROCHLORIDE 0.5 MG: 1 INJECTION, SOLUTION INTRAMUSCULAR; INTRAVENOUS; SUBCUTANEOUS at 13:39

## 2019-04-19 RX ADMIN — GABAPENTIN 600 MG: 300 CAPSULE ORAL at 13:39

## 2019-04-19 RX ADMIN — VENLAFAXINE HYDROCHLORIDE 150 MG: 75 CAPSULE, EXTENDED RELEASE ORAL at 21:40

## 2019-04-19 RX ADMIN — SODIUM CHLORIDE, POTASSIUM CHLORIDE, SODIUM LACTATE AND CALCIUM CHLORIDE 125 ML/HR: 600; 310; 30; 20 INJECTION, SOLUTION INTRAVENOUS at 18:41

## 2019-04-19 RX ADMIN — SODIUM CHLORIDE, POTASSIUM CHLORIDE, SODIUM LACTATE AND CALCIUM CHLORIDE 125 ML/HR: 600; 310; 30; 20 INJECTION, SOLUTION INTRAVENOUS at 09:01

## 2019-04-19 RX ADMIN — CHLORTHALIDONE 25 MG: 25 TABLET ORAL at 10:42

## 2019-04-19 RX ADMIN — TAZOBACTAM SODIUM AND PIPERACILLIN SODIUM 3.38 G: 375; 3 INJECTION, SOLUTION INTRAVENOUS at 03:09

## 2019-04-19 RX ADMIN — LAMOTRIGINE 150 MG: 100 TABLET ORAL at 08:48

## 2019-04-19 RX ADMIN — TAZOBACTAM SODIUM AND PIPERACILLIN SODIUM 3.38 G: 375; 3 INJECTION, SOLUTION INTRAVENOUS at 21:40

## 2019-04-19 RX ADMIN — HYDROMORPHONE HYDROCHLORIDE 0.5 MG: 1 INJECTION, SOLUTION INTRAMUSCULAR; INTRAVENOUS; SUBCUTANEOUS at 08:48

## 2019-04-19 RX ADMIN — SODIUM CHLORIDE, POTASSIUM CHLORIDE, SODIUM LACTATE AND CALCIUM CHLORIDE 150 ML/HR: 600; 310; 30; 20 INJECTION, SOLUTION INTRAVENOUS at 01:19

## 2019-04-19 RX ADMIN — LAMOTRIGINE 150 MG: 100 TABLET ORAL at 21:40

## 2019-04-19 RX ADMIN — IOPAMIDOL 100 ML: 755 INJECTION, SOLUTION INTRAVENOUS at 11:00

## 2019-04-19 RX ADMIN — FAMOTIDINE 20 MG: 10 INJECTION INTRAVENOUS at 08:48

## 2019-04-19 RX ADMIN — GABAPENTIN 600 MG: 300 CAPSULE ORAL at 21:39

## 2019-04-19 RX ADMIN — TAZOBACTAM SODIUM AND PIPERACILLIN SODIUM 3.38 G: 375; 3 INJECTION, SOLUTION INTRAVENOUS at 08:48

## 2019-04-19 RX ADMIN — FAMOTIDINE 20 MG: 10 INJECTION INTRAVENOUS at 21:39

## 2019-04-19 RX ADMIN — HYDROMORPHONE HYDROCHLORIDE 0.5 MG: 1 INJECTION, SOLUTION INTRAMUSCULAR; INTRAVENOUS; SUBCUTANEOUS at 06:04

## 2019-04-19 RX ADMIN — VALSARTAN 160 MG: 80 TABLET, FILM COATED ORAL at 21:40

## 2019-04-19 RX ADMIN — HYDROMORPHONE HYDROCHLORIDE 0.5 MG: 1 INJECTION, SOLUTION INTRAMUSCULAR; INTRAVENOUS; SUBCUTANEOUS at 16:53

## 2019-04-19 RX ADMIN — HYDROMORPHONE HYDROCHLORIDE 0.5 MG: 1 INJECTION, SOLUTION INTRAMUSCULAR; INTRAVENOUS; SUBCUTANEOUS at 00:03

## 2019-04-20 LAB
A1AT SERPL-MCNC: 131 MG/DL (ref 90–200)
ACTIN IGG SERPL-ACNC: 4 UNITS (ref 0–19)
ALBUMIN SERPL-MCNC: 3.9 G/DL (ref 3.5–5)
ALBUMIN/GLOB SERPL: 2 G/DL (ref 1.1–2.5)
ALP SERPL-CCNC: 84 U/L (ref 24–120)
ALT SERPL W P-5'-P-CCNC: 74 U/L (ref 0–54)
AMYLASE SERPL-CCNC: 534 U/L (ref 30–110)
ANION GAP SERPL CALCULATED.3IONS-SCNC: 9 MMOL/L (ref 4–13)
AST SERPL-CCNC: 64 U/L (ref 7–45)
BILIRUB SERPL-MCNC: 0.7 MG/DL (ref 0.1–1)
BUN BLD-MCNC: 9 MG/DL (ref 5–21)
BUN/CREAT SERPL: 15.5 (ref 7–25)
CALCIUM SPEC-SCNC: 8.8 MG/DL (ref 8.4–10.4)
CERULOPLASMIN SERPL-MCNC: 21.1 MG/DL (ref 19–39)
CHLORIDE SERPL-SCNC: 98 MMOL/L (ref 98–110)
CO2 SERPL-SCNC: 28 MMOL/L (ref 24–31)
CREAT BLD-MCNC: 0.58 MG/DL (ref 0.5–1.4)
DEPRECATED MITOCHONDRIA M2 IGG SER-ACNC: <20 UNITS (ref 0–20)
DEPRECATED RDW RBC AUTO: 51.8 FL (ref 40–54)
ERYTHROCYTE [DISTWIDTH] IN BLOOD BY AUTOMATED COUNT: 16.2 % (ref 12–15)
GFR SERPL CREATININE-BSD FRML MDRD: 111 ML/MIN/1.73
GLOBULIN UR ELPH-MCNC: 2 GM/DL
GLUCOSE BLD-MCNC: 161 MG/DL (ref 70–100)
HCT VFR BLD AUTO: 29 % (ref 37–47)
HGB BLD-MCNC: 10.4 G/DL (ref 12–16)
LIPASE SERPL-CCNC: 6183 U/L (ref 23–203)
MCH RBC QN AUTO: 32.6 PG (ref 28–32)
MCHC RBC AUTO-ENTMCNC: 35.9 G/DL (ref 33–36)
MCV RBC AUTO: 90.9 FL (ref 82–98)
PLATELET # BLD AUTO: 84 10*3/MM3 (ref 130–400)
PMV BLD AUTO: 9.6 FL (ref 6–12)
POTASSIUM BLD-SCNC: 3.2 MMOL/L (ref 3.5–5.3)
PROT SERPL-MCNC: 5.9 G/DL (ref 6.3–8.7)
RBC # BLD AUTO: 3.19 10*6/MM3 (ref 4.2–5.4)
SODIUM BLD-SCNC: 135 MMOL/L (ref 135–145)
WBC NRBC COR # BLD: 4.23 10*3/MM3 (ref 4.8–10.8)

## 2019-04-20 PROCEDURE — 99232 SBSQ HOSP IP/OBS MODERATE 35: CPT | Performed by: INTERNAL MEDICINE

## 2019-04-20 PROCEDURE — 83690 ASSAY OF LIPASE: CPT | Performed by: SPECIALIST

## 2019-04-20 PROCEDURE — 80053 COMPREHEN METABOLIC PANEL: CPT | Performed by: SPECIALIST

## 2019-04-20 PROCEDURE — 85027 COMPLETE CBC AUTOMATED: CPT | Performed by: SPECIALIST

## 2019-04-20 PROCEDURE — 82150 ASSAY OF AMYLASE: CPT | Performed by: SPECIALIST

## 2019-04-20 PROCEDURE — 25010000002 PIPERACILLIN SOD-TAZOBACTAM PER 1 G: Performed by: SPECIALIST

## 2019-04-20 PROCEDURE — 25010000002 HYDROMORPHONE PER 4 MG: Performed by: SPECIALIST

## 2019-04-20 PROCEDURE — 94799 UNLISTED PULMONARY SVC/PX: CPT

## 2019-04-20 PROCEDURE — 94760 N-INVAS EAR/PLS OXIMETRY 1: CPT

## 2019-04-20 PROCEDURE — G0378 HOSPITAL OBSERVATION PER HR: HCPCS

## 2019-04-20 RX ADMIN — LAMOTRIGINE 150 MG: 100 TABLET ORAL at 10:23

## 2019-04-20 RX ADMIN — HYDROMORPHONE HYDROCHLORIDE 0.5 MG: 1 INJECTION, SOLUTION INTRAMUSCULAR; INTRAVENOUS; SUBCUTANEOUS at 07:22

## 2019-04-20 RX ADMIN — HYDROMORPHONE HYDROCHLORIDE 0.5 MG: 1 INJECTION, SOLUTION INTRAMUSCULAR; INTRAVENOUS; SUBCUTANEOUS at 13:40

## 2019-04-20 RX ADMIN — LAMOTRIGINE 150 MG: 100 TABLET ORAL at 20:34

## 2019-04-20 RX ADMIN — HYDROMORPHONE HYDROCHLORIDE 0.5 MG: 1 INJECTION, SOLUTION INTRAMUSCULAR; INTRAVENOUS; SUBCUTANEOUS at 02:48

## 2019-04-20 RX ADMIN — HYDROMORPHONE HYDROCHLORIDE 0.5 MG: 1 INJECTION, SOLUTION INTRAMUSCULAR; INTRAVENOUS; SUBCUTANEOUS at 17:06

## 2019-04-20 RX ADMIN — GABAPENTIN 600 MG: 300 CAPSULE ORAL at 05:29

## 2019-04-20 RX ADMIN — VALSARTAN 160 MG: 80 TABLET, FILM COATED ORAL at 20:34

## 2019-04-20 RX ADMIN — HYDROMORPHONE HYDROCHLORIDE 0.5 MG: 1 INJECTION, SOLUTION INTRAMUSCULAR; INTRAVENOUS; SUBCUTANEOUS at 10:23

## 2019-04-20 RX ADMIN — VENLAFAXINE HYDROCHLORIDE 150 MG: 75 CAPSULE, EXTENDED RELEASE ORAL at 20:34

## 2019-04-20 RX ADMIN — TAZOBACTAM SODIUM AND PIPERACILLIN SODIUM 3.38 G: 375; 3 INJECTION, SOLUTION INTRAVENOUS at 13:46

## 2019-04-20 RX ADMIN — FAMOTIDINE 20 MG: 10 INJECTION INTRAVENOUS at 09:18

## 2019-04-20 RX ADMIN — GABAPENTIN 600 MG: 300 CAPSULE ORAL at 21:30

## 2019-04-20 RX ADMIN — GABAPENTIN 600 MG: 300 CAPSULE ORAL at 13:46

## 2019-04-20 RX ADMIN — SODIUM CHLORIDE, POTASSIUM CHLORIDE, SODIUM LACTATE AND CALCIUM CHLORIDE 125 ML/HR: 600; 310; 30; 20 INJECTION, SOLUTION INTRAVENOUS at 17:06

## 2019-04-20 RX ADMIN — CHLORTHALIDONE 25 MG: 25 TABLET ORAL at 09:18

## 2019-04-20 RX ADMIN — SODIUM CHLORIDE, POTASSIUM CHLORIDE, SODIUM LACTATE AND CALCIUM CHLORIDE 125 ML/HR: 600; 310; 30; 20 INJECTION, SOLUTION INTRAVENOUS at 02:47

## 2019-04-20 RX ADMIN — TAZOBACTAM SODIUM AND PIPERACILLIN SODIUM 3.38 G: 375; 3 INJECTION, SOLUTION INTRAVENOUS at 20:33

## 2019-04-20 RX ADMIN — FAMOTIDINE 20 MG: 10 INJECTION INTRAVENOUS at 20:33

## 2019-04-20 RX ADMIN — HYDROMORPHONE HYDROCHLORIDE 0.5 MG: 1 INJECTION, SOLUTION INTRAMUSCULAR; INTRAVENOUS; SUBCUTANEOUS at 20:33

## 2019-04-20 RX ADMIN — TAZOBACTAM SODIUM AND PIPERACILLIN SODIUM 3.38 G: 375; 3 INJECTION, SOLUTION INTRAVENOUS at 03:53

## 2019-04-20 RX ADMIN — TAZOBACTAM SODIUM AND PIPERACILLIN SODIUM 3.38 G: 375; 3 INJECTION, SOLUTION INTRAVENOUS at 09:18

## 2019-04-20 RX ADMIN — SODIUM CHLORIDE, POTASSIUM CHLORIDE, SODIUM LACTATE AND CALCIUM CHLORIDE 125 ML/HR: 600; 310; 30; 20 INJECTION, SOLUTION INTRAVENOUS at 10:33

## 2019-04-21 LAB
ALBUMIN SERPL-MCNC: 4.5 G/DL (ref 3.5–5)
ALBUMIN/GLOB SERPL: 1.8 G/DL (ref 1.1–2.5)
ALP SERPL-CCNC: 93 U/L (ref 24–120)
ALT SERPL W P-5'-P-CCNC: 58 U/L (ref 0–54)
AMYLASE SERPL-CCNC: 322 U/L (ref 30–110)
ANION GAP SERPL CALCULATED.3IONS-SCNC: 13 MMOL/L (ref 4–13)
AST SERPL-CCNC: 46 U/L (ref 7–45)
BILIRUB SERPL-MCNC: 1 MG/DL (ref 0.1–1)
BUN BLD-MCNC: 7 MG/DL (ref 5–21)
BUN/CREAT SERPL: 13 (ref 7–25)
CALCIUM SPEC-SCNC: 9.4 MG/DL (ref 8.4–10.4)
CHLORIDE SERPL-SCNC: 94 MMOL/L (ref 98–110)
CO2 SERPL-SCNC: 29 MMOL/L (ref 24–31)
CREAT BLD-MCNC: 0.54 MG/DL (ref 0.5–1.4)
DEPRECATED RDW RBC AUTO: 53 FL (ref 40–54)
ERYTHROCYTE [DISTWIDTH] IN BLOOD BY AUTOMATED COUNT: 16.6 % (ref 12–15)
GFR SERPL CREATININE-BSD FRML MDRD: 120 ML/MIN/1.73
GLOBULIN UR ELPH-MCNC: 2.5 GM/DL
GLUCOSE BLD-MCNC: 127 MG/DL (ref 70–100)
HCT VFR BLD AUTO: 33.5 % (ref 37–47)
HGB BLD-MCNC: 11.8 G/DL (ref 12–16)
LIPASE SERPL-CCNC: 3574 U/L (ref 23–203)
MCH RBC QN AUTO: 32.2 PG (ref 28–32)
MCHC RBC AUTO-ENTMCNC: 35.2 G/DL (ref 33–36)
MCV RBC AUTO: 91.5 FL (ref 82–98)
PLATELET # BLD AUTO: 119 10*3/MM3 (ref 130–400)
PMV BLD AUTO: 9.7 FL (ref 6–12)
POTASSIUM BLD-SCNC: 3.4 MMOL/L (ref 3.5–5.3)
PROT SERPL-MCNC: 7 G/DL (ref 6.3–8.7)
RBC # BLD AUTO: 3.66 10*6/MM3 (ref 4.2–5.4)
SODIUM BLD-SCNC: 136 MMOL/L (ref 135–145)
WBC NRBC COR # BLD: 6.25 10*3/MM3 (ref 4.8–10.8)

## 2019-04-21 PROCEDURE — 25010000002 HYDROMORPHONE PER 4 MG: Performed by: SPECIALIST

## 2019-04-21 PROCEDURE — 25010000002 PIPERACILLIN SOD-TAZOBACTAM PER 1 G: Performed by: SPECIALIST

## 2019-04-21 PROCEDURE — 82150 ASSAY OF AMYLASE: CPT | Performed by: SPECIALIST

## 2019-04-21 PROCEDURE — 80053 COMPREHEN METABOLIC PANEL: CPT | Performed by: SPECIALIST

## 2019-04-21 PROCEDURE — G0378 HOSPITAL OBSERVATION PER HR: HCPCS

## 2019-04-21 PROCEDURE — 83690 ASSAY OF LIPASE: CPT | Performed by: SPECIALIST

## 2019-04-21 PROCEDURE — 99232 SBSQ HOSP IP/OBS MODERATE 35: CPT | Performed by: INTERNAL MEDICINE

## 2019-04-21 PROCEDURE — 85027 COMPLETE CBC AUTOMATED: CPT | Performed by: SPECIALIST

## 2019-04-21 RX ADMIN — HYDROMORPHONE HYDROCHLORIDE 0.5 MG: 1 INJECTION, SOLUTION INTRAMUSCULAR; INTRAVENOUS; SUBCUTANEOUS at 15:37

## 2019-04-21 RX ADMIN — HYDROMORPHONE HYDROCHLORIDE 0.5 MG: 1 INJECTION, SOLUTION INTRAMUSCULAR; INTRAVENOUS; SUBCUTANEOUS at 12:31

## 2019-04-21 RX ADMIN — LAMOTRIGINE 150 MG: 100 TABLET ORAL at 22:03

## 2019-04-21 RX ADMIN — HYDROMORPHONE HYDROCHLORIDE 0.5 MG: 1 INJECTION, SOLUTION INTRAMUSCULAR; INTRAVENOUS; SUBCUTANEOUS at 18:36

## 2019-04-21 RX ADMIN — FAMOTIDINE 20 MG: 10 INJECTION INTRAVENOUS at 22:01

## 2019-04-21 RX ADMIN — FAMOTIDINE 20 MG: 10 INJECTION INTRAVENOUS at 09:04

## 2019-04-21 RX ADMIN — SODIUM CHLORIDE, POTASSIUM CHLORIDE, SODIUM LACTATE AND CALCIUM CHLORIDE 125 ML/HR: 600; 310; 30; 20 INJECTION, SOLUTION INTRAVENOUS at 04:50

## 2019-04-21 RX ADMIN — VALSARTAN 160 MG: 80 TABLET, FILM COATED ORAL at 22:04

## 2019-04-21 RX ADMIN — GABAPENTIN 600 MG: 300 CAPSULE ORAL at 06:26

## 2019-04-21 RX ADMIN — VENLAFAXINE HYDROCHLORIDE 150 MG: 75 CAPSULE, EXTENDED RELEASE ORAL at 22:04

## 2019-04-21 RX ADMIN — CHLORTHALIDONE 25 MG: 25 TABLET ORAL at 09:01

## 2019-04-21 RX ADMIN — TAZOBACTAM SODIUM AND PIPERACILLIN SODIUM 3.38 G: 375; 3 INJECTION, SOLUTION INTRAVENOUS at 04:50

## 2019-04-21 RX ADMIN — HYDROMORPHONE HYDROCHLORIDE 0.5 MG: 1 INJECTION, SOLUTION INTRAMUSCULAR; INTRAVENOUS; SUBCUTANEOUS at 06:26

## 2019-04-21 RX ADMIN — TAZOBACTAM SODIUM AND PIPERACILLIN SODIUM 3.38 G: 375; 3 INJECTION, SOLUTION INTRAVENOUS at 09:04

## 2019-04-21 RX ADMIN — HYDROMORPHONE HYDROCHLORIDE 0.5 MG: 1 INJECTION, SOLUTION INTRAMUSCULAR; INTRAVENOUS; SUBCUTANEOUS at 22:02

## 2019-04-21 RX ADMIN — HYDROMORPHONE HYDROCHLORIDE 0.5 MG: 1 INJECTION, SOLUTION INTRAMUSCULAR; INTRAVENOUS; SUBCUTANEOUS at 09:32

## 2019-04-21 RX ADMIN — TAZOBACTAM SODIUM AND PIPERACILLIN SODIUM 3.38 G: 375; 3 INJECTION, SOLUTION INTRAVENOUS at 22:01

## 2019-04-21 RX ADMIN — HYDROMORPHONE HYDROCHLORIDE 0.5 MG: 1 INJECTION, SOLUTION INTRAMUSCULAR; INTRAVENOUS; SUBCUTANEOUS at 02:42

## 2019-04-21 RX ADMIN — LAMOTRIGINE 150 MG: 100 TABLET ORAL at 09:01

## 2019-04-21 RX ADMIN — TAZOBACTAM SODIUM AND PIPERACILLIN SODIUM 3.38 G: 375; 3 INJECTION, SOLUTION INTRAVENOUS at 14:22

## 2019-04-21 RX ADMIN — GABAPENTIN 600 MG: 300 CAPSULE ORAL at 14:22

## 2019-04-21 RX ADMIN — SODIUM CHLORIDE, POTASSIUM CHLORIDE, SODIUM LACTATE AND CALCIUM CHLORIDE 125 ML/HR: 600; 310; 30; 20 INJECTION, SOLUTION INTRAVENOUS at 12:09

## 2019-04-21 RX ADMIN — GABAPENTIN 600 MG: 300 CAPSULE ORAL at 22:02

## 2019-04-21 RX ADMIN — SODIUM CHLORIDE, POTASSIUM CHLORIDE, SODIUM LACTATE AND CALCIUM CHLORIDE 125 ML/HR: 600; 310; 30; 20 INJECTION, SOLUTION INTRAVENOUS at 22:01

## 2019-04-22 LAB
ALBUMIN SERPL-MCNC: 4.6 G/DL (ref 3.5–5)
ALBUMIN/GLOB SERPL: 1.7 G/DL (ref 1.1–2.5)
ALP LIVER CFR SERPL: 1.4 UNITS (ref 0–20)
ALP SERPL-CCNC: 96 U/L (ref 24–120)
ALT SERPL W P-5'-P-CCNC: 43 U/L (ref 0–54)
AMYLASE SERPL-CCNC: 177 U/L (ref 30–110)
ANION GAP SERPL CALCULATED.3IONS-SCNC: 12 MMOL/L (ref 4–13)
AST SERPL-CCNC: 35 U/L (ref 7–45)
BILIRUB SERPL-MCNC: 1.2 MG/DL (ref 0.1–1)
BUN BLD-MCNC: 8 MG/DL (ref 5–21)
BUN/CREAT SERPL: 14.8 (ref 7–25)
CALCIUM SPEC-SCNC: 9.5 MG/DL (ref 8.4–10.4)
CENTROMERE B AB SER-ACNC: <0.2 AI (ref 0–0.9)
CHLORIDE SERPL-SCNC: 96 MMOL/L (ref 98–110)
CHROMATIN AB SERPL-ACNC: 0.2 AI (ref 0–0.9)
CO2 SERPL-SCNC: 26 MMOL/L (ref 24–31)
CREAT BLD-MCNC: 0.54 MG/DL (ref 0.5–1.4)
DEPRECATED RDW RBC AUTO: 55.1 FL (ref 40–54)
DSDNA AB SER-ACNC: 1 IU/ML (ref 0–9)
ENA JO1 AB SER-ACNC: <0.2 AI (ref 0–0.9)
ENA RNP AB SER-ACNC: 0.4 AI (ref 0–0.9)
ENA SCL70 AB SER-ACNC: <0.2 AI (ref 0–0.9)
ENA SM AB SER-ACNC: <0.2 AI (ref 0–0.9)
ENA SS-A AB SER-ACNC: <0.2 AI (ref 0–0.9)
ENA SS-B AB SER-ACNC: <0.2 AI (ref 0–0.9)
ERYTHROCYTE [DISTWIDTH] IN BLOOD BY AUTOMATED COUNT: 17 % (ref 12–15)
GFR SERPL CREATININE-BSD FRML MDRD: 120 ML/MIN/1.73
GLOBULIN UR ELPH-MCNC: 2.7 GM/DL
GLUCOSE BLD-MCNC: 136 MG/DL (ref 70–100)
HCT VFR BLD AUTO: 33.5 % (ref 37–47)
HGB BLD-MCNC: 11.8 G/DL (ref 12–16)
LIPASE SERPL-CCNC: 1595 U/L (ref 23–203)
Lab: NORMAL
MCH RBC QN AUTO: 32 PG (ref 28–32)
MCHC RBC AUTO-ENTMCNC: 35.2 G/DL (ref 33–36)
MCV RBC AUTO: 90.8 FL (ref 82–98)
PLATELET # BLD AUTO: 123 10*3/MM3 (ref 130–400)
PMV BLD AUTO: 9.1 FL (ref 6–12)
POTASSIUM BLD-SCNC: 3.7 MMOL/L (ref 3.5–5.3)
PROT SERPL-MCNC: 7.3 G/DL (ref 6.3–8.7)
RBC # BLD AUTO: 3.69 10*6/MM3 (ref 4.2–5.4)
SODIUM BLD-SCNC: 134 MMOL/L (ref 135–145)
WBC NRBC COR # BLD: 7.84 10*3/MM3 (ref 4.8–10.8)

## 2019-04-22 PROCEDURE — 99232 SBSQ HOSP IP/OBS MODERATE 35: CPT | Performed by: CLINICAL NURSE SPECIALIST

## 2019-04-22 PROCEDURE — 25010000002 POTASSIUM CHLORIDE PER 2 MEQ: Performed by: SPECIALIST

## 2019-04-22 PROCEDURE — G0378 HOSPITAL OBSERVATION PER HR: HCPCS

## 2019-04-22 PROCEDURE — 85027 COMPLETE CBC AUTOMATED: CPT | Performed by: SPECIALIST

## 2019-04-22 PROCEDURE — 83690 ASSAY OF LIPASE: CPT | Performed by: SPECIALIST

## 2019-04-22 PROCEDURE — 25010000002 PIPERACILLIN SOD-TAZOBACTAM PER 1 G: Performed by: SPECIALIST

## 2019-04-22 PROCEDURE — 82150 ASSAY OF AMYLASE: CPT | Performed by: SPECIALIST

## 2019-04-22 PROCEDURE — 80053 COMPREHEN METABOLIC PANEL: CPT | Performed by: SPECIALIST

## 2019-04-22 PROCEDURE — 25010000002 HYDROMORPHONE PER 4 MG: Performed by: SPECIALIST

## 2019-04-22 RX ORDER — POTASSIUM CHLORIDE 14.9 MG/ML
20 INJECTION INTRAVENOUS
Status: COMPLETED | OUTPATIENT
Start: 2019-04-22 | End: 2019-04-22

## 2019-04-22 RX ADMIN — POTASSIUM CHLORIDE 20 MEQ: 14.9 INJECTION, SOLUTION INTRAVENOUS at 08:28

## 2019-04-22 RX ADMIN — LAMOTRIGINE 100 MG: 100 TABLET ORAL at 21:28

## 2019-04-22 RX ADMIN — VALSARTAN 160 MG: 80 TABLET, FILM COATED ORAL at 21:28

## 2019-04-22 RX ADMIN — VENLAFAXINE HYDROCHLORIDE 150 MG: 75 CAPSULE, EXTENDED RELEASE ORAL at 21:28

## 2019-04-22 RX ADMIN — POTASSIUM CHLORIDE 20 MEQ: 14.9 INJECTION, SOLUTION INTRAVENOUS at 11:18

## 2019-04-22 RX ADMIN — TAZOBACTAM SODIUM AND PIPERACILLIN SODIUM 3.38 G: 375; 3 INJECTION, SOLUTION INTRAVENOUS at 20:46

## 2019-04-22 RX ADMIN — SODIUM CHLORIDE, POTASSIUM CHLORIDE, SODIUM LACTATE AND CALCIUM CHLORIDE 125 ML/HR: 600; 310; 30; 20 INJECTION, SOLUTION INTRAVENOUS at 23:21

## 2019-04-22 RX ADMIN — HYDROMORPHONE HYDROCHLORIDE 0.5 MG: 1 INJECTION, SOLUTION INTRAMUSCULAR; INTRAVENOUS; SUBCUTANEOUS at 21:28

## 2019-04-22 RX ADMIN — HYDROMORPHONE HYDROCHLORIDE 0.5 MG: 1 INJECTION, SOLUTION INTRAMUSCULAR; INTRAVENOUS; SUBCUTANEOUS at 08:45

## 2019-04-22 RX ADMIN — FAMOTIDINE 20 MG: 10 INJECTION INTRAVENOUS at 08:29

## 2019-04-22 RX ADMIN — SODIUM CHLORIDE, POTASSIUM CHLORIDE, SODIUM LACTATE AND CALCIUM CHLORIDE 125 ML/HR: 600; 310; 30; 20 INJECTION, SOLUTION INTRAVENOUS at 05:48

## 2019-04-22 RX ADMIN — TAZOBACTAM SODIUM AND PIPERACILLIN SODIUM 3.38 G: 375; 3 INJECTION, SOLUTION INTRAVENOUS at 08:29

## 2019-04-22 RX ADMIN — CHLORTHALIDONE 25 MG: 25 TABLET ORAL at 08:29

## 2019-04-22 RX ADMIN — TAZOBACTAM SODIUM AND PIPERACILLIN SODIUM 3.38 G: 375; 3 INJECTION, SOLUTION INTRAVENOUS at 02:44

## 2019-04-22 RX ADMIN — HYDROMORPHONE HYDROCHLORIDE 0.5 MG: 1 INJECTION, SOLUTION INTRAMUSCULAR; INTRAVENOUS; SUBCUTANEOUS at 02:35

## 2019-04-22 RX ADMIN — LAMOTRIGINE 150 MG: 100 TABLET ORAL at 08:28

## 2019-04-22 RX ADMIN — GABAPENTIN 600 MG: 300 CAPSULE ORAL at 21:28

## 2019-04-22 RX ADMIN — HYDROMORPHONE HYDROCHLORIDE 0.5 MG: 1 INJECTION, SOLUTION INTRAMUSCULAR; INTRAVENOUS; SUBCUTANEOUS at 12:10

## 2019-04-22 RX ADMIN — HYDROMORPHONE HYDROCHLORIDE 0.5 MG: 1 INJECTION, SOLUTION INTRAMUSCULAR; INTRAVENOUS; SUBCUTANEOUS at 05:48

## 2019-04-22 RX ADMIN — FAMOTIDINE 20 MG: 10 INJECTION INTRAVENOUS at 21:28

## 2019-04-22 RX ADMIN — GABAPENTIN 600 MG: 300 CAPSULE ORAL at 14:20

## 2019-04-22 RX ADMIN — SODIUM CHLORIDE, POTASSIUM CHLORIDE, SODIUM LACTATE AND CALCIUM CHLORIDE 125 ML/HR: 600; 310; 30; 20 INJECTION, SOLUTION INTRAVENOUS at 14:23

## 2019-04-22 RX ADMIN — TAZOBACTAM SODIUM AND PIPERACILLIN SODIUM 3.38 G: 375; 3 INJECTION, SOLUTION INTRAVENOUS at 14:20

## 2019-04-22 RX ADMIN — HYDROMORPHONE HYDROCHLORIDE 0.5 MG: 1 INJECTION, SOLUTION INTRAMUSCULAR; INTRAVENOUS; SUBCUTANEOUS at 15:13

## 2019-04-22 RX ADMIN — HYDROMORPHONE HYDROCHLORIDE 0.5 MG: 1 INJECTION, SOLUTION INTRAMUSCULAR; INTRAVENOUS; SUBCUTANEOUS at 18:20

## 2019-04-22 RX ADMIN — GABAPENTIN 600 MG: 300 CAPSULE ORAL at 05:48

## 2019-04-23 LAB
ALBUMIN SERPL-MCNC: 4.4 G/DL (ref 3.5–5)
ALBUMIN/GLOB SERPL: 1.8 G/DL (ref 1.1–2.5)
ALP SERPL-CCNC: 95 U/L (ref 24–120)
ALT SERPL W P-5'-P-CCNC: 36 U/L (ref 0–54)
AMYLASE SERPL-CCNC: 116 U/L (ref 30–110)
ANION GAP SERPL CALCULATED.3IONS-SCNC: 11 MMOL/L (ref 4–13)
AST SERPL-CCNC: 25 U/L (ref 7–45)
BILIRUB SERPL-MCNC: 1.1 MG/DL (ref 0.1–1)
BUN BLD-MCNC: 6 MG/DL (ref 5–21)
BUN/CREAT SERPL: 10.2 (ref 7–25)
CALCIUM SPEC-SCNC: 9.3 MG/DL (ref 8.4–10.4)
CHLORIDE SERPL-SCNC: 98 MMOL/L (ref 98–110)
CO2 SERPL-SCNC: 26 MMOL/L (ref 24–31)
CREAT BLD-MCNC: 0.59 MG/DL (ref 0.5–1.4)
DEPRECATED RDW RBC AUTO: 57 FL (ref 40–54)
ERYTHROCYTE [DISTWIDTH] IN BLOOD BY AUTOMATED COUNT: 17.2 % (ref 12–15)
GFR SERPL CREATININE-BSD FRML MDRD: 109 ML/MIN/1.73
GLOBULIN UR ELPH-MCNC: 2.5 GM/DL
GLUCOSE BLD-MCNC: 128 MG/DL (ref 70–100)
HCT VFR BLD AUTO: 34.1 % (ref 37–47)
HGB BLD-MCNC: 12 G/DL (ref 12–16)
LIPASE SERPL-CCNC: 1405 U/L (ref 23–203)
MCH RBC QN AUTO: 32.3 PG (ref 28–32)
MCHC RBC AUTO-ENTMCNC: 35.2 G/DL (ref 33–36)
MCV RBC AUTO: 91.7 FL (ref 82–98)
PLATELET # BLD AUTO: 149 10*3/MM3 (ref 130–400)
PMV BLD AUTO: 9.4 FL (ref 6–12)
POTASSIUM BLD-SCNC: 4.2 MMOL/L (ref 3.5–5.3)
PROT SERPL-MCNC: 6.9 G/DL (ref 6.3–8.7)
RBC # BLD AUTO: 3.72 10*6/MM3 (ref 4.2–5.4)
SODIUM BLD-SCNC: 135 MMOL/L (ref 135–145)
WBC NRBC COR # BLD: 8.05 10*3/MM3 (ref 4.8–10.8)

## 2019-04-23 PROCEDURE — 83690 ASSAY OF LIPASE: CPT | Performed by: SPECIALIST

## 2019-04-23 PROCEDURE — 25010000002 HYDROMORPHONE PER 4 MG: Performed by: SPECIALIST

## 2019-04-23 PROCEDURE — 85027 COMPLETE CBC AUTOMATED: CPT | Performed by: SPECIALIST

## 2019-04-23 PROCEDURE — 99232 SBSQ HOSP IP/OBS MODERATE 35: CPT | Performed by: CLINICAL NURSE SPECIALIST

## 2019-04-23 PROCEDURE — 82150 ASSAY OF AMYLASE: CPT | Performed by: SPECIALIST

## 2019-04-23 PROCEDURE — 25010000002 PIPERACILLIN SOD-TAZOBACTAM PER 1 G: Performed by: SPECIALIST

## 2019-04-23 PROCEDURE — 80053 COMPREHEN METABOLIC PANEL: CPT | Performed by: SPECIALIST

## 2019-04-23 RX ADMIN — HYDROMORPHONE HYDROCHLORIDE 0.5 MG: 1 INJECTION, SOLUTION INTRAMUSCULAR; INTRAVENOUS; SUBCUTANEOUS at 04:47

## 2019-04-23 RX ADMIN — FAMOTIDINE 20 MG: 10 INJECTION INTRAVENOUS at 08:30

## 2019-04-23 RX ADMIN — HYDROMORPHONE HYDROCHLORIDE 0.5 MG: 1 INJECTION, SOLUTION INTRAMUSCULAR; INTRAVENOUS; SUBCUTANEOUS at 14:12

## 2019-04-23 RX ADMIN — SODIUM CHLORIDE, POTASSIUM CHLORIDE, SODIUM LACTATE AND CALCIUM CHLORIDE 125 ML/HR: 600; 310; 30; 20 INJECTION, SOLUTION INTRAVENOUS at 10:47

## 2019-04-23 RX ADMIN — HYDROMORPHONE HYDROCHLORIDE 0.5 MG: 1 INJECTION, SOLUTION INTRAMUSCULAR; INTRAVENOUS; SUBCUTANEOUS at 01:47

## 2019-04-23 RX ADMIN — FAMOTIDINE 20 MG: 10 INJECTION INTRAVENOUS at 20:47

## 2019-04-23 RX ADMIN — LAMOTRIGINE 150 MG: 100 TABLET ORAL at 20:48

## 2019-04-23 RX ADMIN — HYDROMORPHONE HYDROCHLORIDE 0.5 MG: 1 INJECTION, SOLUTION INTRAMUSCULAR; INTRAVENOUS; SUBCUTANEOUS at 23:53

## 2019-04-23 RX ADMIN — LAMOTRIGINE 150 MG: 100 TABLET ORAL at 08:30

## 2019-04-23 RX ADMIN — TAZOBACTAM SODIUM AND PIPERACILLIN SODIUM 3.38 G: 375; 3 INJECTION, SOLUTION INTRAVENOUS at 07:50

## 2019-04-23 RX ADMIN — HYDROMORPHONE HYDROCHLORIDE 0.5 MG: 1 INJECTION, SOLUTION INTRAMUSCULAR; INTRAVENOUS; SUBCUTANEOUS at 07:50

## 2019-04-23 RX ADMIN — VALSARTAN 160 MG: 80 TABLET, FILM COATED ORAL at 20:48

## 2019-04-23 RX ADMIN — GABAPENTIN 600 MG: 300 CAPSULE ORAL at 14:12

## 2019-04-23 RX ADMIN — GABAPENTIN 600 MG: 300 CAPSULE ORAL at 05:54

## 2019-04-23 RX ADMIN — HYDROMORPHONE HYDROCHLORIDE 0.5 MG: 1 INJECTION, SOLUTION INTRAMUSCULAR; INTRAVENOUS; SUBCUTANEOUS at 17:39

## 2019-04-23 RX ADMIN — VENLAFAXINE HYDROCHLORIDE 150 MG: 75 CAPSULE, EXTENDED RELEASE ORAL at 20:48

## 2019-04-23 RX ADMIN — HYDROMORPHONE HYDROCHLORIDE 0.5 MG: 1 INJECTION, SOLUTION INTRAMUSCULAR; INTRAVENOUS; SUBCUTANEOUS at 10:46

## 2019-04-23 RX ADMIN — CHLORTHALIDONE 25 MG: 25 TABLET ORAL at 08:30

## 2019-04-23 RX ADMIN — TAZOBACTAM SODIUM AND PIPERACILLIN SODIUM 3.38 G: 375; 3 INJECTION, SOLUTION INTRAVENOUS at 01:47

## 2019-04-23 RX ADMIN — SODIUM CHLORIDE, POTASSIUM CHLORIDE, SODIUM LACTATE AND CALCIUM CHLORIDE 125 ML/HR: 600; 310; 30; 20 INJECTION, SOLUTION INTRAVENOUS at 18:28

## 2019-04-23 RX ADMIN — GABAPENTIN 600 MG: 300 CAPSULE ORAL at 20:48

## 2019-04-23 RX ADMIN — HYDROMORPHONE HYDROCHLORIDE 0.5 MG: 1 INJECTION, SOLUTION INTRAMUSCULAR; INTRAVENOUS; SUBCUTANEOUS at 20:47

## 2019-04-24 LAB
ALBUMIN SERPL-MCNC: 4.2 G/DL (ref 3.5–5)
ALBUMIN/GLOB SERPL: 1.8 G/DL (ref 1.1–2.5)
ALP SERPL-CCNC: 75 U/L (ref 24–120)
ALT SERPL W P-5'-P-CCNC: 28 U/L (ref 0–54)
AMYLASE SERPL-CCNC: 69 U/L (ref 30–110)
ANION GAP SERPL CALCULATED.3IONS-SCNC: 11 MMOL/L (ref 4–13)
AST SERPL-CCNC: 26 U/L (ref 7–45)
BILIRUB SERPL-MCNC: 0.6 MG/DL (ref 0.1–1)
BUN BLD-MCNC: 7 MG/DL (ref 5–21)
BUN/CREAT SERPL: 11.9 (ref 7–25)
CALCIUM SPEC-SCNC: 9.3 MG/DL (ref 8.4–10.4)
CHLORIDE SERPL-SCNC: 99 MMOL/L (ref 98–110)
CO2 SERPL-SCNC: 25 MMOL/L (ref 24–31)
CREAT BLD-MCNC: 0.59 MG/DL (ref 0.5–1.4)
DEPRECATED RDW RBC AUTO: 56.7 FL (ref 40–54)
ERYTHROCYTE [DISTWIDTH] IN BLOOD BY AUTOMATED COUNT: 17 % (ref 12–15)
GFR SERPL CREATININE-BSD FRML MDRD: 109 ML/MIN/1.73
GLOBULIN UR ELPH-MCNC: 2.4 GM/DL
GLUCOSE BLD-MCNC: 138 MG/DL (ref 70–100)
HCT VFR BLD AUTO: 32.6 % (ref 37–47)
HGB BLD-MCNC: 11.3 G/DL (ref 12–16)
LIPASE SERPL-CCNC: 965 U/L (ref 23–203)
MCH RBC QN AUTO: 32.1 PG (ref 28–32)
MCHC RBC AUTO-ENTMCNC: 34.7 G/DL (ref 33–36)
MCV RBC AUTO: 92.6 FL (ref 82–98)
PLATELET # BLD AUTO: 135 10*3/MM3 (ref 130–400)
PMV BLD AUTO: 9.7 FL (ref 6–12)
POTASSIUM BLD-SCNC: 4.1 MMOL/L (ref 3.5–5.3)
PROT SERPL-MCNC: 6.6 G/DL (ref 6.3–8.7)
RBC # BLD AUTO: 3.52 10*6/MM3 (ref 4.2–5.4)
SODIUM BLD-SCNC: 135 MMOL/L (ref 135–145)
WBC NRBC COR # BLD: 6.83 10*3/MM3 (ref 4.8–10.8)

## 2019-04-24 PROCEDURE — 83690 ASSAY OF LIPASE: CPT | Performed by: SPECIALIST

## 2019-04-24 PROCEDURE — 80053 COMPREHEN METABOLIC PANEL: CPT | Performed by: SPECIALIST

## 2019-04-24 PROCEDURE — 25010000002 HYDROMORPHONE PER 4 MG: Performed by: SPECIALIST

## 2019-04-24 PROCEDURE — 85027 COMPLETE CBC AUTOMATED: CPT | Performed by: SPECIALIST

## 2019-04-24 PROCEDURE — 82150 ASSAY OF AMYLASE: CPT | Performed by: SPECIALIST

## 2019-04-24 PROCEDURE — 99232 SBSQ HOSP IP/OBS MODERATE 35: CPT | Performed by: CLINICAL NURSE SPECIALIST

## 2019-04-24 RX ORDER — FAMOTIDINE 20 MG/1
20 TABLET, FILM COATED ORAL 2 TIMES DAILY
Status: DISCONTINUED | OUTPATIENT
Start: 2019-04-24 | End: 2019-04-26 | Stop reason: HOSPADM

## 2019-04-24 RX ADMIN — HYDROMORPHONE HYDROCHLORIDE 0.5 MG: 1 INJECTION, SOLUTION INTRAMUSCULAR; INTRAVENOUS; SUBCUTANEOUS at 03:18

## 2019-04-24 RX ADMIN — GABAPENTIN 600 MG: 300 CAPSULE ORAL at 06:30

## 2019-04-24 RX ADMIN — VENLAFAXINE HYDROCHLORIDE 150 MG: 75 CAPSULE, EXTENDED RELEASE ORAL at 22:57

## 2019-04-24 RX ADMIN — HYDROMORPHONE HYDROCHLORIDE 0.5 MG: 1 INJECTION, SOLUTION INTRAMUSCULAR; INTRAVENOUS; SUBCUTANEOUS at 06:36

## 2019-04-24 RX ADMIN — FAMOTIDINE 20 MG: 20 TABLET, FILM COATED ORAL at 22:55

## 2019-04-24 RX ADMIN — LAMOTRIGINE 150 MG: 100 TABLET ORAL at 08:48

## 2019-04-24 RX ADMIN — SODIUM CHLORIDE, POTASSIUM CHLORIDE, SODIUM LACTATE AND CALCIUM CHLORIDE 125 ML/HR: 600; 310; 30; 20 INJECTION, SOLUTION INTRAVENOUS at 10:01

## 2019-04-24 RX ADMIN — HYDROMORPHONE HYDROCHLORIDE 0.5 MG: 1 INJECTION, SOLUTION INTRAMUSCULAR; INTRAVENOUS; SUBCUTANEOUS at 09:35

## 2019-04-24 RX ADMIN — HYDROMORPHONE HYDROCHLORIDE 0.5 MG: 1 INJECTION, SOLUTION INTRAMUSCULAR; INTRAVENOUS; SUBCUTANEOUS at 12:39

## 2019-04-24 RX ADMIN — HYDROMORPHONE HYDROCHLORIDE 0.5 MG: 1 INJECTION, SOLUTION INTRAMUSCULAR; INTRAVENOUS; SUBCUTANEOUS at 15:47

## 2019-04-24 RX ADMIN — CHLORTHALIDONE 25 MG: 25 TABLET ORAL at 08:48

## 2019-04-24 RX ADMIN — VALSARTAN 160 MG: 80 TABLET, FILM COATED ORAL at 22:56

## 2019-04-24 RX ADMIN — LAMOTRIGINE 150 MG: 100 TABLET ORAL at 22:56

## 2019-04-24 RX ADMIN — FAMOTIDINE 20 MG: 10 INJECTION INTRAVENOUS at 08:48

## 2019-04-24 RX ADMIN — HYDROMORPHONE HYDROCHLORIDE 0.5 MG: 1 INJECTION, SOLUTION INTRAMUSCULAR; INTRAVENOUS; SUBCUTANEOUS at 19:24

## 2019-04-24 RX ADMIN — GABAPENTIN 600 MG: 300 CAPSULE ORAL at 22:55

## 2019-04-24 RX ADMIN — SODIUM CHLORIDE, POTASSIUM CHLORIDE, SODIUM LACTATE AND CALCIUM CHLORIDE 125 ML/HR: 600; 310; 30; 20 INJECTION, SOLUTION INTRAVENOUS at 18:23

## 2019-04-24 RX ADMIN — HYDROMORPHONE HYDROCHLORIDE 0.5 MG: 1 INJECTION, SOLUTION INTRAMUSCULAR; INTRAVENOUS; SUBCUTANEOUS at 22:55

## 2019-04-24 RX ADMIN — GABAPENTIN 600 MG: 300 CAPSULE ORAL at 13:59

## 2019-04-25 LAB
ALBUMIN SERPL-MCNC: 4.4 G/DL (ref 3.5–5)
ALBUMIN/GLOB SERPL: 1.8 G/DL (ref 1.1–2.5)
ALP SERPL-CCNC: 88 U/L (ref 24–120)
ALT SERPL W P-5'-P-CCNC: 36 U/L (ref 0–54)
AMYLASE SERPL-CCNC: 60 U/L (ref 30–110)
ANION GAP SERPL CALCULATED.3IONS-SCNC: 14 MMOL/L (ref 4–13)
AST SERPL-CCNC: 28 U/L (ref 7–45)
BILIRUB SERPL-MCNC: 0.6 MG/DL (ref 0.1–1)
BUN BLD-MCNC: 6 MG/DL (ref 5–21)
BUN/CREAT SERPL: 11.5 (ref 7–25)
CALCIUM SPEC-SCNC: 9.7 MG/DL (ref 8.4–10.4)
CHLORIDE SERPL-SCNC: 102 MMOL/L (ref 98–110)
CO2 SERPL-SCNC: 24 MMOL/L (ref 24–31)
CREAT BLD-MCNC: 0.52 MG/DL (ref 0.5–1.4)
DEPRECATED RDW RBC AUTO: 56.7 FL (ref 40–54)
ERYTHROCYTE [DISTWIDTH] IN BLOOD BY AUTOMATED COUNT: 16.7 % (ref 12–15)
GFR SERPL CREATININE-BSD FRML MDRD: 126 ML/MIN/1.73
GLOBULIN UR ELPH-MCNC: 2.5 GM/DL
GLUCOSE BLD-MCNC: 125 MG/DL (ref 70–100)
HCT VFR BLD AUTO: 36.4 % (ref 37–47)
HGB BLD-MCNC: 12.5 G/DL (ref 12–16)
LIPASE SERPL-CCNC: 839 U/L (ref 23–203)
MCH RBC QN AUTO: 32.3 PG (ref 28–32)
MCHC RBC AUTO-ENTMCNC: 34.3 G/DL (ref 33–36)
MCV RBC AUTO: 94.1 FL (ref 82–98)
PLATELET # BLD AUTO: 162 10*3/MM3 (ref 130–400)
PMV BLD AUTO: 9.4 FL (ref 6–12)
POTASSIUM BLD-SCNC: 4.3 MMOL/L (ref 3.5–5.3)
PROT SERPL-MCNC: 6.9 G/DL (ref 6.3–8.7)
RBC # BLD AUTO: 3.87 10*6/MM3 (ref 4.2–5.4)
SODIUM BLD-SCNC: 140 MMOL/L (ref 135–145)
WBC NRBC COR # BLD: 6.89 10*3/MM3 (ref 4.8–10.8)

## 2019-04-25 PROCEDURE — 85027 COMPLETE CBC AUTOMATED: CPT | Performed by: SPECIALIST

## 2019-04-25 PROCEDURE — 25010000002 HYDROMORPHONE PER 4 MG: Performed by: SPECIALIST

## 2019-04-25 PROCEDURE — 82150 ASSAY OF AMYLASE: CPT | Performed by: SPECIALIST

## 2019-04-25 PROCEDURE — 83690 ASSAY OF LIPASE: CPT | Performed by: SPECIALIST

## 2019-04-25 PROCEDURE — 80053 COMPREHEN METABOLIC PANEL: CPT | Performed by: SPECIALIST

## 2019-04-25 RX ORDER — HYDROMORPHONE HYDROCHLORIDE 1 MG/ML
0.25 INJECTION, SOLUTION INTRAMUSCULAR; INTRAVENOUS; SUBCUTANEOUS
Status: DISCONTINUED | OUTPATIENT
Start: 2019-04-25 | End: 2019-04-26 | Stop reason: HOSPADM

## 2019-04-25 RX ADMIN — GABAPENTIN 600 MG: 300 CAPSULE ORAL at 06:37

## 2019-04-25 RX ADMIN — GABAPENTIN 600 MG: 300 CAPSULE ORAL at 13:21

## 2019-04-25 RX ADMIN — VALSARTAN 160 MG: 80 TABLET, FILM COATED ORAL at 21:59

## 2019-04-25 RX ADMIN — HYDROMORPHONE HYDROCHLORIDE 0.5 MG: 1 INJECTION, SOLUTION INTRAMUSCULAR; INTRAVENOUS; SUBCUTANEOUS at 09:36

## 2019-04-25 RX ADMIN — HYDROMORPHONE HYDROCHLORIDE 0.25 MG: 1 INJECTION, SOLUTION INTRAMUSCULAR; INTRAVENOUS; SUBCUTANEOUS at 21:57

## 2019-04-25 RX ADMIN — HYDROMORPHONE HYDROCHLORIDE 0.5 MG: 1 INJECTION, SOLUTION INTRAMUSCULAR; INTRAVENOUS; SUBCUTANEOUS at 06:36

## 2019-04-25 RX ADMIN — GABAPENTIN 600 MG: 300 CAPSULE ORAL at 21:59

## 2019-04-25 RX ADMIN — HYDROMORPHONE HYDROCHLORIDE 0.25 MG: 1 INJECTION, SOLUTION INTRAMUSCULAR; INTRAVENOUS; SUBCUTANEOUS at 17:22

## 2019-04-25 RX ADMIN — SODIUM CHLORIDE, POTASSIUM CHLORIDE, SODIUM LACTATE AND CALCIUM CHLORIDE 125 ML/HR: 600; 310; 30; 20 INJECTION, SOLUTION INTRAVENOUS at 01:41

## 2019-04-25 RX ADMIN — LAMOTRIGINE 150 MG: 100 TABLET ORAL at 08:09

## 2019-04-25 RX ADMIN — HYDROMORPHONE HYDROCHLORIDE 0.5 MG: 1 INJECTION, SOLUTION INTRAMUSCULAR; INTRAVENOUS; SUBCUTANEOUS at 01:41

## 2019-04-25 RX ADMIN — FAMOTIDINE 20 MG: 20 TABLET, FILM COATED ORAL at 22:00

## 2019-04-25 RX ADMIN — FAMOTIDINE 20 MG: 20 TABLET, FILM COATED ORAL at 08:10

## 2019-04-25 RX ADMIN — VENLAFAXINE HYDROCHLORIDE 150 MG: 75 CAPSULE, EXTENDED RELEASE ORAL at 21:59

## 2019-04-25 RX ADMIN — LAMOTRIGINE 150 MG: 100 TABLET ORAL at 21:59

## 2019-04-25 RX ADMIN — CHLORTHALIDONE 25 MG: 25 TABLET ORAL at 08:10

## 2019-04-25 RX ADMIN — HYDROMORPHONE HYDROCHLORIDE 0.25 MG: 1 INJECTION, SOLUTION INTRAMUSCULAR; INTRAVENOUS; SUBCUTANEOUS at 13:21

## 2019-04-26 VITALS
OXYGEN SATURATION: 100 % | BODY MASS INDEX: 23.75 KG/M2 | DIASTOLIC BLOOD PRESSURE: 82 MMHG | TEMPERATURE: 98 F | SYSTOLIC BLOOD PRESSURE: 117 MMHG | HEART RATE: 115 BPM | WEIGHT: 139.11 LBS | HEIGHT: 64 IN | RESPIRATION RATE: 16 BRPM

## 2019-04-26 LAB
ALBUMIN SERPL-MCNC: 4.7 G/DL (ref 3.5–5)
ALBUMIN/GLOB SERPL: 1.6 G/DL (ref 1.1–2.5)
ALP SERPL-CCNC: 91 U/L (ref 24–120)
ALT SERPL W P-5'-P-CCNC: 28 U/L (ref 0–54)
AMYLASE SERPL-CCNC: 75 U/L (ref 30–110)
ANION GAP SERPL CALCULATED.3IONS-SCNC: 12 MMOL/L (ref 4–13)
AST SERPL-CCNC: 33 U/L (ref 7–45)
BILIRUB SERPL-MCNC: 0.6 MG/DL (ref 0.1–1)
BUN BLD-MCNC: 7 MG/DL (ref 5–21)
BUN/CREAT SERPL: 12.3 (ref 7–25)
CALCIUM SPEC-SCNC: 9.8 MG/DL (ref 8.4–10.4)
CHLORIDE SERPL-SCNC: 102 MMOL/L (ref 98–110)
CO2 SERPL-SCNC: 26 MMOL/L (ref 24–31)
CREAT BLD-MCNC: 0.57 MG/DL (ref 0.5–1.4)
DEPRECATED RDW RBC AUTO: 56.3 FL (ref 40–54)
ERYTHROCYTE [DISTWIDTH] IN BLOOD BY AUTOMATED COUNT: 16.3 % (ref 12–15)
GFR SERPL CREATININE-BSD FRML MDRD: 113 ML/MIN/1.73
GLOBULIN UR ELPH-MCNC: 2.9 GM/DL
GLUCOSE BLD-MCNC: 145 MG/DL (ref 70–100)
HCT VFR BLD AUTO: 37.2 % (ref 37–47)
HGB BLD-MCNC: 13 G/DL (ref 12–16)
LIPASE SERPL-CCNC: 681 U/L (ref 23–203)
MCH RBC QN AUTO: 32.9 PG (ref 28–32)
MCHC RBC AUTO-ENTMCNC: 34.9 G/DL (ref 33–36)
MCV RBC AUTO: 94.2 FL (ref 82–98)
PLATELET # BLD AUTO: 184 10*3/MM3 (ref 130–400)
PMV BLD AUTO: 9.7 FL (ref 6–12)
POTASSIUM BLD-SCNC: 4.2 MMOL/L (ref 3.5–5.3)
PROT SERPL-MCNC: 7.6 G/DL (ref 6.3–8.7)
RBC # BLD AUTO: 3.95 10*6/MM3 (ref 4.2–5.4)
SODIUM BLD-SCNC: 140 MMOL/L (ref 135–145)
WBC NRBC COR # BLD: 8.49 10*3/MM3 (ref 4.8–10.8)

## 2019-04-26 PROCEDURE — 80053 COMPREHEN METABOLIC PANEL: CPT | Performed by: SPECIALIST

## 2019-04-26 PROCEDURE — 85027 COMPLETE CBC AUTOMATED: CPT | Performed by: SPECIALIST

## 2019-04-26 PROCEDURE — 82150 ASSAY OF AMYLASE: CPT | Performed by: SPECIALIST

## 2019-04-26 PROCEDURE — 83690 ASSAY OF LIPASE: CPT | Performed by: SPECIALIST

## 2019-04-26 PROCEDURE — 25010000002 HYDROMORPHONE PER 4 MG: Performed by: SPECIALIST

## 2019-04-26 RX ADMIN — LAMOTRIGINE 150 MG: 100 TABLET ORAL at 08:31

## 2019-04-26 RX ADMIN — HYDROMORPHONE HYDROCHLORIDE 0.25 MG: 1 INJECTION, SOLUTION INTRAMUSCULAR; INTRAVENOUS; SUBCUTANEOUS at 06:43

## 2019-04-26 RX ADMIN — FAMOTIDINE 20 MG: 20 TABLET, FILM COATED ORAL at 08:31

## 2019-04-26 RX ADMIN — GABAPENTIN 600 MG: 300 CAPSULE ORAL at 06:43

## 2019-04-26 RX ADMIN — HYDROMORPHONE HYDROCHLORIDE 0.25 MG: 1 INJECTION, SOLUTION INTRAMUSCULAR; INTRAVENOUS; SUBCUTANEOUS at 03:28

## 2019-04-26 RX ADMIN — HYDROMORPHONE HYDROCHLORIDE 0.25 MG: 1 INJECTION, SOLUTION INTRAMUSCULAR; INTRAVENOUS; SUBCUTANEOUS at 00:53

## 2019-04-26 RX ADMIN — SODIUM CHLORIDE, POTASSIUM CHLORIDE, SODIUM LACTATE AND CALCIUM CHLORIDE 50 ML/HR: 600; 310; 30; 20 INJECTION, SOLUTION INTRAVENOUS at 00:53

## 2019-04-26 RX ADMIN — CHLORTHALIDONE 25 MG: 25 TABLET ORAL at 08:31

## 2019-04-27 ENCOUNTER — READMISSION MANAGEMENT (OUTPATIENT)
Dept: CALL CENTER | Facility: HOSPITAL | Age: 49
End: 2019-04-27

## 2019-04-30 ENCOUNTER — READMISSION MANAGEMENT (OUTPATIENT)
Dept: CALL CENTER | Facility: HOSPITAL | Age: 49
End: 2019-04-30

## 2019-04-30 NOTE — OUTREACH NOTE
General Surgery Week 1 Survey      Responses   Facility patient discharged from?  [facility]   Does the patient have one of the following disease processes/diagnoses(primary or secondary)?  General Surgery   Is there a successful TCM telephone encounter documented?  No   Week 1 attempt successful?  No   Unsuccessful attempts  Attempt 1          David Michele RN

## 2019-05-01 ENCOUNTER — READMISSION MANAGEMENT (OUTPATIENT)
Dept: CALL CENTER | Facility: HOSPITAL | Age: 49
End: 2019-05-01

## 2019-05-01 NOTE — OUTREACH NOTE
General Surgery Week 1 Survey      Responses   Facility patient discharged from?  [facility]   Does the patient have one of the following disease processes/diagnoses(primary or secondary)?  General Surgery   Is there a successful TCM telephone encounter documented?  No   Week 1 attempt successful?  Yes   Call start time  0820   Call end time  0825   Discharge diagnosis  cholecystectomy   Is patient permission given to speak with other caregiver?  Yes   List who call center can speak with  lela Presley reviewed with patient/caregiver?  Yes   Is the patient having any side effects they believe may be caused by any medication additions or changes?  No   Does the patient have all medications related to this admission filled (includes all antibiotics, pain medications, etc.)  Yes   Is the patient taking all medications as directed (includes completed medication regime)?  Yes   Does the patient have a follow up appointment scheduled with their surgeon?  Yes   Has the patient kept scheduled appointments due by today?  N/A   Comments  May 13 Dr Boles and PCP   Has home health visited the patient within 72 hours of discharge?  N/A   Psychosocial issues?  No   Did the patient receive a copy of their discharge instructions?  Yes   Nursing interventions  Reviewed instructions with patient   What is the patient's perception of their health status since discharge?  Improving   Nursing interventions  Nurse provided patient education   Is the patient /caregiver able to teach back basic post-op care?  No tub bath, swimming, or hot tub until instructed by MD, Take showers only when approved by MD-sponge bathe until then, Drive as instructed by MD in discharge instructions, Practice 'cough and deep breath', Lifting as instructed by MD in discharge instructions, Keep incision areas clean,dry and protected   Is the patient/caregiver able to teach back signs and symptoms of incisional infection?  Increased redness, swelling or pain  at the incisonal site, Increased drainage or bleeding, Incisional warmth, Pus or odor from incision, Fever   Is the patient/caregiver able to teach back steps to recovery at home?  Eat a well-balance diet, Set small, achievable goals for return to baseline health, Rest and rebuild strength, gradually increase activity   Is the patient/caregiver able to teach back the hierarchy of who to call/visit for symptoms/problems? PCP, Specialist, Home health nurse, Urgent Care, ED, 911  Yes   Week 1 call completed?  Yes   Revoked  No further contact(revokes)-requires comment   Graduated/Revoked comments  Does not want weekly follow up calls.   Wrap up additional comments  Doing well from surgery. Appts scheduled.  Aware of Nurse Call Center          Shasta Hermosillo RN

## 2019-05-10 NOTE — DISCHARGE SUMMARY
Consults     Date and Time Order Name Status Description    4/19/2019 0845 Inpatient Gastroenterology Consult Completed       April ANDRZEJ Boles MD FACS Discharge Summary    Date of Discharge:  5/10/2019    Discharge Diagnosis: status post lap nataliya, gallstone and alcoholic pancreatitis, cirrhosis    Presenting Problem/History of Present Illness  Pain [R52]  Pain [R52]     Hospital Course  Patient is a 49 y.o. female presented with known history of cholelithiasis status post ERCP with stone retrieval and alcoholic pancreatitis for cholecystectomy.  She underwent lap nataliya and liver biopsy.  Biopsy deonstrated cirrhosis and postoperative labs demonstrated pancreatitis.  She was treated with bowel rest and hydration.  Her enzymes did normalize.  GI service was consulted.  Her diet was resumed without increase in pain or enzymes.  She was discharged on low fat diet..      Procedures Performed  Procedure(s):  CHOLECYSTECTOMY LAPAROSCOPIC WITH LIVER BIOPSY       Consults:   Consults     Date and Time Order Name Status Description    4/19/2019 0845 Inpatient Gastroenterology Consult Completed           Condition on Discharge:  good    Vital Signs       Physical Exam:   See History and Physical found in chart.    Discharge Disposition  Home or Self Care    Discharge Medications     Discharge Medications      Continue These Medications      Instructions Start Date   chlorthalidone 25 MG tablet  Commonly known as:  HYGROTON   25 mg, Oral, Daily      diclofenac 75 MG EC tablet  Commonly known as:  VOLTAREN   75 mg, Oral, 2 Times Daily      gabapentin 600 MG tablet  Commonly known as:  NEURONTIN   600 mg, Oral, 3 Times Daily      HYDROcodone-acetaminophen 5-325 MG per tablet  Commonly known as:  NORCO   1 tablet, Oral, Every 6 Hours PRN      lamoTRIgine 150 MG tablet  Commonly known as:  LaMICtal   150 mg, Oral, 2 Times Daily      rosuvastatin 10 MG tablet  Commonly known as:  CRESTOR   10 mg, Oral, Daily      tiZANidine 4 MG  tablet  Commonly known as:  ZANAFLEX   4 mg, Oral, Every 8 Hours PRN      valsartan 160 MG tablet  Commonly known as:  DIOVAN   160 mg, Oral, Daily      venlafaxine  MG 24 hr capsule  Commonly known as:  EFFEXOR-XR   150 mg, Oral, Daily      vitamin D 75625 units capsule capsule  Commonly known as:  ERGOCALCIFEROL   50,000 Units, Oral, Every 7 Days, Sunday             Discharge Diet:     Activity at Discharge:     Follow-up Appointments  No future appointments.      Test Results Pending at Discharge       Viviana Boles MD  05/10/19  6:18 AM

## 2019-05-22 ENCOUNTER — HOSPITAL ENCOUNTER (INPATIENT)
Facility: HOSPITAL | Age: 49
LOS: 8 days | Discharge: HOME | End: 2019-05-30
Attending: FAMILY MEDICINE | Admitting: INTERNAL MEDICINE

## 2019-05-22 ENCOUNTER — APPOINTMENT (OUTPATIENT)
Dept: GENERAL RADIOLOGY | Facility: HOSPITAL | Age: 49
End: 2019-05-22

## 2019-05-22 ENCOUNTER — APPOINTMENT (OUTPATIENT)
Dept: CT IMAGING | Facility: HOSPITAL | Age: 49
End: 2019-05-22

## 2019-05-22 DIAGNOSIS — E87.6 HYPOKALEMIA: ICD-10-CM

## 2019-05-22 DIAGNOSIS — K85.90 ACUTE PANCREATITIS, UNSPECIFIED COMPLICATION STATUS, UNSPECIFIED PANCREATITIS TYPE: Primary | ICD-10-CM

## 2019-05-22 LAB
ALBUMIN SERPL-MCNC: 4.6 G/DL (ref 3.5–5)
ALBUMIN/GLOB SERPL: 1.6 G/DL (ref 1.1–2.5)
ALP SERPL-CCNC: 161 U/L (ref 24–120)
ALT SERPL W P-5'-P-CCNC: 74 U/L (ref 0–54)
ANION GAP SERPL CALCULATED.3IONS-SCNC: 14 MMOL/L (ref 4–13)
AST SERPL-CCNC: 70 U/L (ref 7–45)
BACTERIA UR QL AUTO: ABNORMAL /HPF
BASOPHILS # BLD AUTO: 0.03 10*3/MM3 (ref 0–0.2)
BASOPHILS NFR BLD AUTO: 0.3 % (ref 0–2)
BILIRUB SERPL-MCNC: 1.1 MG/DL (ref 0.1–1)
BILIRUB UR QL STRIP: NEGATIVE
BUN BLD-MCNC: 13 MG/DL (ref 5–21)
BUN/CREAT SERPL: 16.5 (ref 7–25)
CALCIUM SPEC-SCNC: 9.4 MG/DL (ref 8.4–10.4)
CHLORIDE SERPL-SCNC: 97 MMOL/L (ref 98–110)
CLARITY UR: CLEAR
CO2 SERPL-SCNC: 24 MMOL/L (ref 24–31)
COLOR UR: ABNORMAL
CREAT BLD-MCNC: 0.79 MG/DL (ref 0.5–1.4)
D-LACTATE SERPL-SCNC: 1.5 MMOL/L (ref 0.5–2)
DEPRECATED RDW RBC AUTO: 51.2 FL (ref 40–54)
EOSINOPHIL # BLD AUTO: 0.12 10*3/MM3 (ref 0–0.7)
EOSINOPHIL NFR BLD AUTO: 1.2 % (ref 0–4)
ERYTHROCYTE [DISTWIDTH] IN BLOOD BY AUTOMATED COUNT: 18.2 % (ref 12–15)
ETHANOL UR QL: <0.01 %
GFR SERPL CREATININE-BSD FRML MDRD: 78 ML/MIN/1.73
GLOBULIN UR ELPH-MCNC: 2.8 GM/DL
GLUCOSE BLD-MCNC: 167 MG/DL (ref 70–100)
GLUCOSE UR STRIP-MCNC: ABNORMAL MG/DL
HCT VFR BLD AUTO: 32.2 % (ref 37–47)
HGB BLD-MCNC: 11.3 G/DL (ref 12–16)
HGB UR QL STRIP.AUTO: NEGATIVE
HOLD SPECIMEN: NORMAL
HOLD SPECIMEN: NORMAL
HYALINE CASTS UR QL AUTO: ABNORMAL /LPF
IMM GRANULOCYTES # BLD AUTO: 0.04 10*3/MM3 (ref 0–0.05)
IMM GRANULOCYTES NFR BLD AUTO: 0.4 % (ref 0–5)
KETONES UR QL STRIP: NEGATIVE
LEUKOCYTE ESTERASE UR QL STRIP.AUTO: ABNORMAL
LIPASE SERPL-CCNC: 5794 U/L (ref 23–203)
LYMPHOCYTES # BLD AUTO: 1.67 10*3/MM3 (ref 0.72–4.86)
LYMPHOCYTES NFR BLD AUTO: 17.4 % (ref 15–45)
MCH RBC QN AUTO: 32.7 PG (ref 28–32)
MCHC RBC AUTO-ENTMCNC: 35.1 G/DL (ref 33–36)
MCV RBC AUTO: 93.1 FL (ref 82–98)
MONOCYTES # BLD AUTO: 0.43 10*3/MM3 (ref 0.19–1.3)
MONOCYTES NFR BLD AUTO: 4.5 % (ref 4–12)
NEUTROPHILS # BLD AUTO: 7.32 10*3/MM3 (ref 1.87–8.4)
NEUTROPHILS NFR BLD AUTO: 76.2 % (ref 39–78)
NITRITE UR QL STRIP: NEGATIVE
NRBC BLD AUTO-RTO: 0 /100 WBC (ref 0–0.2)
PH UR STRIP.AUTO: <=5 [PH] (ref 5–8)
PLATELET # BLD AUTO: 157 10*3/MM3 (ref 130–400)
PMV BLD AUTO: 10 FL (ref 6–12)
POTASSIUM BLD-SCNC: 2.5 MMOL/L (ref 3.5–5.3)
PROT SERPL-MCNC: 7.4 G/DL (ref 6.3–8.7)
PROT UR QL STRIP: NEGATIVE
RBC # BLD AUTO: 3.46 10*6/MM3 (ref 4.2–5.4)
RBC # UR: ABNORMAL /HPF
REF LAB TEST METHOD: ABNORMAL
SODIUM BLD-SCNC: 135 MMOL/L (ref 135–145)
SP GR UR STRIP: >1.03 (ref 1–1.03)
SQUAMOUS #/AREA URNS HPF: ABNORMAL /HPF
TROPONIN I SERPL-MCNC: <0.012 NG/ML (ref 0–0.03)
UROBILINOGEN UR QL STRIP: ABNORMAL
WBC NRBC COR # BLD: 9.61 10*3/MM3 (ref 4.8–10.8)
WBC UR QL AUTO: ABNORMAL /HPF
WHOLE BLOOD HOLD SPECIMEN: NORMAL
WHOLE BLOOD HOLD SPECIMEN: NORMAL

## 2019-05-22 PROCEDURE — 71045 X-RAY EXAM CHEST 1 VIEW: CPT

## 2019-05-22 PROCEDURE — 80053 COMPREHEN METABOLIC PANEL: CPT | Performed by: EMERGENCY MEDICINE

## 2019-05-22 PROCEDURE — 99284 EMERGENCY DEPT VISIT MOD MDM: CPT

## 2019-05-22 PROCEDURE — 25810000003 SODIUM CHLORIDE 0.9 % WITH KCL 20 MEQ 20-0.9 MEQ/L-% SOLUTION: Performed by: FAMILY MEDICINE

## 2019-05-22 PROCEDURE — 80307 DRUG TEST PRSMV CHEM ANLYZR: CPT | Performed by: EMERGENCY MEDICINE

## 2019-05-22 PROCEDURE — 93005 ELECTROCARDIOGRAM TRACING: CPT | Performed by: EMERGENCY MEDICINE

## 2019-05-22 PROCEDURE — 83605 ASSAY OF LACTIC ACID: CPT | Performed by: EMERGENCY MEDICINE

## 2019-05-22 PROCEDURE — 83690 ASSAY OF LIPASE: CPT | Performed by: EMERGENCY MEDICINE

## 2019-05-22 PROCEDURE — 25010000003 POTASSIUM CHLORIDE PER 2 MEQ: Performed by: PHYSICIAN ASSISTANT

## 2019-05-22 PROCEDURE — 25010000002 FENTANYL CITRATE (PF) 100 MCG/2ML SOLUTION: Performed by: EMERGENCY MEDICINE

## 2019-05-22 PROCEDURE — 87088 URINE BACTERIA CULTURE: CPT | Performed by: EMERGENCY MEDICINE

## 2019-05-22 PROCEDURE — 93010 ELECTROCARDIOGRAM REPORT: CPT | Performed by: INTERNAL MEDICINE

## 2019-05-22 PROCEDURE — 25010000002 MORPHINE PER 10 MG: Performed by: EMERGENCY MEDICINE

## 2019-05-22 PROCEDURE — 81001 URINALYSIS AUTO W/SCOPE: CPT | Performed by: EMERGENCY MEDICINE

## 2019-05-22 PROCEDURE — 94799 UNLISTED PULMONARY SVC/PX: CPT

## 2019-05-22 PROCEDURE — 25010000002 IOPAMIDOL 61 % SOLUTION: Performed by: EMERGENCY MEDICINE

## 2019-05-22 PROCEDURE — 74177 CT ABD & PELVIS W/CONTRAST: CPT

## 2019-05-22 PROCEDURE — 85025 COMPLETE CBC W/AUTO DIFF WBC: CPT | Performed by: EMERGENCY MEDICINE

## 2019-05-22 PROCEDURE — 84484 ASSAY OF TROPONIN QUANT: CPT | Performed by: EMERGENCY MEDICINE

## 2019-05-22 PROCEDURE — 87086 URINE CULTURE/COLONY COUNT: CPT | Performed by: EMERGENCY MEDICINE

## 2019-05-22 PROCEDURE — 87186 SC STD MICRODIL/AGAR DIL: CPT | Performed by: EMERGENCY MEDICINE

## 2019-05-22 PROCEDURE — 25010000002 ONDANSETRON PER 1 MG: Performed by: EMERGENCY MEDICINE

## 2019-05-22 PROCEDURE — 25010000002 CEFTRIAXONE PER 250 MG: Performed by: FAMILY MEDICINE

## 2019-05-22 RX ORDER — ONDANSETRON 2 MG/ML
4 INJECTION INTRAMUSCULAR; INTRAVENOUS EVERY 6 HOURS PRN
Status: DISCONTINUED | OUTPATIENT
Start: 2019-05-22 | End: 2019-05-30 | Stop reason: HOSPADM

## 2019-05-22 RX ORDER — ONDANSETRON 2 MG/ML
4 INJECTION INTRAMUSCULAR; INTRAVENOUS ONCE
Status: COMPLETED | OUTPATIENT
Start: 2019-05-22 | End: 2019-05-22

## 2019-05-22 RX ORDER — SODIUM CHLORIDE 0.9 % (FLUSH) 0.9 %
3 SYRINGE (ML) INJECTION EVERY 12 HOURS SCHEDULED
Status: DISCONTINUED | OUTPATIENT
Start: 2019-05-22 | End: 2019-05-30 | Stop reason: HOSPADM

## 2019-05-22 RX ORDER — POTASSIUM CHLORIDE 29.8 MG/ML
20 INJECTION INTRAVENOUS ONCE
Status: COMPLETED | OUTPATIENT
Start: 2019-05-22 | End: 2019-05-22

## 2019-05-22 RX ORDER — POTASSIUM CHLORIDE 14.9 MG/ML
20 INJECTION INTRAVENOUS ONCE
Status: COMPLETED | OUTPATIENT
Start: 2019-05-22 | End: 2019-05-23

## 2019-05-22 RX ORDER — FAMOTIDINE 10 MG/ML
20 INJECTION, SOLUTION INTRAVENOUS 2 TIMES DAILY
Status: DISCONTINUED | OUTPATIENT
Start: 2019-05-22 | End: 2019-05-30 | Stop reason: CLARIF

## 2019-05-22 RX ORDER — SODIUM CHLORIDE AND POTASSIUM CHLORIDE 150; 900 MG/100ML; MG/100ML
50 INJECTION, SOLUTION INTRAVENOUS CONTINUOUS
Status: DISCONTINUED | OUTPATIENT
Start: 2019-05-22 | End: 2019-05-30 | Stop reason: HOSPADM

## 2019-05-22 RX ORDER — FENTANYL CITRATE 50 UG/ML
75 INJECTION, SOLUTION INTRAMUSCULAR; INTRAVENOUS ONCE
Status: COMPLETED | OUTPATIENT
Start: 2019-05-22 | End: 2019-05-22

## 2019-05-22 RX ORDER — SODIUM CHLORIDE 0.9 % (FLUSH) 0.9 %
3-10 SYRINGE (ML) INJECTION AS NEEDED
Status: DISCONTINUED | OUTPATIENT
Start: 2019-05-22 | End: 2019-05-30 | Stop reason: HOSPADM

## 2019-05-22 RX ORDER — ENALAPRILAT 2.5 MG/2ML
1.25 INJECTION INTRAVENOUS EVERY 6 HOURS SCHEDULED
Status: DISCONTINUED | OUTPATIENT
Start: 2019-05-22 | End: 2019-05-25

## 2019-05-22 RX ADMIN — ENALAPRILAT 1.25 MG: 1.25 INJECTION INTRAVENOUS at 23:41

## 2019-05-22 RX ADMIN — HYDROMORPHONE HYDROCHLORIDE 1 MG: 1 INJECTION, SOLUTION INTRAMUSCULAR; INTRAVENOUS; SUBCUTANEOUS at 21:08

## 2019-05-22 RX ADMIN — HYDROMORPHONE HYDROCHLORIDE 1 MG: 1 INJECTION, SOLUTION INTRAMUSCULAR; INTRAVENOUS; SUBCUTANEOUS at 18:59

## 2019-05-22 RX ADMIN — ONDANSETRON HYDROCHLORIDE 4 MG: 2 INJECTION INTRAMUSCULAR; INTRAVENOUS at 17:55

## 2019-05-22 RX ADMIN — SODIUM CHLORIDE, PRESERVATIVE FREE 3 ML: 5 INJECTION INTRAVENOUS at 23:46

## 2019-05-22 RX ADMIN — HYDROMORPHONE HYDROCHLORIDE 1 MG: 1 INJECTION, SOLUTION INTRAMUSCULAR; INTRAVENOUS; SUBCUTANEOUS at 22:28

## 2019-05-22 RX ADMIN — FENTANYL CITRATE 75 MCG: 50 INJECTION INTRAMUSCULAR; INTRAVENOUS at 18:25

## 2019-05-22 RX ADMIN — POTASSIUM CHLORIDE AND SODIUM CHLORIDE 125 ML/HR: 900; 150 INJECTION, SOLUTION INTRAVENOUS at 23:39

## 2019-05-22 RX ADMIN — SODIUM CHLORIDE 1000 ML: 9 INJECTION, SOLUTION INTRAVENOUS at 18:02

## 2019-05-22 RX ADMIN — CEFTRIAXONE SODIUM 1 G: 1 INJECTION, POWDER, FOR SOLUTION INTRAMUSCULAR; INTRAVENOUS at 23:47

## 2019-05-22 RX ADMIN — MORPHINE SULFATE 4 MG: 4 INJECTION INTRAVENOUS at 17:55

## 2019-05-22 RX ADMIN — POTASSIUM CHLORIDE 20 MEQ: 29.8 INJECTION, SOLUTION INTRAVENOUS at 19:32

## 2019-05-22 RX ADMIN — FENTANYL CITRATE 75 MCG: 50 INJECTION INTRAMUSCULAR; INTRAVENOUS at 20:19

## 2019-05-22 RX ADMIN — IOPAMIDOL 92 ML: 612 INJECTION, SOLUTION INTRAVENOUS at 19:07

## 2019-05-23 ENCOUNTER — APPOINTMENT (OUTPATIENT)
Dept: ULTRASOUND IMAGING | Facility: HOSPITAL | Age: 49
End: 2019-05-23

## 2019-05-23 LAB
ALBUMIN SERPL-MCNC: 4 G/DL (ref 3.5–5)
ALBUMIN/GLOB SERPL: 1.7 G/DL (ref 1.1–2.5)
ALP SERPL-CCNC: 148 U/L (ref 24–120)
ALT SERPL W P-5'-P-CCNC: 60 U/L (ref 0–54)
AMYLASE SERPL-CCNC: 230 U/L (ref 30–110)
ANION GAP SERPL CALCULATED.3IONS-SCNC: 8 MMOL/L (ref 4–13)
ARTICHOKE IGE QN: 79 MG/DL (ref 0–99)
AST SERPL-CCNC: 51 U/L (ref 7–45)
BASOPHILS # BLD AUTO: 0.01 10*3/MM3 (ref 0–0.2)
BASOPHILS NFR BLD AUTO: 0.2 % (ref 0–2)
BILIRUB SERPL-MCNC: 0.7 MG/DL (ref 0.1–1)
BUN BLD-MCNC: 6 MG/DL (ref 5–21)
BUN/CREAT SERPL: 13.6 (ref 7–25)
CALCIUM SPEC-SCNC: 8.5 MG/DL (ref 8.4–10.4)
CHLORIDE SERPL-SCNC: 106 MMOL/L (ref 98–110)
CHOLEST SERPL-MCNC: 159 MG/DL (ref 130–200)
CO2 SERPL-SCNC: 23 MMOL/L (ref 24–31)
CREAT BLD-MCNC: 0.44 MG/DL (ref 0.5–1.4)
DEPRECATED RDW RBC AUTO: 52 FL (ref 40–54)
EOSINOPHIL # BLD AUTO: 0.12 10*3/MM3 (ref 0–0.7)
EOSINOPHIL NFR BLD AUTO: 1.9 % (ref 0–4)
ERYTHROCYTE [DISTWIDTH] IN BLOOD BY AUTOMATED COUNT: 17.3 % (ref 12–15)
GFR SERPL CREATININE-BSD FRML MDRD: >150 ML/MIN/1.73
GLOBULIN UR ELPH-MCNC: 2.3 GM/DL
GLUCOSE BLD-MCNC: 124 MG/DL (ref 70–100)
HBA1C MFR BLD: 5.6 %
HCT VFR BLD AUTO: 30 % (ref 37–47)
HDLC SERPL-MCNC: 67 MG/DL
HGB BLD-MCNC: 10.4 G/DL (ref 12–16)
IMM GRANULOCYTES # BLD AUTO: 0.04 10*3/MM3 (ref 0–0.05)
IMM GRANULOCYTES NFR BLD AUTO: 0.6 % (ref 0–5)
LDLC/HDLC SERPL: 0.93 {RATIO}
LIPASE SERPL-CCNC: 2004 U/L (ref 23–203)
LYMPHOCYTES # BLD AUTO: 1.13 10*3/MM3 (ref 0.72–4.86)
LYMPHOCYTES NFR BLD AUTO: 17.5 % (ref 15–45)
MAGNESIUM SERPL-MCNC: 1.9 MG/DL (ref 1.4–2.2)
MCH RBC QN AUTO: 32.8 PG (ref 28–32)
MCHC RBC AUTO-ENTMCNC: 34.7 G/DL (ref 33–36)
MCV RBC AUTO: 94.6 FL (ref 82–98)
MONOCYTES # BLD AUTO: 0.32 10*3/MM3 (ref 0.19–1.3)
MONOCYTES NFR BLD AUTO: 5 % (ref 4–12)
NEUTROPHILS # BLD AUTO: 4.84 10*3/MM3 (ref 1.87–8.4)
NEUTROPHILS NFR BLD AUTO: 74.8 % (ref 39–78)
NRBC BLD AUTO-RTO: 0 /100 WBC (ref 0–0.2)
PLATELET # BLD AUTO: 124 10*3/MM3 (ref 130–400)
PMV BLD AUTO: 9.2 FL (ref 6–12)
POTASSIUM BLD-SCNC: 3.5 MMOL/L (ref 3.5–5.3)
PROT SERPL-MCNC: 6.3 G/DL (ref 6.3–8.7)
RBC # BLD AUTO: 3.17 10*6/MM3 (ref 4.2–5.4)
SODIUM BLD-SCNC: 137 MMOL/L (ref 135–145)
TRIGL SERPL-MCNC: 147 MG/DL (ref 0–149)
TSH SERPL DL<=0.05 MIU/L-ACNC: 1.1 MIU/ML (ref 0.47–4.68)
WBC NRBC COR # BLD: 6.46 10*3/MM3 (ref 4.8–10.8)

## 2019-05-23 PROCEDURE — 76705 ECHO EXAM OF ABDOMEN: CPT

## 2019-05-23 PROCEDURE — 80053 COMPREHEN METABOLIC PANEL: CPT | Performed by: FAMILY MEDICINE

## 2019-05-23 PROCEDURE — 83735 ASSAY OF MAGNESIUM: CPT | Performed by: FAMILY MEDICINE

## 2019-05-23 PROCEDURE — 83036 HEMOGLOBIN GLYCOSYLATED A1C: CPT | Performed by: FAMILY MEDICINE

## 2019-05-23 PROCEDURE — 25010000002 POTASSIUM CHLORIDE PER 2 MEQ: Performed by: FAMILY MEDICINE

## 2019-05-23 PROCEDURE — 25810000003 SODIUM CHLORIDE 0.9 % WITH KCL 20 MEQ 20-0.9 MEQ/L-% SOLUTION: Performed by: FAMILY MEDICINE

## 2019-05-23 PROCEDURE — 94799 UNLISTED PULMONARY SVC/PX: CPT

## 2019-05-23 PROCEDURE — 25010000002 CEFTRIAXONE PER 250 MG: Performed by: FAMILY MEDICINE

## 2019-05-23 PROCEDURE — 82150 ASSAY OF AMYLASE: CPT | Performed by: FAMILY MEDICINE

## 2019-05-23 PROCEDURE — 83690 ASSAY OF LIPASE: CPT | Performed by: FAMILY MEDICINE

## 2019-05-23 PROCEDURE — 80061 LIPID PANEL: CPT | Performed by: FAMILY MEDICINE

## 2019-05-23 PROCEDURE — 84443 ASSAY THYROID STIM HORMONE: CPT | Performed by: FAMILY MEDICINE

## 2019-05-23 PROCEDURE — 85025 COMPLETE CBC W/AUTO DIFF WBC: CPT | Performed by: FAMILY MEDICINE

## 2019-05-23 PROCEDURE — 94760 N-INVAS EAR/PLS OXIMETRY 1: CPT

## 2019-05-23 RX ORDER — MEPERIDINE HYDROCHLORIDE 25 MG/ML
25 INJECTION INTRAMUSCULAR; INTRAVENOUS; SUBCUTANEOUS EVERY 4 HOURS PRN
Status: DISCONTINUED | OUTPATIENT
Start: 2019-05-23 | End: 2019-05-25

## 2019-05-23 RX ORDER — MEPERIDINE HYDROCHLORIDE 25 MG/ML
25 INJECTION INTRAMUSCULAR; INTRAVENOUS; SUBCUTANEOUS ONCE
Status: COMPLETED | OUTPATIENT
Start: 2019-05-23 | End: 2019-05-23

## 2019-05-23 RX ADMIN — SODIUM CHLORIDE, PRESERVATIVE FREE 10 ML: 5 INJECTION INTRAVENOUS at 21:30

## 2019-05-23 RX ADMIN — ENALAPRILAT 1.25 MG: 1.25 INJECTION INTRAVENOUS at 23:29

## 2019-05-23 RX ADMIN — HYDROMORPHONE HYDROCHLORIDE 1 MG: 1 INJECTION, SOLUTION INTRAMUSCULAR; INTRAVENOUS; SUBCUTANEOUS at 19:37

## 2019-05-23 RX ADMIN — ENALAPRILAT 1.25 MG: 1.25 INJECTION INTRAVENOUS at 12:36

## 2019-05-23 RX ADMIN — FAMOTIDINE 20 MG: 10 INJECTION INTRAVENOUS at 00:09

## 2019-05-23 RX ADMIN — HYDROMORPHONE HYDROCHLORIDE 1 MG: 1 INJECTION, SOLUTION INTRAMUSCULAR; INTRAVENOUS; SUBCUTANEOUS at 11:16

## 2019-05-23 RX ADMIN — HYDROMORPHONE HYDROCHLORIDE 1 MG: 1 INJECTION, SOLUTION INTRAMUSCULAR; INTRAVENOUS; SUBCUTANEOUS at 23:29

## 2019-05-23 RX ADMIN — HYDROMORPHONE HYDROCHLORIDE 1 MG: 1 INJECTION, SOLUTION INTRAMUSCULAR; INTRAVENOUS; SUBCUTANEOUS at 01:13

## 2019-05-23 RX ADMIN — FAMOTIDINE 20 MG: 10 INJECTION INTRAVENOUS at 08:33

## 2019-05-23 RX ADMIN — POTASSIUM CHLORIDE AND SODIUM CHLORIDE 125 ML/HR: 900; 150 INJECTION, SOLUTION INTRAVENOUS at 16:16

## 2019-05-23 RX ADMIN — FAMOTIDINE 20 MG: 10 INJECTION INTRAVENOUS at 21:17

## 2019-05-23 RX ADMIN — HYDROMORPHONE HYDROCHLORIDE 1 MG: 1 INJECTION, SOLUTION INTRAMUSCULAR; INTRAVENOUS; SUBCUTANEOUS at 05:14

## 2019-05-23 RX ADMIN — SODIUM CHLORIDE, PRESERVATIVE FREE 10 ML: 5 INJECTION INTRAVENOUS at 23:30

## 2019-05-23 RX ADMIN — HYDROMORPHONE HYDROCHLORIDE 1 MG: 1 INJECTION, SOLUTION INTRAMUSCULAR; INTRAVENOUS; SUBCUTANEOUS at 17:36

## 2019-05-23 RX ADMIN — SODIUM CHLORIDE, PRESERVATIVE FREE 3 ML: 5 INJECTION INTRAVENOUS at 21:18

## 2019-05-23 RX ADMIN — SODIUM CHLORIDE, PRESERVATIVE FREE 10 ML: 5 INJECTION INTRAVENOUS at 19:38

## 2019-05-23 RX ADMIN — HYDROMORPHONE HYDROCHLORIDE 1 MG: 1 INJECTION, SOLUTION INTRAMUSCULAR; INTRAVENOUS; SUBCUTANEOUS at 07:02

## 2019-05-23 RX ADMIN — SODIUM CHLORIDE, PRESERVATIVE FREE 3 ML: 5 INJECTION INTRAVENOUS at 08:33

## 2019-05-23 RX ADMIN — HYDROMORPHONE HYDROCHLORIDE 1 MG: 1 INJECTION, SOLUTION INTRAMUSCULAR; INTRAVENOUS; SUBCUTANEOUS at 21:30

## 2019-05-23 RX ADMIN — MEPERIDINE HYDROCHLORIDE 25 MG: 25 INJECTION INTRAMUSCULAR; INTRAVENOUS; SUBCUTANEOUS at 00:08

## 2019-05-23 RX ADMIN — POTASSIUM CHLORIDE 20 MEQ: 14.9 INJECTION, SOLUTION INTRAVENOUS at 00:55

## 2019-05-23 RX ADMIN — CEFTRIAXONE SODIUM 1 G: 1 INJECTION, POWDER, FOR SOLUTION INTRAMUSCULAR; INTRAVENOUS at 23:28

## 2019-05-23 RX ADMIN — HYDROMORPHONE HYDROCHLORIDE 1 MG: 1 INJECTION, SOLUTION INTRAMUSCULAR; INTRAVENOUS; SUBCUTANEOUS at 15:38

## 2019-05-23 RX ADMIN — POTASSIUM CHLORIDE AND SODIUM CHLORIDE 125 ML/HR: 900; 150 INJECTION, SOLUTION INTRAVENOUS at 07:02

## 2019-05-23 RX ADMIN — ENALAPRILAT 1.25 MG: 1.25 INJECTION INTRAVENOUS at 17:36

## 2019-05-23 RX ADMIN — HYDROMORPHONE HYDROCHLORIDE 1 MG: 1 INJECTION, SOLUTION INTRAMUSCULAR; INTRAVENOUS; SUBCUTANEOUS at 13:35

## 2019-05-23 RX ADMIN — ENALAPRILAT 1.25 MG: 1.25 INJECTION INTRAVENOUS at 05:14

## 2019-05-23 RX ADMIN — HYDROMORPHONE HYDROCHLORIDE 1 MG: 1 INJECTION, SOLUTION INTRAMUSCULAR; INTRAVENOUS; SUBCUTANEOUS at 09:04

## 2019-05-23 RX ADMIN — HYDROMORPHONE HYDROCHLORIDE 1 MG: 1 INJECTION, SOLUTION INTRAMUSCULAR; INTRAVENOUS; SUBCUTANEOUS at 03:11

## 2019-05-24 PROBLEM — N39.0 E. COLI UTI (URINARY TRACT INFECTION): Status: ACTIVE | Noted: 2019-05-24

## 2019-05-24 PROBLEM — B96.20 E. COLI UTI (URINARY TRACT INFECTION): Status: ACTIVE | Noted: 2019-05-24

## 2019-05-24 LAB
ALBUMIN SERPL-MCNC: 3.6 G/DL (ref 3.5–5)
ALBUMIN/GLOB SERPL: 1.5 G/DL (ref 1.1–2.5)
ALP SERPL-CCNC: 114 U/L (ref 24–120)
ALT SERPL W P-5'-P-CCNC: 44 U/L (ref 0–54)
AMYLASE SERPL-CCNC: 74 U/L (ref 30–110)
ANION GAP SERPL CALCULATED.3IONS-SCNC: 6 MMOL/L (ref 4–13)
AST SERPL-CCNC: 39 U/L (ref 7–45)
BACTERIA SPEC AEROBE CULT: ABNORMAL
BASOPHILS # BLD AUTO: 0.02 10*3/MM3 (ref 0–0.2)
BASOPHILS NFR BLD AUTO: 0.6 % (ref 0–2)
BILIRUB SERPL-MCNC: 0.5 MG/DL (ref 0.1–1)
BUN BLD-MCNC: 4 MG/DL (ref 5–21)
BUN/CREAT SERPL: 11.1 (ref 7–25)
CALCIUM SPEC-SCNC: 8.4 MG/DL (ref 8.4–10.4)
CHLORIDE SERPL-SCNC: 106 MMOL/L (ref 98–110)
CO2 SERPL-SCNC: 27 MMOL/L (ref 24–31)
CREAT BLD-MCNC: 0.36 MG/DL (ref 0.5–1.4)
DEPRECATED RDW RBC AUTO: 53.9 FL (ref 40–54)
EOSINOPHIL # BLD AUTO: 0.08 10*3/MM3 (ref 0–0.7)
EOSINOPHIL NFR BLD AUTO: 2.3 % (ref 0–4)
ERYTHROCYTE [DISTWIDTH] IN BLOOD BY AUTOMATED COUNT: 17.1 % (ref 12–15)
GFR SERPL CREATININE-BSD FRML MDRD: >150 ML/MIN/1.73
GLOBULIN UR ELPH-MCNC: 2.4 GM/DL
GLUCOSE BLD-MCNC: 147 MG/DL (ref 70–100)
HCT VFR BLD AUTO: 27.3 % (ref 37–47)
HGB BLD-MCNC: 9.4 G/DL (ref 12–16)
IMM GRANULOCYTES # BLD AUTO: 0.01 10*3/MM3 (ref 0–0.05)
IMM GRANULOCYTES NFR BLD AUTO: 0.3 % (ref 0–5)
LIPASE SERPL-CCNC: 899 U/L (ref 23–203)
LYMPHOCYTES # BLD AUTO: 1.05 10*3/MM3 (ref 0.72–4.86)
LYMPHOCYTES NFR BLD AUTO: 30.2 % (ref 15–45)
MCH RBC QN AUTO: 33 PG (ref 28–32)
MCHC RBC AUTO-ENTMCNC: 34.4 G/DL (ref 33–36)
MCV RBC AUTO: 95.8 FL (ref 82–98)
MONOCYTES # BLD AUTO: 0.23 10*3/MM3 (ref 0.19–1.3)
MONOCYTES NFR BLD AUTO: 6.6 % (ref 4–12)
NEUTROPHILS # BLD AUTO: 2.09 10*3/MM3 (ref 1.87–8.4)
NEUTROPHILS NFR BLD AUTO: 60 % (ref 39–78)
NRBC BLD AUTO-RTO: 0 /100 WBC (ref 0–0.2)
PLATELET # BLD AUTO: 112 10*3/MM3 (ref 130–400)
PMV BLD AUTO: 9.5 FL (ref 6–12)
POTASSIUM BLD-SCNC: 3.4 MMOL/L (ref 3.5–5.3)
PROT SERPL-MCNC: 6 G/DL (ref 6.3–8.7)
RBC # BLD AUTO: 2.85 10*6/MM3 (ref 4.2–5.4)
SODIUM BLD-SCNC: 139 MMOL/L (ref 135–145)
WBC NRBC COR # BLD: 3.48 10*3/MM3 (ref 4.8–10.8)

## 2019-05-24 PROCEDURE — 85025 COMPLETE CBC W/AUTO DIFF WBC: CPT | Performed by: NURSE PRACTITIONER

## 2019-05-24 PROCEDURE — 25010000002 CEFTRIAXONE PER 250 MG: Performed by: FAMILY MEDICINE

## 2019-05-24 PROCEDURE — 80053 COMPREHEN METABOLIC PANEL: CPT | Performed by: NURSE PRACTITIONER

## 2019-05-24 PROCEDURE — 25010000002 POTASSIUM CHLORIDE PER 2 MEQ: Performed by: NURSE PRACTITIONER

## 2019-05-24 PROCEDURE — 82150 ASSAY OF AMYLASE: CPT | Performed by: NURSE PRACTITIONER

## 2019-05-24 PROCEDURE — 83690 ASSAY OF LIPASE: CPT | Performed by: NURSE PRACTITIONER

## 2019-05-24 PROCEDURE — 25810000003 SODIUM CHLORIDE 0.9 % WITH KCL 20 MEQ 20-0.9 MEQ/L-% SOLUTION: Performed by: FAMILY MEDICINE

## 2019-05-24 RX ORDER — POTASSIUM CHLORIDE 14.9 MG/ML
20 INJECTION INTRAVENOUS ONCE
Status: COMPLETED | OUTPATIENT
Start: 2019-05-24 | End: 2019-05-24

## 2019-05-24 RX ADMIN — HYDROMORPHONE HYDROCHLORIDE 1 MG: 1 INJECTION, SOLUTION INTRAMUSCULAR; INTRAVENOUS; SUBCUTANEOUS at 03:38

## 2019-05-24 RX ADMIN — CEFTRIAXONE SODIUM 1 G: 1 INJECTION, POWDER, FOR SOLUTION INTRAMUSCULAR; INTRAVENOUS at 22:28

## 2019-05-24 RX ADMIN — HYDROMORPHONE HYDROCHLORIDE 1 MG: 1 INJECTION, SOLUTION INTRAMUSCULAR; INTRAVENOUS; SUBCUTANEOUS at 21:20

## 2019-05-24 RX ADMIN — POTASSIUM CHLORIDE 20 MEQ: 14.9 INJECTION, SOLUTION INTRAVENOUS at 09:57

## 2019-05-24 RX ADMIN — HYDROMORPHONE HYDROCHLORIDE 1 MG: 1 INJECTION, SOLUTION INTRAMUSCULAR; INTRAVENOUS; SUBCUTANEOUS at 01:30

## 2019-05-24 RX ADMIN — HYDROMORPHONE HYDROCHLORIDE 1 MG: 1 INJECTION, SOLUTION INTRAMUSCULAR; INTRAVENOUS; SUBCUTANEOUS at 05:37

## 2019-05-24 RX ADMIN — FAMOTIDINE 20 MG: 10 INJECTION INTRAVENOUS at 07:57

## 2019-05-24 RX ADMIN — ENALAPRILAT 1.25 MG: 1.25 INJECTION INTRAVENOUS at 05:37

## 2019-05-24 RX ADMIN — SODIUM CHLORIDE, PRESERVATIVE FREE 3 ML: 5 INJECTION INTRAVENOUS at 09:57

## 2019-05-24 RX ADMIN — HYDROMORPHONE HYDROCHLORIDE 1 MG: 1 INJECTION, SOLUTION INTRAMUSCULAR; INTRAVENOUS; SUBCUTANEOUS at 07:56

## 2019-05-24 RX ADMIN — HYDROMORPHONE HYDROCHLORIDE 1 MG: 1 INJECTION, SOLUTION INTRAMUSCULAR; INTRAVENOUS; SUBCUTANEOUS at 14:04

## 2019-05-24 RX ADMIN — MEPERIDINE HYDROCHLORIDE 25 MG: 25 INJECTION INTRAMUSCULAR; INTRAVENOUS; SUBCUTANEOUS at 00:17

## 2019-05-24 RX ADMIN — HYDROMORPHONE HYDROCHLORIDE 1 MG: 1 INJECTION, SOLUTION INTRAMUSCULAR; INTRAVENOUS; SUBCUTANEOUS at 12:01

## 2019-05-24 RX ADMIN — HYDROMORPHONE HYDROCHLORIDE 1 MG: 1 INJECTION, SOLUTION INTRAMUSCULAR; INTRAVENOUS; SUBCUTANEOUS at 09:57

## 2019-05-24 RX ADMIN — FAMOTIDINE 20 MG: 10 INJECTION INTRAVENOUS at 22:28

## 2019-05-24 RX ADMIN — POTASSIUM CHLORIDE AND SODIUM CHLORIDE 125 ML/HR: 900; 150 INJECTION, SOLUTION INTRAVENOUS at 09:33

## 2019-05-24 RX ADMIN — ENALAPRILAT 1.25 MG: 1.25 INJECTION INTRAVENOUS at 12:01

## 2019-05-24 RX ADMIN — SODIUM CHLORIDE, PRESERVATIVE FREE 10 ML: 5 INJECTION INTRAVENOUS at 03:39

## 2019-05-24 RX ADMIN — SODIUM CHLORIDE, PRESERVATIVE FREE 10 ML: 5 INJECTION INTRAVENOUS at 00:18

## 2019-05-24 RX ADMIN — MEPERIDINE HYDROCHLORIDE 25 MG: 25 INJECTION INTRAMUSCULAR; INTRAVENOUS; SUBCUTANEOUS at 22:29

## 2019-05-24 RX ADMIN — POTASSIUM CHLORIDE AND SODIUM CHLORIDE 125 ML/HR: 900; 150 INJECTION, SOLUTION INTRAVENOUS at 01:33

## 2019-05-24 RX ADMIN — SODIUM CHLORIDE, PRESERVATIVE FREE 10 ML: 5 INJECTION INTRAVENOUS at 01:31

## 2019-05-25 LAB
ANION GAP SERPL CALCULATED.3IONS-SCNC: 7 MMOL/L (ref 4–13)
BUN BLD-MCNC: 3 MG/DL (ref 5–21)
BUN/CREAT SERPL: 7.7 (ref 7–25)
CALCIUM SPEC-SCNC: 8.6 MG/DL (ref 8.4–10.4)
CHLORIDE SERPL-SCNC: 110 MMOL/L (ref 98–110)
CO2 SERPL-SCNC: 24 MMOL/L (ref 24–31)
CREAT BLD-MCNC: 0.39 MG/DL (ref 0.5–1.4)
DEPRECATED RDW RBC AUTO: 51.8 FL (ref 40–54)
ERYTHROCYTE [DISTWIDTH] IN BLOOD BY AUTOMATED COUNT: 16.9 % (ref 12–15)
GFR SERPL CREATININE-BSD FRML MDRD: >150 ML/MIN/1.73
GLUCOSE BLD-MCNC: 112 MG/DL (ref 70–100)
HCT VFR BLD AUTO: 26.6 % (ref 37–47)
HGB BLD-MCNC: 9.3 G/DL (ref 12–16)
LIPASE SERPL-CCNC: 577 U/L (ref 23–203)
MCH RBC QN AUTO: 32.7 PG (ref 28–32)
MCHC RBC AUTO-ENTMCNC: 35 G/DL (ref 33–36)
MCV RBC AUTO: 93.7 FL (ref 82–98)
PLATELET # BLD AUTO: 122 10*3/MM3 (ref 130–400)
PMV BLD AUTO: 10.2 FL (ref 6–12)
POTASSIUM BLD-SCNC: 4.2 MMOL/L (ref 3.5–5.3)
RBC # BLD AUTO: 2.84 10*6/MM3 (ref 4.2–5.4)
SODIUM BLD-SCNC: 141 MMOL/L (ref 135–145)
WBC NRBC COR # BLD: 3.18 10*3/MM3 (ref 4.8–10.8)

## 2019-05-25 PROCEDURE — 85027 COMPLETE CBC AUTOMATED: CPT | Performed by: NURSE PRACTITIONER

## 2019-05-25 PROCEDURE — 80048 BASIC METABOLIC PNL TOTAL CA: CPT | Performed by: NURSE PRACTITIONER

## 2019-05-25 PROCEDURE — 25810000003 SODIUM CHLORIDE 0.9 % WITH KCL 20 MEQ 20-0.9 MEQ/L-% SOLUTION: Performed by: FAMILY MEDICINE

## 2019-05-25 PROCEDURE — 25810000003 SODIUM CHLORIDE 0.9 % WITH KCL 20 MEQ 20-0.9 MEQ/L-% SOLUTION: Performed by: NURSE PRACTITIONER

## 2019-05-25 PROCEDURE — 83690 ASSAY OF LIPASE: CPT | Performed by: NURSE PRACTITIONER

## 2019-05-25 RX ORDER — HYDROCODONE BITARTRATE AND ACETAMINOPHEN 7.5; 325 MG/1; MG/1
1 TABLET ORAL EVERY 6 HOURS PRN
Status: DISCONTINUED | OUTPATIENT
Start: 2019-05-25 | End: 2019-05-25

## 2019-05-25 RX ORDER — VALSARTAN 80 MG/1
160 TABLET ORAL DAILY
Status: DISCONTINUED | OUTPATIENT
Start: 2019-05-25 | End: 2019-05-27

## 2019-05-25 RX ORDER — OXYCODONE AND ACETAMINOPHEN 7.5; 325 MG/1; MG/1
1 TABLET ORAL EVERY 6 HOURS PRN
Status: DISCONTINUED | OUTPATIENT
Start: 2019-05-25 | End: 2019-05-26

## 2019-05-25 RX ORDER — GABAPENTIN 300 MG/1
600 CAPSULE ORAL EVERY 8 HOURS SCHEDULED
Status: DISCONTINUED | OUTPATIENT
Start: 2019-05-25 | End: 2019-05-27

## 2019-05-25 RX ORDER — VENLAFAXINE HYDROCHLORIDE 75 MG/1
150 CAPSULE, EXTENDED RELEASE ORAL DAILY
Status: DISCONTINUED | OUTPATIENT
Start: 2019-05-25 | End: 2019-05-27

## 2019-05-25 RX ADMIN — SODIUM CHLORIDE, PRESERVATIVE FREE 3 ML: 5 INJECTION INTRAVENOUS at 08:50

## 2019-05-25 RX ADMIN — VALSARTAN 160 MG: 80 TABLET, FILM COATED ORAL at 11:00

## 2019-05-25 RX ADMIN — HYDROMORPHONE HYDROCHLORIDE 1 MG: 1 INJECTION, SOLUTION INTRAMUSCULAR; INTRAVENOUS; SUBCUTANEOUS at 19:09

## 2019-05-25 RX ADMIN — HYDROMORPHONE HYDROCHLORIDE 1 MG: 1 INJECTION, SOLUTION INTRAMUSCULAR; INTRAVENOUS; SUBCUTANEOUS at 02:45

## 2019-05-25 RX ADMIN — VENLAFAXINE HYDROCHLORIDE 150 MG: 75 CAPSULE, EXTENDED RELEASE ORAL at 11:00

## 2019-05-25 RX ADMIN — HYDROCODONE BITARTRATE AND ACETAMINOPHEN 1 TABLET: 7.5; 325 TABLET ORAL at 13:25

## 2019-05-25 RX ADMIN — GABAPENTIN 600 MG: 300 CAPSULE ORAL at 13:25

## 2019-05-25 RX ADMIN — HYDROMORPHONE HYDROCHLORIDE 1 MG: 1 INJECTION, SOLUTION INTRAMUSCULAR; INTRAVENOUS; SUBCUTANEOUS at 00:37

## 2019-05-25 RX ADMIN — FAMOTIDINE 20 MG: 10 INJECTION INTRAVENOUS at 21:01

## 2019-05-25 RX ADMIN — HYDROMORPHONE HYDROCHLORIDE 1 MG: 1 INJECTION, SOLUTION INTRAMUSCULAR; INTRAVENOUS; SUBCUTANEOUS at 10:57

## 2019-05-25 RX ADMIN — HYDROMORPHONE HYDROCHLORIDE 1 MG: 1 INJECTION, SOLUTION INTRAMUSCULAR; INTRAVENOUS; SUBCUTANEOUS at 23:08

## 2019-05-25 RX ADMIN — HYDROMORPHONE HYDROCHLORIDE 1 MG: 1 INJECTION, SOLUTION INTRAMUSCULAR; INTRAVENOUS; SUBCUTANEOUS at 08:50

## 2019-05-25 RX ADMIN — HYDROMORPHONE HYDROCHLORIDE 1 MG: 1 INJECTION, SOLUTION INTRAMUSCULAR; INTRAVENOUS; SUBCUTANEOUS at 06:10

## 2019-05-25 RX ADMIN — POTASSIUM CHLORIDE AND SODIUM CHLORIDE 125 ML/HR: 900; 150 INJECTION, SOLUTION INTRAVENOUS at 00:35

## 2019-05-25 RX ADMIN — GABAPENTIN 600 MG: 300 CAPSULE ORAL at 21:00

## 2019-05-25 RX ADMIN — POTASSIUM CHLORIDE AND SODIUM CHLORIDE 75 ML/HR: 900; 150 INJECTION, SOLUTION INTRAVENOUS at 13:25

## 2019-05-25 RX ADMIN — ENALAPRILAT 1.25 MG: 1.25 INJECTION INTRAVENOUS at 01:10

## 2019-05-25 RX ADMIN — ENALAPRILAT 1.25 MG: 1.25 INJECTION INTRAVENOUS at 06:13

## 2019-05-25 RX ADMIN — FAMOTIDINE 20 MG: 10 INJECTION INTRAVENOUS at 08:50

## 2019-05-25 RX ADMIN — HYDROMORPHONE HYDROCHLORIDE 1 MG: 1 INJECTION, SOLUTION INTRAMUSCULAR; INTRAVENOUS; SUBCUTANEOUS at 15:15

## 2019-05-26 PROBLEM — K85.90 PANCYTOPENIA ASSOCIATED WITH PANCREATITIS (HCC): Status: ACTIVE | Noted: 2019-02-14

## 2019-05-26 LAB
ANION GAP SERPL CALCULATED.3IONS-SCNC: 7 MMOL/L (ref 4–13)
BUN BLD-MCNC: 2 MG/DL (ref 5–21)
BUN/CREAT SERPL: 4.7 (ref 7–25)
CALCIUM SPEC-SCNC: 9.1 MG/DL (ref 8.4–10.4)
CHLORIDE SERPL-SCNC: 106 MMOL/L (ref 98–110)
CO2 SERPL-SCNC: 26 MMOL/L (ref 24–31)
CREAT BLD-MCNC: 0.43 MG/DL (ref 0.5–1.4)
DEPRECATED RDW RBC AUTO: 55.3 FL (ref 40–54)
ERYTHROCYTE [DISTWIDTH] IN BLOOD BY AUTOMATED COUNT: 16.7 % (ref 12–15)
GFR SERPL CREATININE-BSD FRML MDRD: >150 ML/MIN/1.73
GLUCOSE BLD-MCNC: 125 MG/DL (ref 70–100)
HCT VFR BLD AUTO: 29.1 % (ref 37–47)
HGB BLD-MCNC: 10 G/DL (ref 12–16)
LIPASE SERPL-CCNC: 610 U/L (ref 23–203)
MCH RBC QN AUTO: 32.9 PG (ref 28–32)
MCHC RBC AUTO-ENTMCNC: 34.4 G/DL (ref 33–36)
MCV RBC AUTO: 95.7 FL (ref 82–98)
PLATELET # BLD AUTO: 127 10*3/MM3 (ref 130–400)
PMV BLD AUTO: 11 FL (ref 6–12)
POTASSIUM BLD-SCNC: 4.3 MMOL/L (ref 3.5–5.3)
RBC # BLD AUTO: 3.04 10*6/MM3 (ref 4.2–5.4)
SODIUM BLD-SCNC: 139 MMOL/L (ref 135–145)
WBC NRBC COR # BLD: 2.76 10*3/MM3 (ref 4.8–10.8)

## 2019-05-26 PROCEDURE — 25010000002 HYDROMORPHONE PER 4 MG: Performed by: NURSE PRACTITIONER

## 2019-05-26 PROCEDURE — 80048 BASIC METABOLIC PNL TOTAL CA: CPT | Performed by: NURSE PRACTITIONER

## 2019-05-26 PROCEDURE — 85027 COMPLETE CBC AUTOMATED: CPT | Performed by: NURSE PRACTITIONER

## 2019-05-26 PROCEDURE — 83690 ASSAY OF LIPASE: CPT | Performed by: NURSE PRACTITIONER

## 2019-05-26 PROCEDURE — 25810000003 SODIUM CHLORIDE 0.9 % WITH KCL 20 MEQ 20-0.9 MEQ/L-% SOLUTION: Performed by: NURSE PRACTITIONER

## 2019-05-26 RX ORDER — SACCHAROMYCES BOULARDII 250 MG
250 CAPSULE ORAL 2 TIMES DAILY
Status: DISCONTINUED | OUTPATIENT
Start: 2019-05-26 | End: 2019-05-27

## 2019-05-26 RX ORDER — HYDROMORPHONE HYDROCHLORIDE 1 MG/ML
0.5 INJECTION, SOLUTION INTRAMUSCULAR; INTRAVENOUS; SUBCUTANEOUS EVERY 4 HOURS PRN
Status: DISCONTINUED | OUTPATIENT
Start: 2019-05-26 | End: 2019-05-27

## 2019-05-26 RX ORDER — OXYCODONE AND ACETAMINOPHEN 7.5; 325 MG/1; MG/1
1 TABLET ORAL EVERY 4 HOURS PRN
Status: DISCONTINUED | OUTPATIENT
Start: 2019-05-26 | End: 2019-05-27

## 2019-05-26 RX ADMIN — FAMOTIDINE 20 MG: 10 INJECTION INTRAVENOUS at 22:09

## 2019-05-26 RX ADMIN — POTASSIUM CHLORIDE AND SODIUM CHLORIDE 75 ML/HR: 900; 150 INJECTION, SOLUTION INTRAVENOUS at 05:21

## 2019-05-26 RX ADMIN — FAMOTIDINE 20 MG: 10 INJECTION INTRAVENOUS at 08:17

## 2019-05-26 RX ADMIN — HYDROMORPHONE HYDROCHLORIDE 1 MG: 1 INJECTION, SOLUTION INTRAMUSCULAR; INTRAVENOUS; SUBCUTANEOUS at 04:47

## 2019-05-26 RX ADMIN — HYDROMORPHONE HYDROCHLORIDE 0.5 MG: 1 INJECTION, SOLUTION INTRAMUSCULAR; INTRAVENOUS; SUBCUTANEOUS at 19:47

## 2019-05-26 RX ADMIN — OXYCODONE HYDROCHLORIDE AND ACETAMINOPHEN 1 TABLET: 7.5; 325 TABLET ORAL at 12:18

## 2019-05-26 RX ADMIN — Medication 250 MG: at 10:17

## 2019-05-26 RX ADMIN — OXYCODONE HYDROCHLORIDE AND ACETAMINOPHEN 1 TABLET: 7.5; 325 TABLET ORAL at 16:51

## 2019-05-26 RX ADMIN — Medication 250 MG: at 22:08

## 2019-05-26 RX ADMIN — GABAPENTIN 600 MG: 300 CAPSULE ORAL at 05:58

## 2019-05-26 RX ADMIN — VENLAFAXINE HYDROCHLORIDE 150 MG: 75 CAPSULE, EXTENDED RELEASE ORAL at 08:17

## 2019-05-26 RX ADMIN — OXYCODONE HYDROCHLORIDE AND ACETAMINOPHEN 1 TABLET: 7.5; 325 TABLET ORAL at 08:17

## 2019-05-26 RX ADMIN — POTASSIUM CHLORIDE AND SODIUM CHLORIDE 75 ML/HR: 900; 150 INJECTION, SOLUTION INTRAVENOUS at 19:41

## 2019-05-26 RX ADMIN — OXYCODONE HYDROCHLORIDE AND ACETAMINOPHEN 1 TABLET: 7.5; 325 TABLET ORAL at 22:08

## 2019-05-26 RX ADMIN — GABAPENTIN 600 MG: 300 CAPSULE ORAL at 14:14

## 2019-05-26 RX ADMIN — VALSARTAN 160 MG: 80 TABLET, FILM COATED ORAL at 08:17

## 2019-05-26 RX ADMIN — HYDROMORPHONE HYDROCHLORIDE 0.5 MG: 1 INJECTION, SOLUTION INTRAMUSCULAR; INTRAVENOUS; SUBCUTANEOUS at 10:17

## 2019-05-26 RX ADMIN — GABAPENTIN 600 MG: 300 CAPSULE ORAL at 22:08

## 2019-05-27 LAB
ANION GAP SERPL CALCULATED.3IONS-SCNC: 6 MMOL/L (ref 4–13)
BUN BLD-MCNC: 3 MG/DL (ref 5–21)
BUN/CREAT SERPL: 7.5 (ref 7–25)
CALCIUM SPEC-SCNC: 8.8 MG/DL (ref 8.4–10.4)
CHLORIDE SERPL-SCNC: 110 MMOL/L (ref 98–110)
CO2 SERPL-SCNC: 25 MMOL/L (ref 24–31)
CREAT BLD-MCNC: 0.4 MG/DL (ref 0.5–1.4)
DEPRECATED RDW RBC AUTO: 53.4 FL (ref 40–54)
ERYTHROCYTE [DISTWIDTH] IN BLOOD BY AUTOMATED COUNT: 16.4 % (ref 12–15)
GFR SERPL CREATININE-BSD FRML MDRD: >150 ML/MIN/1.73
GLUCOSE BLD-MCNC: 108 MG/DL (ref 70–100)
HCT VFR BLD AUTO: 27.2 % (ref 37–47)
HGB BLD-MCNC: 9.4 G/DL (ref 12–16)
LIPASE SERPL-CCNC: 550 U/L (ref 23–203)
MCH RBC QN AUTO: 32.5 PG (ref 28–32)
MCHC RBC AUTO-ENTMCNC: 34.6 G/DL (ref 33–36)
MCV RBC AUTO: 94.1 FL (ref 82–98)
PLATELET # BLD AUTO: 173 10*3/MM3 (ref 130–400)
PMV BLD AUTO: 9.6 FL (ref 6–12)
POTASSIUM BLD-SCNC: 4.4 MMOL/L (ref 3.5–5.3)
RBC # BLD AUTO: 2.89 10*6/MM3 (ref 4.2–5.4)
SODIUM BLD-SCNC: 141 MMOL/L (ref 135–145)
WBC NRBC COR # BLD: 3.08 10*3/MM3 (ref 4.8–10.8)

## 2019-05-27 PROCEDURE — 85027 COMPLETE CBC AUTOMATED: CPT | Performed by: NURSE PRACTITIONER

## 2019-05-27 PROCEDURE — 83690 ASSAY OF LIPASE: CPT | Performed by: NURSE PRACTITIONER

## 2019-05-27 PROCEDURE — 80048 BASIC METABOLIC PNL TOTAL CA: CPT | Performed by: NURSE PRACTITIONER

## 2019-05-27 PROCEDURE — 25810000003 SODIUM CHLORIDE 0.9 % WITH KCL 20 MEQ 20-0.9 MEQ/L-% SOLUTION: Performed by: NURSE PRACTITIONER

## 2019-05-27 PROCEDURE — 25010000002 ONDANSETRON PER 1 MG: Performed by: FAMILY MEDICINE

## 2019-05-27 PROCEDURE — 25010000002 HYDROMORPHONE PER 4 MG: Performed by: NURSE PRACTITIONER

## 2019-05-27 RX ORDER — OXYCODONE HYDROCHLORIDE AND ACETAMINOPHEN 5; 325 MG/1; MG/1
1 TABLET ORAL ONCE
Status: COMPLETED | OUTPATIENT
Start: 2019-05-27 | End: 2019-05-27

## 2019-05-27 RX ADMIN — FAMOTIDINE 20 MG: 10 INJECTION INTRAVENOUS at 21:11

## 2019-05-27 RX ADMIN — GABAPENTIN 600 MG: 300 CAPSULE ORAL at 06:50

## 2019-05-27 RX ADMIN — Medication 250 MG: at 09:05

## 2019-05-27 RX ADMIN — OXYCODONE HYDROCHLORIDE AND ACETAMINOPHEN 1 TABLET: 7.5; 325 TABLET ORAL at 13:36

## 2019-05-27 RX ADMIN — POTASSIUM CHLORIDE AND SODIUM CHLORIDE 125 ML/HR: 900; 150 INJECTION, SOLUTION INTRAVENOUS at 22:20

## 2019-05-27 RX ADMIN — OXYCODONE HYDROCHLORIDE AND ACETAMINOPHEN 1 TABLET: 7.5; 325 TABLET ORAL at 09:05

## 2019-05-27 RX ADMIN — HYDROMORPHONE HYDROCHLORIDE 0.5 MG: 1 INJECTION, SOLUTION INTRAMUSCULAR; INTRAVENOUS; SUBCUTANEOUS at 00:14

## 2019-05-27 RX ADMIN — OXYCODONE HYDROCHLORIDE AND ACETAMINOPHEN 1 TABLET: 7.5; 325 TABLET ORAL at 05:17

## 2019-05-27 RX ADMIN — HYDROMORPHONE HYDROCHLORIDE 1 MG: 1 INJECTION, SOLUTION INTRAMUSCULAR; INTRAVENOUS; SUBCUTANEOUS at 21:11

## 2019-05-27 RX ADMIN — VENLAFAXINE HYDROCHLORIDE 150 MG: 75 CAPSULE, EXTENDED RELEASE ORAL at 09:05

## 2019-05-27 RX ADMIN — VALSARTAN 160 MG: 80 TABLET, FILM COATED ORAL at 09:05

## 2019-05-27 RX ADMIN — HYDROMORPHONE HYDROCHLORIDE 1 MG: 1 INJECTION, SOLUTION INTRAMUSCULAR; INTRAVENOUS; SUBCUTANEOUS at 17:23

## 2019-05-27 RX ADMIN — FAMOTIDINE 20 MG: 10 INJECTION INTRAVENOUS at 09:05

## 2019-05-27 RX ADMIN — OXYCODONE HYDROCHLORIDE AND ACETAMINOPHEN 1 TABLET: 5; 325 TABLET ORAL at 15:38

## 2019-05-27 RX ADMIN — GABAPENTIN 600 MG: 300 CAPSULE ORAL at 13:36

## 2019-05-27 RX ADMIN — ONDANSETRON HYDROCHLORIDE 4 MG: 2 INJECTION INTRAMUSCULAR; INTRAVENOUS at 14:15

## 2019-05-28 ENCOUNTER — APPOINTMENT (OUTPATIENT)
Dept: CT IMAGING | Facility: HOSPITAL | Age: 49
End: 2019-05-28

## 2019-05-28 LAB
ANION GAP SERPL CALCULATED.3IONS-SCNC: 5 MMOL/L (ref 4–13)
BUN BLD-MCNC: 4 MG/DL (ref 5–21)
BUN/CREAT SERPL: 9.3 (ref 7–25)
CALCIUM SPEC-SCNC: 8.6 MG/DL (ref 8.4–10.4)
CHLORIDE SERPL-SCNC: 107 MMOL/L (ref 98–110)
CO2 SERPL-SCNC: 25 MMOL/L (ref 24–31)
CREAT BLD-MCNC: 0.43 MG/DL (ref 0.5–1.4)
CRP SERPL-MCNC: 0.54 MG/DL (ref 0–0.99)
DEPRECATED RDW RBC AUTO: 54.6 FL (ref 40–54)
ERYTHROCYTE [DISTWIDTH] IN BLOOD BY AUTOMATED COUNT: 16.5 % (ref 12–15)
GFR SERPL CREATININE-BSD FRML MDRD: >150 ML/MIN/1.73
GLUCOSE BLD-MCNC: 103 MG/DL (ref 70–100)
HCT VFR BLD AUTO: 28.2 % (ref 37–47)
HGB BLD-MCNC: 9.6 G/DL (ref 12–16)
LIPASE SERPL-CCNC: 455 U/L (ref 23–203)
MCH RBC QN AUTO: 33 PG (ref 28–32)
MCHC RBC AUTO-ENTMCNC: 34 G/DL (ref 33–36)
MCV RBC AUTO: 96.9 FL (ref 82–98)
PLATELET # BLD AUTO: 176 10*3/MM3 (ref 130–400)
PMV BLD AUTO: 9.6 FL (ref 6–12)
POTASSIUM BLD-SCNC: 4.9 MMOL/L (ref 3.5–5.3)
RBC # BLD AUTO: 2.91 10*6/MM3 (ref 4.2–5.4)
SODIUM BLD-SCNC: 137 MMOL/L (ref 135–145)
WBC NRBC COR # BLD: 3.6 10*3/MM3 (ref 4.8–10.8)

## 2019-05-28 PROCEDURE — 25810000003 SODIUM CHLORIDE 0.9 % WITH KCL 20 MEQ 20-0.9 MEQ/L-% SOLUTION: Performed by: NURSE PRACTITIONER

## 2019-05-28 PROCEDURE — 25010000002 IOPAMIDOL 61 % SOLUTION: Performed by: INTERNAL MEDICINE

## 2019-05-28 PROCEDURE — 86140 C-REACTIVE PROTEIN: CPT | Performed by: NURSE PRACTITIONER

## 2019-05-28 PROCEDURE — 83690 ASSAY OF LIPASE: CPT | Performed by: NURSE PRACTITIONER

## 2019-05-28 PROCEDURE — 74178 CT ABD&PLV WO CNTR FLWD CNTR: CPT

## 2019-05-28 PROCEDURE — 85027 COMPLETE CBC AUTOMATED: CPT | Performed by: NURSE PRACTITIONER

## 2019-05-28 PROCEDURE — 80048 BASIC METABOLIC PNL TOTAL CA: CPT | Performed by: NURSE PRACTITIONER

## 2019-05-28 RX ORDER — HYDRALAZINE HYDROCHLORIDE 20 MG/ML
10 INJECTION INTRAMUSCULAR; INTRAVENOUS EVERY 6 HOURS PRN
Status: DISCONTINUED | OUTPATIENT
Start: 2019-05-28 | End: 2019-05-30 | Stop reason: HOSPADM

## 2019-05-28 RX ADMIN — FAMOTIDINE 20 MG: 10 INJECTION INTRAVENOUS at 20:18

## 2019-05-28 RX ADMIN — FAMOTIDINE 20 MG: 10 INJECTION INTRAVENOUS at 09:31

## 2019-05-28 RX ADMIN — POTASSIUM CHLORIDE AND SODIUM CHLORIDE 125 ML/HR: 900; 150 INJECTION, SOLUTION INTRAVENOUS at 05:50

## 2019-05-28 RX ADMIN — HYDROMORPHONE HYDROCHLORIDE 1 MG: 1 INJECTION, SOLUTION INTRAMUSCULAR; INTRAVENOUS; SUBCUTANEOUS at 07:49

## 2019-05-28 RX ADMIN — HYDROMORPHONE HYDROCHLORIDE 1 MG: 1 INJECTION, SOLUTION INTRAMUSCULAR; INTRAVENOUS; SUBCUTANEOUS at 04:08

## 2019-05-28 RX ADMIN — HYDROMORPHONE HYDROCHLORIDE 1 MG: 1 INJECTION, SOLUTION INTRAMUSCULAR; INTRAVENOUS; SUBCUTANEOUS at 00:53

## 2019-05-28 RX ADMIN — HYDROMORPHONE HYDROCHLORIDE 1 MG: 1 INJECTION, SOLUTION INTRAMUSCULAR; INTRAVENOUS; SUBCUTANEOUS at 10:58

## 2019-05-28 RX ADMIN — HYDROMORPHONE HYDROCHLORIDE 1 MG: 1 INJECTION, SOLUTION INTRAMUSCULAR; INTRAVENOUS; SUBCUTANEOUS at 14:07

## 2019-05-28 RX ADMIN — HYDROMORPHONE HYDROCHLORIDE 1 MG: 1 INJECTION, SOLUTION INTRAMUSCULAR; INTRAVENOUS; SUBCUTANEOUS at 23:04

## 2019-05-28 RX ADMIN — POTASSIUM CHLORIDE AND SODIUM CHLORIDE 125 ML/HR: 900; 150 INJECTION, SOLUTION INTRAVENOUS at 14:14

## 2019-05-28 RX ADMIN — HYDROMORPHONE HYDROCHLORIDE 1 MG: 1 INJECTION, SOLUTION INTRAMUSCULAR; INTRAVENOUS; SUBCUTANEOUS at 20:06

## 2019-05-28 RX ADMIN — HYDROMORPHONE HYDROCHLORIDE 1 MG: 1 INJECTION, SOLUTION INTRAMUSCULAR; INTRAVENOUS; SUBCUTANEOUS at 16:57

## 2019-05-28 RX ADMIN — SODIUM CHLORIDE, PRESERVATIVE FREE 3 ML: 5 INJECTION INTRAVENOUS at 09:32

## 2019-05-28 RX ADMIN — IOPAMIDOL 100 ML: 612 INJECTION, SOLUTION INTRAVENOUS at 13:45

## 2019-05-29 LAB
ANION GAP SERPL CALCULATED.3IONS-SCNC: 5 MMOL/L (ref 4–13)
BUN BLD-MCNC: 3 MG/DL (ref 5–21)
BUN/CREAT SERPL: 6.4 (ref 7–25)
CALCIUM SPEC-SCNC: 8.7 MG/DL (ref 8.4–10.4)
CHLORIDE SERPL-SCNC: 109 MMOL/L (ref 98–110)
CO2 SERPL-SCNC: 26 MMOL/L (ref 24–31)
CREAT BLD-MCNC: 0.47 MG/DL (ref 0.5–1.4)
DEPRECATED RDW RBC AUTO: 55.1 FL (ref 40–54)
ERYTHROCYTE [DISTWIDTH] IN BLOOD BY AUTOMATED COUNT: 16.3 % (ref 12–15)
GFR SERPL CREATININE-BSD FRML MDRD: 141 ML/MIN/1.73
GLUCOSE BLD-MCNC: 106 MG/DL (ref 70–100)
HCT VFR BLD AUTO: 28.6 % (ref 37–47)
HGB BLD-MCNC: 9.6 G/DL (ref 12–16)
LIPASE SERPL-CCNC: 432 U/L (ref 23–203)
MCH RBC QN AUTO: 32.1 PG (ref 28–32)
MCHC RBC AUTO-ENTMCNC: 33.6 G/DL (ref 33–36)
MCV RBC AUTO: 95.7 FL (ref 82–98)
PLATELET # BLD AUTO: 180 10*3/MM3 (ref 130–400)
PMV BLD AUTO: 9.1 FL (ref 6–12)
POTASSIUM BLD-SCNC: 4.8 MMOL/L (ref 3.5–5.3)
RBC # BLD AUTO: 2.99 10*6/MM3 (ref 4.2–5.4)
SODIUM BLD-SCNC: 140 MMOL/L (ref 135–145)
WBC NRBC COR # BLD: 3.63 10*3/MM3 (ref 4.8–10.8)

## 2019-05-29 PROCEDURE — 85027 COMPLETE CBC AUTOMATED: CPT | Performed by: NURSE PRACTITIONER

## 2019-05-29 PROCEDURE — 25810000003 SODIUM CHLORIDE 0.9 % WITH KCL 20 MEQ 20-0.9 MEQ/L-% SOLUTION: Performed by: NURSE PRACTITIONER

## 2019-05-29 PROCEDURE — 80048 BASIC METABOLIC PNL TOTAL CA: CPT | Performed by: NURSE PRACTITIONER

## 2019-05-29 PROCEDURE — 83690 ASSAY OF LIPASE: CPT | Performed by: NURSE PRACTITIONER

## 2019-05-29 RX ORDER — GABAPENTIN 300 MG/1
600 CAPSULE ORAL EVERY 8 HOURS SCHEDULED
Status: DISCONTINUED | OUTPATIENT
Start: 2019-05-29 | End: 2019-05-30 | Stop reason: HOSPADM

## 2019-05-29 RX ORDER — TIZANIDINE 4 MG/1
4 TABLET ORAL EVERY 8 HOURS PRN
Status: DISCONTINUED | OUTPATIENT
Start: 2019-05-29 | End: 2019-05-30 | Stop reason: HOSPADM

## 2019-05-29 RX ORDER — HYDROCODONE BITARTRATE AND ACETAMINOPHEN 7.5; 325 MG/1; MG/1
1 TABLET ORAL EVERY 6 HOURS PRN
Status: DISCONTINUED | OUTPATIENT
Start: 2019-05-29 | End: 2019-05-30 | Stop reason: HOSPADM

## 2019-05-29 RX ADMIN — GABAPENTIN 600 MG: 300 CAPSULE ORAL at 21:19

## 2019-05-29 RX ADMIN — HYDROMORPHONE HYDROCHLORIDE 1 MG: 1 INJECTION, SOLUTION INTRAMUSCULAR; INTRAVENOUS; SUBCUTANEOUS at 09:46

## 2019-05-29 RX ADMIN — FAMOTIDINE 20 MG: 10 INJECTION INTRAVENOUS at 09:21

## 2019-05-29 RX ADMIN — HYDROMORPHONE HYDROCHLORIDE 1 MG: 1 INJECTION, SOLUTION INTRAMUSCULAR; INTRAVENOUS; SUBCUTANEOUS at 03:50

## 2019-05-29 RX ADMIN — LAMOTRIGINE 150 MG: 100 TABLET ORAL at 09:22

## 2019-05-29 RX ADMIN — SODIUM CHLORIDE, PRESERVATIVE FREE 3 ML: 5 INJECTION INTRAVENOUS at 09:22

## 2019-05-29 RX ADMIN — GABAPENTIN 600 MG: 300 CAPSULE ORAL at 14:38

## 2019-05-29 RX ADMIN — SODIUM CHLORIDE, PRESERVATIVE FREE 3 ML: 5 INJECTION INTRAVENOUS at 21:20

## 2019-05-29 RX ADMIN — LAMOTRIGINE 150 MG: 100 TABLET ORAL at 21:19

## 2019-05-29 RX ADMIN — HYDROMORPHONE HYDROCHLORIDE 1 MG: 1 INJECTION, SOLUTION INTRAMUSCULAR; INTRAVENOUS; SUBCUTANEOUS at 06:56

## 2019-05-29 RX ADMIN — TIZANIDINE 4 MG: 4 TABLET ORAL at 21:19

## 2019-05-29 RX ADMIN — FAMOTIDINE 20 MG: 10 INJECTION INTRAVENOUS at 21:19

## 2019-05-29 RX ADMIN — POTASSIUM CHLORIDE AND SODIUM CHLORIDE 125 ML/HR: 900; 150 INJECTION, SOLUTION INTRAVENOUS at 01:19

## 2019-05-29 RX ADMIN — POTASSIUM CHLORIDE AND SODIUM CHLORIDE 50 ML/HR: 900; 150 INJECTION, SOLUTION INTRAVENOUS at 09:21

## 2019-05-29 RX ADMIN — GABAPENTIN 600 MG: 300 CAPSULE ORAL at 09:21

## 2019-05-29 RX ADMIN — HYDROCODONE BITARTRATE AND ACETAMINOPHEN 1 TABLET: 7.5; 325 TABLET ORAL at 16:09

## 2019-05-29 RX ADMIN — HYDROMORPHONE HYDROCHLORIDE 1 MG: 1 INJECTION, SOLUTION INTRAMUSCULAR; INTRAVENOUS; SUBCUTANEOUS at 12:49

## 2019-05-30 VITALS
BODY MASS INDEX: 23.02 KG/M2 | SYSTOLIC BLOOD PRESSURE: 140 MMHG | OXYGEN SATURATION: 98 % | RESPIRATION RATE: 18 BRPM | HEIGHT: 63 IN | HEART RATE: 75 BPM | WEIGHT: 129.9 LBS | TEMPERATURE: 98.1 F | DIASTOLIC BLOOD PRESSURE: 82 MMHG

## 2019-05-30 LAB
CRP SERPL-MCNC: <0.5 MG/DL (ref 0–0.99)
LIPASE SERPL-CCNC: 536 U/L (ref 23–203)

## 2019-05-30 PROCEDURE — 25810000003 SODIUM CHLORIDE 0.9 % WITH KCL 20 MEQ 20-0.9 MEQ/L-% SOLUTION: Performed by: NURSE PRACTITIONER

## 2019-05-30 PROCEDURE — 83690 ASSAY OF LIPASE: CPT | Performed by: NURSE PRACTITIONER

## 2019-05-30 PROCEDURE — 86140 C-REACTIVE PROTEIN: CPT | Performed by: NURSE PRACTITIONER

## 2019-05-30 RX ORDER — FAMOTIDINE 20 MG/1
20 TABLET, FILM COATED ORAL 2 TIMES DAILY
Status: DISCONTINUED | OUTPATIENT
Start: 2019-05-30 | End: 2019-05-30 | Stop reason: HOSPADM

## 2019-05-30 RX ORDER — ONDANSETRON 4 MG/1
4 TABLET, FILM COATED ORAL EVERY 8 HOURS PRN
Qty: 12 TABLET | Refills: 0 | Status: SHIPPED | OUTPATIENT
Start: 2019-05-30

## 2019-05-30 RX ADMIN — POTASSIUM CHLORIDE AND SODIUM CHLORIDE 50 ML/HR: 900; 150 INJECTION, SOLUTION INTRAVENOUS at 06:22

## 2019-05-30 RX ADMIN — HYDROCODONE BITARTRATE AND ACETAMINOPHEN 1 TABLET: 7.5; 325 TABLET ORAL at 10:14

## 2019-05-30 RX ADMIN — HYDROCODONE BITARTRATE AND ACETAMINOPHEN 1 TABLET: 7.5; 325 TABLET ORAL at 00:00

## 2019-05-30 RX ADMIN — FAMOTIDINE 20 MG: 20 TABLET, FILM COATED ORAL at 09:07

## 2019-05-30 RX ADMIN — HYDROCODONE BITARTRATE AND ACETAMINOPHEN 1 TABLET: 7.5; 325 TABLET ORAL at 05:50

## 2019-05-30 RX ADMIN — LAMOTRIGINE 150 MG: 100 TABLET ORAL at 09:07

## 2019-05-30 RX ADMIN — GABAPENTIN 600 MG: 300 CAPSULE ORAL at 05:50

## 2019-05-31 ENCOUNTER — READMISSION MANAGEMENT (OUTPATIENT)
Dept: CALL CENTER | Facility: HOSPITAL | Age: 49
End: 2019-05-31

## 2019-05-31 NOTE — OUTREACH NOTE
Prep Survey      Responses   Facility patient discharged from?  [facility]   Is patient eligible?  Yes   Discharge diagnosis  pancreatitis   Does the patient have one of the following disease processes/diagnoses(primary or secondary)?  Other   Does the patient have Home health ordered?  No   Is there a DME ordered?  No   Comments regarding appointments  see AVS   Prep survey completed?  Yes          Shelby Krueger RN

## 2019-06-04 ENCOUNTER — READMISSION MANAGEMENT (OUTPATIENT)
Dept: CALL CENTER | Facility: HOSPITAL | Age: 49
End: 2019-06-04

## 2019-06-04 NOTE — OUTREACH NOTE
Medical Week 1 Survey      Responses   Facility patient discharged from?  [facility]   Does the patient have one of the following disease processes/diagnoses(primary or secondary)?  Other   Is there a successful TCM telephone encounter documented?  No   Week 1 attempt successful?  Yes   Call start time  1559   Call end time  1600   Discharge diagnosis  pancreatitis   Meds reviewed with patient/caregiver?  Yes   Is the patient having any side effects they believe may be caused by any medication additions or changes?  No   Does the patient have all medications ordered at discharge?  Yes   Is the patient taking all medications as directed (includes completed medication regime)?  Yes   Does the patient have a primary care provider?   Yes   Does the patient have an appointment with their PCP within 7 days of discharge?  Yes   Has the patient kept scheduled appointments due by today?  Yes   Comments  PCP appt tomorrow.   Has home health visited the patient within 72 hours of discharge?  N/A   Psychosocial issues?  No   Did the patient receive a copy of their discharge instructions?  Yes   Nursing interventions  Reviewed instructions with patient   What is the patient's perception of their health status since discharge?  Improving   Is the patient/caregiver able to teach back signs and symptoms related to disease process for when to call PCP?  Yes   Is the patient/caregiver able to teach back signs and symptoms related to disease process for when to call 911?  Yes   Is the patient/caregiver able to teach back the hierarchy of who to call/visit for symptoms/problems? PCP, Specialist, Home health nurse, Urgent Care, ED, 911  Yes   Additional teach back comments  Reviewed prescribed diet.   Week 1 call completed?  Yes   Graduated  Yes   Graduated/Revoked comments  Very brief call. Doing very well. No complaints. Pt has declined follow up calls in the past + declined to participate also.          David Michele RN

## 2019-06-18 ENCOUNTER — APPOINTMENT (OUTPATIENT)
Dept: CT IMAGING | Facility: HOSPITAL | Age: 49
End: 2019-06-18

## 2019-06-18 ENCOUNTER — HOSPITAL ENCOUNTER (OUTPATIENT)
Facility: HOSPITAL | Age: 49
Setting detail: OBSERVATION
Discharge: HOME | End: 2019-06-19
Attending: EMERGENCY MEDICINE | Admitting: FAMILY MEDICINE

## 2019-06-18 ENCOUNTER — APPOINTMENT (OUTPATIENT)
Dept: GENERAL RADIOLOGY | Facility: HOSPITAL | Age: 49
End: 2019-06-18

## 2019-06-18 DIAGNOSIS — F10.930 ALCOHOL WITHDRAWAL SYNDROME WITHOUT COMPLICATION (HCC): ICD-10-CM

## 2019-06-18 DIAGNOSIS — G45.9 TIA (TRANSIENT ISCHEMIC ATTACK): Primary | ICD-10-CM

## 2019-06-18 PROBLEM — Z72.0 TOBACCO ABUSE: Chronic | Status: ACTIVE | Noted: 2019-06-18

## 2019-06-18 PROBLEM — I67.5 MOYA-MOYA DISEASE: Chronic | Status: ACTIVE | Noted: 2019-06-18

## 2019-06-18 PROBLEM — F10.10 ALCOHOL ABUSE: Chronic | Status: ACTIVE | Noted: 2019-06-18

## 2019-06-18 LAB
ABO GROUP BLD: NORMAL
ALBUMIN SERPL-MCNC: 4.4 G/DL (ref 3.5–5)
ALBUMIN/GLOB SERPL: 1.6 G/DL (ref 1.1–2.5)
ALP SERPL-CCNC: 173 U/L (ref 24–120)
ALT SERPL W P-5'-P-CCNC: 217 U/L (ref 0–54)
AMPHET+METHAMPHET UR QL: NEGATIVE
AMYLASE SERPL-CCNC: 45 U/L (ref 30–110)
ANION GAP SERPL CALCULATED.3IONS-SCNC: 14 MMOL/L (ref 4–13)
APTT PPP: 29 SECONDS (ref 24.1–35)
AST SERPL-CCNC: 576 U/L (ref 7–45)
BARBITURATES UR QL SCN: NEGATIVE
BASOPHILS # BLD AUTO: 0.02 10*3/MM3 (ref 0–0.2)
BASOPHILS NFR BLD AUTO: 0.5 % (ref 0–2)
BENZODIAZ UR QL SCN: NEGATIVE
BILIRUB SERPL-MCNC: 0.9 MG/DL (ref 0.1–1)
BLD GP AB SCN SERPL QL: NEGATIVE
BUN BLD-MCNC: 2 MG/DL (ref 5–21)
BUN/CREAT SERPL: 3.8 (ref 7–25)
CALCIUM SPEC-SCNC: 8.7 MG/DL (ref 8.4–10.4)
CANNABINOIDS SERPL QL: NEGATIVE
CHLORIDE SERPL-SCNC: 111 MMOL/L (ref 98–110)
CO2 SERPL-SCNC: 23 MMOL/L (ref 24–31)
COCAINE UR QL: NEGATIVE
CREAT BLD-MCNC: 0.52 MG/DL (ref 0.5–1.4)
DEPRECATED RDW RBC AUTO: 62.9 FL (ref 40–54)
EOSINOPHIL # BLD AUTO: 0.06 10*3/MM3 (ref 0–0.7)
EOSINOPHIL NFR BLD AUTO: 1.5 % (ref 0–4)
ERYTHROCYTE [DISTWIDTH] IN BLOOD BY AUTOMATED COUNT: 19.1 % (ref 12–15)
ERYTHROCYTE [SEDIMENTATION RATE] IN BLOOD: 3 MM/HR (ref 0–20)
ETHANOL UR QL: 0.34 %
FOLATE SERPL-MCNC: 2.84 NG/ML (ref 4.78–24.2)
GFR SERPL CREATININE-BSD FRML MDRD: 126 ML/MIN/1.73
GLOBULIN UR ELPH-MCNC: 2.8 GM/DL
GLUCOSE BLD-MCNC: 116 MG/DL (ref 70–100)
GLUCOSE BLDC GLUCOMTR-MCNC: 119 MG/DL (ref 70–130)
HCG INTACT+B SERPL-ACNC: <2.39 MIU/ML (ref 0–5)
HCT VFR BLD AUTO: 33 % (ref 37–47)
HGB BLD-MCNC: 11.5 G/DL (ref 12–16)
HOLD SPECIMEN: NORMAL
HOLD SPECIMEN: NORMAL
IMM GRANULOCYTES # BLD AUTO: 0.02 10*3/MM3 (ref 0–0.05)
IMM GRANULOCYTES NFR BLD AUTO: 0.5 % (ref 0–5)
INR PPP: 1.04 (ref 0.91–1.09)
LIPASE SERPL-CCNC: 231 U/L (ref 23–203)
LYMPHOCYTES # BLD AUTO: 1.3 10*3/MM3 (ref 0.72–4.86)
LYMPHOCYTES NFR BLD AUTO: 32 % (ref 15–45)
MAGNESIUM SERPL-MCNC: 2 MG/DL (ref 1.4–2.2)
MCH RBC QN AUTO: 32.9 PG (ref 28–32)
MCHC RBC AUTO-ENTMCNC: 34.8 G/DL (ref 33–36)
MCV RBC AUTO: 94.3 FL (ref 82–98)
METHADONE UR QL SCN: NEGATIVE
MONOCYTES # BLD AUTO: 0.18 10*3/MM3 (ref 0.19–1.3)
MONOCYTES NFR BLD AUTO: 4.4 % (ref 4–12)
NEUTROPHILS # BLD AUTO: 2.48 10*3/MM3 (ref 1.87–8.4)
NEUTROPHILS NFR BLD AUTO: 61.1 % (ref 39–78)
NRBC BLD AUTO-RTO: 0 /100 WBC (ref 0–0.2)
OPIATES UR QL: POSITIVE
PCP UR QL SCN: NEGATIVE
PHOSPHATE SERPL-MCNC: 4.2 MG/DL (ref 2.5–4.5)
PLATELET # BLD AUTO: 73 10*3/MM3 (ref 130–400)
PMV BLD AUTO: 10.2 FL (ref 6–12)
POTASSIUM BLD-SCNC: 3.6 MMOL/L (ref 3.5–5.3)
PROT SERPL-MCNC: 7.2 G/DL (ref 6.3–8.7)
PROTHROMBIN TIME: 13.9 SECONDS (ref 11.9–14.6)
RBC # BLD AUTO: 3.5 10*6/MM3 (ref 4.2–5.4)
RH BLD: POSITIVE
SODIUM BLD-SCNC: 148 MMOL/L (ref 135–145)
T&S EXPIRATION DATE: NORMAL
T4 FREE SERPL-MCNC: 1.02 NG/DL (ref 0.78–2.19)
TROPONIN I SERPL-MCNC: <0.012 NG/ML (ref 0–0.03)
TSH SERPL DL<=0.05 MIU/L-ACNC: 0.4 MIU/ML (ref 0.47–4.68)
VIT B12 BLD-MCNC: 410 PG/ML (ref 239–931)
WBC NRBC COR # BLD: 4.06 10*3/MM3 (ref 4.8–10.8)
WHOLE BLOOD HOLD SPECIMEN: NORMAL
WHOLE BLOOD HOLD SPECIMEN: NORMAL

## 2019-06-18 PROCEDURE — 80307 DRUG TEST PRSMV CHEM ANLYZR: CPT | Performed by: PSYCHIATRY & NEUROLOGY

## 2019-06-18 PROCEDURE — 84439 ASSAY OF FREE THYROXINE: CPT | Performed by: INTERNAL MEDICINE

## 2019-06-18 PROCEDURE — G0378 HOSPITAL OBSERVATION PER HR: HCPCS

## 2019-06-18 PROCEDURE — 25010000002 THIAMINE PER 100 MG: Performed by: INTERNAL MEDICINE

## 2019-06-18 PROCEDURE — 94799 UNLISTED PULMONARY SVC/PX: CPT

## 2019-06-18 PROCEDURE — 25010000002 POTASSIUM CHLORIDE PER 2 MEQ OF POTASSIUM: Performed by: INTERNAL MEDICINE

## 2019-06-18 PROCEDURE — 80053 COMPREHEN METABOLIC PANEL: CPT | Performed by: EMERGENCY MEDICINE

## 2019-06-18 PROCEDURE — 86850 RBC ANTIBODY SCREEN: CPT | Performed by: EMERGENCY MEDICINE

## 2019-06-18 PROCEDURE — 96375 TX/PRO/DX INJ NEW DRUG ADDON: CPT

## 2019-06-18 PROCEDURE — 82746 ASSAY OF FOLIC ACID SERUM: CPT | Performed by: PSYCHIATRY & NEUROLOGY

## 2019-06-18 PROCEDURE — 93005 ELECTROCARDIOGRAM TRACING: CPT | Performed by: EMERGENCY MEDICINE

## 2019-06-18 PROCEDURE — 25010000002 METOCLOPRAMIDE PER 10 MG: Performed by: EMERGENCY MEDICINE

## 2019-06-18 PROCEDURE — 70450 CT HEAD/BRAIN W/O DYE: CPT

## 2019-06-18 PROCEDURE — 84100 ASSAY OF PHOSPHORUS: CPT | Performed by: INTERNAL MEDICINE

## 2019-06-18 PROCEDURE — 25010000002 THIAMINE PER 100 MG: Performed by: PSYCHIATRY & NEUROLOGY

## 2019-06-18 PROCEDURE — 84484 ASSAY OF TROPONIN QUANT: CPT | Performed by: EMERGENCY MEDICINE

## 2019-06-18 PROCEDURE — 71045 X-RAY EXAM CHEST 1 VIEW: CPT

## 2019-06-18 PROCEDURE — 0 IOPAMIDOL PER 1 ML: Performed by: EMERGENCY MEDICINE

## 2019-06-18 PROCEDURE — 85610 PROTHROMBIN TIME: CPT | Performed by: EMERGENCY MEDICINE

## 2019-06-18 PROCEDURE — 80307 DRUG TEST PRSMV CHEM ANLYZR: CPT | Performed by: EMERGENCY MEDICINE

## 2019-06-18 PROCEDURE — 25010000002 LORAZEPAM PER 2 MG: Performed by: EMERGENCY MEDICINE

## 2019-06-18 PROCEDURE — 85651 RBC SED RATE NONAUTOMATED: CPT | Performed by: PSYCHIATRY & NEUROLOGY

## 2019-06-18 PROCEDURE — 70498 CT ANGIOGRAPHY NECK: CPT

## 2019-06-18 PROCEDURE — 70496 CT ANGIOGRAPHY HEAD: CPT

## 2019-06-18 PROCEDURE — 82962 GLUCOSE BLOOD TEST: CPT

## 2019-06-18 PROCEDURE — 93010 ELECTROCARDIOGRAM REPORT: CPT | Performed by: INTERNAL MEDICINE

## 2019-06-18 PROCEDURE — 25010000002 DIPHENHYDRAMINE PER 50 MG: Performed by: EMERGENCY MEDICINE

## 2019-06-18 PROCEDURE — 84702 CHORIONIC GONADOTROPIN TEST: CPT | Performed by: PSYCHIATRY & NEUROLOGY

## 2019-06-18 PROCEDURE — 84443 ASSAY THYROID STIM HORMONE: CPT | Performed by: INTERNAL MEDICINE

## 2019-06-18 PROCEDURE — 82150 ASSAY OF AMYLASE: CPT | Performed by: EMERGENCY MEDICINE

## 2019-06-18 PROCEDURE — 99285 EMERGENCY DEPT VISIT HI MDM: CPT

## 2019-06-18 PROCEDURE — 83690 ASSAY OF LIPASE: CPT | Performed by: EMERGENCY MEDICINE

## 2019-06-18 PROCEDURE — 82607 VITAMIN B-12: CPT | Performed by: PSYCHIATRY & NEUROLOGY

## 2019-06-18 PROCEDURE — 99245 OFF/OP CONSLTJ NEW/EST HI 55: CPT | Performed by: PSYCHIATRY & NEUROLOGY

## 2019-06-18 PROCEDURE — 85025 COMPLETE CBC W/AUTO DIFF WBC: CPT | Performed by: EMERGENCY MEDICINE

## 2019-06-18 PROCEDURE — 94760 N-INVAS EAR/PLS OXIMETRY 1: CPT

## 2019-06-18 PROCEDURE — G0480 DRUG TEST DEF 1-7 CLASSES: HCPCS | Performed by: PSYCHIATRY & NEUROLOGY

## 2019-06-18 PROCEDURE — 96365 THER/PROPH/DIAG IV INF INIT: CPT

## 2019-06-18 PROCEDURE — 85730 THROMBOPLASTIN TIME PARTIAL: CPT | Performed by: EMERGENCY MEDICINE

## 2019-06-18 PROCEDURE — 86900 BLOOD TYPING SEROLOGIC ABO: CPT | Performed by: EMERGENCY MEDICINE

## 2019-06-18 PROCEDURE — 86901 BLOOD TYPING SEROLOGIC RH(D): CPT | Performed by: EMERGENCY MEDICINE

## 2019-06-18 PROCEDURE — 83735 ASSAY OF MAGNESIUM: CPT | Performed by: EMERGENCY MEDICINE

## 2019-06-18 RX ORDER — SODIUM CHLORIDE 0.9 % (FLUSH) 0.9 %
3-10 SYRINGE (ML) INJECTION AS NEEDED
Status: DISCONTINUED | OUTPATIENT
Start: 2019-06-18 | End: 2019-06-19 | Stop reason: HOSPADM

## 2019-06-18 RX ORDER — HYDROCODONE BITARTRATE AND ACETAMINOPHEN 7.5; 325 MG/1; MG/1
1 TABLET ORAL 3 TIMES DAILY PRN
Status: DISCONTINUED | OUTPATIENT
Start: 2019-06-18 | End: 2019-06-19 | Stop reason: HOSPADM

## 2019-06-18 RX ORDER — LORAZEPAM 2 MG/ML
2 INJECTION INTRAMUSCULAR
Status: DISCONTINUED | OUTPATIENT
Start: 2019-06-18 | End: 2019-06-19 | Stop reason: HOSPADM

## 2019-06-18 RX ORDER — LORAZEPAM 1 MG/1
1 TABLET ORAL EVERY 8 HOURS
Status: DISCONTINUED | OUTPATIENT
Start: 2019-06-19 | End: 2019-06-19 | Stop reason: HOSPADM

## 2019-06-18 RX ORDER — ACETAMINOPHEN 650 MG/1
650 SUPPOSITORY RECTAL EVERY 4 HOURS PRN
Status: DISCONTINUED | OUTPATIENT
Start: 2019-06-18 | End: 2019-06-19 | Stop reason: HOSPADM

## 2019-06-18 RX ORDER — ONDANSETRON 4 MG/1
4 TABLET, FILM COATED ORAL EVERY 6 HOURS PRN
Status: DISCONTINUED | OUTPATIENT
Start: 2019-06-18 | End: 2019-06-19 | Stop reason: HOSPADM

## 2019-06-18 RX ORDER — ASPIRIN 325 MG
325 TABLET ORAL DAILY
Status: DISCONTINUED | OUTPATIENT
Start: 2019-06-19 | End: 2019-06-19 | Stop reason: HOSPADM

## 2019-06-18 RX ORDER — NALOXONE HCL 0.4 MG/ML
0.4 VIAL (ML) INJECTION
Status: DISCONTINUED | OUTPATIENT
Start: 2019-06-18 | End: 2019-06-19 | Stop reason: HOSPADM

## 2019-06-18 RX ORDER — GABAPENTIN 300 MG/1
600 CAPSULE ORAL EVERY 8 HOURS SCHEDULED
Status: DISCONTINUED | OUTPATIENT
Start: 2019-06-18 | End: 2019-06-19 | Stop reason: HOSPADM

## 2019-06-18 RX ORDER — FOLIC ACID 1 MG/1
1 TABLET ORAL DAILY
Status: DISCONTINUED | OUTPATIENT
Start: 2019-06-19 | End: 2019-06-19 | Stop reason: HOSPADM

## 2019-06-18 RX ORDER — HYDROMORPHONE HYDROCHLORIDE 1 MG/ML
0.5 INJECTION, SOLUTION INTRAMUSCULAR; INTRAVENOUS; SUBCUTANEOUS
Status: DISCONTINUED | OUTPATIENT
Start: 2019-06-18 | End: 2019-06-19 | Stop reason: HOSPADM

## 2019-06-18 RX ORDER — LORAZEPAM 2 MG/ML
1 INJECTION INTRAMUSCULAR ONCE
Status: COMPLETED | OUTPATIENT
Start: 2019-06-18 | End: 2019-06-18

## 2019-06-18 RX ORDER — ONDANSETRON 2 MG/ML
4 INJECTION INTRAMUSCULAR; INTRAVENOUS EVERY 6 HOURS PRN
Status: DISCONTINUED | OUTPATIENT
Start: 2019-06-18 | End: 2019-06-19 | Stop reason: HOSPADM

## 2019-06-18 RX ORDER — TIZANIDINE 4 MG/1
4 TABLET ORAL EVERY 8 HOURS PRN
Status: DISCONTINUED | OUTPATIENT
Start: 2019-06-18 | End: 2019-06-19 | Stop reason: HOSPADM

## 2019-06-18 RX ORDER — LORAZEPAM 1 MG/1
2 TABLET ORAL
Status: DISCONTINUED | OUTPATIENT
Start: 2019-06-18 | End: 2019-06-19 | Stop reason: HOSPADM

## 2019-06-18 RX ORDER — SODIUM CHLORIDE 0.9 % (FLUSH) 0.9 %
10 SYRINGE (ML) INJECTION AS NEEDED
Status: DISCONTINUED | OUTPATIENT
Start: 2019-06-18 | End: 2019-06-19 | Stop reason: HOSPADM

## 2019-06-18 RX ORDER — ACETAMINOPHEN 500 MG
1000 TABLET ORAL ONCE
Status: COMPLETED | OUTPATIENT
Start: 2019-06-18 | End: 2019-06-18

## 2019-06-18 RX ORDER — LORAZEPAM 1 MG/1
1 TABLET ORAL
Status: DISCONTINUED | OUTPATIENT
Start: 2019-06-18 | End: 2019-06-19 | Stop reason: HOSPADM

## 2019-06-18 RX ORDER — ROSUVASTATIN CALCIUM 10 MG/1
10 TABLET, COATED ORAL DAILY
Status: DISCONTINUED | OUTPATIENT
Start: 2019-06-19 | End: 2019-06-19

## 2019-06-18 RX ORDER — ACETAMINOPHEN 325 MG/1
650 TABLET ORAL EVERY 4 HOURS PRN
Status: DISCONTINUED | OUTPATIENT
Start: 2019-06-18 | End: 2019-06-19 | Stop reason: HOSPADM

## 2019-06-18 RX ORDER — ONDANSETRON 4 MG/1
4 TABLET, FILM COATED ORAL EVERY 8 HOURS PRN
Status: DISCONTINUED | OUTPATIENT
Start: 2019-06-18 | End: 2019-06-18

## 2019-06-18 RX ORDER — FLUOXETINE HYDROCHLORIDE 20 MG/1
20 CAPSULE ORAL DAILY
Status: DISCONTINUED | OUTPATIENT
Start: 2019-06-19 | End: 2019-06-19 | Stop reason: ALTCHOICE

## 2019-06-18 RX ORDER — LORAZEPAM 1 MG/1
1 TABLET ORAL EVERY 6 HOURS
Status: COMPLETED | OUTPATIENT
Start: 2019-06-18 | End: 2019-06-19

## 2019-06-18 RX ORDER — FLUOXETINE HYDROCHLORIDE 20 MG/1
20 CAPSULE ORAL DAILY
COMMUNITY

## 2019-06-18 RX ORDER — VALSARTAN 80 MG/1
160 TABLET ORAL DAILY
Status: DISCONTINUED | OUTPATIENT
Start: 2019-06-19 | End: 2019-06-19 | Stop reason: HOSPADM

## 2019-06-18 RX ORDER — VENLAFAXINE HYDROCHLORIDE 75 MG/1
150 CAPSULE, EXTENDED RELEASE ORAL DAILY
Status: DISCONTINUED | OUTPATIENT
Start: 2019-06-19 | End: 2019-06-19 | Stop reason: HOSPADM

## 2019-06-18 RX ORDER — LORAZEPAM 2 MG/ML
1 INJECTION INTRAMUSCULAR
Status: DISCONTINUED | OUTPATIENT
Start: 2019-06-18 | End: 2019-06-19 | Stop reason: HOSPADM

## 2019-06-18 RX ORDER — SODIUM CHLORIDE 0.9 % (FLUSH) 0.9 %
3 SYRINGE (ML) INJECTION EVERY 12 HOURS SCHEDULED
Status: DISCONTINUED | OUTPATIENT
Start: 2019-06-18 | End: 2019-06-19 | Stop reason: HOSPADM

## 2019-06-18 RX ORDER — ASPIRIN 300 MG/1
300 SUPPOSITORY RECTAL DAILY
Status: DISCONTINUED | OUTPATIENT
Start: 2019-06-19 | End: 2019-06-19 | Stop reason: HOSPADM

## 2019-06-18 RX ORDER — METOCLOPRAMIDE HYDROCHLORIDE 5 MG/ML
10 INJECTION INTRAMUSCULAR; INTRAVENOUS ONCE
Status: COMPLETED | OUTPATIENT
Start: 2019-06-18 | End: 2019-06-18

## 2019-06-18 RX ORDER — SODIUM CHLORIDE 9 MG/ML
100 INJECTION, SOLUTION INTRAVENOUS CONTINUOUS
Status: DISCONTINUED | OUTPATIENT
Start: 2019-06-18 | End: 2019-06-19 | Stop reason: HOSPADM

## 2019-06-18 RX ORDER — DIPHENHYDRAMINE HYDROCHLORIDE 50 MG/ML
25 INJECTION INTRAMUSCULAR; INTRAVENOUS ONCE
Status: COMPLETED | OUTPATIENT
Start: 2019-06-18 | End: 2019-06-18

## 2019-06-18 RX ORDER — NICOTINE 21 MG/24HR
1 PATCH, TRANSDERMAL 24 HOURS TRANSDERMAL NIGHTLY
Status: DISCONTINUED | OUTPATIENT
Start: 2019-06-18 | End: 2019-06-19 | Stop reason: SDUPTHER

## 2019-06-18 RX ADMIN — METOCLOPRAMIDE 10 MG: 5 INJECTION, SOLUTION INTRAMUSCULAR; INTRAVENOUS at 19:51

## 2019-06-18 RX ADMIN — IOPAMIDOL 83.9 ML: 755 INJECTION, SOLUTION INTRAVENOUS at 18:17

## 2019-06-18 RX ADMIN — ACETAMINOPHEN 1000 MG: 500 TABLET ORAL at 19:14

## 2019-06-18 RX ADMIN — GABAPENTIN 600 MG: 300 CAPSULE ORAL at 23:44

## 2019-06-18 RX ADMIN — THIAMINE HYDROCHLORIDE 100 MG: 100 INJECTION, SOLUTION INTRAMUSCULAR; INTRAVENOUS at 18:38

## 2019-06-18 RX ADMIN — DIPHENHYDRAMINE HYDROCHLORIDE 25 MG: 50 INJECTION INTRAMUSCULAR; INTRAVENOUS at 19:53

## 2019-06-18 RX ADMIN — LORAZEPAM 1 MG: 1 TABLET ORAL at 23:44

## 2019-06-18 RX ADMIN — LORAZEPAM 1 MG: 2 INJECTION INTRAMUSCULAR; INTRAVENOUS at 19:55

## 2019-06-18 RX ADMIN — FOLIC ACID 125 ML/HR: 5 INJECTION, SOLUTION INTRAMUSCULAR; INTRAVENOUS; SUBCUTANEOUS at 23:43

## 2019-06-18 RX ADMIN — LAMOTRIGINE 150 MG: 100 TABLET ORAL at 23:44

## 2019-06-18 NOTE — ED PROVIDER NOTES
Subjective   History of Present Illness    49-year-old female presenting with slurred speech and left-sided weakness.    She was last seen normal at 2:30 PM.  Patient notes that approximately 3:30 PM patient notes sudden onset of slurred speech, word finding difficulties, decreased coordination of the left upper left lower extremity.  Patient was worried that she was having a stroke at that time and called EMS.   notes some slurred speech at that time as well, as well as abnormal shaking of her upper and lower extremities.  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    Patient denies any recent head trauma, chest pain, syncope, nausea, vomiting, diarrhea.    Review of Systems   Constitutional: Negative for chills and fever.   HENT: Negative for congestion and sinus pain.    Eyes: Negative for pain.   Respiratory: Negative for shortness of breath.    Cardiovascular: Negative for chest pain.   Gastrointestinal: Negative for abdominal pain and vomiting.   Genitourinary: Negative for flank pain.   Musculoskeletal: Positive for gait problem. Negative for back pain.   Skin: Negative for wound.   Neurological: Positive for tremors, speech difficulty and weakness. Negative for syncope, facial asymmetry and numbness.   Psychiatric/Behavioral: The patient is not hyperactive.        Past Medical History:   Diagnosis Date   • Alcohol abuse    • Anxiety and depression    • Chronic pain     back   • Gallstones    • Hypercholesteremia    • Hypertension    • Clarke-clarke disease    • Pancreatitis    • Stroke (cerebrum) (CMS/HCC)        No Known Allergies    Past Surgical History:   Procedure Laterality Date   • BRAIN SURGERY  2007 [facility] M[facility]    • BREAST SURGERY     • CHOLECYSTECTOMY WITH INTRAOPERATIVE CHOLANGIOGRAM N/A 4/18/2019    Procedure: CHOLECYSTECTOMY LAPAROSCOPIC WITH LIVER BIOPSY;  Surgeon: Viviana Boles MD;   Location: [facility] OR;  Service: General   • TUBAL ABDOMINAL LIGATION         Family History   Problem Relation Age of Onset   • Breast cancer Paternal Grandmother    • Breast cancer Other    • Lung cancer Mother    • Lymphoma Father        Social History     Socioeconomic History   • Marital status:      Spouse name: Not on file   • Number of children: Not on file   • Years of education: Not on file   • Highest education level: Not on file   Tobacco Use   • Smoking status: Former Smoker     Packs/day: 0.50     Types: Cigarettes   • Smokeless tobacco: Never Used   • Tobacco comment: Reports quit 3 weeks ago.   Substance and Sexual Activity   • Alcohol use: No     Frequency: Never     Comment: Reports quit 5 months ago.   • Drug use: No   • Sexual activity: Defer           Objective   Physical Exam   Constitutional: She appears well-developed and well-nourished.   HENT:   Head: Normocephalic and atraumatic.   Eyes: Conjunctivae are normal.   Neck: Normal range of motion.   Cardiovascular: Normal rate, regular rhythm and intact distal pulses.   Pulmonary/Chest: Effort normal and breath sounds normal. No respiratory distress.   Abdominal: Soft. She exhibits no distension. There is no tenderness.   Musculoskeletal: She exhibits no edema.   Neurological:   AxOx3  CN II-XII intact except for mildly slurred speech  EOMI, PERRLA  5/5 RUE  5/5 LUE  5/5 RLE  5/5 LLE  Sensation to light touch intact except for subjective numbness of the left lower extremity from the toes up to the knee  Impaired finger-to-nose left upper extremity, slightly impaired heel-to-shin left lower extremity       Skin: Skin is warm and dry.   Psychiatric: She has a normal mood and affect.   Nursing note and vitals reviewed.      Procedures           ED Course                  MDM  Number of Diagnoses or Management Options  Alcohol withdrawal syndrome without complication (CMS/HCC):   TIA (transient ischemic attack):   Diagnosis management  comments: 49-year-old female presenting with slurred speech, left upper extremity ataxia.  Code stroke was called, patient evaluated by neurology, CTA of the head and neck with no acute embolic stroke.  Neurology recommended admission to the hospital service for a TIA work-up.  Due to rapidly resolving symptoms.    There is also a component of alcohol withdrawal.  After the patient's  left, patient admitted to drinking heavily over the past week, last drink approximately 24 hours ago.       Amount and/or Complexity of Data Reviewed  Clinical lab tests: reviewed  Tests in the radiology section of CPT®: reviewed  Tests in the medicine section of CPT®: reviewed          Final diagnoses:   TIA (transient ischemic attack)   Alcohol withdrawal syndrome without complication (CMS/HCC)            Tray Lopez MD  06/18/19 0040

## 2019-06-18 NOTE — ED NOTES
Dr Madsen at bedside     Marilyn Herbert RN  06/18/19 1752       Marilyn Herbert RN  06/18/19 3523

## 2019-06-18 NOTE — CONSULTS
Neurology Consult Note    Patient:  Maureen Best   YOB: 1970  MRN:  [id number]  Date of Admission:  6/18/2019  5:17 PM    Date: 6/18/2019    Referring Provider: Tray Lopez MD  Reason for Consultation: Code stroke    History of present illness:     This is a 49 y.o. right handed female.with H/O Moyamoya disease and previous strokes  S/p bypass at [facility] in 2006, evaluated for code stroke.    Per  patient has had some cognitive changes over the past month. She has not been able to focus and complete  tasks.  She began to have a headache yesterday and has a H/O migraine with auras.  Today headache is ranked an 8 and is associated with photophobia, phonophobia and osmophobia but not nausea. Currently no aura but in past she has noted squiggly lines.     She presented with dysarthria and left sided ataxia but came in with bilateral tremors that began around 2:30 PM  came home around 4:30 and brought her to the ED.   She was rapidly recovering. She could name all items and repeat and describe pictures but when headache increased she had difficulty.  The diffuse tremors resolved. The ataxia resolved but the last to resolved was of the left arm. She did not have any truncal ataxia, skew vision, diplopia, sensory facial loss or Erich's    Head CT does not show any acute changes but mentions remote right caudate and multifocal remote infarcts/encephalomalacia of the right cerebral hemisphere,. Past head CTs are about the same with  right frontal, parietal, and superior temporal lobes.There are post operative changes of right calvarium    Patient was recently admitted 522 through 5/27/19 for acute pancreatitis, Ecoli UTI , thrombocytopenia,  She reportedly has not had any alcohol since last February.  goes to work so does not stay with her all day, however he states he is 99 % sure that she has not been drinking.     Currently she has significantly elevated liver  enzymes since recent admission.   Ref. Range 5/22/2019 17:54 5/23/2019 06:54 5/24/2019 05:24 6/18/2019 17:20   AST (SGOT)  7 - 45 U/L 70 (H) 51 (H) 39 576 (H)     ALT (SGPT) : 0 - 54 U/L 74 (H) 60 (H) 44 217 (H)     She has had poor appetite per .  Past Medical History:   Diagnosis Date   • Alcohol abuse    • Anxiety and depression    • Chronic pain     back   • Gallstones    • Hypercholesteremia    • Hypertension    • Clarke-clarke disease    • Stroke (cerebrum) (CMS/HCC)        Past Surgical History:   Procedure Laterality Date   • BRAIN SURGERY  2007    Clarke Clarke    • BREAST SURGERY     • CHOLECYSTECTOMY WITH INTRAOPERATIVE CHOLANGIOGRAM N/A 4/18/2019    Procedure: CHOLECYSTECTOMY LAPAROSCOPIC WITH LIVER BIOPSY;  Surgeon: Viviana Boles MD;  Location: [facility];  Service: General   • TUBAL ABDOMINAL LIGATION         Prior to Admission medications    Medication Sig Start Date End Date Taking? Authorizing Provider   diclofenac (VOLTAREN) 75 MG EC tablet Take 75 mg by mouth 2 (Two) Times a Day.    Mckenzie Moore MD   gabapentin (NEURONTIN) 600 MG tablet Take 600 mg by mouth 3 (Three) Times a Day.    Mckenzie Moore MD   HYDROcodone-acetaminophen (NORCO) 5-325 MG per tablet Take 1 tablet by mouth 3 (Three) Times a Day As Needed for Moderate Pain .    Mckenzie Moore MD   lamoTRIgine (LaMICtal) 150 MG tablet Take 150 mg by mouth 2 (Two) Times a Day.    Mckenzie Moore MD   ondansetron (ZOFRAN) 4 MG tablet Take 1 tablet by mouth Every 8 (Eight) Hours As Needed for Nausea or Vomiting. 5/30/19   Monik Hood APRN   rosuvastatin (CRESTOR) 10 MG tablet Take 10 mg by mouth Daily.    Mckenzie Moore MD   tiZANidine (ZANAFLEX) 4 MG tablet Take 4 mg by mouth Every 8 (Eight) Hours As Needed for Muscle Spasms (No more than 3 doses every 24 hours.).    Mckenzie Moore MD   valsartan (DIOVAN) 160 MG tablet Take 1 tablet by mouth Daily. 2/20/19   Lauren Lazaro APRN    venlafaxine XR (EFFEXOR-XR) 150 MG 24 hr capsule Take 150 mg by mouth Daily.    Provider, MD Mckenzie       Hospital scheduled medications:      Hospital PRN medications:  •  sodium chloride    No Known Allergies    Social History     Socioeconomic History   • Marital status:      Spouse name: Not on file   • Number of children: Not on file   • Years of education: Not on file   • Highest education level: Not on file   Tobacco Use   • Smoking status: Former Smoker     Packs/day: 0.50     Types: Cigarettes   • Smokeless tobacco: Never Used   • Tobacco comment: Reports quit 3 weeks ago.   Substance and Sexual Activity   • Alcohol use: No     Frequency: Never     Comment: Reports quit 5 months ago.   • Drug use: No   • Sexual activity: Defer     Family History   Problem Relation Age of Onset   • Breast cancer Paternal Grandmother    • Breast cancer Other    • Lung cancer Mother    • Lymphoma Father        Review of Systems  A 14 point review of systems was reviewed and was negative except for headache    Vital Signs   Temp:  [98.3 °F (36.8 °C)] 98.3 °F (36.8 °C)  Heart Rate:  [102-109] 102  Resp:  [22] 22  BP: (141-153)/(83-99) 141/83    General Exam:  Head:  Normal cephalic, atraumatic  HEENT:  Neck supple  Fundoscopic Exam:  No signs of disc edema  CVS:  Regular rate and rhythm.  No murmurs  Carotid Examination:  No bruits  Lungs:  Clear to auscultation  Abdomen:  Non-tender, Non-distended  Extremities:  No signs of peripheral edema  Skin:  No rashes    Neurologic Exam:    Mental Status:    -Awake, Alert, Oriented X 3  -No word finding difficulties  -No aphasia  -No dysarthria  -Follows simple and complex commands    CN II:  Visual fields full.  Pupils equally reactive to light  CN III, IV, VI:  Extraocular Muscles full with no signs of nystagmus  CN V:  Facial sensory is symmetric   CN VII:  Facial motor symmetric  CN VIII:  Gross hearing intact bilaterally  CN IX/X:  Palate elevates symmetrically  CN  XI:  Shoulder shrug symmetric  CN XII:  Tongue is midline on protrusion    Motor: (strength out of 5:  1= minimal movement, 2 = movement in plane of gravity, 3 = movement against gravity, 4 = movement against some resistance, 5 = full strength)    -Right Upper Ext: Proximal: 5 Distal: 5  -Left Upper Ext: Proximal: 5 Distal: 5    -Right Lower Ext: Proximal: 5 Distal: 5  -Left Lower Ext: Proximal: 5 Distal: 5    DTR:  -Right   Bicep: 2+ Triceps: 2+ Brachioradialis: 2+   Patella: 2+ Ankle: 2+ Neg Babinski  -Left   Bicep: 2+ Triceps: 2+ Brachioradialis: 2+   Patella: 2+ Ankle: 2+ Neg Babinski    Sensory:  -Intact to light touch, pinprick, temperature, pain, and proprioception    Coordination:  -Finger to nose intact--initially was dysmetric on left  -Heel to shin intact--initially was ataxic on left  -No ataxia    Gait  -Not tested due to code stroke    Results Review:  Lab Results (last 7 days)     Procedure Component Value Units Date/Time    CBC & Differential [[id number]] Collected:  06/18/19 1720    Specimen:  Blood Updated:  06/18/19 1800    Narrative:       The following orders were created for panel order CBC & Differential.  Procedure                               Abnormality         Status                     ---------                               -----------         ------                     CBC Auto Differential[[id number]]        Abnormal            Final result                 Please view results for these tests on the individual orders.    CBC Auto Differential [[id number]]  (Abnormal) Collected:  06/18/19 1720    Specimen:  Blood from Arm, Right Updated:  06/18/19 1800     WBC 4.06 10*3/mm3      RBC 3.50 10*6/mm3      Hemoglobin 11.5 g/dL      Hematocrit 33.0 %      MCV 94.3 fL      MCH 32.9 pg      MCHC 34.8 g/dL      RDW 19.1 %      RDW-SD 62.9 fl      MPV 10.2 fL      Platelets 73 10*3/mm3      Neutrophil % 61.1 %      Lymphocyte % 32.0 %      Monocyte % 4.4 %      Eosinophil % 1.5 %      Basophil % 0.5  %      Immature Grans % 0.5 %      Neutrophils, Absolute 2.48 10*3/mm3      Lymphocytes, Absolute 1.30 10*3/mm3      Monocytes, Absolute 0.18 10*3/mm3      Eosinophils, Absolute 0.06 10*3/mm3      Basophils, Absolute 0.02 10*3/mm3      Immature Grans, Absolute 0.02 10*3/mm3      nRBC 0.0 /100 WBC     Troponin [[id number]]  (Normal) Collected:  06/18/19 1720    Specimen:  Blood from Arm, Right Updated:  06/18/19 1759     Troponin I <0.012 ng/mL     Comprehensive Metabolic Panel [[id number]]  (Abnormal) Collected:  06/18/19 1720    Specimen:  Blood from Arm, Right Updated:  06/18/19 1750     Glucose 116 mg/dL      BUN 2 mg/dL      Creatinine 0.52 mg/dL      Sodium 148 mmol/L      Potassium 3.6 mmol/L      Chloride 111 mmol/L      CO2 23.0 mmol/L      Calcium 8.7 mg/dL      Total Protein 7.2 g/dL      Albumin 4.40 g/dL      ALT (SGPT) 217 U/L      AST (SGOT) 576 U/L      Alkaline Phosphatase 173 U/L      Total Bilirubin 0.9 mg/dL      eGFR Non African Amer 126 mL/min/1.73      Globulin 2.8 gm/dL      A/G Ratio 1.6 g/dL      BUN/Creatinine Ratio 3.8     Anion Gap 14.0 mmol/L     Narrative:       GFR Normal >60  Chronic Kidney Disease <60  Kidney Failure <15    Magnesium [[id number]]  (Normal) Collected:  06/18/19 1720    Specimen:  Blood from Arm, Right Updated:  06/18/19 1747     Magnesium 2.0 mg/dL     Protime-INR [[id number]]  (Normal) Collected:  06/18/19 1720    Specimen:  Blood from Arm, Right Updated:  06/18/19 1745     Protime 13.9 Seconds      INR 1.04    aPTT [[id number]]  (Normal) Collected:  06/18/19 1720    Specimen:  Blood from Arm, Right Updated:  06/18/19 1745     PTT 29.0 seconds     Green Top (Gel) [[id number]] Collected:  06/18/19 1720    Specimen:  Blood from Arm, Right Updated:  06/18/19 1733    Red Top [[id number]] Collected:  06/18/19 1720    Specimen:  Blood from Arm, Right Updated:  06/18/19 1733    Light Blue Top [[id number]] Collected:  06/18/19 1720    Specimen:  Blood from Arm, Right Updated:   06/18/19 1733    Gratis Draw [[id number]] Collected:  06/18/19 1720    Specimen:  Blood Updated:  06/18/19 1733    Narrative:       The following orders were created for panel order Gratis Draw.  Procedure                               Abnormality         Status                     ---------                               -----------         ------                     Light Blue Top[[id number]]                                   In process                 Green Top (Gel)[[id number]]                                  In process                 Lavender Top[[id number]]                                     In process                 Red Top[[id number]]                                          In process                   Please view results for these tests on the individual orders.    Lavender Top [[id number]] Collected:  06/18/19 1720    Specimen:  Blood from Arm, Right Updated:  06/18/19 1733    POC Glucose Once [[id number]]  (Normal) Collected:  06/18/19 1720    Specimen:  Blood Updated:  06/18/19 1732     Glucose 119 mg/dL      Comment: : [id number] [id number]             .  Imaging Results (last 24 hours)     Procedure Component Value Units Date/Time    CT Head Without Contrast Stroke Protocol [[id number]] Collected:  06/18/19 1759     Updated:  06/18/19 1805    Narrative:          History:  49-year-old with stroke.     Reference   CT head 03/15/2019     Technique  Unenhanced CT head/brain was performed. Automated exposure control was  utilized to limit radiation dose. DLP 1069 mGy-cm.      Findings  Multifocal remote infarcts/encephalomalacia of the right cerebral  hemisphere, stable. No CT evidence of acute infarction, hemorrhage, or  mass. Remote infarct of the right caudate head is also stable.  Ventricular system appears normal. No extra-axial fluid collection.     Postoperative changes of the right skull. The partially imaged paranasal  sinuses and mastoid air cells are clear.        Impression:       No acute findings.      This was inadvertently ordered as a stroke protocol.  This report was finalized on 06/18/2019 18:02 by Dr Tino Dumont, .              Impression    1. Stroke like symptoms suggestive of left cerebellar but with rapid recovery. So more c/w TIA  2. No TPA due to rapid recovery.  3. H/O Moyamoya s/p bypass  4. Transaminitis not present a couple weeks ago on background of recent admission for acute pancreatitis and reportedly no alcohol since this past summer..  5. Alcohol abuse did have DT in February  6. Depression and anxiety but patient nodded yes to whether she has been diagnosed with Bipolar disorder and she is on Lamictal for this.     Plan    · IV thiamine daily for 3 doses then PO  · B12, folate,Sed rate, CRP  · Ammonia level in AM  · UDS  · ETOH   · CTA of head and neck  · Telemetry  · Echo  · SCD's  · Aspirin  · Lipitor  · Hemoglobin A1C  · Lipid panel  · MRI brain without      I discussed the patients findings and my recommendations with patient, family, nursing staff and Dr John Marie MD  06/18/19  6:14 PM

## 2019-06-18 NOTE — ED NOTES
Dr. Lopez calling code stroke after further evaluation and discussion with .      Ivonne Brooks, RN  06/18/19 1737       Ivonne Brooks, RN  06/18/19 1730

## 2019-06-19 ENCOUNTER — APPOINTMENT (OUTPATIENT)
Dept: MRI IMAGING | Facility: HOSPITAL | Age: 49
End: 2019-06-19

## 2019-06-19 ENCOUNTER — APPOINTMENT (OUTPATIENT)
Dept: CARDIOLOGY | Facility: HOSPITAL | Age: 49
End: 2019-06-19

## 2019-06-19 VITALS
OXYGEN SATURATION: 99 % | RESPIRATION RATE: 18 BRPM | TEMPERATURE: 98.4 F | HEIGHT: 64 IN | SYSTOLIC BLOOD PRESSURE: 165 MMHG | HEART RATE: 94 BPM | BODY MASS INDEX: 22.47 KG/M2 | DIASTOLIC BLOOD PRESSURE: 91 MMHG | WEIGHT: 131.6 LBS

## 2019-06-19 LAB
ALBUMIN SERPL-MCNC: 3.3 G/DL (ref 3.5–5)
ALBUMIN/GLOB SERPL: 1.3 G/DL (ref 1.1–2.5)
ALP SERPL-CCNC: 147 U/L (ref 24–120)
ALT SERPL W P-5'-P-CCNC: 190 U/L (ref 0–54)
AMMONIA BLD-SCNC: 20 UMOL/L (ref 9–33)
AMYLASE SERPL-CCNC: 38 U/L (ref 30–110)
ANION GAP SERPL CALCULATED.3IONS-SCNC: 9 MMOL/L (ref 4–13)
ARTICHOKE IGE QN: 103 MG/DL (ref 0–99)
AST SERPL-CCNC: 504 U/L (ref 7–45)
BASOPHILS # BLD AUTO: 0.01 10*3/MM3 (ref 0–0.2)
BASOPHILS NFR BLD AUTO: 0.5 % (ref 0–2)
BH CV ECHO MEAS - AO MAX PG (FULL): 2.7 MMHG
BH CV ECHO MEAS - AO MAX PG: 11.6 MMHG
BH CV ECHO MEAS - AO MEAN PG (FULL): 2 MMHG
BH CV ECHO MEAS - AO MEAN PG: 5 MMHG
BH CV ECHO MEAS - AO ROOT AREA (BSA CORRECTED): 1.8
BH CV ECHO MEAS - AO ROOT AREA: 7.1 CM^2
BH CV ECHO MEAS - AO ROOT DIAM: 3 CM
BH CV ECHO MEAS - AO V2 MAX: 170 CM/SEC
BH CV ECHO MEAS - AO V2 MEAN: 105.5 CM/SEC
BH CV ECHO MEAS - AO V2 VTI: 29.7 CM
BH CV ECHO MEAS - AVA(I,A): 2.1 CM^2
BH CV ECHO MEAS - AVA(I,D): 2.1 CM^2
BH CV ECHO MEAS - AVA(V,A): 2.2 CM^2
BH CV ECHO MEAS - AVA(V,D): 2.2 CM^2
BH CV ECHO MEAS - BSA(HAYCOCK): 1.6 M^2
BH CV ECHO MEAS - BSA: 1.6 M^2
BH CV ECHO MEAS - BZI_BMI: 22.5 KILOGRAMS/M^2
BH CV ECHO MEAS - BZI_METRIC_HEIGHT: 162.6 CM
BH CV ECHO MEAS - BZI_METRIC_WEIGHT: 59.4 KG
BH CV ECHO MEAS - EDV(CUBED): 92.3 ML
BH CV ECHO MEAS - EDV(MOD-SP4): 102 ML
BH CV ECHO MEAS - EDV(TEICH): 93.4 ML
BH CV ECHO MEAS - EF(CUBED): 63.2 %
BH CV ECHO MEAS - EF(MOD-SP4): 72.4 %
BH CV ECHO MEAS - EF(TEICH): 54.8 %
BH CV ECHO MEAS - ESV(CUBED): 34 ML
BH CV ECHO MEAS - ESV(MOD-SP4): 28.2 ML
BH CV ECHO MEAS - ESV(TEICH): 42.2 ML
BH CV ECHO MEAS - FS: 28.3 %
BH CV ECHO MEAS - IVS/LVPW: 0.95
BH CV ECHO MEAS - IVSD: 0.81 CM
BH CV ECHO MEAS - LAT PEAK E' VEL: 14.1 CM/SEC
BH CV ECHO MEAS - LV DIASTOLIC VOL/BSA (35-75): 62.4 ML/M^2
BH CV ECHO MEAS - LV MASS(C)D: 119.7 GRAMS
BH CV ECHO MEAS - LV MASS(C)DI: 73.2 GRAMS/M^2
BH CV ECHO MEAS - LV MAX PG: 8.9 MMHG
BH CV ECHO MEAS - LV MEAN PG: 3 MMHG
BH CV ECHO MEAS - LV SYSTOLIC VOL/BSA (12-30): 17.3 ML/M^2
BH CV ECHO MEAS - LV V1 MAX: 149 CM/SEC
BH CV ECHO MEAS - LV V1 MEAN: 79.4 CM/SEC
BH CV ECHO MEAS - LV V1 VTI: 24.1 CM
BH CV ECHO MEAS - LVIDD: 4.5 CM
BH CV ECHO MEAS - LVIDS: 3.2 CM
BH CV ECHO MEAS - LVLD AP4: 7.8 CM
BH CV ECHO MEAS - LVLS AP4: 6.3 CM
BH CV ECHO MEAS - LVOT AREA (M): 2.5 CM^2
BH CV ECHO MEAS - LVOT AREA: 2.5 CM^2
BH CV ECHO MEAS - LVOT DIAM: 1.8 CM
BH CV ECHO MEAS - LVPWD: 0.85 CM
BH CV ECHO MEAS - MED PEAK E' VEL: 9.68 CM/SEC
BH CV ECHO MEAS - MV A MAX VEL: 123 CM/SEC
BH CV ECHO MEAS - MV DEC TIME: 0.23 SEC
BH CV ECHO MEAS - MV E MAX VEL: 96.1 CM/SEC
BH CV ECHO MEAS - MV E/A: 0.78
BH CV ECHO MEAS - RAP SYSTOLE: 5 MMHG
BH CV ECHO MEAS - RVSP: 23.1 MMHG
BH CV ECHO MEAS - SI(AO): 128.3 ML/M^2
BH CV ECHO MEAS - SI(CUBED): 35.7 ML/M^2
BH CV ECHO MEAS - SI(LVOT): 37.5 ML/M^2
BH CV ECHO MEAS - SI(MOD-SP4): 45.2 ML/M^2
BH CV ECHO MEAS - SI(TEICH): 31.3 ML/M^2
BH CV ECHO MEAS - SV(AO): 209.6 ML
BH CV ECHO MEAS - SV(CUBED): 58.3 ML
BH CV ECHO MEAS - SV(LVOT): 61.3 ML
BH CV ECHO MEAS - SV(MOD-SP4): 73.8 ML
BH CV ECHO MEAS - SV(TEICH): 51.2 ML
BH CV ECHO MEAS - TR MAX VEL: 213 CM/SEC
BH CV ECHO MEASUREMENTS AVERAGE E/E' RATIO: 8.08
BILIRUB SERPL-MCNC: 1 MG/DL (ref 0.1–1)
BUN BLD-MCNC: 2 MG/DL (ref 5–21)
BUN/CREAT SERPL: 5 (ref 7–25)
CALCIUM SPEC-SCNC: 8 MG/DL (ref 8.4–10.4)
CHLORIDE SERPL-SCNC: 113 MMOL/L (ref 98–110)
CHOLEST SERPL-MCNC: 151 MG/DL (ref 130–200)
CO2 SERPL-SCNC: 22 MMOL/L (ref 24–31)
CREAT BLD-MCNC: 0.4 MG/DL (ref 0.5–1.4)
CRP SERPL-MCNC: <0.5 MG/DL (ref 0–0.99)
DEPRECATED RDW RBC AUTO: 67.1 FL (ref 40–54)
EOSINOPHIL # BLD AUTO: 0.05 10*3/MM3 (ref 0–0.7)
EOSINOPHIL NFR BLD AUTO: 2.5 % (ref 0–4)
ERYTHROCYTE [DISTWIDTH] IN BLOOD BY AUTOMATED COUNT: 19.1 % (ref 12–15)
GFR SERPL CREATININE-BSD FRML MDRD: >150 ML/MIN/1.73
GLOBULIN UR ELPH-MCNC: 2.5 GM/DL
GLUCOSE BLD-MCNC: 110 MG/DL (ref 70–100)
HBA1C MFR BLD: 5.5 %
HCT VFR BLD AUTO: 29.2 % (ref 37–47)
HDLC SERPL-MCNC: 41 MG/DL
HGB BLD-MCNC: 10 G/DL (ref 12–16)
LDLC/HDLC SERPL: 2.17 {RATIO}
LEFT ATRIUM VOLUME INDEX: 28.2 ML/M2
LEFT ATRIUM VOLUME: 46 CM3
LIPASE SERPL-CCNC: 156 U/L (ref 23–203)
LYMPHOCYTES # BLD AUTO: 0.72 10*3/MM3 (ref 0.72–4.86)
LYMPHOCYTES NFR BLD AUTO: 36 % (ref 15–45)
MAXIMAL PREDICTED HEART RATE: 172 BPM
MCH RBC QN AUTO: 33.8 PG (ref 28–32)
MCHC RBC AUTO-ENTMCNC: 34.2 G/DL (ref 33–36)
MCV RBC AUTO: 98.6 FL (ref 82–98)
MONOCYTES # BLD AUTO: 0.09 10*3/MM3 (ref 0.19–1.3)
MONOCYTES NFR BLD AUTO: 4.5 % (ref 4–12)
NEUTROPHILS # BLD AUTO: 1.12 10*3/MM3 (ref 1.87–8.4)
NEUTROPHILS NFR BLD AUTO: 56 % (ref 39–78)
PLATELET # BLD AUTO: 47 10*3/MM3 (ref 130–400)
PMV BLD AUTO: 10.1 FL (ref 6–12)
POTASSIUM BLD-SCNC: 3.5 MMOL/L (ref 3.5–5.3)
PROT SERPL-MCNC: 5.8 G/DL (ref 6.3–8.7)
RBC # BLD AUTO: 2.96 10*6/MM3 (ref 4.2–5.4)
SODIUM BLD-SCNC: 144 MMOL/L (ref 135–145)
STRESS TARGET HR: 146 BPM
TRIGL SERPL-MCNC: 106 MG/DL (ref 0–149)
WBC NRBC COR # BLD: 2 10*3/MM3 (ref 4.8–10.8)

## 2019-06-19 PROCEDURE — 80061 LIPID PANEL: CPT | Performed by: INTERNAL MEDICINE

## 2019-06-19 PROCEDURE — 25010000002 HYDROMORPHONE PER 4 MG: Performed by: INTERNAL MEDICINE

## 2019-06-19 PROCEDURE — 25010000002 PERFLUTREN 6.52 MG/ML SUSPENSION: Performed by: FAMILY MEDICINE

## 2019-06-19 PROCEDURE — 99226 PR SBSQ OBSERVATION CARE/DAY 35 MINUTES: CPT | Performed by: PSYCHIATRY & NEUROLOGY

## 2019-06-19 PROCEDURE — G0378 HOSPITAL OBSERVATION PER HR: HCPCS

## 2019-06-19 PROCEDURE — 25010000002 THIAMINE PER 100 MG: Performed by: INTERNAL MEDICINE

## 2019-06-19 PROCEDURE — 36415 COLL VENOUS BLD VENIPUNCTURE: CPT | Performed by: PSYCHIATRY & NEUROLOGY

## 2019-06-19 PROCEDURE — 96375 TX/PRO/DX INJ NEW DRUG ADDON: CPT

## 2019-06-19 PROCEDURE — 70551 MRI BRAIN STEM W/O DYE: CPT

## 2019-06-19 PROCEDURE — 83036 HEMOGLOBIN GLYCOSYLATED A1C: CPT | Performed by: INTERNAL MEDICINE

## 2019-06-19 PROCEDURE — 82140 ASSAY OF AMMONIA: CPT | Performed by: PSYCHIATRY & NEUROLOGY

## 2019-06-19 PROCEDURE — 96366 THER/PROPH/DIAG IV INF ADDON: CPT

## 2019-06-19 PROCEDURE — 83690 ASSAY OF LIPASE: CPT | Performed by: INTERNAL MEDICINE

## 2019-06-19 PROCEDURE — 93306 TTE W/DOPPLER COMPLETE: CPT

## 2019-06-19 PROCEDURE — 93306 TTE W/DOPPLER COMPLETE: CPT | Performed by: INTERNAL MEDICINE

## 2019-06-19 PROCEDURE — 80053 COMPREHEN METABOLIC PANEL: CPT | Performed by: INTERNAL MEDICINE

## 2019-06-19 PROCEDURE — 96361 HYDRATE IV INFUSION ADD-ON: CPT

## 2019-06-19 PROCEDURE — 86140 C-REACTIVE PROTEIN: CPT | Performed by: PSYCHIATRY & NEUROLOGY

## 2019-06-19 PROCEDURE — 85025 COMPLETE CBC W/AUTO DIFF WBC: CPT | Performed by: INTERNAL MEDICINE

## 2019-06-19 PROCEDURE — 82150 ASSAY OF AMYLASE: CPT | Performed by: INTERNAL MEDICINE

## 2019-06-19 RX ORDER — FOLIC ACID 1 MG/1
5 TABLET ORAL ONCE
Status: COMPLETED | OUTPATIENT
Start: 2019-06-19 | End: 2019-06-19

## 2019-06-19 RX ORDER — ASPIRIN 325 MG
325 TABLET ORAL DAILY
COMMUNITY

## 2019-06-19 RX ORDER — LANOLIN ALCOHOL/MO/W.PET/CERES
100 CREAM (GRAM) TOPICAL DAILY
Qty: 30 TABLET | Refills: 0 | Status: SHIPPED | OUTPATIENT
Start: 2019-06-19

## 2019-06-19 RX ORDER — NICOTINE 21 MG/24HR
1 PATCH, TRANSDERMAL 24 HOURS TRANSDERMAL
Status: DISCONTINUED | OUTPATIENT
Start: 2019-06-19 | End: 2019-06-19 | Stop reason: HOSPADM

## 2019-06-19 RX ORDER — NICOTINE 21 MG/24HR
1 PATCH, TRANSDERMAL 24 HOURS TRANSDERMAL
Qty: 30 PATCH | Refills: 0 | Status: SHIPPED | OUTPATIENT
Start: 2019-06-20

## 2019-06-19 RX ORDER — FOLIC ACID 1 MG/1
1 TABLET ORAL DAILY
Qty: 30 TABLET | Refills: 0 | Status: SHIPPED | OUTPATIENT
Start: 2019-06-20

## 2019-06-19 RX ORDER — ROSUVASTATIN CALCIUM 20 MG/1
20 TABLET, COATED ORAL DAILY
Status: DISCONTINUED | OUTPATIENT
Start: 2019-06-20 | End: 2019-06-19 | Stop reason: HOSPADM

## 2019-06-19 RX ADMIN — HYDROCODONE BITARTRATE AND ACETAMINOPHEN 1 TABLET: 7.5; 325 TABLET ORAL at 05:38

## 2019-06-19 RX ADMIN — SODIUM CHLORIDE 100 ML/HR: 9 INJECTION, SOLUTION INTRAVENOUS at 05:36

## 2019-06-19 RX ADMIN — HYDROMORPHONE HYDROCHLORIDE 0.5 MG: 1 INJECTION, SOLUTION INTRAMUSCULAR; INTRAVENOUS; SUBCUTANEOUS at 14:05

## 2019-06-19 RX ADMIN — NICOTINE 1 PATCH: 21 PATCH, EXTENDED RELEASE TRANSDERMAL at 05:39

## 2019-06-19 RX ADMIN — HYDROMORPHONE HYDROCHLORIDE 0.5 MG: 1 INJECTION, SOLUTION INTRAMUSCULAR; INTRAVENOUS; SUBCUTANEOUS at 11:17

## 2019-06-19 RX ADMIN — ROSUVASTATIN CALCIUM 10 MG: 10 TABLET, FILM COATED ORAL at 08:39

## 2019-06-19 RX ADMIN — GABAPENTIN 600 MG: 300 CAPSULE ORAL at 05:35

## 2019-06-19 RX ADMIN — PERFLUTREN: 6.52 INJECTION, SUSPENSION INTRAVENOUS at 10:42

## 2019-06-19 RX ADMIN — GABAPENTIN 600 MG: 300 CAPSULE ORAL at 14:05

## 2019-06-19 RX ADMIN — LORAZEPAM 1 MG: 1 TABLET ORAL at 05:36

## 2019-06-19 RX ADMIN — HYDROMORPHONE HYDROCHLORIDE 0.5 MG: 1 INJECTION, SOLUTION INTRAMUSCULAR; INTRAVENOUS; SUBCUTANEOUS at 16:20

## 2019-06-19 RX ADMIN — LAMOTRIGINE 150 MG: 100 TABLET ORAL at 08:39

## 2019-06-19 RX ADMIN — VALSARTAN 160 MG: 80 TABLET, FILM COATED ORAL at 08:39

## 2019-06-19 RX ADMIN — ASPIRIN 325 MG: 325 TABLET, COATED ORAL at 08:39

## 2019-06-19 RX ADMIN — HYDROCODONE BITARTRATE AND ACETAMINOPHEN 1 TABLET: 7.5; 325 TABLET ORAL at 12:18

## 2019-06-19 RX ADMIN — LORAZEPAM 1 MG: 1 TABLET ORAL at 16:14

## 2019-06-19 RX ADMIN — VENLAFAXINE HYDROCHLORIDE 150 MG: 75 CAPSULE, EXTENDED RELEASE ORAL at 11:13

## 2019-06-19 RX ADMIN — FOLIC ACID 5 MG: 1 TABLET ORAL at 16:14

## 2019-06-19 RX ADMIN — FOLIC ACID 1 MG: 1 TABLET ORAL at 08:39

## 2019-06-19 RX ADMIN — LORAZEPAM 1 MG: 1 TABLET ORAL at 11:13

## 2019-06-19 RX ADMIN — HYDROCODONE BITARTRATE AND ACETAMINOPHEN 1 TABLET: 7.5; 325 TABLET ORAL at 18:18

## 2019-06-19 RX ADMIN — THIAMINE HYDROCHLORIDE 100 MG: 100 INJECTION, SOLUTION INTRAMUSCULAR; INTRAVENOUS at 08:39

## 2019-06-19 NOTE — ED NOTES
PATIENT UP IN [place], STATES STATES SHE IS GOING TO BATHROOM, I ESCORTED PATIENT TO BATHROOM, AND BACK TO HER BED.  ENCOURAGED NOT TO GET UP OUT OF BED, TO USE HER CALL LIGHT, SHE VERBALIZED UNDERSTANDING.        Maya Lopez RN  06/18/19 2037

## 2019-06-19 NOTE — ED NOTES
AT BEDSIDE WITH DR WAGGONER, PATIENT STATES HER NERVES ARE BAD, AND SHE HAS A HEADACHE,  DR WAGGONER EXPLAINS TO PATIENT IMPORTANCE OF STAYING, PATIENT ADMITS TO DRINKING ALCOHOL LAST NIGHT, EARLY MORNING, STATES SHE DOES NOT WANT HER  TO KNOW.  DR WAGGONER INFORMS PATIENT HE WOULD GIVE HER SOMETHING FOR HER NERVES.  PATIENT STATES SHE WILL STAY FOR ADMISSION.       Maya Lopez, RN  06/18/19 1946

## 2019-06-19 NOTE — PLAN OF CARE
Problem: Patient Care Overview  Goal: Plan of Care Review  Outcome: Ongoing (interventions implemented as appropriate)   06/19/19 1207   Coping/Psychosocial   Plan of Care Reviewed With patient   Plan of Care Review   Progress improving   OTHER   Outcome Summary Received orders for PT eval. Attempted to see pt. Pt reports she is hoping to go home this date. Reports she is back to baseline and she is feeling better. Reports no needs for PT and requested to hold PT eval at this time. Reports her baseline is w/ decrease balance from a stroke previously, but that is not a new problem. Advised pt to let nsg staff know if anything changes. At this time, PT will sign off. Please rewrite orders if something changes and appropriate for eval.

## 2019-06-19 NOTE — H&P
Full  [facility] Medicine Services  HISTORY AND PHYSICAL    Date of Admission: 6/18/2019  Primary Care Physician: Chaitanya Padilla DO    Subjective     Chief Complaint: Slurred speech    History of Present Illness  Patient is a 49-year-old white female past history of moyamoya disorder status post bypass presents emergency room with acute onset of slurred speech and left-sided weakness she was also noted to have trouble with finding words and some amount of word salad.  This started around 330 when she was working in the garden shoveling mulch.  She was seen in the emergency room and initially at the symptoms and was worked up and while she was having scans all of her symptoms resolved.  She did complain of a severe headache at the same time this occurred and during that while it was going on.  She still complaining of a headache somewhat when I see her her symptoms have all gone away. She also has a history of alcohol abuse but states that she is been drinking hardly anything at all maybe a beer every other day per her recollection.   The patient's blood alcohol level was 339 in the ER so she obviously has been drinking significantly.  Neurology has seen her evaluated her CTA carotids as well as intracranially have no acute findings on them.  She is being admitted for further evaluation and management.        Review of Systems   14 point review of systems negative except for as per HPI    Otherwise complete ROS reviewed and negative except as mentioned in the HPI.    Past Medical History:   Past Medical History:   Diagnosis Date   • Alcohol abuse    • Anxiety and depression    • Chronic pain     back   • Gallstones    • Hypercholesteremia    • Hypertension    • Clarke-clarke disease    • Pancreatitis    • Stroke (cerebrum) (CMS/HCC)      Past Surgical History:  Past Surgical History:   Procedure Laterality Date   • BRAIN SURGERY  2007    Clarke Clarke    • BREAST SURGERY      • CHOLECYSTECTOMY WITH INTRAOPERATIVE CHOLANGIOGRAM N/A 4/18/2019    Procedure: CHOLECYSTECTOMY LAPAROSCOPIC WITH LIVER BIOPSY;  Surgeon: Viviana Boles MD;  Location: [facility];  Service: General   • TUBAL ABDOMINAL LIGATION       Social History:  reports that she has quit smoking. Her smoking use included cigarettes. She smoked 0.50 packs per day. She has never used smokeless tobacco. She reports that she does not drink alcohol or use drugs.    Family History: family history includes Breast cancer in her other and paternal grandmother; Lung cancer in her mother; Lymphoma in her father.       Allergies:  No Known Allergies  Medications:  Prior to Admission medications    Medication Sig Start Date End Date Taking? Authorizing Provider   diclofenac (VOLTAREN) 75 MG EC tablet Take 75 mg by mouth 2 (Two) Times a Day.   Yes Mckenzie Moore MD   FLUoxetine (PROzac) 20 MG capsule Take 20 mg by mouth Daily.   Yes Mckenzie Moore MD   gabapentin (NEURONTIN) 600 MG tablet Take 600 mg by mouth 3 (Three) Times a Day.   Yes Mckenzie Moore MD   HYDROcodone-acetaminophen (NORCO) 7.5-325 MG per tablet Take 1 tablet by mouth 3 (Three) Times a Day As Needed for Moderate Pain .   Yes Mckenzie Moore MD   lamoTRIgine (LaMICtal) 150 MG tablet Take 150 mg by mouth 2 (Two) Times a Day.   Yes Mckenzie Moore MD   ondansetron (ZOFRAN) 4 MG tablet Take 1 tablet by mouth Every 8 (Eight) Hours As Needed for Nausea or Vomiting. 5/30/19  Yes Monik Hood APRN   rosuvastatin (CRESTOR) 10 MG tablet Take 10 mg by mouth Daily.   Yes Mckenzie Moore MD   tiZANidine (ZANAFLEX) 4 MG tablet Take 4 mg by mouth Every 8 (Eight) Hours As Needed for Muscle Spasms (No more than 3 doses every 24 hours.).   Yes Mckenzie Moore MD   valsartan (DIOVAN) 160 MG tablet Take 1 tablet by mouth Daily. 2/20/19  Yes Lauren Lazaro APRN   venlafaxine XR (EFFEXOR-XR) 150 MG 24 hr capsule Take 150 mg by mouth  "Daily.   Yes Provider, MD Mckenzie     Objective     Vital Signs: /82 (BP Location: Left arm, Patient Position: Lying)   Pulse 94   Temp 98 °F (36.7 °C) (Oral)   Resp 16   Ht 161.5 cm (63.6\")   Wt 59.7 kg (131 lb 9.6 oz)   SpO2 97%   BMI 22.87 kg/m²   Physical Exam  Gen.:  Well-nourished well-developed white female in no acute distress  HEENT: Atraumatic, normocephalic.  Pupils equal, round, and reactive to light. Extraocular movements intact.  Sclerae anicteric.  TMs negative.  Mucous membranes moist.  Neck is supple without lymphadenopathy.  No JVD is noted.  No carotid bruits are auscultated.  Oropharynx is without erythema or exudate.   Chest: Clear to auscultation and percussion.  CV: Regular rate and rhythm.  Normal S1-S2.  No gallops, murmurs. or rubs.  Abdomen: Soft, nontender, nondistended.  Active bowel sounds,  No hepatosplenomegaly.  No masses.  No hernias.  Extremities: No clubbing, edema, or cyanosis.  Capillary refill is normal.  Pulses are 2+ and symmetric at radial and dorsalis pedis.  Posterior tibial pulses are intact and 2+ palpable.  Neuro: Patient is awake, alert, and oriented ×3.  Cranial nerves II through XII are grossly intact.  Motor is 5 out of 5 bilaterally.  DTRs are 2+ and symmetric bilaterally. Sensory exam is nonfocal  Skin: Warm, dry, and intact.  No evidence of breakdown.  Sensorium: Normal thought and affect    Nursing notes and vital signs reviewed        Results Reviewed:  Lab Results (last 24 hours)     Procedure Component Value Units Date/Time    TSH [[id number]]  (Abnormal) Collected:  06/18/19 1720    Specimen:  Blood from Arm, Right Updated:  06/18/19 2314     TSH 0.402 mIU/mL     T4, Free [[id number]]  (Normal) Collected:  06/18/19 1720    Specimen:  Blood from Arm, Right Updated:  06/18/19 2300     Free T4 1.02 ng/dL     Phosphorus [[id number]]  (Normal) Collected:  06/18/19 1720    Specimen:  Blood from Arm, Right Updated:  06/18/19 2243     Phosphorus 4.2 " mg/dL     Ethanol [[id number]]  (Abnormal) Collected:  06/18/19 1720    Specimen:  Blood from Arm, Right Updated:  06/18/19 2008     Ethanol % 0.339 %     Narrative:       Not for legal purposes. Chain of Custody not followed.     Vitamin B12 [[id number]]  (Normal) Collected:  06/18/19 1720    Specimen:  Blood from Arm, Right Updated:  06/18/19 2008     Vitamin B-12 410 pg/mL     Folate [[id number]]  (Abnormal) Collected:  06/18/19 1720    Specimen:  Blood from Arm, Right Updated:  06/18/19 2008     Folate 2.84 ng/mL     hCG, Quantitative, Pregnancy [[id number]]  (Normal) Collected:  06/18/19 1720    Specimen:  Blood from Arm, Right Updated:  06/18/19 1957     HCG Quantitative <2.39 mIU/mL     Sedimentation Rate [[id number]]  (Normal) Collected:  06/18/19 1720    Specimen:  Blood from Arm, Right Updated:  06/18/19 1948     Sed Rate 3 mm/hr     Amylase [[id number]]  (Normal) Collected:  06/18/19 1720    Specimen:  Blood from Arm, Right Updated:  06/18/19 1902     Amylase 45 U/L     Urine Drug Screen - Urine, Clean Catch [[id number]]  (Abnormal) Collected:  06/18/19 1830    Specimen:  Urine, Clean Catch Updated:  06/18/19 1859     Amphetamine Screen, Urine Negative     Barbiturates Screen, Urine Negative     Benzodiazepine Screen, Urine Negative     Cocaine Screen, Urine Negative     Methadone Screen, Urine Negative     Opiate Screen Positive     Phencyclidine (PCP), Urine Negative     THC, Screen, Urine Negative    Narrative:       Negative Thresholds For Drugs Screened in Urine:    Amphetamines          500 ng/ml  Barbiturates          200 ng/ml  Benzodiazepines       200 ng/ml  Cocaine               150 ng/ml  Methadone             150 ng/ml  Opiates               300 ng/ml  Phencyclidine         25 ng/ml  THC                      50 ng/ml    The normal value for all drugs tested is negative. This report includes final unconfirmed screening results.  A positive result by this assay can be, at your request, sent to the  Reference Lab for confirmation by gas chromatography. Unconfirmed results must not be used for non-medical purposes, such as employment or legal testing. Clinical consideration should be applied to any drug of abuse test result, particularly when unconfirmed results are used.    Lipase [[id number]]  (Abnormal) Collected:  06/18/19 1720    Specimen:  Blood from Arm, Right Updated:  06/18/19 1859     Lipase 231 U/L     Ethanol, Urine - Urine, Clean Catch [[id number]] Collected:  06/18/19 1830    Specimen:  Urine, Clean Catch Updated:  06/18/19 1835    [place] Draw [[id number]] Collected:  06/18/19 1720    Specimen:  Blood Updated:  06/18/19 1830    Narrative:       The following orders were created for panel order [place] Draw.  Procedure                               Abnormality         Status                     ---------                               -----------         ------                     Light Blue Top[[id number]]                                   Final result               Green Top (Gel)[[id number]]                                  Final result               Lavender Top[[id number]]                                     Final result               Red Top[[id number]]                                          Final result                 Please view results for these tests on the individual orders.    Green Top (Gel) [[id number]] Collected:  06/18/19 1720    Specimen:  Blood from Arm, Right Updated:  06/18/19 1830     Extra Tube Hold for add-ons.     Comment: Auto resulted.       Red Top [[id number]] Collected:  06/18/19 1720    Specimen:  Blood from Arm, Right Updated:  06/18/19 1830     Extra Tube Hold for add-ons.     Comment: Auto resulted.       Light Blue Top [[id number]] Collected:  06/18/19 1720    Specimen:  Blood from Arm, Right Updated:  06/18/19 1830     Extra Tube hold for add-on     Comment: Auto resulted       Lavender Top [[id number]] Collected:  06/18/19 1720    Specimen:  Blood from Arm, Right Updated:   06/18/19 1830     Extra Tube hold for add-on     Comment: Auto resulted       CBC & Differential [[id number]] Collected:  06/18/19 1720    Specimen:  Blood Updated:  06/18/19 1800    Narrative:       The following orders were created for panel order CBC & Differential.  Procedure                               Abnormality         Status                     ---------                               -----------         ------                     CBC Auto Differential[[id number]]        Abnormal            Final result                 Please view results for these tests on the individual orders.    CBC Auto Differential [[id number]]  (Abnormal) Collected:  06/18/19 1720    Specimen:  Blood from Arm, Right Updated:  06/18/19 1800     WBC 4.06 10*3/mm3      RBC 3.50 10*6/mm3      Hemoglobin 11.5 g/dL      Hematocrit 33.0 %      MCV 94.3 fL      MCH 32.9 pg      MCHC 34.8 g/dL      RDW 19.1 %      RDW-SD 62.9 fl      MPV 10.2 fL      Platelets 73 10*3/mm3      Neutrophil % 61.1 %      Lymphocyte % 32.0 %      Monocyte % 4.4 %      Eosinophil % 1.5 %      Basophil % 0.5 %      Immature Grans % 0.5 %      Neutrophils, Absolute 2.48 10*3/mm3      Lymphocytes, Absolute 1.30 10*3/mm3      Monocytes, Absolute 0.18 10*3/mm3      Eosinophils, Absolute 0.06 10*3/mm3      Basophils, Absolute 0.02 10*3/mm3      Immature Grans, Absolute 0.02 10*3/mm3      nRBC 0.0 /100 WBC     Troponin [[id number]]  (Normal) Collected:  06/18/19 1720    Specimen:  Blood from Arm, Right Updated:  06/18/19 1759     Troponin I <0.012 ng/mL     Comprehensive Metabolic Panel [[id number]]  (Abnormal) Collected:  06/18/19 1720    Specimen:  Blood from Arm, Right Updated:  06/18/19 1750     Glucose 116 mg/dL      BUN 2 mg/dL      Creatinine 0.52 mg/dL      Sodium 148 mmol/L      Potassium 3.6 mmol/L      Chloride 111 mmol/L      CO2 23.0 mmol/L      Calcium 8.7 mg/dL      Total Protein 7.2 g/dL      Albumin 4.40 g/dL      ALT (SGPT) 217 U/L      AST (SGOT) 576 U/L       Alkaline Phosphatase 173 U/L      Total Bilirubin 0.9 mg/dL      eGFR Non African Amer 126 mL/min/1.73      Globulin 2.8 gm/dL      A/G Ratio 1.6 g/dL      BUN/Creatinine Ratio 3.8     Anion Gap 14.0 mmol/L     Narrative:       GFR Normal >60  Chronic Kidney Disease <60  Kidney Failure <15    Magnesium [[id number]]  (Normal) Collected:  06/18/19 1720    Specimen:  Blood from Arm, Right Updated:  06/18/19 1747     Magnesium 2.0 mg/dL     Protime-INR [[id number]]  (Normal) Collected:  06/18/19 1720    Specimen:  Blood from Arm, Right Updated:  06/18/19 1745     Protime 13.9 Seconds      INR 1.04    aPTT [[id number]]  (Normal) Collected:  06/18/19 1720    Specimen:  Blood from Arm, Right Updated:  06/18/19 1745     PTT 29.0 seconds     POC Glucose Once [[id number]]  (Normal) Collected:  06/18/19 1720    Specimen:  Blood Updated:  06/18/19 1732     Glucose 119 mg/dL      Comment: : [id number] FowlereMeter ID: [id number]           Imaging Results (last 24 hours)     Procedure Component Value Units Date/Time    XR Chest 1 View [[id number]] Collected:  06/18/19 2046     Updated:  06/18/19 2049    Narrative:       History:  49-year-old with stroke protocol.     Reference:  Chest radiograph 05/22/2019.     Findings:  Frontal chest radiograph performed.     The heart and mediastinum appear normal. The lungs are clear. No pleural  fluid or pneumothorax. No acute osseous abnormality.          Impression:       No acute cardiopulmonary process.  This report was finalized on 06/18/2019 20:46 by Dr Tino Dumont, .    CT Angiogram Neck With & Without Contrast [[id number]] Collected:  06/18/19 1915     Updated:  06/18/19 1921    Narrative:          History:  49-year-old with stroke.     Reference   CTA neck degenerative thousand 12     Technique  Thin slice helical scanning of the neck is performed post intravenous  contrast administration for evaluation of the cervical arterial  vasculature. Multiple 3-D MIP reformations  are obtained. Automated  exposure control was utilized to limit radiation dose.  mGy-cm     Findings  Common origin of the right brachiocephalic artery and left common  carotid artery (bovine arch). There is mild atherosclerosis of the  visualized aortic arch. Both common carotid arteries show minimal plaque  without luminal narrowing. There is a less than 50% stenosis of the  proximal left cervical ICA due to eccentric plaque formation. There is  diffuse moderate luminal narrowing of the right cervical ICA which is  unchanged.     Both vertebral arteries appear normal.       Impression:          1. Normal vertebral arteries.   2. Chronic moderate luminal narrowing of the right cervical ICA,  unchanged from 2012. This vessel eventually occludes at the cavernous  intracranial segment, as described on dedicated CTA head.  This report was finalized on 06/18/2019 19:18 by Dr Tino Dumont, .    CT Angiogram Head With & Without Contrast [[id number]] Collected:  06/18/19 1909     Updated:  06/18/19 1918    Narrative:          History:  49-year-old with stroke.     Reference   CT head January 2012     Technique  Thin slice helical scanning of the brain is performed post intravenous  contrast administration for evaluation of the intracranial arterial  vasculature. Multiple 3-D MIP reformations are obtained. Automated  exposure control was utilized to limit radiation dose.  mGy-cm     Findings  The visualized right ICA is moderately narrowed in its petrous and  lacerum segments and tapers/completely occludes at the cavernous  segment. Small caliber of the right M1 MCA with patent M2 branches. This  is unchanged from 2012. Patent anterior communicating artery. There are  multifocal stenoses of the right A2 MARK which are similar to reference  study and consistent with moyamoya. Left MARK and left MCA are  nonstenotic.     Basilar artery and proximal posterior cerebral arteries are patent. Mild  stenosis of the right P2  segment.          Impression:          No posterior circulation occlusion. 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  This report was finalized on 06/18/2019 19:15 by Dr Tino Dumont, .    CT Head Without Contrast Stroke Protocol [[id number]] Collected:  06/18/19 1759     Updated:  06/18/19 1805    Narrative:          History:  49-year-old with stroke.     Reference   CT head 03/15/2019     Technique  Unenhanced CT head/brain was performed. Automated exposure control was  utilized to limit radiation dose. DLP 1069 mGy-cm.      Findings  Multifocal remote infarcts/encephalomalacia of the right cerebral  hemisphere, stable. No CT evidence of acute infarction, hemorrhage, or  mass. Remote infarct of the right caudate head is also stable.  Ventricular system appears normal. No extra-axial fluid collection.     Postoperative changes of the right skull. The partially imaged paranasal  sinuses and mastoid air cells are clear.       Impression:       No acute findings.      This was inadvertently ordered as a stroke protocol.  This report was finalized on 06/18/2019 18:02 by Dr Tino Dumont, .        I have personally reviewed and interpreted the radiology studies and ECG obtained at time of admission.     Assessment / Plan     Assessment:   Active Hospital Problems    Diagnosis   • TIA (transient ischemic attack)   • Alcohol abuse   • Clarke-clarke disease   • Tobacco abuse   • Essential hypertension   1.  TIA symptoms now resolved plan is to admit patient monitor serial neuro exams will obtain MRI of the brain as well as 2D echo in the morning.  If these are negative she will be discharged home.  Will be evaluated by PT and OT as well although she has no symptoms currently.  She is not on any type of antiplatelet or anticoagulation at this time we will add aspirin to her regimen.  Also check lipid panel she is on statin therapy.  2.  Moyamoya disease  plans as per above she is already had bypass in her scans of her carotids and intracranial or without anything acute.  3.  Tobacco abuse encourage smoking cessation will provide nicotine patch.  4.  Headache possible migraine which I wonder if that is the cause of all of this.  We will work on pain control.  5.  Essential hypertension blood pressure stable permissive hypertension now monitor blood pressure continue current medications.  6.  Alcohol abuse with continued abuse will add thiamine daily to her regimen we will also give her a banana bag her folic acid is low we will replace it and start her on daily folic acid.  She is counseled on the importance of cessation of alcohol.  We will enact the CIWA protocol as well as Ativan alcohol protocol to prevent DTs.      Patient seen prior to midnight         Code Status: Full     I discussed the patient's findings and my recommendations with the patient and her fiancé is her healthcare power surrogate should she not be able to speak for herself.    Estimated length of stay 1 to 2 days.    Alexandre Guevara MD   06/19/19   5:30 AM

## 2019-06-19 NOTE — ED NOTES
"PATIENTS  TO DESK, STATES \"SHE IS SAYING SHE IS GOING TO LEAVE, I AM NOT PUTTING UP WITH THIS SHIT, I AM LEAVING\"  PATIENTS  GAVE ME HIS PHONE NUMBER TO PUT ON CHART.      Maya Lopez RN  06/18/19 1944    "

## 2019-06-19 NOTE — PROGRESS NOTES
Neurology Progress Note      Date of admission: 6/18/2019  5:17 PM  Date of visit: 6/19/2019    Chief Complaint:  F/u TIA    Subjective     Subjective:    Patient back to her baseline.  She now admits to continued alcohol drinking. She is having a tremor     Medications:  Current Facility-Administered Medications   Medication Dose Route Frequency Provider Last Rate Last Dose   • acetaminophen (TYLENOL) tablet 650 mg  650 mg Oral Q4H PRN Alexandre Guevara MD        Or   • acetaminophen (TYLENOL) suppository 650 mg  650 mg Rectal Q4H PRN Alexandre Guevara MD       • aspirin tablet 325 mg  325 mg Oral Daily Alexandre Guevara MD   325 mg at 06/19/19 0839    Or   • aspirin suppository 300 mg  300 mg Rectal Daily Alexandre Guevara MD       • folic acid (FOLVITE) tablet 1 mg  1 mg Oral Daily Alexandre Guevara MD   1 mg at 06/19/19 0839   • gabapentin (NEURONTIN) capsule 600 mg  600 mg Oral Q8H Alexandre Guevara MD   600 mg at 06/19/19 1405   • HYDROcodone-acetaminophen (NORCO) 7.5-325 MG per tablet 1 tablet  1 tablet Oral TID PRN Alexandre Guevara MD   1 tablet at 06/19/19 1218   • HYDROmorphone (DILAUDID) injection 0.5 mg  0.5 mg Intravenous Q2H PRN Alexandre Guevara MD   0.5 mg at 06/19/19 1405    And   • naloxone (NARCAN) injection 0.4 mg  0.4 mg Intravenous Q5 Min PRN Alexandre Guevara MD       • lamoTRIgine (LaMICtal) tablet 150 mg  150 mg Oral BID Alexandre Guevara MD   150 mg at 06/19/19 0839   • LORazepam (ATIVAN) tablet 1 mg  1 mg Oral Q2H PRN Alexandre Guevara MD        Or   • LORazepam (ATIVAN) injection 1 mg  1 mg Intravenous Q2H PRN Alexandre Guevara MD        Or   • LORazepam (ATIVAN) tablet 2 mg  2 mg Oral Q1H PRN Alexandre Geuvara MD        Or   • LORazepam (ATIVAN) injection 2 mg  2 mg Intravenous Q1H PRN Alexandre Guevara MD        Or   • LORazepam (ATIVAN) injection 2 mg  2 mg Intravenous Q15 Min PRN Alexandre Guevara MD        Or   • LORazepam (ATIVAN) injection 2 mg  2 mg Intramuscular  Q15 Min PRN Alexandre Guevara MD       • LORazepam (ATIVAN) tablet 1 mg  1 mg Oral Q6H Alexandre Guevara MD   1 mg at 06/19/19 1113    Followed by   • LORazepam (ATIVAN) tablet 1 mg  1 mg Oral Q8H Alexandre Guevara MD       • nicotine (NICODERM CQ) 21 MG/24HR patch 1 patch  1 patch Transdermal Q24H Alexandre Guevara MD   1 patch at 06/19/19 0539   • ondansetron (ZOFRAN) tablet 4 mg  4 mg Oral Q6H PRN Alexandre Guevara MD        Or   • ondansetron (ZOFRAN) injection 4 mg  4 mg Intravenous Q6H PRN Alexandre Guevara MD       • rosuvastatin (CRESTOR) tablet 10 mg  10 mg Oral Daily Alexandre Guevara MD   10 mg at 06/19/19 0839   • sodium chloride 0.9 % flush 10 mL  10 mL Intravenous PRN Tray Lopez MD       • sodium chloride 0.9 % flush 3 mL  3 mL Intravenous Q12H Alexandre Guevara MD       • sodium chloride 0.9 % flush 3-10 mL  3-10 mL Intravenous PRN Alexandre Guevara MD       • sodium chloride 0.9 % infusion  100 mL/hr Intravenous Continuous Alexandre Guevara MD   Stopped at 06/19/19 1059   • thiamine (B-1) 100 mg in sodium chloride 0.9 % 100 mL IVPB  100 mg Intravenous Daily Alexandre Guevara MD   Stopped at 06/19/19 1059   • tiZANidine (ZANAFLEX) tablet 4 mg  4 mg Oral Q8H PRN Alexandre Guevara MD       • valsartan (DIOVAN) tablet 160 mg  160 mg Oral Daily Alexandre Guevara MD   160 mg at 06/19/19 0839   • venlafaxine XR (EFFEXOR-XR) 24 hr capsule 150 mg  150 mg Oral Daily Alexandre Guevara MD   150 mg at 06/19/19 1113       Review of Systems:   -A 14 point review of systems is completed and is negative     Objective     Objective      Vital Signs  Temp:  [97.9 °F (36.6 °C)-99 °F (37.2 °C)] 98.6 °F (37 °C)  Heart Rate:  [] 118  Resp:  [16-22] 18  BP: (105-159)/(60-99) 159/92    Physical Exam:    HEENT:  Neck supple  CVS:  Regular rate and rhythm.  No murmurs  Carotid Examination:  No bruits  Lungs:  Clear to auscultation  Abdomen:  Non-tender, Non-distended  Extremities:  No signs of  peripheral edema    Neurologic Exam:    -Awake, Alert, Oriented X 3  -No word finding difficulties  -No aphasia  -No dysarthria  -Follows simple and complex commands    Cranial nerves II through XII intact.    Motor: (strength out of 5:  1= minimal movement, 2 = movement in plane of gravity, 3 = movement against gravity, 4 = movement against some resistance, 5 = full strength)    Patient with diffuse tremor    -Right Upper Ext: Proximal: 5 Distal: 5  -Left Upper Ext: Proximal: 5 Distal: 5    -Right Lower Ext: Proximal: 5 Distal: 5  -Left Lower Ext: Proximal: 5 Distal: 5    DTR:  2+ throughout in all four extremities    Sensory:  -Intact to light touch, pinprick, temperature, pain, and proprioception    Coordination/Gait:  -No ataxia     Results Review:    I reviewed the patient's new clinical results.    Lab Results (last 24 hours)     Procedure Component Value Units Date/Time    Hemoglobin A1c [[id number]] Collected:  06/19/19 0633    Specimen:  Blood Updated:  06/19/19 0803     Hemoglobin A1C 5.5 %     Narrative:       Less than 6.0           Non-Diabetic Range  6.0-7.0                 ADA Therapeutic Target  Greater than 7.0        Action Suggested    Lipid Panel [[id number]]  (Abnormal) Collected:  06/19/19 0633    Specimen:  Blood Updated:  06/19/19 0713     Total Cholesterol 151 mg/dL      Triglycerides 106 mg/dL      HDL Cholesterol 41 mg/dL      LDL Cholesterol  103 mg/dL      LDL/HDL Ratio 2.17    C-reactive Protein [[id number]]  (Normal) Collected:  06/19/19 0633    Specimen:  Blood Updated:  06/19/19 0704     C-Reactive Protein <0.50 mg/dL     Comprehensive Metabolic Panel [[id number]]  (Abnormal) Collected:  06/19/19 0633    Specimen:  Blood Updated:  06/19/19 0704     Glucose 110 mg/dL      BUN 2 mg/dL      Creatinine 0.40 mg/dL      Sodium 144 mmol/L      Potassium 3.5 mmol/L      Chloride 113 mmol/L      CO2 22.0 mmol/L      Calcium 8.0 mg/dL      Total Protein 5.8 g/dL      Albumin 3.30 g/dL      ALT  (SGPT) 190 U/L      AST (SGOT) 504 U/L      Alkaline Phosphatase 147 U/L      Total Bilirubin 1.0 mg/dL      eGFR Non African Amer >150 mL/min/1.73      Globulin 2.5 gm/dL      A/G Ratio 1.3 g/dL      BUN/Creatinine Ratio 5.0     Anion Gap 9.0 mmol/L     Narrative:       GFR Normal >60  Chronic Kidney Disease <60  Kidney Failure <15    Amylase [[id number]]  (Normal) Collected:  06/19/19 0633    Specimen:  Blood Updated:  06/19/19 0704     Amylase 38 U/L     Lipase [[id number]]  (Normal) Collected:  06/19/19 0633    Specimen:  Blood Updated:  06/19/19 0704     Lipase 156 U/L     Ammonia [[id number]]  (Normal) Collected:  06/19/19 0633    Specimen:  Blood Updated:  06/19/19 0702     Ammonia 20 umol/L     CBC Auto Differential [[id number]]  (Abnormal) Collected:  06/19/19 0633    Specimen:  Blood Updated:  06/19/19 0651     WBC 2.00 10*3/mm3      RBC 2.96 10*6/mm3      Hemoglobin 10.0 g/dL      Hematocrit 29.2 %      MCV 98.6 fL      MCH 33.8 pg      MCHC 34.2 g/dL      RDW 19.1 %      RDW-SD 67.1 fl      MPV 10.1 fL      Platelets 47 10*3/mm3      Neutrophil % 56.0 %      Lymphocyte % 36.0 %      Monocyte % 4.5 %      Eosinophil % 2.5 %      Basophil % 0.5 %      Neutrophils, Absolute 1.12 10*3/mm3      Lymphocytes, Absolute 0.72 10*3/mm3      Monocytes, Absolute 0.09 10*3/mm3      Eosinophils, Absolute 0.05 10*3/mm3      Basophils, Absolute 0.01 10*3/mm3     TSH [[id number]]  (Abnormal) Collected:  06/18/19 1720    Specimen:  Blood from Arm, Right Updated:  06/18/19 2314     TSH 0.402 mIU/mL     T4, Free [[id number]]  (Normal) Collected:  06/18/19 1720    Specimen:  Blood from Arm, Right Updated:  06/18/19 2300     Free T4 1.02 ng/dL     Phosphorus [[id number]]  (Normal) Collected:  06/18/19 1720    Specimen:  Blood from Arm, Right Updated:  06/18/19 2243     Phosphorus 4.2 mg/dL     Ethanol [[id number]]  (Abnormal) Collected:  06/18/19 1720    Specimen:  Blood from Arm, Right Updated:  06/18/19 2008     Ethanol % 0.339 %      Narrative:       Not for legal purposes. Chain of Custody not followed.     Vitamin B12 [[id number]]  (Normal) Collected:  06/18/19 1720    Specimen:  Blood from Arm, Right Updated:  06/18/19 2008     Vitamin B-12 410 pg/mL     Folate [[id number]]  (Abnormal) Collected:  06/18/19 1720    Specimen:  Blood from Arm, Right Updated:  06/18/19 2008     Folate 2.84 ng/mL     hCG, Quantitative, Pregnancy [[id number]]  (Normal) Collected:  06/18/19 1720    Specimen:  Blood from Arm, Right Updated:  06/18/19 1957     HCG Quantitative <2.39 mIU/mL     Sedimentation Rate [[id number]]  (Normal) Collected:  06/18/19 1720    Specimen:  Blood from Arm, Right Updated:  06/18/19 1948     Sed Rate 3 mm/hr     Amylase [[id number]]  (Normal) Collected:  06/18/19 1720    Specimen:  Blood from Arm, Right Updated:  06/18/19 1902     Amylase 45 U/L     Urine Drug Screen - Urine, Clean Catch [[id number]]  (Abnormal) Collected:  06/18/19 1830    Specimen:  Urine, Clean Catch Updated:  06/18/19 1859     Amphetamine Screen, Urine Negative     Barbiturates Screen, Urine Negative     Benzodiazepine Screen, Urine Negative     Cocaine Screen, Urine Negative     Methadone Screen, Urine Negative     Opiate Screen Positive     Phencyclidine (PCP), Urine Negative     THC, Screen, Urine Negative    Narrative:       Negative Thresholds For Drugs Screened in Urine:    Amphetamines          500 ng/ml  Barbiturates          200 ng/ml  Benzodiazepines       200 ng/ml  Cocaine               150 ng/ml  Methadone             150 ng/ml  Opiates               300 ng/ml  Phencyclidine         25 ng/ml  THC                      50 ng/ml    The normal value for all drugs tested is negative. This report includes final unconfirmed screening results.  A positive result by this assay can be, at your request, sent to the Reference Lab for confirmation by gas chromatography. Unconfirmed results must not be used for non-medical purposes, such as employment or legal testing.  Clinical consideration should be applied to any drug of abuse test result, particularly when unconfirmed results are used.    Lipase [[id number]]  (Abnormal) Collected:  06/18/19 1720    Specimen:  Blood from Arm, Right Updated:  06/18/19 1859     Lipase 231 U/L     Ethanol, Urine - Urine, Clean Catch [[id number]] Collected:  06/18/19 1830    Specimen:  Urine, Clean Catch Updated:  06/18/19 1835    [place] [facility] [[id number]] Collected:  06/18/19 1720    Specimen:  Blood Updated:  06/18/19 1830    Narrative:       The following orders were created for panel order [place] [facility].  Procedure                               Abnormality         Status                     ---------                               -----------         ------                     Light Blue Top[[id number]]                                   Final result               Green Top (Gel)[[id number]]                                  Final result               Lavender Top[[id number]]                                     Final result               Red Top[[id number]]                                          Final result                 Please view results for these tests on the individual orders.    Green Top (Gel) [[id number]] Collected:  06/18/19 1720    Specimen:  Blood from Arm, Right Updated:  06/18/19 1830     Extra Tube Hold for add-ons.     Comment: Auto resulted.       Red Top [[id number]] Collected:  06/18/19 1720    Specimen:  Blood from Arm, Right Updated:  06/18/19 1830     Extra Tube Hold for add-ons.     Comment: Auto resulted.       Light Blue Top [[id number]] Collected:  06/18/19 1720    Specimen:  Blood from Arm, Right Updated:  06/18/19 1830     Extra Tube hold for add-on     Comment: Auto resulted       Lavender Top [[id number]] Collected:  06/18/19 1720    Specimen:  Blood from Arm, Right Updated:  06/18/19 1830     Extra Tube hold for add-on     Comment: Auto resulted       CBC & Differential [[id number]] Collected:  06/18/19 1720    Specimen:   Blood Updated:  06/18/19 1800    Narrative:       The following orders were created for panel order CBC & Differential.  Procedure                               Abnormality         Status                     ---------                               -----------         ------                     CBC Auto Differential[[id number]]        Abnormal            Final result                 Please view results for these tests on the individual orders.    CBC Auto Differential [[id number]]  (Abnormal) Collected:  06/18/19 1720    Specimen:  Blood from Arm, Right Updated:  06/18/19 1800     WBC 4.06 10*3/mm3      RBC 3.50 10*6/mm3      Hemoglobin 11.5 g/dL      Hematocrit 33.0 %      MCV 94.3 fL      MCH 32.9 pg      MCHC 34.8 g/dL      RDW 19.1 %      RDW-SD 62.9 fl      MPV 10.2 fL      Platelets 73 10*3/mm3      Neutrophil % 61.1 %      Lymphocyte % 32.0 %      Monocyte % 4.4 %      Eosinophil % 1.5 %      Basophil % 0.5 %      Immature Grans % 0.5 %      Neutrophils, Absolute 2.48 10*3/mm3      Lymphocytes, Absolute 1.30 10*3/mm3      Monocytes, Absolute 0.18 10*3/mm3      Eosinophils, Absolute 0.06 10*3/mm3      Basophils, Absolute 0.02 10*3/mm3      Immature Grans, Absolute 0.02 10*3/mm3      nRBC 0.0 /100 WBC     Troponin [[id number]]  (Normal) Collected:  06/18/19 1720    Specimen:  Blood from Arm, Right Updated:  06/18/19 1759     Troponin I <0.012 ng/mL     Comprehensive Metabolic Panel [[id number]]  (Abnormal) Collected:  06/18/19 1720    Specimen:  Blood from Arm, Right Updated:  06/18/19 1750     Glucose 116 mg/dL      BUN 2 mg/dL      Creatinine 0.52 mg/dL      Sodium 148 mmol/L      Potassium 3.6 mmol/L      Chloride 111 mmol/L      CO2 23.0 mmol/L      Calcium 8.7 mg/dL      Total Protein 7.2 g/dL      Albumin 4.40 g/dL      ALT (SGPT) 217 U/L      AST (SGOT) 576 U/L      Alkaline Phosphatase 173 U/L      Total Bilirubin 0.9 mg/dL      eGFR Non African Amer 126 mL/min/1.73      Globulin 2.8 gm/dL      A/G Ratio 1.6  g/dL      BUN/Creatinine Ratio 3.8     Anion Gap 14.0 mmol/L     Narrative:       GFR Normal >60  Chronic Kidney Disease <60  Kidney Failure <15    Magnesium [[id number]]  (Normal) Collected:  06/18/19 1720    Specimen:  Blood from Arm, Right Updated:  06/18/19 1747     Magnesium 2.0 mg/dL     Protime-INR [[id number]]  (Normal) Collected:  06/18/19 1720    Specimen:  Blood from Arm, Right Updated:  06/18/19 1745     Protime 13.9 Seconds      INR 1.04    aPTT [[id number]]  (Normal) Collected:  06/18/19 1720    Specimen:  Blood from Arm, Right Updated:  06/18/19 1745     PTT 29.0 seconds     POC Glucose Once [[id number]]  (Normal) Collected:  06/18/19 1720    Specimen:  Blood Updated:  06/18/19 1732     Glucose 119 mg/dL      Comment: : [id number] DesireeMeter ID: [id number]           Imaging Results (last 24 hours)     Procedure Component Value Units Date/Time    MRI Brain Without Contrast [[id number]] Collected:  06/19/19 0947     Updated:  06/19/19 0955    Narrative:       Indication: TIA        Technique: Multisequence, multiplanar MRI of the brain without contrast.     Comparison: CT scan dated 06/18/2019, CT scan dated 03/15/2019     Findings:      No diffusion signal abnormality to suggest acute ischemia. Old cortical  infarcts in the posterior right frontal and right parietal lobe with  associated encephalomalacia. Additional old right basal ganglia lacunar  infarcts involving the caudate head and anterior putamen. No visualized  mass lesion on this noncontrast exam. No intra-axial or extra-axial  hemorrhage.     Ventricles are nondilated. Normal brainstem and posterior fossa. The  pituitary and suprasellar region are unremarkable. Nonvisualized right  distal ICA flow void. Normal orbits. The paranasal sinuses are clear.  Trace fluid in the left mastoids. Normal bone marrow signal.        Impression:       Impression:     1. Nonvisualized right distal ICA flow void, suggesting occlusion or  high-grade  upstream stenosis.  2. No evidence of acute ischemia.  3. Chronic cortical infarcts in the posterior right frontal lobe and  right parietal lobe with additional old right basal ganglia lacunar  infarcts.     This report was finalized on 06/19/2019 09:52 by Dr Roge Garcia, .    XR Chest 1 View [[id number]] Collected:  06/18/19 2046     Updated:  06/18/19 2049    Narrative:       History:  49-year-old with stroke protocol.     Reference:  Chest radiograph 05/22/2019.     Findings:  Frontal chest radiograph performed.     The heart and mediastinum appear normal. The lungs are clear. No pleural  fluid or pneumothorax. No acute osseous abnormality.          Impression:       No acute cardiopulmonary process.  This report was finalized on 06/18/2019 20:46 by Dr Tino Dumont, .    CT Angiogram Neck With & Without Contrast [[id number]] Collected:  06/18/19 1915     Updated:  06/18/19 1921    Narrative:          History:  49-year-old with stroke.     Reference   CTA neck degenerative thousand 12     Technique  Thin slice helical scanning of the neck is performed post intravenous  contrast administration for evaluation of the cervical arterial  vasculature. Multiple 3-D MIP reformations are obtained. Automated  exposure control was utilized to limit radiation dose.  mGy-cm     Findings  Common origin of the right brachiocephalic artery and left common  carotid artery (bovine arch). There is mild atherosclerosis of the  visualized aortic arch. Both common carotid arteries show minimal plaque  without luminal narrowing. There is a less than 50% stenosis of the  proximal left cervical ICA due to eccentric plaque formation. There is  diffuse moderate luminal narrowing of the right cervical ICA which is  unchanged.     Both vertebral arteries appear normal.       Impression:          1. Normal vertebral arteries.   2. Chronic moderate luminal narrowing of the right cervical ICA,  unchanged from 2012. This vessel eventually  occludes at the cavernous  intracranial segment, as described on dedicated CTA head.  This report was finalized on 06/18/2019 19:18 by Dr Tino Dumont, .    CT Angiogram Head With & Without Contrast [[id number]] Collected:  06/18/19 1909     Updated:  06/18/19 1918    Narrative:          History:  49-year-old with stroke.     Reference   CT head January 2012     Technique  Thin slice helical scanning of the brain is performed post intravenous  contrast administration for evaluation of the intracranial arterial  vasculature. Multiple 3-D MIP reformations are obtained. Automated  exposure control was utilized to limit radiation dose.  mGy-cm     Findings  The visualized right ICA is moderately narrowed in its petrous and  lacerum segments and tapers/completely occludes at the cavernous  segment. Small caliber of the right M1 MCA with patent M2 branches. This  is unchanged from 2012. Patent anterior communicating artery. There are  multifocal stenoses of the right A2 MARK which are similar to reference  study and consistent with moyamoya. Left MARK and left MCA are  nonstenotic.     Basilar artery and proximal posterior cerebral arteries are patent. Mild  stenosis of the right P2 segment.          Impression:          No posterior circulation occlusion. 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  This report was finalized on 06/18/2019 19:15 by Dr Tino Dumont, .    CT Head Without Contrast Stroke Protocol [[id number]] Collected:  06/18/19 1759     Updated:  06/18/19 1805    Narrative:          History:  49-year-old with stroke.     Reference   CT head 03/15/2019     Technique  Unenhanced CT head/brain was performed. Automated exposure control was  utilized to limit radiation dose. DLP 1069 mGy-cm.      Findings  Multifocal remote infarcts/encephalomalacia of the right cerebral  hemisphere, stable. No CT evidence of acute infarction,  hemorrhage, or  mass. Remote infarct of the right caudate head is also stable.  Ventricular system appears normal. No extra-axial fluid collection.     Postoperative changes of the right skull. The partially imaged paranasal  sinuses and mastoid air cells are clear.       Impression:       No acute findings.      This was inadvertently ordered as a stroke protocol.  This report was finalized on 06/18/2019 18:02 by Dr Tino Dumont, .        Telemetry: Sinus/sinus tach: 91 to 130     Assessment/Plan     Hospital Problem List      Essential hypertension    TIA (transient ischemic attack)    Alcohol abuse    Clarke-clakre disease    Tobacco abuse    Impression:  1. Possible TIA with symptoms completely resolved  2. Alcoholic--suspect most of her symptoms seen were due to alcohol  3. Folic acid deficiency--replaced  4. Thiamine has been given 3 times   5. H/O Clarke-clarke disease s/p bypass  6. LDL at 109 with goal of <70  On Crestor  Plan:  OK to discharge once echocardiogram is back and is ok.   Increase Crestor to 20 mg  Smoking cessation counseling      Maxine Marie MD  06/19/19  3:15 PM

## 2019-06-19 NOTE — PROGRESS NOTES
Discharge Planning Assessment  [facility]     Patient Name: Maureen Best  MRN: [id number]  Today's Date: 6/19/2019    Admit Date: 6/18/2019    Discharge Needs Assessment     Row Name 06/19/19 1537       Living Environment    Lives With  significant other    Name(s) of Who Lives With Patient  JANAY    Current Living Arrangements  home/apartment/condo    Primary Care Provided by  self    Provides Primary Care For  no one    Family Caregiver if Needed  significant other    Quality of Family Relationships  helpful;involved    Able to Return to Prior Arrangements  yes    Living Arrangement Comments  PLAN IS FOR DC HOME WHEN READY       Resource/Environmental Concerns    Resource/Environmental Concerns  none    Transportation Concerns  car, none       Transition Planning    Patient/Family Anticipates Transition to  home with family    Patient/Family Anticipated Services at Transition  none    Transportation Anticipated  family or friend will provide       Discharge Needs Assessment    Readmission Within the Last 30 Days  current reason for admission unrelated to previous admission    Concerns to be Addressed  denies needs/concerns at this time    Equipment Currently Used at Home  none    Anticipated Changes Related to Illness  none    Equipment Needed After Discharge  none    Discharge Coordination/Progress  PT LIVES AT HOME WITH HER SIGN. OTHER AND PLANS TO DC HOME WHEN READY. PT IS INDEPENDENT WITH ADL'S; DOES NOT USE DME OR HOME SERVICES. PT HAS PCP AND RX COVERAGE. NO DC NEEDS IDENTIFIED.         Discharge Plan     Row Name 06/19/19 1539       Plan    Plan  RETURN HOME WHEN READY        Destination      No service coordination in this encounter.      Durable Medical Equipment      No service coordination in this encounter.      Dialysis/Infusion      No service coordination in this encounter.      Home Medical Care      No service coordination in this encounter.      Therapy      No service coordination in this  encounter.      Community Resources      No service coordination in this encounter.          Demographic Summary    No documentation.       Functional Status    No documentation.       Psychosocial    No documentation.       Abuse/Neglect    No documentation.       Legal    No documentation.       Substance Abuse    No documentation.       Patient Forms    No documentation.           KATELYN Han

## 2019-06-19 NOTE — PLAN OF CARE
Problem: Patient Care Overview  Goal: Plan of Care Review  Outcome: Ongoing (interventions implemented as appropriate)   06/19/19 0321   Coping/Psychosocial   Plan of Care Reviewed With patient   Plan of Care Review   Progress no change   OTHER   Outcome Summary Pt admitted from ED for TIA/ Stroke workout. A&O, answers all appropriately however may be slow to respond, drift off or mumble Pt pleasant and cooperative. Banana bag hung, ativan given as scheduled, pt resting well between care.        Problem: Stroke (Ischemic) (Adult)  Goal: Signs and Symptoms of Listed Potential Problems Will be Absent, Minimized or Managed (Stroke)  Outcome: Ongoing (interventions implemented as appropriate)      Problem: Alcohol Withdrawal Acute, Risk/Actual (Adult)  Goal: Signs and Symptoms of Listed Potential Problems Will be Absent, Minimized or Managed (Alcohol Withdrawal Acute, Risk/Actual)  Outcome: Ongoing (interventions implemented as appropriate)

## 2019-06-19 NOTE — PLAN OF CARE
Problem: Patient Care Overview  Goal: Plan of Care Review  Outcome: Ongoing (interventions implemented as appropriate)   06/19/19 2391   Coping/Psychosocial   Plan of Care Reviewed With patient   Plan of Care Review   Progress no change   OTHER   Outcome Summary Screened pt for OT. Chart review completed and spoke with pt. Pt is no longer experiencing neurologic changes/symptoms. She does report residual L sided symptoms from a past stroke that are baseline for her. These are mostly L sided FM coordination deficits. She has been up in room independently this admission. She does not feel she needs OT at this time and states she is at her baseline function. OT to sign off.

## 2019-06-19 NOTE — ED NOTES
" IN [place] STATES, \"SHE IS PULLING EVERYTHING OFF, SAYS SHE IS LEAVING AMA OR WHATEVER IT IS, I AM NOT LISTENING TO HER ANYMORE, I AM LEAVING\"  WENT INTO PATIENTS ROOM, EXPLAINED HOW IMPORTANT IT WAS FOR HER TO STAY, PATIENT STATES \"I AM BETTER NOW, I HAVE A HEADACHE, AND JUST WANT TO GO HOME\"  AGAIN EXPLAINED IT WAS IMPORTANT FOR HER TO STAY, PATIENT AGREES TO STAY.       Maya Lopez, RN  06/18/19 1943    "

## 2019-06-19 NOTE — DISCHARGE SUMMARY
[facility]  DISCHARGE SUMMARY       Date of Admission: 6/18/2019  Date of Discharge:  6/19/2019  Primary Care Physician: Chaitanya Padilla DO    Presenting Problem/History of Present Illness:   TIA (transient ischemic attack)   • Alcohol abuse   • Clarke-clarke disease   • Tobacco abuse   • Essential hypertension       Final Discharge Diagnoses:     TIA (transient ischemic attack)   • Alcohol abuse   • Clarke-clarke disease   • Tobacco abuse   • Essential hypertension   Alcohol abuse.        Consults: Neurology    Procedures Performed: None    Pertinent Test Results:   Imaging Results (last 7 days)     Procedure Component Value Units Date/Time    MRI Brain Without Contrast [[id number]] Collected:  06/19/19 0947     Updated:  06/19/19 0955    Narrative:       Indication: TIA        Technique: Multisequence, multiplanar MRI of the brain without contrast.     Comparison: CT scan dated 06/18/2019, CT scan dated 03/15/2019     Findings:      No diffusion signal abnormality to suggest acute ischemia. Old cortical  infarcts in the posterior right frontal and right parietal lobe with  associated encephalomalacia. Additional old right basal ganglia lacunar  infarcts involving the caudate head and anterior putamen. No visualized  mass lesion on this noncontrast exam. No intra-axial or extra-axial  hemorrhage.     Ventricles are nondilated. Normal brainstem and posterior fossa. The  pituitary and suprasellar region are unremarkable. Nonvisualized right  distal ICA flow void. Normal orbits. The paranasal sinuses are clear.  Trace fluid in the left mastoids. Normal bone marrow signal.        Impression:       Impression:     1. Nonvisualized right distal ICA flow void, suggesting occlusion or  high-grade upstream stenosis.  2. No evidence of acute ischemia.  3. Chronic cortical infarcts in the posterior right frontal lobe and  right parietal lobe with additional old right basal  ganglia lacunar  infarcts.     This report was finalized on 06/19/2019 09:52 by Dr Roge Garcia, .    XR Chest 1 View [[id number]] Collected:  06/18/19 2046     Updated:  06/18/19 2049    Narrative:       History:  49-year-old with stroke protocol.     Reference:  Chest radiograph 05/22/2019.     Findings:  Frontal chest radiograph performed.     The heart and mediastinum appear normal. The lungs are clear. No pleural  fluid or pneumothorax. No acute osseous abnormality.          Impression:       No acute cardiopulmonary process.  This report was finalized on 06/18/2019 20:46 by Dr Tino Dumont, .    CT Angiogram Neck With & Without Contrast [[id number]] Collected:  06/18/19 1915     Updated:  06/18/19 1921    Narrative:          History:  49-year-old with stroke.     Reference   CTA neck degenerative thousand 12     Technique  Thin slice helical scanning of the neck is performed post intravenous  contrast administration for evaluation of the cervical arterial  vasculature. Multiple 3-D MIP reformations are obtained. Automated  exposure control was utilized to limit radiation dose.  mGy-cm     Findings  Common origin of the right brachiocephalic artery and left common  carotid artery (bovine arch). There is mild atherosclerosis of the  visualized aortic arch. Both common carotid arteries show minimal plaque  without luminal narrowing. There is a less than 50% stenosis of the  proximal left cervical ICA due to eccentric plaque formation. There is  diffuse moderate luminal narrowing of the right cervical ICA which is  unchanged.     Both vertebral arteries appear normal.       Impression:          1. Normal vertebral arteries.   2. Chronic moderate luminal narrowing of the right cervical ICA,  unchanged from 2012. This vessel eventually occludes at the cavernous  intracranial segment, as described on dedicated CTA head.  This report was finalized on 06/18/2019 19:18 by Dr Tino Dumont, .    CT Angiogram Head With &  Without Contrast [[id number]] Collected:  06/18/19 1909     Updated:  06/18/19 1918    Narrative:          History:  49-year-old with stroke.     Reference   CT head January 2012     Technique  Thin slice helical scanning of the brain is performed post intravenous  contrast administration for evaluation of the intracranial arterial  vasculature. Multiple 3-D MIP reformations are obtained. Automated  exposure control was utilized to limit radiation dose.  mGy-cm     Findings  The visualized right ICA is moderately narrowed in its petrous and  lacerum segments and tapers/completely occludes at the cavernous  segment. Small caliber of the right M1 MCA with patent M2 branches. This  is unchanged from 2012. Patent anterior communicating artery. There are  multifocal stenoses of the right A2 MARK which are similar to reference  study and consistent with moyamoya. Left MARK and left MCA are  nonstenotic.     Basilar artery and proximal posterior cerebral arteries are patent. Mild  stenosis of the right P2 segment.          Impression:          No posterior circulation occlusion. 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  This report was finalized on 06/18/2019 19:15 by Dr Tino Dumont, .    CT Head Without Contrast Stroke Protocol [[id number]] Collected:  06/18/19 1759     Updated:  06/18/19 1805    Narrative:          History:  49-year-old with stroke.     Reference   CT head 03/15/2019     Technique  Unenhanced CT head/brain was performed. Automated exposure control was  utilized to limit radiation dose. DLP 1069 mGy-cm.      Findings  Multifocal remote infarcts/encephalomalacia of the right cerebral  hemisphere, stable. No CT evidence of acute infarction, hemorrhage, or  mass. Remote infarct of the right caudate head is also stable.  Ventricular system appears normal. No extra-axial fluid collection.     Postoperative changes of the right  skull. The partially imaged paranasal  sinuses and mastoid air cells are clear.       Impression:       No acute findings.      This was inadvertently ordered as a stroke protocol.  This report was finalized on 06/18/2019 18:02 by Dr Tino Dumont, .        Lab Results (last 7 days)     Procedure Component Value Units Date/Time    Hemoglobin A1c [[id number]] Collected:  06/19/19 0633    Specimen:  Blood Updated:  06/19/19 0803     Hemoglobin A1C 5.5 %     Narrative:       Less than 6.0           Non-Diabetic Range  6.0-7.0                 ADA Therapeutic Target  Greater than 7.0        Action Suggested    Lipid Panel [[id number]]  (Abnormal) Collected:  06/19/19 0633    Specimen:  Blood Updated:  06/19/19 0713     Total Cholesterol 151 mg/dL      Triglycerides 106 mg/dL      HDL Cholesterol 41 mg/dL      LDL Cholesterol  103 mg/dL      LDL/HDL Ratio 2.17    C-reactive Protein [[id number]]  (Normal) Collected:  06/19/19 0633    Specimen:  Blood Updated:  06/19/19 0704     C-Reactive Protein <0.50 mg/dL     Comprehensive Metabolic Panel [[id number]]  (Abnormal) Collected:  06/19/19 0633    Specimen:  Blood Updated:  06/19/19 0704     Glucose 110 mg/dL      BUN 2 mg/dL      Creatinine 0.40 mg/dL      Sodium 144 mmol/L      Potassium 3.5 mmol/L      Chloride 113 mmol/L      CO2 22.0 mmol/L      Calcium 8.0 mg/dL      Total Protein 5.8 g/dL      Albumin 3.30 g/dL      ALT (SGPT) 190 U/L      AST (SGOT) 504 U/L      Alkaline Phosphatase 147 U/L      Total Bilirubin 1.0 mg/dL      eGFR Non African Amer >150 mL/min/1.73      Globulin 2.5 gm/dL      A/G Ratio 1.3 g/dL      BUN/Creatinine Ratio 5.0     Anion Gap 9.0 mmol/L     Narrative:       GFR Normal >60  Chronic Kidney Disease <60  Kidney Failure <15    Amylase [[id number]]  (Normal) Collected:  06/19/19 0633    Specimen:  Blood Updated:  06/19/19 0704     Amylase 38 U/L     Lipase [[id number]]  (Normal) Collected:  06/19/19 0633    Specimen:  Blood Updated:  06/19/19 0704      Lipase 156 U/L     Ammonia [[id number]]  (Normal) Collected:  06/19/19 0633    Specimen:  Blood Updated:  06/19/19 0702     Ammonia 20 umol/L     CBC Auto Differential [[id number]]  (Abnormal) Collected:  06/19/19 0633    Specimen:  Blood Updated:  06/19/19 0651     WBC 2.00 10*3/mm3      RBC 2.96 10*6/mm3      Hemoglobin 10.0 g/dL      Hematocrit 29.2 %      MCV 98.6 fL      MCH 33.8 pg      MCHC 34.2 g/dL      RDW 19.1 %      RDW-SD 67.1 fl      MPV 10.1 fL      Platelets 47 10*3/mm3      Neutrophil % 56.0 %      Lymphocyte % 36.0 %      Monocyte % 4.5 %      Eosinophil % 2.5 %      Basophil % 0.5 %      Neutrophils, Absolute 1.12 10*3/mm3      Lymphocytes, Absolute 0.72 10*3/mm3      Monocytes, Absolute 0.09 10*3/mm3      Eosinophils, Absolute 0.05 10*3/mm3      Basophils, Absolute 0.01 10*3/mm3     TSH [[id number]]  (Abnormal) Collected:  06/18/19 1720    Specimen:  Blood from Arm, Right Updated:  06/18/19 2314     TSH 0.402 mIU/mL     T4, Free [[id number]]  (Normal) Collected:  06/18/19 1720    Specimen:  Blood from Arm, Right Updated:  06/18/19 2300     Free T4 1.02 ng/dL     Phosphorus [[id number]]  (Normal) Collected:  06/18/19 1720    Specimen:  Blood from Arm, Right Updated:  06/18/19 2243     Phosphorus 4.2 mg/dL     Ethanol [[id number]]  (Abnormal) Collected:  06/18/19 1720    Specimen:  Blood from Arm, Right Updated:  06/18/19 2008     Ethanol % 0.339 %     Narrative:       Not for legal purposes. Chain of Custody not followed.     Vitamin B12 [[id number]]  (Normal) Collected:  06/18/19 1720    Specimen:  Blood from Arm, Right Updated:  06/18/19 2008     Vitamin B-12 410 pg/mL     Folate [[id number]]  (Abnormal) Collected:  06/18/19 1720    Specimen:  Blood from Arm, Right Updated:  06/18/19 2008     Folate 2.84 ng/mL     hCG, Quantitative, Pregnancy [[id number]]  (Normal) Collected:  06/18/19 1720    Specimen:  Blood from Arm, Right Updated:  06/18/19 1957     HCG Quantitative <2.39 mIU/mL     Sedimentation  Rate [[id number]]  (Normal) Collected:  06/18/19 1720    Specimen:  Blood from Arm, Right Updated:  06/18/19 1948     Sed Rate 3 mm/hr     Amylase [[id number]]  (Normal) Collected:  06/18/19 1720    Specimen:  Blood from Arm, Right Updated:  06/18/19 1902     Amylase 45 U/L     Urine Drug Screen - Urine, Clean Catch [[id number]]  (Abnormal) Collected:  06/18/19 1830    Specimen:  Urine, Clean Catch Updated:  06/18/19 1859     Amphetamine Screen, Urine Negative     Barbiturates Screen, Urine Negative     Benzodiazepine Screen, Urine Negative     Cocaine Screen, Urine Negative     Methadone Screen, Urine Negative     Opiate Screen Positive     Phencyclidine (PCP), Urine Negative     THC, Screen, Urine Negative    Narrative:       Negative Thresholds For Drugs Screened in Urine:    Amphetamines          500 ng/ml  Barbiturates          200 ng/ml  Benzodiazepines       200 ng/ml  Cocaine               150 ng/ml  Methadone             150 ng/ml  Opiates               300 ng/ml  Phencyclidine         25 ng/ml  THC                      50 ng/ml    The normal value for all drugs tested is negative. This report includes final unconfirmed screening results.  A positive result by this assay can be, at your request, sent to the Reference Lab for confirmation by gas chromatography. Unconfirmed results must not be used for non-medical purposes, such as employment or legal testing. Clinical consideration should be applied to any drug of abuse test result, particularly when unconfirmed results are used.    Lipase [[id number]]  (Abnormal) Collected:  06/18/19 1720    Specimen:  Blood from Arm, Right Updated:  06/18/19 1859     Lipase 231 U/L     Ethanol, Urine - Urine, Clean Catch [[id number]] Collected:  06/18/19 1830    Specimen:  Urine, Clean Catch Updated:  06/18/19 1835    [place] Draw [[id number]] Collected:  06/18/19 1720    Specimen:  Blood Updated:  06/18/19 1830    Narrative:       The following orders were created for panel  order [place] Draw.  Procedure                               Abnormality         Status                     ---------                               -----------         ------                     Light Blue Top[[id number]]                                   Final result               Green Top (Gel)[[id number]]                                  Final result               Lavender Top[[id number]]                                     Final result               Red Top[[id number]]                                          Final result                 Please view results for these tests on the individual orders.    Green Top (Gel) [[id number]] Collected:  06/18/19 1720    Specimen:  Blood from Arm, Right Updated:  06/18/19 1830     Extra Tube Hold for add-ons.     Comment: Auto resulted.       Red Top [[id number]] Collected:  06/18/19 1720    Specimen:  Blood from Arm, Right Updated:  06/18/19 1830     Extra Tube Hold for add-ons.     Comment: Auto resulted.       Light Blue Top [[id number]] Collected:  06/18/19 1720    Specimen:  Blood from Arm, Right Updated:  06/18/19 1830     Extra Tube hold for add-on     Comment: Auto resulted       Lavender Top [[id number]] Collected:  06/18/19 1720    Specimen:  Blood from Arm, Right Updated:  06/18/19 1830     Extra Tube hold for add-on     Comment: Auto resulted       CBC & Differential [[id number]] Collected:  06/18/19 1720    Specimen:  Blood Updated:  06/18/19 1800    Narrative:       The following orders were created for panel order CBC & Differential.  Procedure                               Abnormality         Status                     ---------                               -----------         ------                     CBC Auto Differential[[id number]]        Abnormal            Final result                 Please view results for these tests on the individual orders.    CBC Auto Differential [[id number]]  (Abnormal) Collected:  06/18/19 1720    Specimen:  Blood from Arm, Right  Updated:  06/18/19 1800     WBC 4.06 10*3/mm3      RBC 3.50 10*6/mm3      Hemoglobin 11.5 g/dL      Hematocrit 33.0 %      MCV 94.3 fL      MCH 32.9 pg      MCHC 34.8 g/dL      RDW 19.1 %      RDW-SD 62.9 fl      MPV 10.2 fL      Platelets 73 10*3/mm3      Neutrophil % 61.1 %      Lymphocyte % 32.0 %      Monocyte % 4.4 %      Eosinophil % 1.5 %      Basophil % 0.5 %      Immature Grans % 0.5 %      Neutrophils, Absolute 2.48 10*3/mm3      Lymphocytes, Absolute 1.30 10*3/mm3      Monocytes, Absolute 0.18 10*3/mm3      Eosinophils, Absolute 0.06 10*3/mm3      Basophils, Absolute 0.02 10*3/mm3      Immature Grans, Absolute 0.02 10*3/mm3      nRBC 0.0 /100 WBC     Troponin [[id number]]  (Normal) Collected:  06/18/19 1720    Specimen:  Blood from Arm, Right Updated:  06/18/19 1759     Troponin I <0.012 ng/mL     Comprehensive Metabolic Panel [[id number]]  (Abnormal) Collected:  06/18/19 1720    Specimen:  Blood from Arm, Right Updated:  06/18/19 1750     Glucose 116 mg/dL      BUN 2 mg/dL      Creatinine 0.52 mg/dL      Sodium 148 mmol/L      Potassium 3.6 mmol/L      Chloride 111 mmol/L      CO2 23.0 mmol/L      Calcium 8.7 mg/dL      Total Protein 7.2 g/dL      Albumin 4.40 g/dL      ALT (SGPT) 217 U/L      AST (SGOT) 576 U/L      Alkaline Phosphatase 173 U/L      Total Bilirubin 0.9 mg/dL      eGFR Non African Amer 126 mL/min/1.73      Globulin 2.8 gm/dL      A/G Ratio 1.6 g/dL      BUN/Creatinine Ratio 3.8     Anion Gap 14.0 mmol/L     Narrative:       GFR Normal >60  Chronic Kidney Disease <60  Kidney Failure <15    Magnesium [[id number]]  (Normal) Collected:  06/18/19 1720    Specimen:  Blood from Arm, Right Updated:  06/18/19 1747     Magnesium 2.0 mg/dL     Protime-INR [[id number]]  (Normal) Collected:  06/18/19 1720    Specimen:  Blood from Arm, Right Updated:  06/18/19 1745     Protime 13.9 Seconds      INR 1.04    aPTT [[id number]]  (Normal) Collected:  06/18/19 1720    Specimen:  Blood from Arm, Right Updated:   "06/18/19 1745     PTT 29.0 seconds     POC Glucose Once [[id number]]  (Normal) Collected:  06/18/19 1720    Specimen:  Blood Updated:  06/18/19 1732     Glucose 119 mg/dL      Comment: : [id number] White ID: [id number]           Hospital Course:  The patient is a 49 y.o. female who presented to [facility] with weakness and \"stroke\" symptoms.  She was evaluated by the neurologist who is not confident that her symptoms were TIA.  She vocalized concern that she had a thiamine deficiency.   She was admitted to the medical floor.   Symptoms totally resolved.  She had been drinking large \"15% beers\" with neighbor.  She had an echo preformed that showed   · Left ventricular systolic function is normal. Estimated EF appears to be in the range of 56 - 60%.  · No evidence of a patent foramen ovale.  · No significant valvular disease.   She notes she is ready to go home.    .      Physical Exam on Discharge:  /91 (BP Location: Left arm, Patient Position: Sitting)   Pulse 94   Temp 98.4 °F (36.9 °C) (Oral)   Resp 18   Ht 161.5 cm (63.6\")   Wt 59.7 kg (131 lb 9.6 oz)   SpO2 99%   BMI 22.87 kg/m²   Physical Exam   Constitutional: She is oriented to person, place, and time. She appears well-developed and well-nourished.   Eyes: Conjunctivae and EOM are normal. Pupils are equal, round, and reactive to light.   Neck: Neck supple.   Cardiovascular: Normal rate and regular rhythm. Exam reveals no gallop and no friction rub.   No murmur heard.  Pulmonary/Chest: Effort normal and breath sounds normal.   Abdominal: Soft. Bowel sounds are normal. There is no hepatosplenomegaly. There is no tenderness.   Musculoskeletal: Normal range of motion. She exhibits no edema.   Neurological: She is alert and oriented to person, place, and time. No cranial nerve deficit.   Skin: Skin is warm and dry.   Psychiatric: She has a normal mood and affect. Her behavior is normal.   Nursing note and vitals " reviewed.     Condition on Discharge:   Patient feels well  Discharge Disposition:  Home or Self Care    Discharge Medications:     Discharge Medications      New Medications      Instructions Start Date   folic acid 1 MG tablet  Commonly known as:  FOLVITE   1 mg, Oral, Daily   Start Date:  6/20/2019     nicotine 21 MG/24HR patch  Commonly known as:  NICODERM CQ   1 patch, Transdermal, Every 24 Hours Scheduled   Start Date:  6/20/2019     thiamine 100 MG tablet  Commonly known as:  VITAMIN B1   100 mg, Oral, Daily         Continue These Medications      Instructions Start Date   aspirin 325 MG tablet   325 mg, Oral, Daily      diclofenac 75 MG EC tablet  Commonly known as:  VOLTAREN   75 mg, Oral, 2 Times Daily      FLUoxetine 20 MG capsule  Commonly known as:  PROzac   20 mg, Oral, Daily      gabapentin 600 MG tablet  Commonly known as:  NEURONTIN   600 mg, Oral, 3 Times Daily      HYDROcodone-acetaminophen 7.5-325 MG per tablet  Commonly known as:  NORCO   1 tablet, Oral, Every 8 Hours PRN      lamoTRIgine 150 MG tablet  Commonly known as:  LaMICtal   150 mg, Oral, 2 Times Daily      ondansetron 4 MG tablet  Commonly known as:  ZOFRAN   4 mg, Oral, Every 8 Hours PRN      rosuvastatin 10 MG tablet  Commonly known as:  CRESTOR   10 mg, Oral, Daily      tiZANidine 4 MG tablet  Commonly known as:  ZANAFLEX   4 mg, Oral, Every 8 Hours PRN      valsartan 160 MG tablet  Commonly known as:  DIOVAN   160 mg, Oral, Daily      venlafaxine  MG 24 hr capsule  Commonly known as:  EFFEXOR-XR   150 mg, Oral, Daily           Discharge Diet:   Diet Instructions     Diet: Regular      Discharge Diet:  Regular      stop drinking alcohol     Activity at Discharge:   Activity Instructions     Activity as Tolerated            Follow-up Appointments:   5 days with PCP    Marika Elliott DO  06/19/19  4:18 PM  Time: 35 minutes.

## 2019-06-20 ENCOUNTER — READMISSION MANAGEMENT (OUTPATIENT)
Dept: CALL CENTER | Facility: HOSPITAL | Age: 49
End: 2019-06-20

## 2019-06-20 NOTE — OUTREACH NOTE
Prep Survey      Responses   Facility patient discharged from?  [facility]   Is patient eligible?  Yes   Discharge diagnosis  TIA (transient ischemic attack ruled out/ Alcohol abuseMoya-clarke disease   Does the patient have one of the following disease processes/diagnoses(primary or secondary)?  Other   Does the patient have Home health ordered?  No   Is there a DME ordered?  No   Prep survey completed?  Yes          Myra Stone RN

## 2019-06-22 ENCOUNTER — READMISSION MANAGEMENT (OUTPATIENT)
Dept: CALL CENTER | Facility: HOSPITAL | Age: 49
End: 2019-06-22

## 2019-06-22 LAB
ETHANOL U, CONFIRMATION: 0.36 %
ETHANOL UR-MCNC: NORMAL %
ETHANOL UR-MCNC: POSITIVE MG/DL

## 2019-06-22 NOTE — OUTREACH NOTE
Medical Week 1 Survey      Responses   Facility patient discharged from?  [facility]   Does the patient have one of the following disease processes/diagnoses(primary or secondary)?  Other   Is there a successful TCM telephone encounter documented?  No   Week 1 attempt successful?  No   Unsuccessful attempts  Attempt 1          Stacy Monaco RN

## 2019-06-23 ENCOUNTER — READMISSION MANAGEMENT (OUTPATIENT)
Dept: CALL CENTER | Facility: HOSPITAL | Age: 49
End: 2019-06-23

## 2019-06-23 NOTE — OUTREACH NOTE
Medical Week 1 Survey      Responses   Facility patient discharged from?  [facility]   Does the patient have one of the following disease processes/diagnoses(primary or secondary)?  Other   Is there a successful TCM telephone encounter documented?  No   Week 1 attempt successful?  No   Unsuccessful attempts  Attempt 2          Melissa Fong RN

## 2019-07-24 ENCOUNTER — HOSPITAL ENCOUNTER (INPATIENT)
Age: 49
LOS: 2 days | Discharge: HOME | DRG: 897 | End: 2019-07-28
Attending: EMERGENCY MEDICINE | Admitting: INTERNAL MEDICINE
Payer: COMMERCIAL

## 2019-07-24 DIAGNOSIS — F10.929 ACUTE ALCOHOLIC INTOXICATION WITH COMPLICATION (HCC): ICD-10-CM

## 2019-07-24 DIAGNOSIS — R45.851 SUICIDAL IDEATION: Primary | ICD-10-CM

## 2019-07-24 LAB
ALBUMIN SERPL-MCNC: 4.1 G/DL (ref 3.5–5.2)
ALP BLD-CCNC: 150 U/L (ref 35–104)
ALT SERPL-CCNC: 416 U/L (ref 5–33)
AMPHETAMINE SCREEN, URINE: NEGATIVE
ANION GAP SERPL CALCULATED.3IONS-SCNC: 17 MMOL/L (ref 7–19)
AST SERPL-CCNC: 1176 U/L (ref 5–32)
BACTERIA: NEGATIVE /HPF
BARBITURATE SCREEN URINE: NEGATIVE
BASOPHILS ABSOLUTE: 0 K/UL (ref 0–0.2)
BASOPHILS RELATIVE PERCENT: 0.3 % (ref 0–1)
BENZODIAZEPINE SCREEN, URINE: NEGATIVE
BILIRUB SERPL-MCNC: 0.9 MG/DL (ref 0.2–1.2)
BILIRUBIN URINE: NEGATIVE
BLOOD, URINE: ABNORMAL
BUN BLDV-MCNC: 4 MG/DL (ref 6–20)
CALCIUM SERPL-MCNC: 8.2 MG/DL (ref 8.6–10)
CANNABINOID SCREEN URINE: NEGATIVE
CHLORIDE BLD-SCNC: 105 MMOL/L (ref 98–111)
CLARITY: CLEAR
CO2: 23 MMOL/L (ref 22–29)
COCAINE METABOLITE SCREEN URINE: NEGATIVE
COLOR: ABNORMAL
CREAT SERPL-MCNC: <0.5 MG/DL (ref 0.5–0.9)
EOSINOPHILS ABSOLUTE: 0 K/UL (ref 0–0.6)
EOSINOPHILS RELATIVE PERCENT: 0.5 % (ref 0–5)
EPITHELIAL CELLS, UA: 2 /HPF (ref 0–5)
ETHANOL: 384 MG/DL (ref 0–0.08)
GFR NON-AFRICAN AMERICAN: >60
GLUCOSE BLD-MCNC: 176 MG/DL (ref 74–109)
GLUCOSE URINE: 250 MG/DL
HCT VFR BLD CALC: 39.9 % (ref 37–47)
HEMOGLOBIN: 13.8 G/DL (ref 12–16)
HYALINE CASTS: 4 /HPF (ref 0–8)
KETONES, URINE: NEGATIVE MG/DL
LEUKOCYTE ESTERASE, URINE: NEGATIVE
LYMPHOCYTES ABSOLUTE: 1.6 K/UL (ref 1.1–4.5)
LYMPHOCYTES RELATIVE PERCENT: 25.7 % (ref 20–40)
Lab: NORMAL
MAGNESIUM: 2 MG/DL (ref 1.6–2.6)
MCH RBC QN AUTO: 33 PG (ref 27–31)
MCHC RBC AUTO-ENTMCNC: 34.6 G/DL (ref 33–37)
MCV RBC AUTO: 95.5 FL (ref 81–99)
MONOCYTES ABSOLUTE: 0.2 K/UL (ref 0–0.9)
MONOCYTES RELATIVE PERCENT: 3 % (ref 0–10)
NEUTROPHILS ABSOLUTE: 4.5 K/UL (ref 1.5–7.5)
NEUTROPHILS RELATIVE PERCENT: 70.2 % (ref 50–65)
NITRITE, URINE: NEGATIVE
OPIATE SCREEN URINE: NEGATIVE
PDW BLD-RTO: 16.2 % (ref 11.5–14.5)
PH UA: 7 (ref 5–8)
PLATELET # BLD: 71 K/UL (ref 130–400)
PMV BLD AUTO: 10.5 FL (ref 9.4–12.3)
POTASSIUM REFLEX MAGNESIUM: 3.5 MMOL/L (ref 3.5–5)
PROTEIN UA: ABNORMAL MG/DL
RBC # BLD: 4.18 M/UL (ref 4.2–5.4)
RBC UA: 1 /HPF (ref 0–4)
SODIUM BLD-SCNC: 145 MMOL/L (ref 136–145)
SPECIFIC GRAVITY UA: 1.02 (ref 1–1.03)
TOTAL PROTEIN: 7 G/DL (ref 6.6–8.7)
URINE REFLEX TO CULTURE: ABNORMAL
UROBILINOGEN, URINE: 0.2 E.U./DL
WBC # BLD: 6.3 K/UL (ref 4.8–10.8)
WBC UA: 1 /HPF (ref 0–5)

## 2019-07-24 PROCEDURE — 80307 DRUG TEST PRSMV CHEM ANLYZR: CPT

## 2019-07-24 PROCEDURE — 96376 TX/PRO/DX INJ SAME DRUG ADON: CPT

## 2019-07-24 PROCEDURE — 2580000003 HC RX 258: Performed by: EMERGENCY MEDICINE

## 2019-07-24 PROCEDURE — G0378 HOSPITAL OBSERVATION PER HR: HCPCS

## 2019-07-24 PROCEDURE — 81001 URINALYSIS AUTO W/SCOPE: CPT

## 2019-07-24 PROCEDURE — 99285 EMERGENCY DEPT VISIT HI MDM: CPT | Performed by: EMERGENCY MEDICINE

## 2019-07-24 PROCEDURE — 6370000000 HC RX 637 (ALT 250 FOR IP): Performed by: HOSPITALIST

## 2019-07-24 PROCEDURE — 99220 PR INITIAL OBSERVATION CARE/DAY 70 MINUTES: CPT | Performed by: HOSPITALIST

## 2019-07-24 PROCEDURE — 85025 COMPLETE CBC W/AUTO DIFF WBC: CPT

## 2019-07-24 PROCEDURE — 2500000003 HC RX 250 WO HCPCS: Performed by: EMERGENCY MEDICINE

## 2019-07-24 PROCEDURE — 6360000002 HC RX W HCPCS: Performed by: HOSPITALIST

## 2019-07-24 PROCEDURE — 96375 TX/PRO/DX INJ NEW DRUG ADDON: CPT

## 2019-07-24 PROCEDURE — 36415 COLL VENOUS BLD VENIPUNCTURE: CPT

## 2019-07-24 PROCEDURE — 6360000002 HC RX W HCPCS: Performed by: EMERGENCY MEDICINE

## 2019-07-24 PROCEDURE — 80053 COMPREHEN METABOLIC PANEL: CPT

## 2019-07-24 PROCEDURE — 99285 EMERGENCY DEPT VISIT HI MDM: CPT

## 2019-07-24 PROCEDURE — 83735 ASSAY OF MAGNESIUM: CPT

## 2019-07-24 PROCEDURE — 2580000003 HC RX 258: Performed by: HOSPITALIST

## 2019-07-24 PROCEDURE — G0480 DRUG TEST DEF 1-7 CLASSES: HCPCS

## 2019-07-24 RX ORDER — LORAZEPAM 2 MG/ML
1 INJECTION INTRAMUSCULAR
Status: DISCONTINUED | OUTPATIENT
Start: 2019-07-24 | End: 2019-07-28 | Stop reason: HOSPADM

## 2019-07-24 RX ORDER — ONDANSETRON 2 MG/ML
4 INJECTION INTRAMUSCULAR; INTRAVENOUS EVERY 6 HOURS PRN
Status: DISCONTINUED | OUTPATIENT
Start: 2019-07-24 | End: 2019-07-28 | Stop reason: HOSPADM

## 2019-07-24 RX ORDER — SODIUM CHLORIDE 0.9 % (FLUSH) 0.9 %
10 SYRINGE (ML) INJECTION PRN
Status: DISCONTINUED | OUTPATIENT
Start: 2019-07-24 | End: 2019-07-28 | Stop reason: HOSPADM

## 2019-07-24 RX ORDER — SODIUM CHLORIDE 0.9 % (FLUSH) 0.9 %
10 SYRINGE (ML) INJECTION EVERY 12 HOURS SCHEDULED
Status: DISCONTINUED | OUTPATIENT
Start: 2019-07-24 | End: 2019-07-28 | Stop reason: HOSPADM

## 2019-07-24 RX ORDER — LORAZEPAM 2 MG/ML
3 INJECTION INTRAMUSCULAR
Status: DISCONTINUED | OUTPATIENT
Start: 2019-07-24 | End: 2019-07-28 | Stop reason: HOSPADM

## 2019-07-24 RX ORDER — LORAZEPAM 2 MG/ML
2 INJECTION INTRAMUSCULAR
Status: DISCONTINUED | OUTPATIENT
Start: 2019-07-24 | End: 2019-07-28 | Stop reason: HOSPADM

## 2019-07-24 RX ORDER — LORAZEPAM 1 MG/1
3 TABLET ORAL
Status: DISCONTINUED | OUTPATIENT
Start: 2019-07-24 | End: 2019-07-28 | Stop reason: HOSPADM

## 2019-07-24 RX ORDER — LORAZEPAM 2 MG/ML
4 INJECTION INTRAMUSCULAR
Status: DISCONTINUED | OUTPATIENT
Start: 2019-07-24 | End: 2019-07-28 | Stop reason: HOSPADM

## 2019-07-24 RX ORDER — VALSARTAN 80 MG/1
80 TABLET ORAL DAILY
Status: DISCONTINUED | OUTPATIENT
Start: 2019-07-25 | End: 2019-07-28 | Stop reason: HOSPADM

## 2019-07-24 RX ORDER — FAMOTIDINE 20 MG/1
20 TABLET, FILM COATED ORAL 2 TIMES DAILY
Status: DISCONTINUED | OUTPATIENT
Start: 2019-07-24 | End: 2019-07-28 | Stop reason: HOSPADM

## 2019-07-24 RX ORDER — ACETAMINOPHEN 325 MG/1
650 TABLET ORAL EVERY 4 HOURS PRN
Status: DISCONTINUED | OUTPATIENT
Start: 2019-07-24 | End: 2019-07-25

## 2019-07-24 RX ORDER — FLUOXETINE HYDROCHLORIDE 20 MG/1
20 CAPSULE ORAL DAILY
Status: DISCONTINUED | OUTPATIENT
Start: 2019-07-25 | End: 2019-07-28 | Stop reason: HOSPADM

## 2019-07-24 RX ORDER — LORAZEPAM 1 MG/1
4 TABLET ORAL
Status: DISCONTINUED | OUTPATIENT
Start: 2019-07-24 | End: 2019-07-28 | Stop reason: HOSPADM

## 2019-07-24 RX ORDER — LORAZEPAM 1 MG/1
1 TABLET ORAL
Status: DISCONTINUED | OUTPATIENT
Start: 2019-07-24 | End: 2019-07-28 | Stop reason: HOSPADM

## 2019-07-24 RX ORDER — LORAZEPAM 1 MG/1
2 TABLET ORAL
Status: DISCONTINUED | OUTPATIENT
Start: 2019-07-24 | End: 2019-07-28 | Stop reason: HOSPADM

## 2019-07-24 RX ORDER — GABAPENTIN 300 MG/1
300 CAPSULE ORAL 3 TIMES DAILY
Status: DISCONTINUED | OUTPATIENT
Start: 2019-07-24 | End: 2019-07-28 | Stop reason: HOSPADM

## 2019-07-24 RX ADMIN — Medication 10 ML: at 21:36

## 2019-07-24 RX ADMIN — FAMOTIDINE 20 MG: 20 TABLET ORAL at 21:41

## 2019-07-24 RX ADMIN — THIAMINE HYDROCHLORIDE: 100 INJECTION, SOLUTION INTRAMUSCULAR; INTRAVENOUS at 19:31

## 2019-07-24 RX ADMIN — LORAZEPAM 4 MG: 2 INJECTION INTRAMUSCULAR; INTRAVENOUS at 23:37

## 2019-07-24 RX ADMIN — LORAZEPAM 4 MG: 2 INJECTION INTRAMUSCULAR; INTRAVENOUS at 21:36

## 2019-07-24 RX ADMIN — GABAPENTIN 300 MG: 300 CAPSULE ORAL at 21:41

## 2019-07-24 ASSESSMENT — PAIN SCALES - GENERAL
PAINLEVEL_OUTOF10: 0

## 2019-07-24 NOTE — ED PROVIDER NOTES
Radiologist below, if available at the time ofthis note:    XR CHEST PORTABLE   Final Result   No evidence of acute cardiopulmonary process. Signed by Dr Alexia Olivia on 7/25/2019 10:00 AM      [address]    (Results Pending)         ED BEDSIDE ULTRASOUND:   Performed by ED Physician - none    LABS:  Labs Reviewed   CBC WITH AUTO DIFFERENTIAL - Abnormal; Notable for the following components:       Result Value    RBC 4.18 (*)     MCH 33.0 (*)     RDW 16.2 (*)     Platelets 71 (*)     Neutrophils % 70.2 (*)     All other components within normal limits   COMPREHENSIVE METABOLIC PANEL W/ REFLEX TO MG FOR LOW K - Abnormal; Notable for the following components:    Glucose 176 (*)     BUN 4 (*)     Calcium 8.2 (*)     Alkaline Phosphatase 150 (*)      (*)     AST 1,176 (*)     All other components within normal limits   URINE RT REFLEX TO CULTURE - Abnormal; Notable for the following components:    Glucose, Ur 250 (*)     Blood, Urine MODERATE (*)     Protein, UA TRACE (*)     All other components within normal limits   HEPATIC FUNCTION PANEL - Abnormal; Notable for the following components:     Total Protein 5.9 (*)     Alkaline Phosphatase 134 (*)      (*)      (*)     Bilirubin, Direct 0.5 (*)     All other components within normal limits   BASIC METABOLIC PANEL W/ REFLEX TO MG FOR LOW K - Abnormal; Notable for the following components:    CO2 21 (*)     Glucose 151 (*)     BUN 5 (*)     Calcium 7.6 (*)     All other components within normal limits   CBC - Abnormal; Notable for the following components:    RBC 3.60 (*)     Hemoglobin 11.8 (*)     Hematocrit 35.2 (*)     MCH 32.8 (*)     RDW 16.4 (*)     Platelets 52 (*)     All other components within normal limits   C DIFF TOXIN/ANTIGEN   FECAL LEUKOCYTES   URINE DRUG SCREEN   ETHANOL   MICROSCOPIC URINALYSIS   MAGNESIUM   ETHANOL   MAGNESIUM   ACETAMINOPHEN LEVEL   HCG, SERUM, QUALITATIVE   HEPATITIS PANEL, ACUTE       All other labs

## 2019-07-25 ENCOUNTER — APPOINTMENT (OUTPATIENT)
Dept: GENERAL RADIOLOGY | Age: 49
DRG: 897 | End: 2019-07-25
Payer: COMMERCIAL

## 2019-07-25 ENCOUNTER — LAB REQUISITION (OUTPATIENT)
Dept: LAB | Facility: HOSPITAL | Age: 49
End: 2019-07-25

## 2019-07-25 ENCOUNTER — APPOINTMENT (OUTPATIENT)
Dept: ULTRASOUND IMAGING | Age: 49
DRG: 897 | End: 2019-07-25
Payer: COMMERCIAL

## 2019-07-25 DIAGNOSIS — Z00.00 ROUTINE GENERAL MEDICAL EXAMINATION AT A HEALTH CARE FACILITY: ICD-10-CM

## 2019-07-25 LAB
ACETAMINOPHEN LEVEL: <15 UG/ML
ALBUMIN SERPL-MCNC: 3.6 G/DL (ref 3.5–5.2)
ALP BLD-CCNC: 134 U/L (ref 35–104)
ALT SERPL-CCNC: 349 U/L (ref 5–33)
ANION GAP SERPL CALCULATED.3IONS-SCNC: 17 MMOL/L (ref 7–19)
AST SERPL-CCNC: 941 U/L (ref 5–32)
BILIRUB SERPL-MCNC: 1 MG/DL (ref 0.2–1.2)
BILIRUBIN DIRECT: 0.5 MG/DL (ref 0–0.3)
BILIRUBIN, INDIRECT: 0.5 MG/DL (ref 0.1–1)
BUN BLDV-MCNC: 5 MG/DL (ref 6–20)
C DIFF TOX GENS STL QL NAA+PROBE: POSITIVE
C DIFF TOXIN/ANTIGEN: NORMAL
CALCIUM SERPL-MCNC: 7.6 MG/DL (ref 8.6–10)
CHLORIDE BLD-SCNC: 107 MMOL/L (ref 98–111)
CLOSTRIDIUM DIFFICILE DNA AMPLIFICATION: ABNORMAL
CO2: 21 MMOL/L (ref 22–29)
CREAT SERPL-MCNC: <0.5 MG/DL (ref 0.5–0.9)
ETHANOL: 238 MG/DL (ref 0–0.08)
GFR NON-AFRICAN AMERICAN: >60
GLUCOSE BLD-MCNC: 151 MG/DL (ref 74–109)
HAV IGM SER IA-ACNC: NORMAL
HCG QUALITATIVE: NEGATIVE
HCT VFR BLD CALC: 35.2 % (ref 37–47)
HEMOGLOBIN: 11.8 G/DL (ref 12–16)
HEPATITIS B CORE IGM ANTIBODY: NORMAL
HEPATITIS B SURFACE ANTIGEN INTERPRETATION: NORMAL
HEPATITIS C ANTIBODY INTERPRETATION: NORMAL
LACTOFERRIN, FECAL: POSITIVE
MAGNESIUM: 1.7 MG/DL (ref 1.6–2.6)
MCH RBC QN AUTO: 32.8 PG (ref 27–31)
MCHC RBC AUTO-ENTMCNC: 33.5 G/DL (ref 33–37)
MCV RBC AUTO: 97.8 FL (ref 81–99)
PDW BLD-RTO: 16.4 % (ref 11.5–14.5)
PLATELET # BLD: 52 K/UL (ref 130–400)
PMV BLD AUTO: 11 FL (ref 9.4–12.3)
POTASSIUM REFLEX MAGNESIUM: 3.5 MMOL/L (ref 3.5–5)
RBC # BLD: 3.6 M/UL (ref 4.2–5.4)
SODIUM BLD-SCNC: 145 MMOL/L (ref 136–145)
TOTAL PROTEIN: 5.9 G/DL (ref 6.6–8.7)
WBC # BLD: 6.5 K/UL (ref 4.8–10.8)

## 2019-07-25 PROCEDURE — 96375 TX/PRO/DX INJ NEW DRUG ADDON: CPT

## 2019-07-25 PROCEDURE — 85027 COMPLETE CBC AUTOMATED: CPT

## 2019-07-25 PROCEDURE — 87324 CLOSTRIDIUM AG IA: CPT

## 2019-07-25 PROCEDURE — 6360000002 HC RX W HCPCS: Performed by: INTERNAL MEDICINE

## 2019-07-25 PROCEDURE — 99253 IP/OBS CNSLTJ NEW/EST LOW 45: CPT | Performed by: PSYCHIATRY & NEUROLOGY

## 2019-07-25 PROCEDURE — G0378 HOSPITAL OBSERVATION PER HR: HCPCS

## 2019-07-25 PROCEDURE — 6370000000 HC RX 637 (ALT 250 FOR IP): Performed by: INTERNAL MEDICINE

## 2019-07-25 PROCEDURE — 99233 SBSQ HOSP IP/OBS HIGH 50: CPT | Performed by: INTERNAL MEDICINE

## 2019-07-25 PROCEDURE — 6360000002 HC RX W HCPCS: Performed by: HOSPITALIST

## 2019-07-25 PROCEDURE — 36415 COLL VENOUS BLD VENIPUNCTURE: CPT

## 2019-07-25 PROCEDURE — 6370000000 HC RX 637 (ALT 250 FOR IP): Performed by: HOSPITALIST

## 2019-07-25 PROCEDURE — 2580000003 HC RX 258: Performed by: INTERNAL MEDICINE

## 2019-07-25 PROCEDURE — 87493 C DIFF AMPLIFIED PROBE: CPT

## 2019-07-25 PROCEDURE — G0480 DRUG TEST DEF 1-7 CLASSES: HCPCS

## 2019-07-25 PROCEDURE — 51798 US URINE CAPACITY MEASURE: CPT

## 2019-07-25 PROCEDURE — 80048 BASIC METABOLIC PNL TOTAL CA: CPT

## 2019-07-25 PROCEDURE — 83735 ASSAY OF MAGNESIUM: CPT

## 2019-07-25 PROCEDURE — 84703 CHORIONIC GONADOTROPIN ASSAY: CPT

## 2019-07-25 PROCEDURE — 80074 ACUTE HEPATITIS PANEL: CPT

## 2019-07-25 PROCEDURE — 80076 HEPATIC FUNCTION PANEL: CPT

## 2019-07-25 PROCEDURE — 71045 X-RAY EXAM CHEST 1 VIEW: CPT

## 2019-07-25 PROCEDURE — 87449 NOS EACH ORGANISM AG IA: CPT

## 2019-07-25 PROCEDURE — 2580000003 HC RX 258: Performed by: HOSPITALIST

## 2019-07-25 PROCEDURE — 2500000003 HC RX 250 WO HCPCS: Performed by: HOSPITALIST

## 2019-07-25 PROCEDURE — 96376 TX/PRO/DX INJ SAME DRUG ADON: CPT

## 2019-07-25 PROCEDURE — 96365 THER/PROPH/DIAG IV INF INIT: CPT

## 2019-07-25 PROCEDURE — 83630 LACTOFERRIN FECAL (QUAL): CPT

## 2019-07-25 PROCEDURE — 76705 ECHO EXAM OF ABDOMEN: CPT

## 2019-07-25 RX ORDER — CHOLESTYRAMINE LIGHT 4 G/5.7G
4 POWDER, FOR SUSPENSION ORAL 3 TIMES DAILY
Status: DISCONTINUED | OUTPATIENT
Start: 2019-07-25 | End: 2019-07-28 | Stop reason: HOSPADM

## 2019-07-25 RX ORDER — POTASSIUM CHLORIDE 3 G/15ML
40 SOLUTION ORAL ONCE
Status: DISCONTINUED | OUTPATIENT
Start: 2019-07-25 | End: 2019-07-25

## 2019-07-25 RX ORDER — METRONIDAZOLE 500 MG/1
500 TABLET ORAL EVERY 8 HOURS SCHEDULED
Status: DISCONTINUED | OUTPATIENT
Start: 2019-07-25 | End: 2019-07-26

## 2019-07-25 RX ORDER — SODIUM CHLORIDE, SODIUM LACTATE, POTASSIUM CHLORIDE, CALCIUM CHLORIDE 600; 310; 30; 20 MG/100ML; MG/100ML; MG/100ML; MG/100ML
INJECTION, SOLUTION INTRAVENOUS CONTINUOUS
Status: DISCONTINUED | OUTPATIENT
Start: 2019-07-25 | End: 2019-07-28 | Stop reason: HOSPADM

## 2019-07-25 RX ORDER — CHLORDIAZEPOXIDE HYDROCHLORIDE 25 MG/1
50 CAPSULE, GELATIN COATED ORAL EVERY 6 HOURS PRN
Status: DISCONTINUED | OUTPATIENT
Start: 2019-07-25 | End: 2019-07-28 | Stop reason: HOSPADM

## 2019-07-25 RX ORDER — LAMOTRIGINE 100 MG/1
100 TABLET ORAL DAILY
Status: DISCONTINUED | OUTPATIENT
Start: 2019-07-26 | End: 2019-07-28 | Stop reason: HOSPADM

## 2019-07-25 RX ADMIN — LORAZEPAM 1 MG: 2 INJECTION INTRAMUSCULAR; INTRAVENOUS at 20:55

## 2019-07-25 RX ADMIN — FAMOTIDINE 20 MG: 20 TABLET ORAL at 07:59

## 2019-07-25 RX ADMIN — LORAZEPAM 2 MG: 2 INJECTION INTRAMUSCULAR; INTRAVENOUS at 13:12

## 2019-07-25 RX ADMIN — LORAZEPAM 2 MG: 1 TABLET ORAL at 17:21

## 2019-07-25 RX ADMIN — VALSARTAN 80 MG: 80 TABLET, FILM COATED ORAL at 08:00

## 2019-07-25 RX ADMIN — LORAZEPAM 3 MG: 2 INJECTION INTRAMUSCULAR; INTRAVENOUS at 18:31

## 2019-07-25 RX ADMIN — LORAZEPAM 3 MG: 2 INJECTION INTRAMUSCULAR; INTRAVENOUS at 23:29

## 2019-07-25 RX ADMIN — FAMOTIDINE 20 MG: 20 TABLET ORAL at 20:44

## 2019-07-25 RX ADMIN — FLUOXETINE HYDROCHLORIDE 20 MG: 20 CAPSULE ORAL at 08:00

## 2019-07-25 RX ADMIN — GABAPENTIN 300 MG: 300 CAPSULE ORAL at 13:12

## 2019-07-25 RX ADMIN — LORAZEPAM 4 MG: 2 INJECTION INTRAMUSCULAR; INTRAVENOUS at 14:57

## 2019-07-25 RX ADMIN — CHOLESTYRAMINE 4 G: 4 POWDER, FOR SUSPENSION ORAL at 20:44

## 2019-07-25 RX ADMIN — Medication 10 ML: at 20:44

## 2019-07-25 RX ADMIN — LORAZEPAM 2 MG: 2 INJECTION INTRAMUSCULAR; INTRAVENOUS at 22:15

## 2019-07-25 RX ADMIN — LORAZEPAM 4 MG: 2 INJECTION INTRAMUSCULAR; INTRAVENOUS at 11:05

## 2019-07-25 RX ADMIN — POTASSIUM BICARBONATE 40 MEQ: 782 TABLET, EFFERVESCENT ORAL at 10:06

## 2019-07-25 RX ADMIN — ASPIRIN 325 MG: 325 TABLET, DELAYED RELEASE ORAL at 08:00

## 2019-07-25 RX ADMIN — CALCIUM GLUCONATE 1 G: 98 INJECTION, SOLUTION INTRAVENOUS at 11:06

## 2019-07-25 RX ADMIN — LORAZEPAM 2 MG: 2 INJECTION INTRAMUSCULAR; INTRAVENOUS at 10:04

## 2019-07-25 RX ADMIN — SODIUM CHLORIDE, POTASSIUM CHLORIDE, SODIUM LACTATE AND CALCIUM CHLORIDE: 600; 310; 30; 20 INJECTION, SOLUTION INTRAVENOUS at 13:49

## 2019-07-25 RX ADMIN — LORAZEPAM 4 MG: 2 INJECTION INTRAMUSCULAR; INTRAVENOUS at 08:00

## 2019-07-25 RX ADMIN — CHLORDIAZEPOXIDE HYDROCHLORIDE 50 MG: 25 CAPSULE ORAL at 14:01

## 2019-07-25 RX ADMIN — LORAZEPAM 4 MG: 2 INJECTION INTRAMUSCULAR; INTRAVENOUS at 12:03

## 2019-07-25 RX ADMIN — GABAPENTIN 300 MG: 300 CAPSULE ORAL at 08:00

## 2019-07-25 RX ADMIN — POTASSIUM CHLORIDE 40 MEQ: 3 SOLUTION ORAL at 10:04

## 2019-07-25 RX ADMIN — METRONIDAZOLE 500 MG: 500 TABLET ORAL at 20:44

## 2019-07-25 RX ADMIN — ASCORBIC ACID, VITAMIN A PALMITATE, CHOLECALCIFEROL, THIAMINE HYDROCHLORIDE, RIBOFLAVIN-5 PHOSPHATE SODIUM, PYRIDOXINE HYDROCHLORIDE, NIACINAMIDE, DEXPANTHENOL, ALPHA-TOCOPHEROL ACETATE, VITAMIN K1, FOLIC ACID, BIOTIN, CYANOCOBALAMIN: 200; 3300; 200; 6; 3.6; 6; 40; 15; 10; 150; 600; 60; 5 INJECTION, SOLUTION INTRAVENOUS at 05:20

## 2019-07-25 RX ADMIN — LORAZEPAM 3 MG: 2 INJECTION INTRAMUSCULAR; INTRAVENOUS at 19:56

## 2019-07-25 RX ADMIN — GABAPENTIN 300 MG: 300 CAPSULE ORAL at 20:44

## 2019-07-25 RX ADMIN — CHLORDIAZEPOXIDE HYDROCHLORIDE 50 MG: 25 CAPSULE ORAL at 08:00

## 2019-07-25 RX ADMIN — CHLORDIAZEPOXIDE HYDROCHLORIDE 50 MG: 25 CAPSULE ORAL at 21:51

## 2019-07-25 RX ADMIN — ONDANSETRON 4 MG: 2 INJECTION INTRAMUSCULAR; INTRAVENOUS at 19:44

## 2019-07-25 RX ADMIN — ONDANSETRON 4 MG: 2 INJECTION INTRAMUSCULAR; INTRAVENOUS at 08:00

## 2019-07-25 RX ADMIN — LAMOTRIGINE 150 MG: 100 TABLET ORAL at 08:04

## 2019-07-25 ASSESSMENT — PAIN DESCRIPTION - ONSET: ONSET: ON-GOING

## 2019-07-25 ASSESSMENT — PAIN DESCRIPTION - LOCATION: LOCATION: HEAD

## 2019-07-25 ASSESSMENT — PAIN DESCRIPTION - FREQUENCY: FREQUENCY: CONTINUOUS

## 2019-07-25 ASSESSMENT — PAIN SCALES - GENERAL
PAINLEVEL_OUTOF10: 0
PAINLEVEL_OUTOF10: 5
PAINLEVEL_OUTOF10: 0
PAINLEVEL_OUTOF10: 5

## 2019-07-25 ASSESSMENT — PAIN DESCRIPTION - PROGRESSION: CLINICAL_PROGRESSION: GRADUALLY IMPROVING

## 2019-07-25 ASSESSMENT — PAIN DESCRIPTION - ORIENTATION: ORIENTATION: RIGHT;LEFT

## 2019-07-25 ASSESSMENT — PAIN - FUNCTIONAL ASSESSMENT: PAIN_FUNCTIONAL_ASSESSMENT: PREVENTS OR INTERFERES SOME ACTIVE ACTIVITIES AND ADLS

## 2019-07-25 ASSESSMENT — PAIN DESCRIPTION - DESCRIPTORS: DESCRIPTORS: ACHING

## 2019-07-25 ASSESSMENT — PAIN DESCRIPTION - PAIN TYPE: TYPE: ACUTE PAIN

## 2019-07-25 NOTE — PLAN OF CARE
Problem: Suicide risk  Goal: Provide patient with safe environment  Description  Provide patient with safe environment  Outcome: Ongoing     Problem: Falls - Risk of:  Goal: Will remain free from falls  Description  Will remain free from falls  Outcome: Ongoing  Goal: Absence of physical injury  Description  Absence of physical injury  Outcome: Ongoing

## 2019-07-26 ENCOUNTER — APPOINTMENT (OUTPATIENT)
Dept: ULTRASOUND IMAGING | Age: 49
DRG: 897 | End: 2019-07-26
Payer: COMMERCIAL

## 2019-07-26 PROBLEM — R56.9 ALCOHOL WITHDRAWAL SEIZURE WITHOUT COMPLICATION (HCC): Status: ACTIVE | Noted: 2019-07-26

## 2019-07-26 PROBLEM — F10.930 ALCOHOL WITHDRAWAL SEIZURE WITHOUT COMPLICATION (HCC): Status: ACTIVE | Noted: 2019-07-26

## 2019-07-26 PROBLEM — D61.818 PANCYTOPENIA (HCC): Status: ACTIVE | Noted: 2019-07-26

## 2019-07-26 LAB
ALBUMIN SERPL-MCNC: 3.4 G/DL (ref 3.5–5.2)
ALP BLD-CCNC: 152 U/L (ref 35–104)
ALT SERPL-CCNC: 223 U/L (ref 5–33)
ANION GAP SERPL CALCULATED.3IONS-SCNC: 14 MMOL/L (ref 7–19)
APTT: 32.8 SEC (ref 26–36.2)
AST SERPL-CCNC: 405 U/L (ref 5–32)
BASOPHILS ABSOLUTE: 0 K/UL (ref 0–0.2)
BASOPHILS RELATIVE PERCENT: 0.4 % (ref 0–1)
BILIRUB SERPL-MCNC: 2.4 MG/DL (ref 0.2–1.2)
BUN BLDV-MCNC: 7 MG/DL (ref 6–20)
CALCIUM SERPL-MCNC: 8.6 MG/DL (ref 8.6–10)
CHLORIDE BLD-SCNC: 109 MMOL/L (ref 98–111)
CO2: 20 MMOL/L (ref 22–29)
CREAT SERPL-MCNC: <0.5 MG/DL (ref 0.5–0.9)
EOSINOPHILS ABSOLUTE: 0 K/UL (ref 0–0.6)
EOSINOPHILS RELATIVE PERCENT: 1.6 % (ref 0–5)
FERRITIN: 537.4 NG/ML (ref 13–150)
FIBRINOGEN: 315 MG/DL (ref 238–505)
FOLATE: 14.2 NG/ML (ref 4.8–37.3)
GFR NON-AFRICAN AMERICAN: >60
GLUCOSE BLD-MCNC: 120 MG/DL (ref 74–109)
HAPTOGLOBIN: 16 MG/DL (ref 30–200)
HCT VFR BLD CALC: 32.6 % (ref 37–47)
HCT VFR BLD CALC: 32.8 % (ref 37–47)
HEMOGLOBIN: 11.1 G/DL (ref 12–16)
HIV-1 P24 AG: NORMAL
INR BLD: 1.29 (ref 0.88–1.18)
IRON SATURATION: 93 % (ref 14–50)
IRON: 222 UG/DL (ref 37–145)
LACTATE DEHYDROGENASE: 327 U/L (ref 91–215)
LIPASE: 41 U/L (ref 13–60)
LYMPHOCYTES ABSOLUTE: 0.8 K/UL (ref 1.1–4.5)
LYMPHOCYTES RELATIVE PERCENT: 32.4 % (ref 20–40)
MAGNESIUM: 1.6 MG/DL (ref 1.6–2.6)
MCH RBC QN AUTO: 33.7 PG (ref 27–31)
MCHC RBC AUTO-ENTMCNC: 34 G/DL (ref 33–37)
MCV RBC AUTO: 99.1 FL (ref 81–99)
MONOCYTES ABSOLUTE: 0.1 K/UL (ref 0–0.9)
MONOCYTES RELATIVE PERCENT: 4 % (ref 0–10)
NEUTROPHILS ABSOLUTE: 1.5 K/UL (ref 1.5–7.5)
NEUTROPHILS RELATIVE PERCENT: 61.6 % (ref 50–65)
PATHOLOGIST REVIEW: NORMAL
PDW BLD-RTO: 15.9 % (ref 11.5–14.5)
PLATELET # BLD: 26 K/UL (ref 130–400)
PLATELET SLIDE REVIEW: ABNORMAL
PMV BLD AUTO: 12.1 FL (ref 9.4–12.3)
POTASSIUM SERPL-SCNC: 3.8 MMOL/L (ref 3.5–5)
PROTHROMBIN TIME: 15.4 SEC (ref 12–14.6)
RAPID HIV 1&2: NORMAL
RBC # BLD: 3.29 M/UL (ref 4.2–5.4)
REASON FOR REJECTION: NORMAL
REJECTED TEST: NORMAL
RETICULOCYTE ABSOLUTE COUNT: 0.03 M/UL (ref 0.03–0.12)
RETICULOCYTE COUNT PCT: 1 % (ref 0.5–1.5)
SODIUM BLD-SCNC: 143 MMOL/L (ref 136–145)
TOTAL IRON BINDING CAPACITY: 240 UG/DL (ref 250–400)
TOTAL PROTEIN: 5.9 G/DL (ref 6.6–8.7)
TSH SERPL DL<=0.05 MIU/L-ACNC: 1.72 UIU/ML (ref 0.27–4.2)
VITAMIN B-12: 676 PG/ML (ref 211–946)
WBC # BLD: 2.5 K/UL (ref 4.8–10.8)

## 2019-07-26 PROCEDURE — 85610 PROTHROMBIN TIME: CPT

## 2019-07-26 PROCEDURE — 82607 VITAMIN B-12: CPT

## 2019-07-26 PROCEDURE — 6370000000 HC RX 637 (ALT 250 FOR IP): Performed by: INTERNAL MEDICINE

## 2019-07-26 PROCEDURE — 36415 COLL VENOUS BLD VENIPUNCTURE: CPT

## 2019-07-26 PROCEDURE — 76705 ECHO EXAM OF ABDOMEN: CPT

## 2019-07-26 PROCEDURE — 83735 ASSAY OF MAGNESIUM: CPT

## 2019-07-26 PROCEDURE — 84460 ALANINE AMINO (ALT) (SGPT): CPT

## 2019-07-26 PROCEDURE — 85045 AUTOMATED RETICULOCYTE COUNT: CPT

## 2019-07-26 PROCEDURE — 82525 ASSAY OF COPPER: CPT

## 2019-07-26 PROCEDURE — 81256 HFE GENE: CPT

## 2019-07-26 PROCEDURE — 82977 ASSAY OF GGT: CPT

## 2019-07-26 PROCEDURE — 83010 ASSAY OF HAPTOGLOBIN QUANT: CPT

## 2019-07-26 PROCEDURE — 1210000000 HC MED SURG R&B

## 2019-07-26 PROCEDURE — 2580000003 HC RX 258: Performed by: INTERNAL MEDICINE

## 2019-07-26 PROCEDURE — 6370000000 HC RX 637 (ALT 250 FOR IP): Performed by: HOSPITALIST

## 2019-07-26 PROCEDURE — 83883 ASSAY NEPHELOMETRY NOT SPEC: CPT

## 2019-07-26 PROCEDURE — 82172 ASSAY OF APOLIPOPROTEIN: CPT

## 2019-07-26 PROCEDURE — 84443 ASSAY THYROID STIM HORMONE: CPT

## 2019-07-26 PROCEDURE — 82746 ASSAY OF FOLIC ACID SERUM: CPT

## 2019-07-26 PROCEDURE — 6360000002 HC RX W HCPCS: Performed by: INTERNAL MEDICINE

## 2019-07-26 PROCEDURE — 82247 BILIRUBIN TOTAL: CPT

## 2019-07-26 PROCEDURE — 85730 THROMBOPLASTIN TIME PARTIAL: CPT

## 2019-07-26 PROCEDURE — 83690 ASSAY OF LIPASE: CPT

## 2019-07-26 PROCEDURE — 85025 COMPLETE CBC W/AUTO DIFF WBC: CPT

## 2019-07-26 PROCEDURE — 85384 FIBRINOGEN ACTIVITY: CPT

## 2019-07-26 PROCEDURE — 99243 OFF/OP CNSLTJ NEW/EST LOW 30: CPT | Performed by: INTERNAL MEDICINE

## 2019-07-26 PROCEDURE — 99226 PR SBSQ OBSERVATION CARE/DAY 35 MINUTES: CPT | Performed by: INTERNAL MEDICINE

## 2019-07-26 PROCEDURE — 82728 ASSAY OF FERRITIN: CPT

## 2019-07-26 PROCEDURE — 84160 ASSAY OF PROTEIN ANY SOURCE: CPT

## 2019-07-26 PROCEDURE — 83615 LACTATE (LD) (LDH) ENZYME: CPT

## 2019-07-26 PROCEDURE — 84165 PROTEIN E-PHORESIS SERUM: CPT

## 2019-07-26 PROCEDURE — 83540 ASSAY OF IRON: CPT

## 2019-07-26 PROCEDURE — 83550 IRON BINDING TEST: CPT

## 2019-07-26 PROCEDURE — 80053 COMPREHEN METABOLIC PANEL: CPT

## 2019-07-26 PROCEDURE — 99254 IP/OBS CNSLTJ NEW/EST MOD 60: CPT | Performed by: INTERNAL MEDICINE

## 2019-07-26 PROCEDURE — 87806 HIV AG W/HIV1&2 ANTB W/OPTIC: CPT

## 2019-07-26 PROCEDURE — 96376 TX/PRO/DX INJ SAME DRUG ADON: CPT

## 2019-07-26 RX ORDER — OXYCODONE HYDROCHLORIDE AND ACETAMINOPHEN 5; 325 MG/1; MG/1
1 TABLET ORAL EVERY 8 HOURS PRN
Status: DISCONTINUED | OUTPATIENT
Start: 2019-07-26 | End: 2019-07-28 | Stop reason: HOSPADM

## 2019-07-26 RX ADMIN — Medication 125 MG: at 17:05

## 2019-07-26 RX ADMIN — LORAZEPAM 2 MG: 2 INJECTION INTRAMUSCULAR; INTRAVENOUS at 11:41

## 2019-07-26 RX ADMIN — CHOLESTYRAMINE 4 G: 4 POWDER, FOR SUSPENSION ORAL at 13:01

## 2019-07-26 RX ADMIN — METRONIDAZOLE 500 MG: 500 TABLET ORAL at 06:37

## 2019-07-26 RX ADMIN — CHLORDIAZEPOXIDE HYDROCHLORIDE 50 MG: 25 CAPSULE ORAL at 21:03

## 2019-07-26 RX ADMIN — Medication 10 ML: at 20:16

## 2019-07-26 RX ADMIN — LORAZEPAM 1 MG: 1 TABLET ORAL at 09:05

## 2019-07-26 RX ADMIN — GABAPENTIN 300 MG: 300 CAPSULE ORAL at 08:57

## 2019-07-26 RX ADMIN — Medication 125 MG: at 11:42

## 2019-07-26 RX ADMIN — FAMOTIDINE 20 MG: 20 TABLET ORAL at 08:57

## 2019-07-26 RX ADMIN — GABAPENTIN 300 MG: 300 CAPSULE ORAL at 13:01

## 2019-07-26 RX ADMIN — SODIUM CHLORIDE, POTASSIUM CHLORIDE, SODIUM LACTATE AND CALCIUM CHLORIDE: 600; 310; 30; 20 INJECTION, SOLUTION INTRAVENOUS at 07:04

## 2019-07-26 RX ADMIN — OXYCODONE HYDROCHLORIDE AND ACETAMINOPHEN 1 TABLET: 5; 325 TABLET ORAL at 13:01

## 2019-07-26 RX ADMIN — FLUOXETINE HYDROCHLORIDE 20 MG: 20 CAPSULE ORAL at 08:57

## 2019-07-26 RX ADMIN — OXYCODONE HYDROCHLORIDE AND ACETAMINOPHEN 1 TABLET: 5; 325 TABLET ORAL at 21:03

## 2019-07-26 RX ADMIN — FAMOTIDINE 20 MG: 20 TABLET ORAL at 20:16

## 2019-07-26 RX ADMIN — LORAZEPAM 2 MG: 2 INJECTION INTRAMUSCULAR; INTRAVENOUS at 23:16

## 2019-07-26 RX ADMIN — CHOLESTYRAMINE 4 G: 4 POWDER, FOR SUSPENSION ORAL at 20:16

## 2019-07-26 RX ADMIN — CHOLESTYRAMINE 4 G: 4 POWDER, FOR SUSPENSION ORAL at 08:57

## 2019-07-26 RX ADMIN — GABAPENTIN 300 MG: 300 CAPSULE ORAL at 20:16

## 2019-07-26 RX ADMIN — VALSARTAN 80 MG: 80 TABLET, FILM COATED ORAL at 08:57

## 2019-07-26 RX ADMIN — LAMOTRIGINE 100 MG: 100 TABLET ORAL at 08:57

## 2019-07-26 RX ADMIN — LORAZEPAM 2 MG: 2 INJECTION INTRAMUSCULAR; INTRAVENOUS at 17:24

## 2019-07-26 RX ADMIN — LORAZEPAM 4 MG: 1 TABLET ORAL at 20:15

## 2019-07-26 ASSESSMENT — PAIN DESCRIPTION - LOCATION: LOCATION: HEAD

## 2019-07-26 ASSESSMENT — PAIN DESCRIPTION - PROGRESSION: CLINICAL_PROGRESSION: GRADUALLY IMPROVING

## 2019-07-26 ASSESSMENT — PAIN SCALES - GENERAL
PAINLEVEL_OUTOF10: 8
PAINLEVEL_OUTOF10: 7
PAINLEVEL_OUTOF10: 8

## 2019-07-26 ASSESSMENT — PAIN DESCRIPTION - DESCRIPTORS: DESCRIPTORS: ACHING

## 2019-07-26 ASSESSMENT — PAIN DESCRIPTION - PAIN TYPE: TYPE: ACUTE PAIN

## 2019-07-26 ASSESSMENT — PAIN DESCRIPTION - FREQUENCY: FREQUENCY: CONTINUOUS

## 2019-07-26 NOTE — CONSULTS
intoxication, possible alcohol hepatitis. Hepatic  discriminant function unknown at this time as the INR is pending. 2.  Epigastric pain with history of pancreatitis, rule out recurrent  pancreatitis; also possibly secondary to alcohol with history of  retained common bile duct stones in the past.  This must also remain in  the differential.  3.  Diarrhea with positive C. difficile toxin (C. difficile colitis). PLAN:  1.  _____ INR to calculate hepatic discriminant function. If this is  elevated may benefit from prednisolone, although the liver function  tests have declined over the last 24 hours. 2.  Obtain serum lipase. 3.  Monitor liver function tests. 4.  Vancomycin 125 q.i.d. for the C. difficile colitis. Thank you for asking me to see the patient.         Ayah Meyer MD    D: 07/26/2019 10:59:53      T: 07/26/2019 11:09:14     [email or web address]  Job#: [id number]     Doc#: [id number]    CC:
external inspection of ears and nose are normal  NECK: Supple, no masses. No palpable thyroid mass  CHEST/LUNGS: CTA bilaterally, normal respiratory effort   CARDIOVASCULAR: RRR, no murmurs. No lower extremity edema  ABDOMEN: soft non-tender, active bowel sounds, no HSM. No palpable masses  EXTREMITIES: warm, full ROM in all 4 extremities, no focal weakness. SKIN: warm, dry with no rashes or lesions, jaundice  LYMPH: No cervical, clavicular, axillary, or inguinal lymphadenopathy  NEUROLOGIC: follows commands, non focal   PSYCH: mood and affect appropriate. Alert and oriented to time, place, person      Recent Labs     07/26/19  0657 07/25/19  0118 07/24/19  1855   WBC 2.5* 6.5 6.3   HGB 11.1* 11.8* 13.8   HCT 32.6* 35.2* 39.9   MCV 99.1* 97.8 95.5   PLT 26* 52* 71*       Lab Results   Component Value Date     07/26/2019    K 3.8 07/26/2019     07/26/2019    CO2 20 (L) 07/26/2019    BUN 7 07/26/2019    CREATININE <0.5 07/26/2019    GLUCOSE 120 (H) 07/26/2019    CALCIUM 8.6 07/26/2019    PROT 5.9 (L) 07/26/2019    LABALBU 3.4 (L) 07/26/2019    BILITOT 2.4 (H) 07/26/2019    ALKPHOS 152 (H) 07/26/2019     (H) 07/26/2019     (H) 07/26/2019    LABGLOM >60 07/26/2019       Lab Results   Component Value Date    INR 1.10 11/28/2018    PROTIME 14.1 11/28/2018       ASSESSMENT/PLAN:    #Pancytopenia- likely secondary to alcohol intoxication  #Alcoholic hepatitis  #Alcoholic cirrhosis  #Alcohol abuse  #Depression      Plan:  · Anemia workup  · Path review  · Ultrasound of the spleen  · Transfuse platelets KU<68,914, or active bleeding. · Stop aspirin  · PT/PTT  · CBC daily      Larissa HOLCOMB Josr    07/26/19  9:01 AM   I have seen, examined and reviewed this patient medication list, appropriate labs and imaging studies. I reviewed relevant medical records and others physicians notes. I discussed the plans of care with the patient.  I answered all the questions to the patients

## 2019-07-27 LAB
ALBUMIN SERPL-MCNC: 3.6 G/DL (ref 3.5–5.2)
ALP BLD-CCNC: 156 U/L (ref 35–104)
ALT SERPL-CCNC: 159 U/L (ref 5–33)
ANION GAP SERPL CALCULATED.3IONS-SCNC: 14 MMOL/L (ref 7–19)
AST SERPL-CCNC: 185 U/L (ref 5–32)
BASOPHILS ABSOLUTE: 0 K/UL (ref 0–0.2)
BASOPHILS RELATIVE PERCENT: 0.6 % (ref 0–1)
BILIRUB SERPL-MCNC: 1.5 MG/DL (ref 0.2–1.2)
BUN BLDV-MCNC: 6 MG/DL (ref 6–20)
CALCIUM SERPL-MCNC: 8.8 MG/DL (ref 8.6–10)
CHLORIDE BLD-SCNC: 111 MMOL/L (ref 98–111)
CO2: 16 MMOL/L (ref 22–29)
CREAT SERPL-MCNC: <0.5 MG/DL (ref 0.5–0.9)
EOSINOPHILS ABSOLUTE: 0.2 K/UL (ref 0–0.6)
EOSINOPHILS RELATIVE PERCENT: 4.8 % (ref 0–5)
GFR NON-AFRICAN AMERICAN: >60
GLUCOSE BLD-MCNC: 116 MG/DL (ref 74–109)
HCT VFR BLD CALC: 35.5 % (ref 37–47)
HEMOGLOBIN: 11.6 G/DL (ref 12–16)
LYMPHOCYTES ABSOLUTE: 0.9 K/UL (ref 1.1–4.5)
LYMPHOCYTES RELATIVE PERCENT: 27.7 % (ref 20–40)
MAGNESIUM: 2 MG/DL (ref 1.6–2.6)
MCH RBC QN AUTO: 33.6 PG (ref 27–31)
MCHC RBC AUTO-ENTMCNC: 32.7 G/DL (ref 33–37)
MCV RBC AUTO: 102.9 FL (ref 81–99)
MONOCYTES ABSOLUTE: 0.1 K/UL (ref 0–0.9)
MONOCYTES RELATIVE PERCENT: 4.1 % (ref 0–10)
NEUTROPHILS ABSOLUTE: 2 K/UL (ref 1.5–7.5)
NEUTROPHILS RELATIVE PERCENT: 62.5 % (ref 50–65)
PDW BLD-RTO: 15.5 % (ref 11.5–14.5)
PLATELET # BLD: 28 K/UL (ref 130–400)
PLATELET SLIDE REVIEW: ABNORMAL
PMV BLD AUTO: 12.7 FL (ref 9.4–12.3)
POTASSIUM SERPL-SCNC: 3.7 MMOL/L (ref 3.5–5)
RBC # BLD: 3.45 M/UL (ref 4.2–5.4)
SODIUM BLD-SCNC: 141 MMOL/L (ref 136–145)
TOTAL PROTEIN: 6.5 G/DL (ref 6.6–8.7)
WBC # BLD: 3.1 K/UL (ref 4.8–10.8)

## 2019-07-27 PROCEDURE — 6370000000 HC RX 637 (ALT 250 FOR IP): Performed by: INTERNAL MEDICINE

## 2019-07-27 PROCEDURE — 6360000002 HC RX W HCPCS: Performed by: INTERNAL MEDICINE

## 2019-07-27 PROCEDURE — 2580000003 HC RX 258: Performed by: INTERNAL MEDICINE

## 2019-07-27 PROCEDURE — 99221 1ST HOSP IP/OBS SF/LOW 40: CPT | Performed by: INTERNAL MEDICINE

## 2019-07-27 PROCEDURE — 6370000000 HC RX 637 (ALT 250 FOR IP): Performed by: HOSPITALIST

## 2019-07-27 PROCEDURE — 1210000000 HC MED SURG R&B

## 2019-07-27 PROCEDURE — 36415 COLL VENOUS BLD VENIPUNCTURE: CPT

## 2019-07-27 PROCEDURE — 80053 COMPREHEN METABOLIC PANEL: CPT

## 2019-07-27 PROCEDURE — 99232 SBSQ HOSP IP/OBS MODERATE 35: CPT | Performed by: INTERNAL MEDICINE

## 2019-07-27 PROCEDURE — 99225 PR SBSQ OBSERVATION CARE/DAY 25 MINUTES: CPT | Performed by: INTERNAL MEDICINE

## 2019-07-27 PROCEDURE — 85025 COMPLETE CBC W/AUTO DIFF WBC: CPT

## 2019-07-27 PROCEDURE — 83735 ASSAY OF MAGNESIUM: CPT

## 2019-07-27 RX ADMIN — CHOLESTYRAMINE 4 G: 4 POWDER, FOR SUSPENSION ORAL at 16:39

## 2019-07-27 RX ADMIN — GABAPENTIN 300 MG: 300 CAPSULE ORAL at 21:08

## 2019-07-27 RX ADMIN — GABAPENTIN 300 MG: 300 CAPSULE ORAL at 15:00

## 2019-07-27 RX ADMIN — Medication 125 MG: at 23:54

## 2019-07-27 RX ADMIN — GABAPENTIN 300 MG: 300 CAPSULE ORAL at 08:50

## 2019-07-27 RX ADMIN — LORAZEPAM 2 MG: 1 TABLET ORAL at 19:52

## 2019-07-27 RX ADMIN — OXYCODONE HYDROCHLORIDE AND ACETAMINOPHEN 1 TABLET: 5; 325 TABLET ORAL at 04:57

## 2019-07-27 RX ADMIN — Medication 125 MG: at 00:25

## 2019-07-27 RX ADMIN — VALSARTAN 80 MG: 80 TABLET, FILM COATED ORAL at 08:50

## 2019-07-27 RX ADMIN — ONDANSETRON 4 MG: 2 INJECTION INTRAMUSCULAR; INTRAVENOUS at 17:19

## 2019-07-27 RX ADMIN — Medication 125 MG: at 04:56

## 2019-07-27 RX ADMIN — LORAZEPAM 2 MG: 2 INJECTION INTRAMUSCULAR; INTRAVENOUS at 00:22

## 2019-07-27 RX ADMIN — LORAZEPAM 4 MG: 1 TABLET ORAL at 23:26

## 2019-07-27 RX ADMIN — Medication 125 MG: at 15:00

## 2019-07-27 RX ADMIN — LORAZEPAM 2 MG: 2 INJECTION INTRAMUSCULAR; INTRAVENOUS at 17:57

## 2019-07-27 RX ADMIN — LORAZEPAM 2 MG: 1 TABLET ORAL at 16:39

## 2019-07-27 RX ADMIN — LAMOTRIGINE 100 MG: 100 TABLET ORAL at 08:49

## 2019-07-27 RX ADMIN — FAMOTIDINE 20 MG: 20 TABLET ORAL at 21:08

## 2019-07-27 RX ADMIN — FLUOXETINE HYDROCHLORIDE 20 MG: 20 CAPSULE ORAL at 08:50

## 2019-07-27 RX ADMIN — CHOLESTYRAMINE 4 G: 4 POWDER, FOR SUSPENSION ORAL at 21:08

## 2019-07-27 RX ADMIN — LORAZEPAM 3 MG: 1 TABLET ORAL at 09:20

## 2019-07-27 RX ADMIN — OXYCODONE HYDROCHLORIDE AND ACETAMINOPHEN 1 TABLET: 5; 325 TABLET ORAL at 15:00

## 2019-07-27 RX ADMIN — CHOLESTYRAMINE 4 G: 4 POWDER, FOR SUSPENSION ORAL at 08:49

## 2019-07-27 RX ADMIN — SODIUM CHLORIDE, POTASSIUM CHLORIDE, SODIUM LACTATE AND CALCIUM CHLORIDE: 600; 310; 30; 20 INJECTION, SOLUTION INTRAVENOUS at 21:08

## 2019-07-27 RX ADMIN — FAMOTIDINE 20 MG: 20 TABLET ORAL at 08:50

## 2019-07-27 RX ADMIN — LORAZEPAM 2 MG: 2 INJECTION INTRAMUSCULAR; INTRAVENOUS at 04:56

## 2019-07-27 RX ADMIN — LORAZEPAM 1 MG: 1 TABLET ORAL at 15:00

## 2019-07-27 ASSESSMENT — PAIN SCALES - GENERAL
PAINLEVEL_OUTOF10: 9
PAINLEVEL_OUTOF10: 8
PAINLEVEL_OUTOF10: 6

## 2019-07-27 NOTE — PROGRESS NOTES
spleen was performed in  the transverse and longitudinal projections. The spleen measures 18.9 x 6.6 x 6.9 cm in size, for a splenic volume  of 450.80 mL. Normal color flow is demonstrated. IMPRESSION:  Marked splenomegaly as above. 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 PBS - pending    #Alcoholic hepatitis, cirrhosis, abuse    Discussed with Dr. Minnie Palacios, suspect acute alcohol intoxication, hepatitis. She drinks 4-5 LOCO 14 daily which contains 14% alcohol and caffeine. #C. Diff colitis  PO Vanco continues     Gabi Dedra    07/27/19  10:16 AM    I personally saw and examined this patient, performing a face-to-face diagnostic evaluation with plan of care reviewed and developed with [facility] and nursing staff. I have addended and/or modified the above history of present illness, physical examination, and assessment and plan to reflect my findings and impressions. Essential elements of the care plan were discussed with Allyson Calvillo PAC . Agree with findings and assessment/plan as documented above. Questions were encouraged, asked and answered to their understanding and satisfaction.       Electronically signed by Ondina Paez MD on 7/27/19 at 11:34 AM

## 2019-07-27 NOTE — PROGRESS NOTES
CRP:  No results for input(s): CRP in the last 72 hours. Sed Rate:  No results for input(s): SEDRATE in the last 72 hours. HgBA1c:  No components found for: HGBA1C  FLP:  No results found for: TRIG, HDL, LDLCALC, LDLDIRECT, LABVLDL  TSH:    Lab Results   Component Value Date    TSH 1.720 07/26/2019     Troponin T: No results for input(s): TROPONINI in the last 72 hours. Pro-BNP: No results for input(s): BNP in the last 72 hours. INR:   Recent Labs     07/26/19  0928   INR 1.29*     ABGs: No results found for: PHART, PO2ART, YOC2NOT  UA:  Recent Labs     07/24/19  1840   COLORU DK YELLOW   PHUR 7.0   WBCUA 1   RBCUA 1   BACTERIA NEGATIVE   CLARITYU Clear   SPECGRAV 1.016   LEUKOCYTESUR Negative   UROBILINOGEN 0.2   BILIRUBINUR Negative   BLOODU MODERATE*   GLUCOSEU 250*         Culture Results:    No results for input(s): CXSURG in the last 720 hours. Blood Culture Recent: No results for input(s): BC in the last 720 hours. Cultures:   No results for input(s): CULTURE in the last 72 hours. No results for input(s): BC, Hellen Aysha in the last 72 hours. No results for input(s): CXSURG in the last 72 hours. Recent Labs     07/25/19  0118 07/26/19  0651 07/27/19  0727   MG 1.7 1.6 2.0     Recent Labs     07/25/19  0118 07/26/19  0651 07/27/19  0727   * 405* 185*   * 223* 159*   BILITOT 1.0 2.4* 1.5*   ALKPHOS 134* 152* 156*         RAD:   Us Gallbladder Ruq    Result Date: 7/25/2019  US GALLBLADDER RUQ 7/25/2019 12:00 PM History: Cirrhosis. Grayscale and color flow ultrasound evaluation of the right upper quadrant. The gallbladder is absent. The liver is echogenic which can be seen with cirrhosis or fatty infiltration. No focal liver lesion is seen. No biliary dilation. CBD = 3 mm. The visualized portion of the pancreas is normal. Normal right kidney measuring 102 x 42 mm. 1. Cirrhotic or fatty liver with no focal lesion or biliary dilation.  Signed by Dr Briana Rodriguez on medical conditions - see above and orders.           Advance Directive: Full Code    DIET GENERAL; Safety Tray; Safety Tray (Disposables)     DVT prophylaxis: SCDs    Consults Made:   IP CONSULT TO PSYCHIATRY  IP CONSULT TO HEM/ONC  IP CONSULT TO GI    Discharge planning: tbd       Electronically signed by   Kamari Beach MD, MPH,   Internal Medicine Hospitalist   7/27/2019 12:24 PM

## 2019-07-28 VITALS
WEIGHT: 121.3 LBS | OXYGEN SATURATION: 95 % | BODY MASS INDEX: 20.21 KG/M2 | HEART RATE: 78 BPM | RESPIRATION RATE: 18 BRPM | DIASTOLIC BLOOD PRESSURE: 76 MMHG | TEMPERATURE: 97.8 F | HEIGHT: 65 IN | SYSTOLIC BLOOD PRESSURE: 145 MMHG

## 2019-07-28 LAB
ALBUMIN SERPL-MCNC: 3.5 G/DL (ref 3.5–5.2)
ALP BLD-CCNC: 137 U/L (ref 35–104)
ALT SERPL-CCNC: 108 U/L (ref 5–33)
ANION GAP SERPL CALCULATED.3IONS-SCNC: 16 MMOL/L (ref 7–19)
ANISOCYTOSIS: ABNORMAL
AST SERPL-CCNC: 99 U/L (ref 5–32)
BASOPHILS ABSOLUTE: 0 K/UL (ref 0–0.2)
BASOPHILS RELATIVE PERCENT: 0.4 % (ref 0–1)
BILIRUB SERPL-MCNC: 1 MG/DL (ref 0.2–1.2)
BUN BLDV-MCNC: 4 MG/DL (ref 6–20)
CALCIUM SERPL-MCNC: 8.2 MG/DL (ref 8.6–10)
CHLORIDE BLD-SCNC: 108 MMOL/L (ref 98–111)
CO2: 18 MMOL/L (ref 22–29)
CREAT SERPL-MCNC: <0.5 MG/DL (ref 0.5–0.9)
EOSINOPHILS ABSOLUTE: 0.1 K/UL (ref 0–0.6)
EOSINOPHILS RELATIVE PERCENT: 4 % (ref 0–5)
GFR NON-AFRICAN AMERICAN: >60
GLUCOSE BLD-MCNC: 103 MG/DL (ref 74–109)
HCT VFR BLD CALC: 29.4 % (ref 37–47)
HEMOGLOBIN: 10.1 G/DL (ref 12–16)
LYMPHOCYTES ABSOLUTE: 0.9 K/UL (ref 1.1–4.5)
LYMPHOCYTES RELATIVE PERCENT: 31.9 % (ref 20–40)
MACROCYTES: ABNORMAL
MAGNESIUM: 1.8 MG/DL (ref 1.6–2.6)
MCH RBC QN AUTO: 34 PG (ref 27–31)
MCHC RBC AUTO-ENTMCNC: 34.4 G/DL (ref 33–37)
MCV RBC AUTO: 99 FL (ref 81–99)
MONOCYTES ABSOLUTE: 0.2 K/UL (ref 0–0.9)
MONOCYTES RELATIVE PERCENT: 5.8 % (ref 0–10)
NEUTROPHILS ABSOLUTE: 1.6 K/UL (ref 1.5–7.5)
NEUTROPHILS RELATIVE PERCENT: 57.9 % (ref 50–65)
OVALOCYTES: ABNORMAL
PDW BLD-RTO: 15.5 % (ref 11.5–14.5)
PLATELET # BLD: 31 K/UL (ref 130–400)
PLATELET SLIDE REVIEW: ABNORMAL
PMV BLD AUTO: 13 FL (ref 9.4–12.3)
POTASSIUM SERPL-SCNC: 3.3 MMOL/L (ref 3.5–5)
RBC # BLD: 2.97 M/UL (ref 4.2–5.4)
SODIUM BLD-SCNC: 142 MMOL/L (ref 136–145)
TEAR DROP CELLS: ABNORMAL
TOTAL PROTEIN: 5.8 G/DL (ref 6.6–8.7)
WBC # BLD: 2.8 K/UL (ref 4.8–10.8)

## 2019-07-28 PROCEDURE — 6370000000 HC RX 637 (ALT 250 FOR IP): Performed by: INTERNAL MEDICINE

## 2019-07-28 PROCEDURE — 99239 HOSP IP/OBS DSCHRG MGMT >30: CPT | Performed by: INTERNAL MEDICINE

## 2019-07-28 PROCEDURE — 36415 COLL VENOUS BLD VENIPUNCTURE: CPT

## 2019-07-28 PROCEDURE — 85025 COMPLETE CBC W/AUTO DIFF WBC: CPT

## 2019-07-28 PROCEDURE — 99231 SBSQ HOSP IP/OBS SF/LOW 25: CPT | Performed by: INTERNAL MEDICINE

## 2019-07-28 PROCEDURE — 6360000002 HC RX W HCPCS: Performed by: INTERNAL MEDICINE

## 2019-07-28 PROCEDURE — 6370000000 HC RX 637 (ALT 250 FOR IP): Performed by: HOSPITALIST

## 2019-07-28 PROCEDURE — 80053 COMPREHEN METABOLIC PANEL: CPT

## 2019-07-28 PROCEDURE — 83735 ASSAY OF MAGNESIUM: CPT

## 2019-07-28 RX ADMIN — GABAPENTIN 300 MG: 300 CAPSULE ORAL at 09:43

## 2019-07-28 RX ADMIN — Medication 125 MG: at 13:44

## 2019-07-28 RX ADMIN — OXYCODONE HYDROCHLORIDE AND ACETAMINOPHEN 1 TABLET: 5; 325 TABLET ORAL at 02:44

## 2019-07-28 RX ADMIN — CHOLESTYRAMINE 4 G: 4 POWDER, FOR SUSPENSION ORAL at 09:43

## 2019-07-28 RX ADMIN — LORAZEPAM 2 MG: 1 TABLET ORAL at 12:08

## 2019-07-28 RX ADMIN — FAMOTIDINE 20 MG: 20 TABLET ORAL at 09:43

## 2019-07-28 RX ADMIN — VALSARTAN 80 MG: 80 TABLET, FILM COATED ORAL at 09:43

## 2019-07-28 RX ADMIN — ONDANSETRON 4 MG: 2 INJECTION INTRAMUSCULAR; INTRAVENOUS at 12:08

## 2019-07-28 RX ADMIN — LORAZEPAM 4 MG: 2 INJECTION INTRAMUSCULAR; INTRAVENOUS at 03:05

## 2019-07-28 RX ADMIN — OXYCODONE HYDROCHLORIDE AND ACETAMINOPHEN 1 TABLET: 5; 325 TABLET ORAL at 12:08

## 2019-07-28 RX ADMIN — Medication 125 MG: at 06:23

## 2019-07-28 RX ADMIN — FLUOXETINE HYDROCHLORIDE 20 MG: 20 CAPSULE ORAL at 09:43

## 2019-07-28 RX ADMIN — LAMOTRIGINE 100 MG: 100 TABLET ORAL at 09:43

## 2019-07-28 RX ADMIN — LORAZEPAM 3 MG: 1 TABLET ORAL at 09:50

## 2019-07-28 ASSESSMENT — PAIN SCALES - GENERAL
PAINLEVEL_OUTOF10: 7
PAINLEVEL_OUTOF10: 0
PAINLEVEL_OUTOF10: 6

## 2019-07-28 NOTE — PROGRESS NOTES
and nose are normal  NECK: Supple, no masses. No palpable thyroid mass  CHEST/LUNGS: CTA bilaterally, normal respiratory effort   CARDIOVASCULAR: RRR, no murmurs. No lower extremity edema  ABDOMEN: Midepigastric tenderness, splenomegaly noted  EXTREMITIES: warm, full ROM in all 4 extremities, no focal weakness. SKIN: warm, dry with no rashes or lesions  LYMPH: No cervical, clavicular, axillary, or inguinal lymphadenopathy  NEUROLOGIC: follows commands, non focal       Recent Labs     07/28/19  0358 07/27/19  0727 07/26/19  0928 07/26/19  0657   WBC 2.8* 3.1*  --  2.5*   HGB 10.1* 11.6*  --  11.1*   HCT 29.4* 35.5* 32.8* 32.6*   MCV 99.0 102.9*  --  99.1*   PLT 31* 28*  --  26*       Lab Results   Component Value Date     07/28/2019    K 3.3 (L) 07/28/2019     07/28/2019    CO2 18 (L) 07/28/2019    BUN 4 (L) 07/28/2019    CREATININE <0.5 07/28/2019    GLUCOSE 103 07/28/2019    CALCIUM 8.2 (L) 07/28/2019    PROT 5.8 (L) 07/28/2019    LABALBU 3.5 07/28/2019    BILITOT 1.0 07/28/2019    ALKPHOS 137 (H) 07/28/2019    AST 99 (H) 07/28/2019     (H) 07/28/2019    LABGLOM >60 07/28/2019       Lab Results   Component Value Date    INR 1.29 (H) 07/26/2019    INR 1.10 11/28/2018    PROTIME 15.4 (H) 07/26/2019    PROTIME 14.1 11/28/2018       30 Day lookback of cultures:    Blood Culture Recent: No results for input(s): BC in the last 720 hours. Gram Stain Recent: No results for input(s): LABGRAM in the last 720 hours. Resp Culture Recent: No results for input(s): CULTRESP in the last 720 hours. Body Fluid Recent : No results for input(s): BFCX in the last 720 hours. MRSA Recent : No results for input(s): [address] in the last 720 hours. Urine Culture Recent : No results for input(s): LABURIN in the last 720 hours. Organism Recent : No results for input(s): ORG in the last 720 hours.            US SPLEEN 7/26/2019 1:00 PM  HISTORY: Thrombocytopenia, evaluate spleen size  COMPARISON:

## 2019-07-29 LAB
ALBUMIN SERPL-MCNC: 3.28 G/DL (ref 3.75–5.01)
ALPHA-1-GLOBULIN: 0.38 G/DL (ref 0.19–0.46)
ALPHA-2-GLOBULIN: 0.48 G/DL (ref 0.48–1.05)
BETA GLOBULIN: 0.59 G/DL (ref 0.48–1.1)
GAMMA GLOBULIN: 0.76 G/DL (ref 0.62–1.51)
PROTEIN ELECTROPHORESIS, SERUM: ABNORMAL
SPE/IFE INTERPRETATION: ABNORMAL
TOTAL PROTEIN: 5.5 G/DL (ref 6–8.3)

## 2019-07-29 RX ORDER — POTASSIUM CHLORIDE 20 MEQ/1
20 TABLET, EXTENDED RELEASE ORAL DAILY
Qty: 3 TABLET | Refills: 0 | Status: ON HOLD | OUTPATIENT
Start: 2019-07-29 | End: 2019-10-09 | Stop reason: HOSPADM

## 2019-07-30 ENCOUNTER — TELEPHONE (OUTPATIENT)
Dept: GASTROENTEROLOGY | Age: 49
End: 2019-07-30

## 2019-07-30 LAB
ALPHA 2-MACROGLOBULINS, QN: 162 NG/ML
ALT SERPL-CCNC: 249 NG/ML
BILIRUB SERPL-MCNC: 2.3 NG/ML
COMMENT: NORMAL NG/ML
COPPER: 78.6 UG/DL (ref 80–155)
FIBROSIS SCORE: 0.92 NG/ML
FIBROSIS STAGE: NORMAL NG/ML
FREE KAPPA LIGHT CHAINS: 1.78 MG/DL (ref 0.37–1.94)
FREE KAPPA/LAMBDA RATIO: 0.79 (ref 0.26–1.65)
FREE LAMBDA LIGHT CHAINS: 2.25 MG/DL (ref 0.57–2.63)
GGT: 1400 NG/ML
HAPTOGLOBIN: 16 NG/ML
INTERPRETATIONS:: NORMAL NG/ML
LIMITATIONS:: NORMAL NG/ML
NECROINFLAMM ACTIVITY SCORING:: NORMAL NG/ML
NECROINFLAMMAT ACTIVITY GRADE: NORMAL NG/ML
NECROINFLAMMAT ACTIVITY SCORE: 0.94 NG/ML

## 2019-07-31 LAB
C282Y HEMOCHROMATOSIS MUT: NEGATIVE
H63D HEMOCHROMATOSIS MUT: NEGATIVE
HEMOCHROMATOSIS GENE ANALYSIS: NORMAL
HFE PCR SPECIMEN: NORMAL
S65C HEMOCHROMATOSIS MUT: NEGATIVE

## 2019-08-07 ENCOUNTER — HOSPITAL ENCOUNTER (EMERGENCY)
Age: 49
Discharge: HOME | End: 2019-08-07
Attending: EMERGENCY MEDICINE
Payer: COMMERCIAL

## 2019-08-07 VITALS
TEMPERATURE: 98.9 F | WEIGHT: 120 LBS | BODY MASS INDEX: 21.26 KG/M2 | HEART RATE: 82 BPM | DIASTOLIC BLOOD PRESSURE: 70 MMHG | RESPIRATION RATE: 16 BRPM | HEIGHT: 63 IN | OXYGEN SATURATION: 96 % | SYSTOLIC BLOOD PRESSURE: 109 MMHG

## 2019-08-07 DIAGNOSIS — K86.0 ALCOHOL-INDUCED CHRONIC PANCREATITIS (HCC): ICD-10-CM

## 2019-08-07 DIAGNOSIS — K70.30 ALCOHOLIC CIRRHOSIS, UNSPECIFIED WHETHER ASCITES PRESENT (HCC): ICD-10-CM

## 2019-08-07 DIAGNOSIS — R10.13 ABDOMINAL PAIN, EPIGASTRIC: Primary | ICD-10-CM

## 2019-08-07 LAB
ALBUMIN SERPL-MCNC: 4.2 G/DL (ref 3.5–5.2)
ALP BLD-CCNC: 167 U/L (ref 35–104)
ALT SERPL-CCNC: 72 U/L (ref 5–33)
ANION GAP SERPL CALCULATED.3IONS-SCNC: 14 MMOL/L (ref 7–19)
AST SERPL-CCNC: 193 U/L (ref 5–32)
BACTERIA: ABNORMAL /HPF
BILIRUB SERPL-MCNC: 0.6 MG/DL (ref 0.2–1.2)
BILIRUBIN DIRECT: 0.3 MG/DL (ref 0–0.3)
BILIRUBIN URINE: NEGATIVE
BILIRUBIN, INDIRECT: 0.3 MG/DL (ref 0.1–1)
BLOOD, URINE: NEGATIVE
BUN BLDV-MCNC: 2 MG/DL (ref 6–20)
CALCIUM SERPL-MCNC: 8.3 MG/DL (ref 8.6–10)
CHLORIDE BLD-SCNC: 109 MMOL/L (ref 98–111)
CLARITY: ABNORMAL
CO2: 19 MMOL/L (ref 22–29)
COLOR: YELLOW
CREAT SERPL-MCNC: <0.5 MG/DL (ref 0.5–0.9)
EPITHELIAL CELLS, UA: 2 /HPF (ref 0–5)
GFR NON-AFRICAN AMERICAN: >60
GLUCOSE BLD-MCNC: 180 MG/DL (ref 74–109)
GLUCOSE URINE: NEGATIVE MG/DL
HCG(URINE) PREGNANCY TEST: NEGATIVE
HCT VFR BLD CALC: 44.1 % (ref 37–47)
HEMOGLOBIN: 14.7 G/DL (ref 12–16)
HYALINE CASTS: 4 /HPF (ref 0–8)
KETONES, URINE: NEGATIVE MG/DL
LEUKOCYTE ESTERASE, URINE: ABNORMAL
LIPASE: 16 U/L (ref 13–60)
MCH RBC QN AUTO: 32.8 PG (ref 27–31)
MCHC RBC AUTO-ENTMCNC: 33.3 G/DL (ref 33–37)
MCV RBC AUTO: 98.4 FL (ref 81–99)
NITRITE, URINE: POSITIVE
PDW BLD-RTO: 16.5 % (ref 11.5–14.5)
PH UA: 6.5 (ref 5–8)
PLATELET # BLD: 281 K/UL (ref 130–400)
PMV BLD AUTO: 10.3 FL (ref 9.4–12.3)
POTASSIUM REFLEX MAGNESIUM: 4.2 MMOL/L (ref 3.5–5)
PROTEIN UA: NEGATIVE MG/DL
RBC # BLD: 4.48 M/UL (ref 4.2–5.4)
RBC UA: 1 /HPF (ref 0–4)
SODIUM BLD-SCNC: 142 MMOL/L (ref 136–145)
SPECIFIC GRAVITY UA: 1.01 (ref 1–1.03)
TOTAL PROTEIN: 7.1 G/DL (ref 6.6–8.7)
UROBILINOGEN, URINE: 0.2 E.U./DL
WBC # BLD: 9.2 K/UL (ref 4.8–10.8)
WBC UA: 32 /HPF (ref 0–5)

## 2019-08-07 PROCEDURE — 6360000002 HC RX W HCPCS: Performed by: EMERGENCY MEDICINE

## 2019-08-07 PROCEDURE — 85027 COMPLETE CBC AUTOMATED: CPT

## 2019-08-07 PROCEDURE — 83690 ASSAY OF LIPASE: CPT

## 2019-08-07 PROCEDURE — 80076 HEPATIC FUNCTION PANEL: CPT

## 2019-08-07 PROCEDURE — 2580000003 HC RX 258: Performed by: EMERGENCY MEDICINE

## 2019-08-07 PROCEDURE — 84703 CHORIONIC GONADOTROPIN ASSAY: CPT

## 2019-08-07 PROCEDURE — 36415 COLL VENOUS BLD VENIPUNCTURE: CPT

## 2019-08-07 PROCEDURE — 99283 EMERGENCY DEPT VISIT LOW MDM: CPT

## 2019-08-07 PROCEDURE — 81001 URINALYSIS AUTO W/SCOPE: CPT

## 2019-08-07 PROCEDURE — 99284 EMERGENCY DEPT VISIT MOD MDM: CPT | Performed by: EMERGENCY MEDICINE

## 2019-08-07 PROCEDURE — 96374 THER/PROPH/DIAG INJ IV PUSH: CPT

## 2019-08-07 PROCEDURE — 80048 BASIC METABOLIC PNL TOTAL CA: CPT

## 2019-08-07 PROCEDURE — 96375 TX/PRO/DX INJ NEW DRUG ADDON: CPT

## 2019-08-07 RX ORDER — RANITIDINE 150 MG/1
150 TABLET ORAL 2 TIMES DAILY
Qty: 28 TABLET | Refills: 0 | Status: ON HOLD | OUTPATIENT
Start: 2019-08-07 | End: 2019-10-07

## 2019-08-07 RX ORDER — SODIUM CHLORIDE, SODIUM LACTATE, POTASSIUM CHLORIDE, AND CALCIUM CHLORIDE .6; .31; .03; .02 G/100ML; G/100ML; G/100ML; G/100ML
1000 INJECTION, SOLUTION INTRAVENOUS ONCE
Status: COMPLETED | OUTPATIENT
Start: 2019-08-07 | End: 2019-08-07

## 2019-08-07 RX ORDER — DIPHENHYDRAMINE HYDROCHLORIDE 50 MG/ML
25 INJECTION INTRAMUSCULAR; INTRAVENOUS ONCE
Status: COMPLETED | OUTPATIENT
Start: 2019-08-07 | End: 2019-08-07

## 2019-08-07 RX ORDER — HYOSCYAMINE SULFATE 0.12 MG/1
0.12 TABLET SUBLINGUAL
Qty: 30 EACH | Refills: 0 | Status: ON HOLD | OUTPATIENT
Start: 2019-08-07 | End: 2019-10-07

## 2019-08-07 RX ORDER — METOCLOPRAMIDE HYDROCHLORIDE 5 MG/ML
10 INJECTION INTRAMUSCULAR; INTRAVENOUS ONCE
Status: COMPLETED | OUTPATIENT
Start: 2019-08-07 | End: 2019-08-07

## 2019-08-07 RX ORDER — ONDANSETRON 4 MG/1
4 TABLET, FILM COATED ORAL EVERY 8 HOURS PRN
Qty: 30 TABLET | Refills: 0 | Status: ON HOLD | OUTPATIENT
Start: 2019-08-07 | End: 2019-08-11 | Stop reason: SDUPTHER

## 2019-08-07 RX ORDER — HALOPERIDOL 5 MG/ML
5 INJECTION INTRAMUSCULAR ONCE
Status: COMPLETED | OUTPATIENT
Start: 2019-08-07 | End: 2019-08-07

## 2019-08-07 RX ADMIN — DIPHENHYDRAMINE HYDROCHLORIDE 25 MG: 50 INJECTION, SOLUTION INTRAMUSCULAR; INTRAVENOUS at 16:08

## 2019-08-07 RX ADMIN — SODIUM CHLORIDE, POTASSIUM CHLORIDE, SODIUM LACTATE AND CALCIUM CHLORIDE 1000 ML: 600; 310; 30; 20 INJECTION, SOLUTION INTRAVENOUS at 15:30

## 2019-08-07 RX ADMIN — METOCLOPRAMIDE 10 MG: 5 INJECTION, SOLUTION INTRAMUSCULAR; INTRAVENOUS at 15:30

## 2019-08-07 RX ADMIN — HALOPERIDOL LACTATE 5 MG: 5 INJECTION INTRAMUSCULAR at 16:08

## 2019-08-07 ASSESSMENT — ENCOUNTER SYMPTOMS
EYE PAIN: 0
DIARRHEA: 0
RHINORRHEA: 0
EYE REDNESS: 0
SHORTNESS OF BREATH: 0
ABDOMINAL PAIN: 1
VOMITING: 0
VOICE CHANGE: 0
COUGH: 0

## 2019-08-07 ASSESSMENT — PAIN SCALES - GENERAL
PAINLEVEL_OUTOF10: 8
PAINLEVEL_OUTOF10: 9

## 2019-08-07 ASSESSMENT — PAIN DESCRIPTION - PAIN TYPE: TYPE: ACUTE PAIN

## 2019-08-07 ASSESSMENT — PAIN DESCRIPTION - LOCATION: LOCATION: ABDOMEN

## 2019-08-07 NOTE — ED PROVIDER NOTES
Tobacco Use    Smoking status: Current Every Day Smoker     Packs/day: 0.50    Smokeless tobacco: Never Used   Substance and Sexual Activity    Alcohol use: Yes     Comment: drank just  few minutes ago 8/7/2019    Drug use: No     Comment: denies    Sexual activity: Yes   Lifestyle    Physical activity:     Days per week: None     Minutes per session: None    Stress: None   Relationships    Social connections:     Talks on phone: None     Gets together: None     Attends [employer] service: None     Active member of club or organization: None     Attends meetings of clubs or organizations: None     Relationship status: None    Intimate partner violence:     Fear of current or ex partner: None     Emotionally abused: None     Physically abused: None     Forced sexual activity: None   Other Topics Concern    None   Social History Narrative    None       SCREENINGS             PHYSICAL EXAM    (up to 7 for level 4, 8 or more for level 5)     ED Triage Vitals [08/07/19 1402]   BP Temp Temp Source Pulse Resp SpO2 Height Weight   -- 98.9 °F (37.2 °C) Temporal 85 16 96 % 5' 3\" (1.6 m) 120 lb (54.4 kg)       Physical Exam   Constitutional: She is oriented to person, place, and time. She appears well-developed and well-nourished. Non-toxic appearance. No distress. HENT:   Head: Normocephalic and atraumatic. Mouth/Throat: Oropharynx is clear and moist.   Eyes: Pupils are equal, round, and reactive to light. EOM are normal. Right eye exhibits no discharge. Left eye exhibits no discharge. No scleral icterus. Neck: Normal range of motion. Cardiovascular: Normal rate and regular rhythm. Pulmonary/Chest: Effort normal. No stridor. No respiratory distress. Abdominal: Soft. She exhibits no distension and no mass. There is no tenderness. There is no rebound and no guarding. No hernia.    Tenderness only with very deep palpation to the epigastric region, otherwise abdominal exam is reassuring and benign

## 2019-08-08 ENCOUNTER — HOSPITAL ENCOUNTER (OUTPATIENT)
Age: 49
Setting detail: OBSERVATION
Discharge: HOME | End: 2019-08-11
Attending: EMERGENCY MEDICINE | Admitting: INTERNAL MEDICINE
Payer: COMMERCIAL

## 2019-08-08 DIAGNOSIS — F10.920 ACUTE ALCOHOLIC INTOXICATION WITHOUT COMPLICATION (HCC): ICD-10-CM

## 2019-08-08 DIAGNOSIS — R45.851 SUICIDAL IDEATION: Primary | ICD-10-CM

## 2019-08-08 LAB
ACETAMINOPHEN LEVEL: <15 UG/ML
ALBUMIN SERPL-MCNC: 4.3 G/DL (ref 3.5–5.2)
ALP BLD-CCNC: 176 U/L (ref 35–104)
ALT SERPL-CCNC: 65 U/L (ref 5–33)
AMPHETAMINE SCREEN, URINE: NEGATIVE
ANION GAP SERPL CALCULATED.3IONS-SCNC: 14 MMOL/L (ref 7–19)
AST SERPL-CCNC: 148 U/L (ref 5–32)
BARBITURATE SCREEN URINE: NEGATIVE
BASOPHILS ABSOLUTE: 0.1 K/UL (ref 0–0.2)
BASOPHILS RELATIVE PERCENT: 0.6 % (ref 0–1)
BENZODIAZEPINE SCREEN, URINE: POSITIVE
BILIRUB SERPL-MCNC: 0.5 MG/DL (ref 0.2–1.2)
BUN BLDV-MCNC: 4 MG/DL (ref 6–20)
CALCIUM SERPL-MCNC: 8.5 MG/DL (ref 8.6–10)
CANNABINOID SCREEN URINE: NEGATIVE
CHLORIDE BLD-SCNC: 107 MMOL/L (ref 98–111)
CO2: 20 MMOL/L (ref 22–29)
COCAINE METABOLITE SCREEN URINE: NEGATIVE
CREAT SERPL-MCNC: 0.5 MG/DL (ref 0.5–0.9)
EOSINOPHILS ABSOLUTE: 0.1 K/UL (ref 0–0.6)
EOSINOPHILS RELATIVE PERCENT: 0.6 % (ref 0–5)
ETHANOL: 323 MG/DL (ref 0–0.08)
GFR NON-AFRICAN AMERICAN: >60
GLUCOSE BLD-MCNC: 140 MG/DL (ref 74–109)
HCG(URINE) PREGNANCY TEST: NEGATIVE
HCT VFR BLD CALC: 38.6 % (ref 37–47)
HEMOGLOBIN: 13.1 G/DL (ref 12–16)
LIPASE: 32 U/L (ref 13–60)
LYMPHOCYTES ABSOLUTE: 3.1 K/UL (ref 1.1–4.5)
LYMPHOCYTES RELATIVE PERCENT: 39.5 % (ref 20–40)
Lab: ABNORMAL
MCH RBC QN AUTO: 32.8 PG (ref 27–31)
MCHC RBC AUTO-ENTMCNC: 33.9 G/DL (ref 33–37)
MCV RBC AUTO: 96.7 FL (ref 81–99)
MONOCYTES ABSOLUTE: 0.4 K/UL (ref 0–0.9)
MONOCYTES RELATIVE PERCENT: 5.7 % (ref 0–10)
NEUTROPHILS ABSOLUTE: 4.1 K/UL (ref 1.5–7.5)
NEUTROPHILS RELATIVE PERCENT: 53.5 % (ref 50–65)
OPIATE SCREEN URINE: NEGATIVE
PDW BLD-RTO: 15.9 % (ref 11.5–14.5)
PLATELET # BLD: 195 K/UL (ref 130–400)
PMV BLD AUTO: 10.1 FL (ref 9.4–12.3)
POTASSIUM SERPL-SCNC: 4.3 MMOL/L (ref 3.5–5)
RBC # BLD: 3.99 M/UL (ref 4.2–5.4)
SALICYLATE, SERUM: <3 MG/DL (ref 3–10)
SODIUM BLD-SCNC: 141 MMOL/L (ref 136–145)
TOTAL PROTEIN: 7.2 G/DL (ref 6.6–8.7)
WBC # BLD: 7.7 K/UL (ref 4.8–10.8)

## 2019-08-08 PROCEDURE — 99285 EMERGENCY DEPT VISIT HI MDM: CPT

## 2019-08-08 PROCEDURE — 80307 DRUG TEST PRSMV CHEM ANLYZR: CPT

## 2019-08-08 PROCEDURE — G0480 DRUG TEST DEF 1-7 CLASSES: HCPCS

## 2019-08-08 PROCEDURE — 36415 COLL VENOUS BLD VENIPUNCTURE: CPT

## 2019-08-08 PROCEDURE — 83690 ASSAY OF LIPASE: CPT

## 2019-08-08 PROCEDURE — 80053 COMPREHEN METABOLIC PANEL: CPT

## 2019-08-08 PROCEDURE — 85025 COMPLETE CBC W/AUTO DIFF WBC: CPT

## 2019-08-08 PROCEDURE — 84703 CHORIONIC GONADOTROPIN ASSAY: CPT

## 2019-08-09 PROBLEM — R45.89 SUICIDAL BEHAVIOR WITHOUT ATTEMPTED SELF-INJURY: Status: ACTIVE | Noted: 2019-08-09

## 2019-08-09 LAB
ANION GAP SERPL CALCULATED.3IONS-SCNC: 14 MMOL/L (ref 7–19)
BUN BLDV-MCNC: 6 MG/DL (ref 6–20)
CALCIUM SERPL-MCNC: 8.9 MG/DL (ref 8.6–10)
CHLORIDE BLD-SCNC: 102 MMOL/L (ref 98–111)
CO2: 21 MMOL/L (ref 22–29)
CREAT SERPL-MCNC: 0.5 MG/DL (ref 0.5–0.9)
GFR NON-AFRICAN AMERICAN: >60
GLUCOSE BLD-MCNC: 117 MG/DL (ref 74–109)
POTASSIUM REFLEX MAGNESIUM: 4.3 MMOL/L (ref 3.5–5)
SODIUM BLD-SCNC: 137 MMOL/L (ref 136–145)

## 2019-08-09 PROCEDURE — G0378 HOSPITAL OBSERVATION PER HR: HCPCS

## 2019-08-09 PROCEDURE — 2580000003 HC RX 258: Performed by: INTERNAL MEDICINE

## 2019-08-09 PROCEDURE — 2500000003 HC RX 250 WO HCPCS: Performed by: INTERNAL MEDICINE

## 2019-08-09 PROCEDURE — 80048 BASIC METABOLIC PNL TOTAL CA: CPT

## 2019-08-09 PROCEDURE — 6370000000 HC RX 637 (ALT 250 FOR IP): Performed by: INTERNAL MEDICINE

## 2019-08-09 PROCEDURE — 99219 PR INITIAL OBSERVATION CARE/DAY 50 MINUTES: CPT | Performed by: INTERNAL MEDICINE

## 2019-08-09 PROCEDURE — 96376 TX/PRO/DX INJ SAME DRUG ADON: CPT

## 2019-08-09 PROCEDURE — 36415 COLL VENOUS BLD VENIPUNCTURE: CPT

## 2019-08-09 PROCEDURE — 99285 EMERGENCY DEPT VISIT HI MDM: CPT | Performed by: NURSE PRACTITIONER

## 2019-08-09 PROCEDURE — 96374 THER/PROPH/DIAG INJ IV PUSH: CPT

## 2019-08-09 PROCEDURE — 99999 PR OFFICE/OUTPT VISIT,PROCEDURE ONLY: CPT | Performed by: INTERNAL MEDICINE

## 2019-08-09 PROCEDURE — 6360000002 HC RX W HCPCS: Performed by: INTERNAL MEDICINE

## 2019-08-09 RX ORDER — LORAZEPAM 1 MG/1
2 TABLET ORAL
Status: DISCONTINUED | OUTPATIENT
Start: 2019-08-09 | End: 2019-08-11 | Stop reason: HOSPADM

## 2019-08-09 RX ORDER — LORAZEPAM 1 MG/1
1 TABLET ORAL
Status: DISCONTINUED | OUTPATIENT
Start: 2019-08-09 | End: 2019-08-11 | Stop reason: HOSPADM

## 2019-08-09 RX ORDER — LORAZEPAM 2 MG/ML
2 INJECTION INTRAMUSCULAR
Status: DISCONTINUED | OUTPATIENT
Start: 2019-08-09 | End: 2019-08-11 | Stop reason: HOSPADM

## 2019-08-09 RX ORDER — SODIUM CHLORIDE 0.9 % (FLUSH) 0.9 %
10 SYRINGE (ML) INJECTION EVERY 12 HOURS SCHEDULED
Status: DISCONTINUED | OUTPATIENT
Start: 2019-08-09 | End: 2019-08-09 | Stop reason: SDUPTHER

## 2019-08-09 RX ORDER — SODIUM CHLORIDE 0.9 % (FLUSH) 0.9 %
10 SYRINGE (ML) INJECTION PRN
Status: DISCONTINUED | OUTPATIENT
Start: 2019-08-09 | End: 2019-08-09 | Stop reason: SDUPTHER

## 2019-08-09 RX ORDER — LORAZEPAM 2 MG/ML
3 INJECTION INTRAMUSCULAR
Status: DISCONTINUED | OUTPATIENT
Start: 2019-08-09 | End: 2019-08-11 | Stop reason: HOSPADM

## 2019-08-09 RX ORDER — LORAZEPAM 2 MG/ML
4 INJECTION INTRAMUSCULAR
Status: DISCONTINUED | OUTPATIENT
Start: 2019-08-09 | End: 2019-08-11 | Stop reason: HOSPADM

## 2019-08-09 RX ORDER — FLUOXETINE HYDROCHLORIDE 20 MG/1
20 CAPSULE ORAL DAILY
Status: DISCONTINUED | OUTPATIENT
Start: 2019-08-09 | End: 2019-08-11

## 2019-08-09 RX ORDER — SODIUM CHLORIDE 0.9 % (FLUSH) 0.9 %
10 SYRINGE (ML) INJECTION EVERY 12 HOURS SCHEDULED
Status: DISCONTINUED | OUTPATIENT
Start: 2019-08-09 | End: 2019-08-11 | Stop reason: HOSPADM

## 2019-08-09 RX ORDER — DICLOFENAC POTASSIUM 50 MG/1
50 TABLET, FILM COATED ORAL 2 TIMES DAILY
Status: ON HOLD | COMMUNITY
End: 2019-10-08

## 2019-08-09 RX ORDER — LORAZEPAM 2 MG/ML
1 INJECTION INTRAMUSCULAR
Status: DISCONTINUED | OUTPATIENT
Start: 2019-08-09 | End: 2019-08-11 | Stop reason: HOSPADM

## 2019-08-09 RX ORDER — SODIUM CHLORIDE 0.9 % (FLUSH) 0.9 %
10 SYRINGE (ML) INJECTION PRN
Status: DISCONTINUED | OUTPATIENT
Start: 2019-08-09 | End: 2019-08-11 | Stop reason: HOSPADM

## 2019-08-09 RX ORDER — THIAMINE MONONITRATE (VIT B1) 100 MG
100 TABLET ORAL DAILY
Status: DISCONTINUED | OUTPATIENT
Start: 2019-08-13 | End: 2019-08-11 | Stop reason: HOSPADM

## 2019-08-09 RX ORDER — LORAZEPAM 1 MG/1
4 TABLET ORAL
Status: DISCONTINUED | OUTPATIENT
Start: 2019-08-09 | End: 2019-08-11 | Stop reason: HOSPADM

## 2019-08-09 RX ORDER — GABAPENTIN 600 MG/1
600 TABLET ORAL 3 TIMES DAILY
Status: DISCONTINUED | OUTPATIENT
Start: 2019-08-09 | End: 2019-08-11 | Stop reason: HOSPADM

## 2019-08-09 RX ORDER — ONDANSETRON 2 MG/ML
4 INJECTION INTRAMUSCULAR; INTRAVENOUS EVERY 6 HOURS PRN
Status: DISCONTINUED | OUTPATIENT
Start: 2019-08-09 | End: 2019-08-11 | Stop reason: HOSPADM

## 2019-08-09 RX ORDER — LORAZEPAM 1 MG/1
3 TABLET ORAL
Status: DISCONTINUED | OUTPATIENT
Start: 2019-08-09 | End: 2019-08-11 | Stop reason: HOSPADM

## 2019-08-09 RX ADMIN — Medication 10 ML: at 19:41

## 2019-08-09 RX ADMIN — FLUOXETINE HYDROCHLORIDE 20 MG: 20 CAPSULE ORAL at 08:30

## 2019-08-09 RX ADMIN — GABAPENTIN 600 MG: 600 TABLET, FILM COATED ORAL at 13:58

## 2019-08-09 RX ADMIN — GABAPENTIN 600 MG: 600 TABLET, FILM COATED ORAL at 08:29

## 2019-08-09 RX ADMIN — LORAZEPAM 1 MG: 1 TABLET ORAL at 06:24

## 2019-08-09 RX ADMIN — LAMOTRIGINE 150 MG: 100 TABLET ORAL at 08:29

## 2019-08-09 RX ADMIN — ONDANSETRON 4 MG: 2 INJECTION INTRAMUSCULAR; INTRAVENOUS at 09:54

## 2019-08-09 RX ADMIN — THIAMINE HYDROCHLORIDE: 100 INJECTION, SOLUTION INTRAMUSCULAR; INTRAVENOUS at 01:14

## 2019-08-09 RX ADMIN — ONDANSETRON 4 MG: 2 INJECTION INTRAMUSCULAR; INTRAVENOUS at 19:11

## 2019-08-09 RX ADMIN — LORAZEPAM 1 MG: 1 TABLET ORAL at 19:40

## 2019-08-09 RX ADMIN — GABAPENTIN 600 MG: 600 TABLET, FILM COATED ORAL at 19:34

## 2019-08-09 RX ADMIN — LORAZEPAM 2 MG: 1 TABLET ORAL at 13:58

## 2019-08-09 ASSESSMENT — PAIN DESCRIPTION - FREQUENCY: FREQUENCY: CONTINUOUS

## 2019-08-09 ASSESSMENT — PAIN DESCRIPTION - LOCATION: LOCATION: ABDOMEN

## 2019-08-09 ASSESSMENT — ENCOUNTER SYMPTOMS
CONSTIPATION: 0
RHINORRHEA: 0
ORTHOPNEA: 0
BACK PAIN: 0
COUGH: 0
DIARRHEA: 0
SINUS PRESSURE: 0
HEARTBURN: 1
HEMOPTYSIS: 0
NAUSEA: 0
EYES NEGATIVE: 1
ABDOMINAL PAIN: 1
SORE THROAT: 0
TROUBLE SWALLOWING: 0
SHORTNESS OF BREATH: 0
SPUTUM PRODUCTION: 0
VOMITING: 0

## 2019-08-09 ASSESSMENT — PAIN DESCRIPTION - ORIENTATION: ORIENTATION: RIGHT;UPPER

## 2019-08-09 ASSESSMENT — PAIN DESCRIPTION - ONSET: ONSET: ON-GOING

## 2019-08-09 ASSESSMENT — PAIN DESCRIPTION - DESCRIPTORS: DESCRIPTORS: ACHING

## 2019-08-09 ASSESSMENT — PAIN SCALES - GENERAL: PAINLEVEL_OUTOF10: 9
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
## 2019-08-09 NOTE — PROGRESS NOTES
..4 Eyes Skin Assessment    Jaimie Burton is being assessed upon: Admission    I agree that I, Myla Trent, along with *** (either 2 RN's or 1 LPN and 1 RN) have performed a thorough Head to Toe Skin Assessment on the patient. ALL assessment sites listed below have been assessed. Areas assessed by both nurses:     [x]   Head, Face, and Ears   [x]   Shoulders, Back, and Chest  [x]   Arms, Elbows, and Hands   [x]   Coccyx, Sacrum, and Ischium  [x]   Legs, Feet, and Heels    Does the Patient have Skin Breakdown? No    Ezio Prevention initiated: No  Wound Care Orders initiated: No    WO nurse consulted for Pressure Injury (Stage 3,4, Unstageable, DTI, NWPT, and Complex wounds) and New or Established Ostomies: No        Primary Nurse eSignature:  Myla Trent RN on 8/9/2019 at 3:25 AM      Co-Signer eSignature: {Esignature:[id number]}

## 2019-08-09 NOTE — H&P
daily.  Multiple Vitamin CHEW, Take  by mouth. Allergies:  Sulfa antibiotics    Social History:   TOBACCO:   reports that she has been smoking. She has been smoking about 0.50 packs per day. She has never used smokeless tobacco.  ETOH:   reports that she drinks alcohol. Patient currently lives alone    Family History:       Problem Relation Age of Onset    Cancer Paternal Grandfather     Cancer Paternal Grandmother     Cancer Maternal Grandmother     Cancer Maternal Grandfather     Hypertension Maternal Grandfather     Cancer Father     Cancer Mother        I have personally reviewed above histories  REVIEW OF SYSTEMS:  Review of Systems   Constitutional: Negative for chills, fever, malaise/fatigue and weight loss. HENT: Negative. Eyes: Negative. Respiratory: Negative for cough, hemoptysis, sputum production and shortness of breath. Cardiovascular: Negative for chest pain, palpitations and orthopnea. Gastrointestinal: Positive for abdominal pain and heartburn. Negative for constipation, diarrhea, nausea and vomiting. Genitourinary: Negative for dysuria and urgency. Musculoskeletal: Negative for back pain, myalgias and neck pain. Skin: Negative for rash. Neurological: Negative for dizziness, tingling, tremors and headaches. Endo/Heme/Allergies: Negative for environmental allergies. Does not bruise/bleed easily. Psychiatric/Behavioral: Positive for substance abuse and suicidal ideas. PHYSICAL EXAM:  /65   Pulse 86   Temp 98.5 °F (36.9 °C)   Resp 18   Ht 5' 3\" (1.6 m)   Wt 118 lb (53.5 kg)   SpO2 99%   BMI 20.90 kg/m²   Physical Exam   Constitutional: She is oriented to person, place, and time. She appears well-developed and well-nourished. Non-toxic appearance. She does not appear ill. No distress. HENT:   Head: Normocephalic and atraumatic. Eyes: Pupils are equal, round, and reactive to light. Conjunctivae and lids are normal. No scleral icterus.    Neck: PREGNANCY, URINE   ACETAMINOPHEN LEVEL   SALICYLATE LEVEL   ETHANOL   LIPASE          IMPRESSION:    1. Suicidal  2. Alcohol intoxication  3. Chronica pancreatitis      PLAN:     1. Admit to floor  2. Psych consult when sober  3. ivf  4. ciwa protocol  5. Thiamine , folic isis  6. Review home medications.   7. Clear liquid diet and advanced    CC: DO Jaya Vieira MD      Internal Medicine Hospitalist

## 2019-08-09 NOTE — PROGRESS NOTES
..  Jaimie Posadas arrived to room # [id number]  Presented with: etoh 323  Mental Status: Patient is oriented, alert, coherent, logical, thought processes intact and able to concentrate and follow conversation. Vitals:    08/09/19 0217   BP: 105/65   Pulse: 86   Resp: 18   Temp: 98.5 °F (36.9 °C)   SpO2: 99%     Patient safety contract and falls prevention contract reviewed with patient Yes. Oriented Patient to room. Call light within reach. Yes. Needs, issues or concerns expressed at this time: yes, wants withdrawal meds.       Electronically signed by Nelly Quiros RN on 8/9/2019 at 3:25 AM

## 2019-08-10 LAB — HBA1C MFR BLD: 5.5 % (ref 4–6)

## 2019-08-10 PROCEDURE — 96376 TX/PRO/DX INJ SAME DRUG ADON: CPT

## 2019-08-10 PROCEDURE — 2500000003 HC RX 250 WO HCPCS: Performed by: INTERNAL MEDICINE

## 2019-08-10 PROCEDURE — 99225 PR SBSQ OBSERVATION CARE/DAY 25 MINUTES: CPT | Performed by: INTERNAL MEDICINE

## 2019-08-10 PROCEDURE — G0378 HOSPITAL OBSERVATION PER HR: HCPCS

## 2019-08-10 PROCEDURE — 83036 HEMOGLOBIN GLYCOSYLATED A1C: CPT

## 2019-08-10 PROCEDURE — 99251 PR INITL INPATIENT CONSULT NEW/ESTAB PT 20 MIN: CPT | Performed by: PSYCHIATRY & NEUROLOGY

## 2019-08-10 PROCEDURE — 36415 COLL VENOUS BLD VENIPUNCTURE: CPT

## 2019-08-10 PROCEDURE — 6360000002 HC RX W HCPCS: Performed by: INTERNAL MEDICINE

## 2019-08-10 PROCEDURE — 6370000000 HC RX 637 (ALT 250 FOR IP): Performed by: INTERNAL MEDICINE

## 2019-08-10 PROCEDURE — 2580000003 HC RX 258: Performed by: INTERNAL MEDICINE

## 2019-08-10 RX ADMIN — ONDANSETRON 4 MG: 2 INJECTION INTRAMUSCULAR; INTRAVENOUS at 10:18

## 2019-08-10 RX ADMIN — LORAZEPAM 1 MG: 1 TABLET ORAL at 03:36

## 2019-08-10 RX ADMIN — LORAZEPAM 1 MG: 1 TABLET ORAL at 00:09

## 2019-08-10 RX ADMIN — THIAMINE HYDROCHLORIDE: 100 INJECTION, SOLUTION INTRAMUSCULAR; INTRAVENOUS at 07:48

## 2019-08-10 RX ADMIN — Medication 10 ML: at 21:46

## 2019-08-10 RX ADMIN — GABAPENTIN 600 MG: 600 TABLET, FILM COATED ORAL at 21:45

## 2019-08-10 RX ADMIN — ONDANSETRON 4 MG: 2 INJECTION INTRAMUSCULAR; INTRAVENOUS at 22:57

## 2019-08-10 RX ADMIN — FLUOXETINE HYDROCHLORIDE 20 MG: 20 CAPSULE ORAL at 07:48

## 2019-08-10 RX ADMIN — GABAPENTIN 600 MG: 600 TABLET, FILM COATED ORAL at 07:48

## 2019-08-10 RX ADMIN — ONDANSETRON 4 MG: 2 INJECTION INTRAMUSCULAR; INTRAVENOUS at 03:57

## 2019-08-10 RX ADMIN — ONDANSETRON 4 MG: 2 INJECTION INTRAMUSCULAR; INTRAVENOUS at 16:26

## 2019-08-10 RX ADMIN — Medication 10 ML: at 07:48

## 2019-08-10 RX ADMIN — LAMOTRIGINE 150 MG: 100 TABLET ORAL at 07:48

## 2019-08-10 RX ADMIN — GABAPENTIN 600 MG: 600 TABLET, FILM COATED ORAL at 14:47

## 2019-08-10 RX ADMIN — LORAZEPAM 1 MG: 1 TABLET ORAL at 07:54

## 2019-08-10 RX ADMIN — LORAZEPAM 1 MG: 1 TABLET ORAL at 21:50

## 2019-08-10 NOTE — CONSULTS
with him. Denies she needs to stay in the hospital. She feels her psychiatric medications are helpful and reports compliance. She thinks Prozac helps more than Effexor, and she thinks she needs higher dose. Sleeping poorly due to back pain. Appetite is down. Energy level is low. Enjoys watching son play baseball. Allergies: Allergies as of 08/08/2019 - Review Complete 08/07/2019   Allergen Reaction Noted    Sulfa antibiotics  11/18/2016       Home Medications:   Lamictal 150 mg daily - reports compliance.    Prozac 20 mg daily compliant  Effexor 150 mg daily   mg daily  Trimictal   Lortab 5 mg TID  Crestor  Diclofenac  Valsartan 160 mg daily    Current Medications:   Current Facility-Administered Medications   Medication Dose Route Frequency Provider Last Rate Last Dose    ondansetron (ZOFRAN) injection 4 mg  4 mg Intravenous Q6H PRN Darion Pruitt MD   4 mg at 08/10/19 1626    sodium chloride flush 0.9 % injection 10 mL  10 mL Intravenous 2 times per day Sharon Morales MD   10 mL at 08/10/19 0748    sodium chloride flush 0.9 % injection 10 mL  10 mL Intravenous PRN Sharon Morales MD        [START ON 8/13/2019] vitamin B-1 (THIAMINE) tablet 100 mg  100 mg Oral Daily Darion Pruitt MD        LORazepam (ATIVAN) tablet 1 mg  1 mg Oral Q1H PRN Kiara Pruitt MD   1 mg at 08/10/19 0754    Or    LORazepam (ATIVAN) injection 1 mg  1 mg Intravenous Q1H PRN Darion Pruitt MD        Or    LORazepam (ATIVAN) tablet 2 mg  2 mg Oral Q1H PRN Kiara Pruitt MD   2 mg at 08/09/19 1358    Or    LORazepam (ATIVAN) injection 2 mg  2 mg Intravenous Q1H PRN Darion Pruitt MD        Or    LORazepam (ATIVAN) tablet 3 mg  3 mg Oral Q1H PRN Darion Pruitt MD        Or    LORazepam (ATIVAN) injection 3 mg  3 mg Intravenous Q1H PRN Darion Pruitt MD        Or    LORazepam (ATIVAN) tablet 4 mg  4 mg Oral Q1H PRN Darion Pruitt MD        Or    LORazepam (ATIVAN) injection 4 mg  4 mg Intravenous palitations  Gastrointestinal ROS: no vomiting, change in bowel habits, or black or bloody stools. +nausea, abdo pain   Genito-Urinary ROS: negative for change in urinary stream, dysuria or urinary frequency/urgency  Musculoskeletal ROS: +back pain  Neurological ROS: negative for dizziness, numbness/tingling, seizures. +tremors, headache  Dermatological ROS: +scratch on her face - she does not know how it occured    Physical Exam: Speaks in full sentences without shortness of air. Last set of Vital Signs:  Vitals:    08/10/19 1227   BP: (!) 162/92   Pulse: 98   Resp: 16   Temp: 97.6 °F (36.4 °C)   SpO2: 98%       Last set of Labs:  Labs Reviewed   CBC WITH AUTO DIFFERENTIAL - Abnormal; Notable for the following components:       Result Value    RBC 3.99 (*)     MCH 32.8 (*)     RDW 15.9 (*)     All other components within normal limits   COMPREHENSIVE METABOLIC PANEL - Abnormal; Notable for the following components:    CO2 20 (*)     Glucose 140 (*)     BUN 4 (*)     Calcium 8.5 (*)     Alkaline Phosphatase 176 (*)     ALT 65 (*)      (*)     All other components within normal limits   URINE DRUG SCREEN - Abnormal; Notable for the following components:    Benzodiazepine Screen, Urine Positive (*)     All other components within normal limits   BASIC METABOLIC PANEL W/ REFLEX TO MG FOR LOW K - Abnormal; Notable for the following components:    CO2 21 (*)     Glucose 117 (*)     All other components within normal limits   PREGNANCY, URINE   ACETAMINOPHEN LEVEL   SALICYLATE LEVEL   ETHANOL   LIPASE   HEMOGLOBIN A1C       DSM V Diagnoses:    Unspecified depression  Alcohol use disorder, severe.         Active Medical Problems:  Patient Active Problem List   Diagnosis    Insufficient prenatal care    Supervision of high-risk pregnancy    Positive urine drug screen    Depression    Migraine headache    Alcohol use    Delirium tremens ([facility] [address]    Hypokalemia    Transaminitis    Hypomagnesemia    Suicidal

## 2019-08-10 NOTE — PROGRESS NOTES
MD Sonal        LORazepam (ATIVAN) tablet 1 mg  1 mg Oral Q1H PRN Marquis Lebron MD   1 mg at 08/10/19 0754    Or    LORazepam (ATIVAN) injection 1 mg  1 mg Intravenous Q1H PRN Marquis Lebron MD        Or    LORazepam (ATIVAN) tablet 2 mg  2 mg Oral Q1H PRN Marquis Lebron MD   2 mg at 08/09/19 1358    Or    LORazepam (ATIVAN) injection 2 mg  2 mg Intravenous Q1H PRN Marquis Lebron MD        Or    LORazepam (ATIVAN) tablet 3 mg  3 mg Oral Q1H PRN Marquis Lebron MD        Or    LORazepam (ATIVAN) injection 3 mg  3 mg Intravenous Q1H PRN Darion Pruitt MD        Or    LORazepam (ATIVAN) tablet 4 mg  4 mg Oral Q1H PRN Marquis Lebron MD        Or    LORazepam (ATIVAN) injection 4 mg  4 mg Intravenous Q1H PRN Marquis Lebron MD        gabapentin (NEURONTIN) tablet 600 mg  600 mg Oral TID Marquis Lebron MD   600 mg at 08/10/19 0748    FLUoxetine (PROZAC) capsule 20 mg  20 mg Oral Daily Darion Pruitt MD   20 mg at 08/10/19 0748    lamoTRIgine (LAMICTAL) tablet 150 mg  150 mg Oral Daily Darion Pruitt MD   150 mg at 08/10/19 0748    sodium chloride 0.9 % 7,937 mL with folic acid 1 mg, adult multi-vitamin with vitamin k 10 mL, thiamine 100 mg   Intravenous Daily Tray Pruitt  mL/hr at 08/10/19 0748             sodium chloride flush  10 mL Intravenous 2 times per day    [START ON 8/13/2019] thiamine  100 mg Oral Daily    gabapentin  600 mg Oral TID    FLUoxetine  20 mg Oral Daily    lamoTRIgine  150 mg Oral Daily    folic acid, thiamine, multi-vitamin with vitamin K infusion   Intravenous Daily     ondansetron, sodium chloride flush, LORazepam **OR** LORazepam **OR** LORazepam **OR** LORazepam **OR** LORazepam **OR** LORazepam **OR** LORazepam **OR** LORazepam  DIET CARB CONTROL;       Labs:   CBC with DIFF:  Recent Labs     08/07/19  1424 08/08/19  2230   WBC 9.2 7.7   RBC 4.48 3.99*   HGB 14.7 13.1   HCT 44.1 38.6   MCV 98.4 96.7   MCH 32.8* 32.8*   MCHC 33.3 33.9   RDW 16.5* 15.9*    195   MPV 10.3 10.1   NEUTOPHILPCT  --  53.5   LYMPHOPCT  --  39.5   MONOPCT  --  5.7   EOSRELPCT  --  0.6   BASOPCT  --  0.6   NEUTROABS  --  4.1   LYMPHSABS  --  3.1   MONOSABS  --  0.40   EOSABS  --  0.10   BASOSABS  --  0.10       CMP/BMP:  Recent Labs     08/07/19  1424 08/08/19  2230 08/09/19 2002    141 137   K 4.2 4.3 4.3    107 102   CO2 19* 20* 21*   ANIONGAP 14 14 14   GLUCOSE 180* 140* 117*   BUN 2* 4* 6   CREATININE <0.5 0.5 0.5   LABGLOM >60 >60 >60   CALCIUM 8.3* 8.5* 8.9   PROT 7.1 7.2  --    LABALBU 4.2 4.3  --    BILITOT 0.6 0.5  --    ALKPHOS 167* 176*  --    ALT 72* 65*  --    * 148*  --          CRP:  No results for input(s): CRP in the last 72 hours. Sed Rate:  No results for input(s): SEDRATE in the last 72 hours. HgBA1c:  No components found for: HGBA1C  FLP:  No results found for: TRIG, HDL, LDLCALC, LDLDIRECT, LABVLDL  TSH:    Lab Results   Component Value Date    TSH 1.720 07/26/2019     Troponin T: No results for input(s): TROPONINI in the last 72 hours. Pro-BNP: No results for input(s): BNP in the last 72 hours. INR: No results for input(s): INR in the last 72 hours. ABGs: No results found for: PHART, PO2ART, HYH9WBY  UA:  Recent Labs     08/07/19  1457   COLORU YELLOW   PHUR 6.5   WBCUA 32*   RBCUA 1   BACTERIA 3+   CLARITYU CLOUDY*   SPECGRAV 1.009   LEUKOCYTESUR SMALL*   UROBILINOGEN 0.2   BILIRUBINUR Negative   BLOODU Negative   GLUCOSEU Negative         Culture Results:    No results for input(s): CXSURG in the last 720 hours. Blood Culture Recent: No results for input(s): BC in the last 720 hours. Cultures:   No results for input(s): CULTURE in the last 72 hours. No results for input(s): BC, Karen Triplett in the last 72 hours. No results for input(s): CXSURG in the last 72 hours. No results for input(s): MG, PHOS in the last 72 hours.   Recent Labs     08/07/19  1424 08/08/19  2230   * 148*   ALT 72* 65*   BILITOT

## 2019-08-11 VITALS
SYSTOLIC BLOOD PRESSURE: 146 MMHG | BODY MASS INDEX: 20.96 KG/M2 | HEART RATE: 80 BPM | OXYGEN SATURATION: 96 % | HEIGHT: 63 IN | TEMPERATURE: 97.7 F | WEIGHT: 118.31 LBS | RESPIRATION RATE: 20 BRPM | DIASTOLIC BLOOD PRESSURE: 86 MMHG

## 2019-08-11 PROCEDURE — 6370000000 HC RX 637 (ALT 250 FOR IP): Performed by: INTERNAL MEDICINE

## 2019-08-11 PROCEDURE — 2500000003 HC RX 250 WO HCPCS: Performed by: INTERNAL MEDICINE

## 2019-08-11 PROCEDURE — G0378 HOSPITAL OBSERVATION PER HR: HCPCS

## 2019-08-11 PROCEDURE — 96376 TX/PRO/DX INJ SAME DRUG ADON: CPT

## 2019-08-11 PROCEDURE — 6360000002 HC RX W HCPCS: Performed by: INTERNAL MEDICINE

## 2019-08-11 PROCEDURE — 99217 PR OBSERVATION CARE DISCHARGE MANAGEMENT: CPT | Performed by: INTERNAL MEDICINE

## 2019-08-11 PROCEDURE — 2580000003 HC RX 258: Performed by: INTERNAL MEDICINE

## 2019-08-11 RX ORDER — ONDANSETRON 4 MG/1
4 TABLET, FILM COATED ORAL EVERY 8 HOURS PRN
Qty: 30 TABLET | Refills: 0 | Status: SHIPPED | OUTPATIENT
Start: 2019-08-11 | End: 2022-06-27

## 2019-08-11 RX ORDER — LANOLIN ALCOHOL/MO/W.PET/CERES
100 CREAM (GRAM) TOPICAL DAILY
Qty: 30 TABLET | Refills: 3 | Status: SHIPPED | OUTPATIENT
Start: 2019-08-13 | End: 2022-06-27

## 2019-08-11 RX ORDER — FLUOXETINE 10 MG/1
30 CAPSULE ORAL DAILY
Qty: 90 CAPSULE | Refills: 0 | Status: ON HOLD | OUTPATIENT
Start: 2019-08-12 | End: 2019-10-09 | Stop reason: HOSPADM

## 2019-08-11 RX ADMIN — Medication 10 ML: at 08:22

## 2019-08-11 RX ADMIN — FLUOXETINE HYDROCHLORIDE 20 MG: 20 CAPSULE ORAL at 08:22

## 2019-08-11 RX ADMIN — LAMOTRIGINE 150 MG: 100 TABLET ORAL at 08:22

## 2019-08-11 RX ADMIN — THIAMINE HYDROCHLORIDE: 100 INJECTION, SOLUTION INTRAMUSCULAR; INTRAVENOUS at 08:22

## 2019-08-11 RX ADMIN — ONDANSETRON 4 MG: 2 INJECTION INTRAMUSCULAR; INTRAVENOUS at 05:13

## 2019-08-11 RX ADMIN — GABAPENTIN 600 MG: 600 TABLET, FILM COATED ORAL at 08:22

## 2019-08-11 ASSESSMENT — PAIN DESCRIPTION - FREQUENCY: FREQUENCY: CONTINUOUS

## 2019-08-11 ASSESSMENT — PAIN DESCRIPTION - PROGRESSION: CLINICAL_PROGRESSION: GRADUALLY WORSENING

## 2019-08-11 ASSESSMENT — PAIN DESCRIPTION - LOCATION: LOCATION: HEAD

## 2019-08-11 ASSESSMENT — PAIN DESCRIPTION - ONSET: ONSET: SUDDEN

## 2019-08-11 ASSESSMENT — PAIN DESCRIPTION - PAIN TYPE: TYPE: OTHER (COMMENT)

## 2019-08-11 ASSESSMENT — PAIN - FUNCTIONAL ASSESSMENT: PAIN_FUNCTIONAL_ASSESSMENT: PREVENTS OR INTERFERES SOME ACTIVE ACTIVITIES AND ADLS

## 2019-08-11 ASSESSMENT — PAIN DESCRIPTION - ORIENTATION: ORIENTATION: OTHER (COMMENT)

## 2019-08-11 ASSESSMENT — PAIN SCALES - GENERAL
PAINLEVEL_OUTOF10: 10
PAINLEVEL_OUTOF10: 10
PAINLEVEL_OUTOF10: 0

## 2019-08-11 ASSESSMENT — PAIN DESCRIPTION - DESCRIPTORS: DESCRIPTORS: HEADACHE

## 2019-08-11 ASSESSMENT — PAIN DESCRIPTION - DIRECTION: RADIATING_TOWARDS: NO

## 2019-08-11 NOTE — DISCHARGE SUMMARY
daily                 Discharge Instructions: Follow up with Marli Villaseñor DO in 7 days. Take medications as directed. Resume activity as tolerated. Diet: DIET CARB CONTROL;        DISCHARGE STATUS:    Condition: Good  Disposition: Patient is medically stable and will be discharged Home    Extended Emergency Contact Information  Primary Emergency Contact: [id number] Lynn of [address] [place] [address] Phone: [phone]  Relation: Other       Time Spent on discharge is more than 32 in the examination, evaluation, counseling and review of medications and discharge plan. Electronically signed by   Roxana Carmona MD, MPH,   Internal Medicine Hospitalist   8/11/2019 12:28 PM      Thank you Marli Villaseñor DO for the opportunity to be involved in this patient's care. If you have any questions or concerns please feel free to contact me at [phone]        EMR Dragon/Transcription disclaimer:   Much of this encounter note is an electronic transcription/translation of spoken language to printed text.  The electronic translation of spoken language may permit erroneous, or at times, nonsensical words or phrases to be inadvertently transcribed; although attempts have made to review the note for such errors, some may still exist.

## 2019-10-05 ENCOUNTER — HOSPITAL ENCOUNTER (INPATIENT)
Age: 49
LOS: 1 days | Discharge: PSYCHIATRIC OR SUBSTANCE USE FACILITY | DRG: 897 | End: 2019-10-07
Attending: EMERGENCY MEDICINE | Admitting: HOSPITALIST
Payer: COMMERCIAL

## 2019-10-05 DIAGNOSIS — Z87.898 HISTORY OF ALCOHOL USE DISORDER: ICD-10-CM

## 2019-10-05 DIAGNOSIS — F10.920 ACUTE ALCOHOLIC INTOXICATION WITHOUT COMPLICATION (HCC): ICD-10-CM

## 2019-10-05 DIAGNOSIS — F32.A DEPRESSION, UNSPECIFIED DEPRESSION TYPE: ICD-10-CM

## 2019-10-05 DIAGNOSIS — R45.851 SUICIDAL IDEATION: Primary | ICD-10-CM

## 2019-10-05 LAB
ALBUMIN SERPL-MCNC: 4.4 G/DL (ref 3.5–5.2)
ALP BLD-CCNC: 104 U/L (ref 35–104)
ALT SERPL-CCNC: 53 U/L (ref 5–33)
AMPHETAMINE SCREEN, URINE: NEGATIVE
ANION GAP SERPL CALCULATED.3IONS-SCNC: 17 MMOL/L (ref 7–19)
AST SERPL-CCNC: 70 U/L (ref 5–32)
BACTERIA: NEGATIVE /HPF
BARBITURATE SCREEN URINE: NEGATIVE
BENZODIAZEPINE SCREEN, URINE: NEGATIVE
BILIRUB SERPL-MCNC: 0.6 MG/DL (ref 0.2–1.2)
BILIRUBIN DIRECT: 0.2 MG/DL (ref 0–0.3)
BILIRUBIN URINE: NEGATIVE
BILIRUBIN, INDIRECT: 0.4 MG/DL (ref 0.1–1)
BLOOD, URINE: ABNORMAL
BUN BLDV-MCNC: 5 MG/DL (ref 6–20)
CALCIUM SERPL-MCNC: 8.6 MG/DL (ref 8.6–10)
CANNABINOID SCREEN URINE: NEGATIVE
CHLORIDE BLD-SCNC: 107 MMOL/L (ref 98–111)
CLARITY: CLEAR
CO2: 22 MMOL/L (ref 22–29)
COCAINE METABOLITE SCREEN URINE: NEGATIVE
COLOR: YELLOW
CREAT SERPL-MCNC: 0.5 MG/DL (ref 0.5–0.9)
EPITHELIAL CELLS, UA: 2 /HPF (ref 0–5)
ETHANOL: 313 MG/DL (ref 0–0.08)
GFR NON-AFRICAN AMERICAN: >60
GLUCOSE BLD-MCNC: 143 MG/DL (ref 74–109)
GLUCOSE URINE: 500 MG/DL
HCT VFR BLD CALC: 43.4 % (ref 37–47)
HEMOGLOBIN: 14.6 G/DL (ref 12–16)
HYALINE CASTS: 1 /HPF (ref 0–8)
KETONES, URINE: NEGATIVE MG/DL
LEUKOCYTE ESTERASE, URINE: NEGATIVE
Lab: NORMAL
MAGNESIUM: 2 MG/DL (ref 1.6–2.6)
MCH RBC QN AUTO: 31.7 PG (ref 27–31)
MCHC RBC AUTO-ENTMCNC: 33.6 G/DL (ref 33–37)
MCV RBC AUTO: 94.1 FL (ref 81–99)
NITRITE, URINE: NEGATIVE
OPIATE SCREEN URINE: NEGATIVE
PDW BLD-RTO: 14.5 % (ref 11.5–14.5)
PH UA: 6 (ref 5–8)
PLATELET # BLD: 166 K/UL (ref 130–400)
PMV BLD AUTO: 9.3 FL (ref 9.4–12.3)
POTASSIUM REFLEX MAGNESIUM: 3.5 MMOL/L (ref 3.5–5)
PROTEIN UA: NEGATIVE MG/DL
RBC # BLD: 4.61 M/UL (ref 4.2–5.4)
RBC UA: 1 /HPF (ref 0–4)
SODIUM BLD-SCNC: 146 MMOL/L (ref 136–145)
SPECIFIC GRAVITY UA: 1.01 (ref 1–1.03)
TOTAL PROTEIN: 6.7 G/DL (ref 6.6–8.7)
UROBILINOGEN, URINE: 0.2 E.U./DL
WBC # BLD: 8.4 K/UL (ref 4.8–10.8)
WBC UA: 1 /HPF (ref 0–5)

## 2019-10-05 PROCEDURE — 36415 COLL VENOUS BLD VENIPUNCTURE: CPT

## 2019-10-05 PROCEDURE — 80048 BASIC METABOLIC PNL TOTAL CA: CPT

## 2019-10-05 PROCEDURE — 99999 PR OFFICE/OUTPT VISIT,PROCEDURE ONLY: CPT | Performed by: EMERGENCY MEDICINE

## 2019-10-05 PROCEDURE — 80076 HEPATIC FUNCTION PANEL: CPT

## 2019-10-05 PROCEDURE — G0480 DRUG TEST DEF 1-7 CLASSES: HCPCS

## 2019-10-05 PROCEDURE — 80307 DRUG TEST PRSMV CHEM ANLYZR: CPT

## 2019-10-05 PROCEDURE — 83735 ASSAY OF MAGNESIUM: CPT

## 2019-10-05 PROCEDURE — 99285 EMERGENCY DEPT VISIT HI MDM: CPT

## 2019-10-05 PROCEDURE — 85027 COMPLETE CBC AUTOMATED: CPT

## 2019-10-05 PROCEDURE — G0378 HOSPITAL OBSERVATION PER HR: HCPCS

## 2019-10-05 PROCEDURE — 81001 URINALYSIS AUTO W/SCOPE: CPT

## 2019-10-05 RX ORDER — LORAZEPAM 1 MG/1
3 TABLET ORAL
Status: DISCONTINUED | OUTPATIENT
Start: 2019-10-05 | End: 2019-10-07 | Stop reason: HOSPADM

## 2019-10-05 RX ORDER — LORAZEPAM 2 MG/ML
3 INJECTION INTRAMUSCULAR
Status: DISCONTINUED | OUTPATIENT
Start: 2019-10-05 | End: 2019-10-07 | Stop reason: HOSPADM

## 2019-10-05 RX ORDER — HYDROCODONE BITARTRATE AND ACETAMINOPHEN 7.5; 325 MG/1; MG/1
1 TABLET ORAL EVERY 8 HOURS PRN
Status: ON HOLD | COMMUNITY
End: 2020-01-24 | Stop reason: HOSPADM

## 2019-10-05 RX ORDER — LORAZEPAM 1 MG/1
1 TABLET ORAL
Status: DISCONTINUED | OUTPATIENT
Start: 2019-10-05 | End: 2019-10-07 | Stop reason: HOSPADM

## 2019-10-05 RX ORDER — SODIUM CHLORIDE 0.9 % (FLUSH) 0.9 %
10 SYRINGE (ML) INJECTION PRN
Status: DISCONTINUED | OUTPATIENT
Start: 2019-10-05 | End: 2019-10-07 | Stop reason: HOSPADM

## 2019-10-05 RX ORDER — SODIUM CHLORIDE 0.9 % (FLUSH) 0.9 %
10 SYRINGE (ML) INJECTION EVERY 12 HOURS SCHEDULED
Status: DISCONTINUED | OUTPATIENT
Start: 2019-10-06 | End: 2019-10-07 | Stop reason: HOSPADM

## 2019-10-05 RX ORDER — LORAZEPAM 1 MG/1
2 TABLET ORAL
Status: DISCONTINUED | OUTPATIENT
Start: 2019-10-05 | End: 2019-10-07 | Stop reason: HOSPADM

## 2019-10-05 RX ORDER — LORAZEPAM 1 MG/1
4 TABLET ORAL
Status: DISCONTINUED | OUTPATIENT
Start: 2019-10-05 | End: 2019-10-07 | Stop reason: HOSPADM

## 2019-10-05 RX ORDER — LORAZEPAM 2 MG/ML
1 INJECTION INTRAMUSCULAR
Status: DISCONTINUED | OUTPATIENT
Start: 2019-10-05 | End: 2019-10-07 | Stop reason: HOSPADM

## 2019-10-05 RX ORDER — ACETAMINOPHEN 325 MG/1
650 TABLET ORAL EVERY 4 HOURS PRN
Status: DISCONTINUED | OUTPATIENT
Start: 2019-10-05 | End: 2019-10-07 | Stop reason: HOSPADM

## 2019-10-05 RX ORDER — LORAZEPAM 2 MG/ML
2 INJECTION INTRAMUSCULAR
Status: DISCONTINUED | OUTPATIENT
Start: 2019-10-05 | End: 2019-10-07 | Stop reason: HOSPADM

## 2019-10-05 RX ORDER — ONDANSETRON 2 MG/ML
4 INJECTION INTRAMUSCULAR; INTRAVENOUS EVERY 6 HOURS PRN
Status: DISCONTINUED | OUTPATIENT
Start: 2019-10-05 | End: 2019-10-07 | Stop reason: HOSPADM

## 2019-10-05 RX ORDER — LORAZEPAM 2 MG/ML
4 INJECTION INTRAMUSCULAR
Status: DISCONTINUED | OUTPATIENT
Start: 2019-10-05 | End: 2019-10-07 | Stop reason: HOSPADM

## 2019-10-05 ASSESSMENT — ENCOUNTER SYMPTOMS
COUGH: 0
DIARRHEA: 0
SHORTNESS OF BREATH: 0
ABDOMINAL PAIN: 0
VOMITING: 0
VOICE CHANGE: 0
EYE PAIN: 0
EYE REDNESS: 0
RHINORRHEA: 0

## 2019-10-05 ASSESSMENT — PAIN DESCRIPTION - DESCRIPTORS: DESCRIPTORS: THROBBING

## 2019-10-05 ASSESSMENT — PAIN DESCRIPTION - LOCATION: LOCATION: HEAD

## 2019-10-05 ASSESSMENT — PAIN SCALES - GENERAL: PAINLEVEL_OUTOF10: 8

## 2019-10-06 LAB
ANION GAP SERPL CALCULATED.3IONS-SCNC: 17 MMOL/L (ref 7–19)
BUN BLDV-MCNC: 7 MG/DL (ref 6–20)
CALCIUM SERPL-MCNC: 8.6 MG/DL (ref 8.6–10)
CHLORIDE BLD-SCNC: 109 MMOL/L (ref 98–111)
CO2: 19 MMOL/L (ref 22–29)
CREAT SERPL-MCNC: 0.5 MG/DL (ref 0.5–0.9)
GFR NON-AFRICAN AMERICAN: >60
GLUCOSE BLD-MCNC: 160 MG/DL (ref 74–109)
HCT VFR BLD CALC: 38.7 % (ref 37–47)
HEMOGLOBIN: 12.9 G/DL (ref 12–16)
MCH RBC QN AUTO: 31.8 PG (ref 27–31)
MCHC RBC AUTO-ENTMCNC: 33.3 G/DL (ref 33–37)
MCV RBC AUTO: 95.3 FL (ref 81–99)
PDW BLD-RTO: 14.2 % (ref 11.5–14.5)
PLATELET # BLD: 146 K/UL (ref 130–400)
PMV BLD AUTO: 9.7 FL (ref 9.4–12.3)
POTASSIUM REFLEX MAGNESIUM: 3.9 MMOL/L (ref 3.5–5)
RBC # BLD: 4.06 M/UL (ref 4.2–5.4)
SODIUM BLD-SCNC: 145 MMOL/L (ref 136–145)
WBC # BLD: 7.8 K/UL (ref 4.8–10.8)

## 2019-10-06 PROCEDURE — 6370000000 HC RX 637 (ALT 250 FOR IP): Performed by: HOSPITALIST

## 2019-10-06 PROCEDURE — 2580000003 HC RX 258: Performed by: HOSPITALIST

## 2019-10-06 PROCEDURE — 2500000003 HC RX 250 WO HCPCS: Performed by: HOSPITALIST

## 2019-10-06 PROCEDURE — 96372 THER/PROPH/DIAG INJ SC/IM: CPT

## 2019-10-06 PROCEDURE — 6370000000 HC RX 637 (ALT 250 FOR IP): Performed by: PSYCHIATRY & NEUROLOGY

## 2019-10-06 PROCEDURE — 6360000002 HC RX W HCPCS: Performed by: HOSPITALIST

## 2019-10-06 PROCEDURE — 99252 IP/OBS CONSLTJ NEW/EST SF 35: CPT | Performed by: PSYCHIATRY & NEUROLOGY

## 2019-10-06 PROCEDURE — 96375 TX/PRO/DX INJ NEW DRUG ADDON: CPT

## 2019-10-06 PROCEDURE — 96374 THER/PROPH/DIAG INJ IV PUSH: CPT

## 2019-10-06 PROCEDURE — 1210000000 HC MED SURG R&B

## 2019-10-06 PROCEDURE — 96376 TX/PRO/DX INJ SAME DRUG ADON: CPT

## 2019-10-06 PROCEDURE — 36415 COLL VENOUS BLD VENIPUNCTURE: CPT

## 2019-10-06 PROCEDURE — 85027 COMPLETE CBC AUTOMATED: CPT

## 2019-10-06 PROCEDURE — 80048 BASIC METABOLIC PNL TOTAL CA: CPT

## 2019-10-06 RX ORDER — GABAPENTIN 300 MG/1
600 CAPSULE ORAL 3 TIMES DAILY
Status: DISCONTINUED | OUTPATIENT
Start: 2019-10-06 | End: 2019-10-07 | Stop reason: HOSPADM

## 2019-10-06 RX ORDER — FAMOTIDINE 20 MG/1
20 TABLET, FILM COATED ORAL DAILY
Status: DISCONTINUED | OUTPATIENT
Start: 2019-10-06 | End: 2019-10-07 | Stop reason: HOSPADM

## 2019-10-06 RX ORDER — ONDANSETRON 4 MG/1
4 TABLET, FILM COATED ORAL EVERY 8 HOURS PRN
Status: DISCONTINUED | OUTPATIENT
Start: 2019-10-06 | End: 2019-10-07 | Stop reason: HOSPADM

## 2019-10-06 RX ORDER — MULTIVITAMIN WITH FOLIC ACID 400 MCG
1 TABLET ORAL DAILY
Status: DISCONTINUED | OUTPATIENT
Start: 2019-10-06 | End: 2019-10-07 | Stop reason: HOSPADM

## 2019-10-06 RX ORDER — FLUOXETINE 10 MG/1
30 CAPSULE ORAL DAILY
Status: DISCONTINUED | OUTPATIENT
Start: 2019-10-06 | End: 2019-10-07 | Stop reason: HOSPADM

## 2019-10-06 RX ORDER — HYDROXYZINE HYDROCHLORIDE 10 MG/1
10 TABLET, FILM COATED ORAL 3 TIMES DAILY PRN
Status: DISCONTINUED | OUTPATIENT
Start: 2019-10-06 | End: 2019-10-07 | Stop reason: HOSPADM

## 2019-10-06 RX ORDER — HYDROXYZINE HYDROCHLORIDE 10 MG/1
10 TABLET, FILM COATED ORAL DAILY
Status: DISCONTINUED | OUTPATIENT
Start: 2019-10-06 | End: 2019-10-06

## 2019-10-06 RX ORDER — THIAMINE MONONITRATE (VIT B1) 100 MG
100 TABLET ORAL DAILY
Status: DISCONTINUED | OUTPATIENT
Start: 2019-10-06 | End: 2019-10-07 | Stop reason: HOSPADM

## 2019-10-06 RX ORDER — FOLIC ACID 1 MG/1
1 TABLET ORAL DAILY
Status: DISCONTINUED | OUTPATIENT
Start: 2019-10-06 | End: 2019-10-07 | Stop reason: HOSPADM

## 2019-10-06 RX ORDER — HYDROXYZINE PAMOATE 25 MG/1
25 CAPSULE ORAL 3 TIMES DAILY PRN
Status: DISCONTINUED | OUTPATIENT
Start: 2019-10-06 | End: 2019-10-07 | Stop reason: HOSPADM

## 2019-10-06 RX ORDER — HYDROCODONE BITARTRATE AND ACETAMINOPHEN 7.5; 325 MG/1; MG/1
1 TABLET ORAL EVERY 8 HOURS PRN
Status: DISCONTINUED | OUTPATIENT
Start: 2019-10-06 | End: 2019-10-07 | Stop reason: HOSPADM

## 2019-10-06 RX ORDER — HYDROXYZINE PAMOATE 25 MG/1
25 CAPSULE ORAL 3 TIMES DAILY PRN
Status: DISCONTINUED | OUTPATIENT
Start: 2019-10-06 | End: 2019-10-06

## 2019-10-06 RX ADMIN — HYDROCODONE BITARTRATE AND ACETAMINOPHEN 1 TABLET: 7.5; 325 TABLET ORAL at 21:49

## 2019-10-06 RX ADMIN — THERA TABS 1 TABLET: TAB at 13:15

## 2019-10-06 RX ADMIN — ASCORBIC ACID, VITAMIN A PALMITATE, CHOLECALCIFEROL, THIAMINE HYDROCHLORIDE, RIBOFLAVIN-5 PHOSPHATE SODIUM, PYRIDOXINE HYDROCHLORIDE, NIACINAMIDE, DEXPANTHENOL, ALPHA-TOCOPHEROL ACETATE, VITAMIN K1, FOLIC ACID, BIOTIN, CYANOCOBALAMIN: 200; 3300; 200; 6; 3.6; 6; 40; 15; 10; 150; 600; 60; 5 INJECTION, SOLUTION INTRAVENOUS at 11:04

## 2019-10-06 RX ADMIN — FOLIC ACID 1 MG: 1 TABLET ORAL at 13:16

## 2019-10-06 RX ADMIN — GABAPENTIN 600 MG: 300 CAPSULE ORAL at 13:15

## 2019-10-06 RX ADMIN — GABAPENTIN 600 MG: 300 CAPSULE ORAL at 21:49

## 2019-10-06 RX ADMIN — ONDANSETRON HYDROCHLORIDE 4 MG: 4 TABLET, FILM COATED ORAL at 19:56

## 2019-10-06 RX ADMIN — ASPIRIN 325 MG: 325 TABLET, COATED ORAL at 13:15

## 2019-10-06 RX ADMIN — LORAZEPAM 1 MG: 2 INJECTION INTRAMUSCULAR; INTRAVENOUS at 03:19

## 2019-10-06 RX ADMIN — ASCORBIC ACID, VITAMIN A PALMITATE, CHOLECALCIFEROL, THIAMINE HYDROCHLORIDE, RIBOFLAVIN-5 PHOSPHATE SODIUM, PYRIDOXINE HYDROCHLORIDE, NIACINAMIDE, DEXPANTHENOL, ALPHA-TOCOPHEROL ACETATE, VITAMIN K1, FOLIC ACID, BIOTIN, CYANOCOBALAMIN: 200; 3300; 200; 6; 3.6; 6; 40; 15; 10; 150; 600; 60; 5 INJECTION, SOLUTION INTRAVENOUS at 02:31

## 2019-10-06 RX ADMIN — FAMOTIDINE 20 MG: 20 TABLET, FILM COATED ORAL at 13:21

## 2019-10-06 RX ADMIN — HYDROXYZINE PAMOATE 25 MG: 25 CAPSULE ORAL at 12:23

## 2019-10-06 RX ADMIN — LORAZEPAM 3 MG: 1 TABLET ORAL at 19:56

## 2019-10-06 RX ADMIN — ACETAMINOPHEN 650 MG: 325 TABLET ORAL at 08:23

## 2019-10-06 RX ADMIN — LAMOTRIGINE 150 MG: 100 TABLET ORAL at 13:15

## 2019-10-06 RX ADMIN — HYDROCODONE BITARTRATE AND ACETAMINOPHEN 1 TABLET: 7.5; 325 TABLET ORAL at 13:15

## 2019-10-06 RX ADMIN — LORAZEPAM 1 MG: 2 INJECTION INTRAMUSCULAR; INTRAVENOUS at 00:20

## 2019-10-06 RX ADMIN — Medication 10 ML: at 08:24

## 2019-10-06 RX ADMIN — ENOXAPARIN SODIUM 40 MG: 40 INJECTION SUBCUTANEOUS at 08:23

## 2019-10-06 RX ADMIN — LORAZEPAM 1 MG: 2 INJECTION INTRAMUSCULAR; INTRAVENOUS at 06:04

## 2019-10-06 RX ADMIN — FLUOXETINE 30 MG: 10 CAPSULE ORAL at 13:16

## 2019-10-06 RX ADMIN — HYDROXYZINE HYDROCHLORIDE 10 MG: 10 TABLET, FILM COATED ORAL at 13:22

## 2019-10-06 RX ADMIN — Medication 100 MG: at 13:15

## 2019-10-06 ASSESSMENT — PAIN SCALES - GENERAL
PAINLEVEL_OUTOF10: 7
PAINLEVEL_OUTOF10: 8
PAINLEVEL_OUTOF10: 6
PAINLEVEL_OUTOF10: 7
PAINLEVEL_OUTOF10: 0

## 2019-10-06 ASSESSMENT — PAIN DESCRIPTION - FREQUENCY
FREQUENCY: CONTINUOUS

## 2019-10-06 ASSESSMENT — PAIN DESCRIPTION - DESCRIPTORS
DESCRIPTORS: THROBBING

## 2019-10-06 ASSESSMENT — PAIN DESCRIPTION - ORIENTATION: ORIENTATION: ANTERIOR

## 2019-10-06 ASSESSMENT — PAIN DESCRIPTION - PAIN TYPE
TYPE: ACUTE PAIN

## 2019-10-06 ASSESSMENT — PAIN DESCRIPTION - LOCATION
LOCATION: HEAD

## 2019-10-06 ASSESSMENT — PAIN - FUNCTIONAL ASSESSMENT
PAIN_FUNCTIONAL_ASSESSMENT: PREVENTS OR INTERFERES SOME ACTIVE ACTIVITIES AND ADLS
PAIN_FUNCTIONAL_ASSESSMENT: PREVENTS OR INTERFERES SOME ACTIVE ACTIVITIES AND ADLS

## 2019-10-06 ASSESSMENT — PAIN DESCRIPTION - ONSET
ONSET: GRADUAL

## 2019-10-06 ASSESSMENT — PAIN DESCRIPTION - PROGRESSION: CLINICAL_PROGRESSION: NOT CHANGED

## 2019-10-07 ENCOUNTER — HOSPITAL ENCOUNTER (INPATIENT)
Age: 49
LOS: 2 days | Discharge: HOME | DRG: 897 | End: 2019-10-09
Attending: PSYCHIATRY & NEUROLOGY | Admitting: PSYCHIATRY & NEUROLOGY
Payer: COMMERCIAL

## 2019-10-07 VITALS
HEIGHT: 63 IN | OXYGEN SATURATION: 98 % | WEIGHT: 120 LBS | BODY MASS INDEX: 21.26 KG/M2 | DIASTOLIC BLOOD PRESSURE: 81 MMHG | SYSTOLIC BLOOD PRESSURE: 137 MMHG | RESPIRATION RATE: 16 BRPM | HEART RATE: 78 BPM | TEMPERATURE: 98.9 F

## 2019-10-07 PROCEDURE — 6370000000 HC RX 637 (ALT 250 FOR IP): Performed by: HOSPITALIST

## 2019-10-07 PROCEDURE — 6370000000 HC RX 637 (ALT 250 FOR IP): Performed by: PSYCHIATRY & NEUROLOGY

## 2019-10-07 PROCEDURE — 6370000000 HC RX 637 (ALT 250 FOR IP): Performed by: FAMILY MEDICINE

## 2019-10-07 PROCEDURE — 1240000000 HC EMOTIONAL WELLNESS R&B

## 2019-10-07 PROCEDURE — 6360000002 HC RX W HCPCS: Performed by: HOSPITALIST

## 2019-10-07 RX ORDER — IBUPROFEN 600 MG/1
600 TABLET ORAL EVERY 6 HOURS PRN
Status: DISCONTINUED | OUTPATIENT
Start: 2019-10-07 | End: 2019-10-09 | Stop reason: HOSPADM

## 2019-10-07 RX ORDER — NICOTINE 21 MG/24HR
1 PATCH, TRANSDERMAL 24 HOURS TRANSDERMAL DAILY
Status: DISCONTINUED | OUTPATIENT
Start: 2019-10-07 | End: 2019-10-09 | Stop reason: HOSPADM

## 2019-10-07 RX ORDER — TRAZODONE HYDROCHLORIDE 50 MG/1
50 TABLET ORAL NIGHTLY
Status: DISCONTINUED | OUTPATIENT
Start: 2019-10-07 | End: 2019-10-09 | Stop reason: HOSPADM

## 2019-10-07 RX ORDER — LANOLIN ALCOHOL/MO/W.PET/CERES
3 CREAM (GRAM) TOPICAL NIGHTLY
Status: DISCONTINUED | OUTPATIENT
Start: 2019-10-07 | End: 2019-10-09 | Stop reason: HOSPADM

## 2019-10-07 RX ORDER — HYDROXYZINE HYDROCHLORIDE 25 MG/1
25 TABLET, FILM COATED ORAL EVERY 6 HOURS PRN
Status: DISCONTINUED | OUTPATIENT
Start: 2019-10-07 | End: 2019-10-09 | Stop reason: HOSPADM

## 2019-10-07 RX ADMIN — Medication 100 MG: at 08:49

## 2019-10-07 RX ADMIN — HYDROXYZINE HYDROCHLORIDE 25 MG: 25 TABLET ORAL at 17:23

## 2019-10-07 RX ADMIN — FAMOTIDINE 20 MG: 20 TABLET, FILM COATED ORAL at 08:56

## 2019-10-07 RX ADMIN — FLUOXETINE 30 MG: 10 CAPSULE ORAL at 08:48

## 2019-10-07 RX ADMIN — MELATONIN 3 MG: at 20:55

## 2019-10-07 RX ADMIN — IBUPROFEN 600 MG: 600 TABLET ORAL at 21:18

## 2019-10-07 RX ADMIN — GABAPENTIN 600 MG: 300 CAPSULE ORAL at 08:48

## 2019-10-07 RX ADMIN — HYDROCODONE BITARTRATE AND ACETAMINOPHEN 1 TABLET: 7.5; 325 TABLET ORAL at 06:45

## 2019-10-07 RX ADMIN — TRAZODONE HYDROCHLORIDE 50 MG: 50 TABLET ORAL at 20:55

## 2019-10-07 RX ADMIN — LAMOTRIGINE 150 MG: 100 TABLET ORAL at 08:49

## 2019-10-07 RX ADMIN — ENOXAPARIN SODIUM 40 MG: 40 INJECTION SUBCUTANEOUS at 08:49

## 2019-10-07 RX ADMIN — THERA TABS 1 TABLET: TAB at 08:48

## 2019-10-07 RX ADMIN — ASPIRIN 325 MG: 325 TABLET, COATED ORAL at 08:48

## 2019-10-07 RX ADMIN — FOLIC ACID 1 MG: 1 TABLET ORAL at 08:49

## 2019-10-07 ASSESSMENT — PAIN - FUNCTIONAL ASSESSMENT: PAIN_FUNCTIONAL_ASSESSMENT: ACTIVITIES ARE NOT PREVENTED

## 2019-10-07 ASSESSMENT — PAIN DESCRIPTION - DESCRIPTORS: DESCRIPTORS: HEADACHE

## 2019-10-07 ASSESSMENT — SLEEP AND FATIGUE QUESTIONNAIRES
SLEEP PATTERN: DIFFICULTY FALLING ASLEEP;INSOMNIA
DIFFICULTY ARISING: NO
RESTFUL SLEEP: NO
AVERAGE NUMBER OF SLEEP HOURS: 1
DIFFICULTY STAYING ASLEEP: YES
DO YOU HAVE DIFFICULTY SLEEPING: YES
DO YOU USE A SLEEP AID: NO
DIFFICULTY FALLING ASLEEP: YES

## 2019-10-07 ASSESSMENT — PAIN DESCRIPTION - LOCATION: LOCATION: HEAD

## 2019-10-07 ASSESSMENT — PAIN SCALES - GENERAL
PAINLEVEL_OUTOF10: 2
PAINLEVEL_OUTOF10: 9
PAINLEVEL_OUTOF10: 7

## 2019-10-07 ASSESSMENT — PAIN DESCRIPTION - ONSET: ONSET: ON-GOING

## 2019-10-07 ASSESSMENT — PAIN DESCRIPTION - PROGRESSION: CLINICAL_PROGRESSION: GRADUALLY IMPROVING

## 2019-10-07 ASSESSMENT — PAIN DESCRIPTION - ORIENTATION: ORIENTATION: ANTERIOR

## 2019-10-07 ASSESSMENT — PAIN DESCRIPTION - PAIN TYPE: TYPE: ACUTE PAIN

## 2019-10-07 ASSESSMENT — PAIN DESCRIPTION - FREQUENCY: FREQUENCY: INTERMITTENT

## 2019-10-07 ASSESSMENT — LIFESTYLE VARIABLES: HISTORY_ALCOHOL_USE: YES

## 2019-10-08 PROBLEM — F31.31 BIPOLAR AFFECTIVE DISORDER, DEPRESSED, MILD (HCC): Status: ACTIVE | Noted: 2019-10-08

## 2019-10-08 LAB
HCG(URINE) PREGNANCY TEST: NEGATIVE
TSH REFLEX FT4: 0.73 UIU/ML (ref 0.35–5.5)
VITAMIN B-12: 429 PG/ML (ref 211–946)
VITAMIN D 25-HYDROXY: 37.4 NG/ML

## 2019-10-08 PROCEDURE — 82607 VITAMIN B-12: CPT

## 2019-10-08 PROCEDURE — 84703 CHORIONIC GONADOTROPIN ASSAY: CPT

## 2019-10-08 PROCEDURE — 6370000000 HC RX 637 (ALT 250 FOR IP): Performed by: FAMILY MEDICINE

## 2019-10-08 PROCEDURE — 36415 COLL VENOUS BLD VENIPUNCTURE: CPT

## 2019-10-08 PROCEDURE — 1240000000 HC EMOTIONAL WELLNESS R&B

## 2019-10-08 PROCEDURE — 6370000000 HC RX 637 (ALT 250 FOR IP): Performed by: PSYCHIATRY & NEUROLOGY

## 2019-10-08 PROCEDURE — 82306 VITAMIN D 25 HYDROXY: CPT

## 2019-10-08 PROCEDURE — 84443 ASSAY THYROID STIM HORMONE: CPT

## 2019-10-08 PROCEDURE — 99223 1ST HOSP IP/OBS HIGH 75: CPT | Performed by: PSYCHIATRY & NEUROLOGY

## 2019-10-08 RX ORDER — LAMOTRIGINE 25 MG/1
100 TABLET ORAL 2 TIMES DAILY
Status: DISCONTINUED | OUTPATIENT
Start: 2019-10-08 | End: 2019-10-09 | Stop reason: HOSPADM

## 2019-10-08 RX ORDER — FLUOXETINE HYDROCHLORIDE 20 MG/1
40 CAPSULE ORAL DAILY
Status: DISCONTINUED | OUTPATIENT
Start: 2019-10-08 | End: 2019-10-09 | Stop reason: HOSPADM

## 2019-10-08 RX ORDER — VALSARTAN 80 MG/1
80 TABLET ORAL DAILY
Status: DISCONTINUED | OUTPATIENT
Start: 2019-10-08 | End: 2019-10-09 | Stop reason: HOSPADM

## 2019-10-08 RX ORDER — FOLIC ACID 1 MG/1
1 TABLET ORAL DAILY
Status: DISCONTINUED | OUTPATIENT
Start: 2019-10-08 | End: 2019-10-09 | Stop reason: HOSPADM

## 2019-10-08 RX ORDER — GABAPENTIN 600 MG/1
600 TABLET ORAL 3 TIMES DAILY
Status: DISCONTINUED | OUTPATIENT
Start: 2019-10-08 | End: 2019-10-09 | Stop reason: HOSPADM

## 2019-10-08 RX ORDER — THIAMINE MONONITRATE (VIT B1) 100 MG
100 TABLET ORAL DAILY
Status: DISCONTINUED | OUTPATIENT
Start: 2019-10-08 | End: 2019-10-09 | Stop reason: HOSPADM

## 2019-10-08 RX ORDER — ACETAMINOPHEN 325 MG/1
650 TABLET ORAL EVERY 4 HOURS PRN
Status: DISCONTINUED | OUTPATIENT
Start: 2019-10-08 | End: 2019-10-09 | Stop reason: HOSPADM

## 2019-10-08 RX ADMIN — GABAPENTIN 600 MG: 600 TABLET, FILM COATED ORAL at 16:35

## 2019-10-08 RX ADMIN — GABAPENTIN 600 MG: 600 TABLET, FILM COATED ORAL at 20:45

## 2019-10-08 RX ADMIN — FLUOXETINE HYDROCHLORIDE 40 MG: 20 CAPSULE ORAL at 16:35

## 2019-10-08 RX ADMIN — IBUPROFEN 600 MG: 600 TABLET ORAL at 05:56

## 2019-10-08 RX ADMIN — LAMOTRIGINE 100 MG: 25 TABLET ORAL at 16:35

## 2019-10-08 RX ADMIN — HYDROXYZINE HYDROCHLORIDE 25 MG: 25 TABLET ORAL at 17:59

## 2019-10-08 RX ADMIN — HYDROXYZINE HYDROCHLORIDE 25 MG: 25 TABLET ORAL at 05:56

## 2019-10-08 RX ADMIN — Medication 100 MG: at 09:25

## 2019-10-08 RX ADMIN — TRAZODONE HYDROCHLORIDE 50 MG: 50 TABLET ORAL at 20:45

## 2019-10-08 RX ADMIN — ACETAMINOPHEN 650 MG: 325 TABLET ORAL at 20:45

## 2019-10-08 RX ADMIN — IBUPROFEN 600 MG: 600 TABLET ORAL at 17:59

## 2019-10-08 RX ADMIN — VALSARTAN 80 MG: 80 TABLET, FILM COATED ORAL at 09:25

## 2019-10-08 RX ADMIN — LAMOTRIGINE 100 MG: 25 TABLET ORAL at 20:45

## 2019-10-08 RX ADMIN — IBUPROFEN 600 MG: 600 TABLET ORAL at 12:13

## 2019-10-08 RX ADMIN — MELATONIN 3 MG: at 20:45

## 2019-10-08 RX ADMIN — FOLIC ACID 1 MG: 1 TABLET ORAL at 09:26

## 2019-10-08 RX ADMIN — HYDROXYZINE HYDROCHLORIDE 25 MG: 25 TABLET ORAL at 12:13

## 2019-10-08 ASSESSMENT — PAIN SCALES - GENERAL
PAINLEVEL_OUTOF10: 6
PAINLEVEL_OUTOF10: 7
PAINLEVEL_OUTOF10: 6
PAINLEVEL_OUTOF10: 7

## 2019-10-09 VITALS
RESPIRATION RATE: 18 BRPM | SYSTOLIC BLOOD PRESSURE: 127 MMHG | HEART RATE: 95 BPM | OXYGEN SATURATION: 96 % | DIASTOLIC BLOOD PRESSURE: 75 MMHG | TEMPERATURE: 97.4 F

## 2019-10-09 PROCEDURE — 6370000000 HC RX 637 (ALT 250 FOR IP): Performed by: PSYCHIATRY & NEUROLOGY

## 2019-10-09 PROCEDURE — 99239 HOSP IP/OBS DSCHRG MGMT >30: CPT | Performed by: PSYCHIATRY & NEUROLOGY

## 2019-10-09 PROCEDURE — 5130000000 HC BRIDGE APPOINTMENT

## 2019-10-09 PROCEDURE — 6370000000 HC RX 637 (ALT 250 FOR IP): Performed by: FAMILY MEDICINE

## 2019-10-09 RX ORDER — TRAZODONE HYDROCHLORIDE 50 MG/1
50 TABLET ORAL NIGHTLY
Qty: 30 TABLET | Refills: 1 | Status: SHIPPED | OUTPATIENT
Start: 2019-10-09 | End: 2022-06-27 | Stop reason: SDUPTHER

## 2019-10-09 RX ORDER — FOLIC ACID 1 MG/1
1 TABLET ORAL DAILY
Qty: 30 TABLET | Refills: 3 | Status: SHIPPED | OUTPATIENT
Start: 2019-10-10 | End: 2022-06-27

## 2019-10-09 RX ORDER — LANOLIN ALCOHOL/MO/W.PET/CERES
3 CREAM (GRAM) TOPICAL NIGHTLY
Qty: 30 TABLET | Refills: 1 | Status: SHIPPED | OUTPATIENT
Start: 2019-10-09 | End: 2022-06-27

## 2019-10-09 RX ORDER — LAMOTRIGINE 100 MG/1
100 TABLET ORAL 2 TIMES DAILY
Qty: 60 TABLET | Refills: 1 | Status: SHIPPED | OUTPATIENT
Start: 2019-10-09 | End: 2022-06-27

## 2019-10-09 RX ORDER — FLUOXETINE HYDROCHLORIDE 40 MG/1
40 CAPSULE ORAL DAILY
Qty: 30 CAPSULE | Refills: 1 | Status: SHIPPED | OUTPATIENT
Start: 2019-10-10 | End: 2022-06-27 | Stop reason: SDUPTHER

## 2019-10-09 RX ADMIN — Medication 100 MG: at 08:31

## 2019-10-09 RX ADMIN — IBUPROFEN 600 MG: 600 TABLET ORAL at 05:38

## 2019-10-09 RX ADMIN — FOLIC ACID 1 MG: 1 TABLET ORAL at 08:30

## 2019-10-09 RX ADMIN — HYDROXYZINE HYDROCHLORIDE 25 MG: 25 TABLET ORAL at 05:38

## 2019-10-09 RX ADMIN — VALSARTAN 80 MG: 80 TABLET, FILM COATED ORAL at 08:31

## 2019-10-09 RX ADMIN — FLUOXETINE HYDROCHLORIDE 40 MG: 20 CAPSULE ORAL at 08:31

## 2019-10-09 RX ADMIN — LAMOTRIGINE 100 MG: 25 TABLET ORAL at 08:30

## 2019-10-09 RX ADMIN — GABAPENTIN 600 MG: 600 TABLET, FILM COATED ORAL at 08:31

## 2019-10-09 ASSESSMENT — PAIN SCALES - GENERAL: PAINLEVEL_OUTOF10: 6

## 2020-01-01 ENCOUNTER — HOSPITAL ENCOUNTER (OUTPATIENT)
Facility: HOSPITAL | Age: 50
Setting detail: OBSERVATION
Discharge: HOME | End: 2020-01-02
Attending: INTERNAL MEDICINE | Admitting: INTERNAL MEDICINE

## 2020-01-01 ENCOUNTER — APPOINTMENT (OUTPATIENT)
Dept: CT IMAGING | Facility: HOSPITAL | Age: 50
End: 2020-01-01

## 2020-01-01 ENCOUNTER — APPOINTMENT (OUTPATIENT)
Dept: GENERAL RADIOLOGY | Facility: HOSPITAL | Age: 50
End: 2020-01-01

## 2020-01-01 DIAGNOSIS — R13.10 DYSPHAGIA, UNSPECIFIED TYPE: ICD-10-CM

## 2020-01-01 DIAGNOSIS — K86.3 PANCREATIC PSEUDOCYST: ICD-10-CM

## 2020-01-01 DIAGNOSIS — R41.82 ALTERED MENTAL STATUS, UNSPECIFIED ALTERED MENTAL STATUS TYPE: Primary | ICD-10-CM

## 2020-01-01 LAB
ALBUMIN SERPL-MCNC: 4.3 G/DL (ref 3.5–5.2)
ALBUMIN/GLOB SERPL: 1.7 G/DL
ALP SERPL-CCNC: 104 U/L (ref 39–117)
ALT SERPL W P-5'-P-CCNC: 30 U/L (ref 1–33)
AMMONIA BLD-SCNC: 47 UMOL/L (ref 11–51)
AMPHET+METHAMPHET UR QL: POSITIVE
AMPHETAMINES UR QL: NEGATIVE
ANION GAP SERPL CALCULATED.3IONS-SCNC: 17 MMOL/L (ref 5–15)
ANISOCYTOSIS BLD QL: ABNORMAL
ARTERIAL PATENCY WRIST A: POSITIVE
AST SERPL-CCNC: 47 U/L (ref 1–32)
ATMOSPHERIC PRESS: 746 MMHG
BARBITURATES UR QL SCN: NEGATIVE
BASE EXCESS BLDA CALC-SCNC: -4.3 MMOL/L (ref 0–2)
BASOPHILS # BLD MANUAL: 0.05 10*3/MM3 (ref 0–0.2)
BASOPHILS NFR BLD AUTO: 1 % (ref 0–1.5)
BDY SITE: ABNORMAL
BENZODIAZ UR QL SCN: NEGATIVE
BILIRUB SERPL-MCNC: 1.2 MG/DL (ref 0.2–1.2)
BILIRUB UR QL STRIP: NEGATIVE
BODY TEMPERATURE: 37 C
BUN BLD-MCNC: 6 MG/DL (ref 6–20)
BUN/CREAT SERPL: 10.9 (ref 7–25)
BUPRENORPHINE SERPL-MCNC: NEGATIVE NG/ML
CALCIUM SPEC-SCNC: 8.7 MG/DL (ref 8.6–10.5)
CANNABINOIDS SERPL QL: NEGATIVE
CHLORIDE SERPL-SCNC: 102 MMOL/L (ref 98–107)
CLARITY UR: CLEAR
CO2 SERPL-SCNC: 21 MMOL/L (ref 22–29)
COCAINE UR QL: NEGATIVE
COLOR UR: YELLOW
CREAT BLD-MCNC: 0.55 MG/DL (ref 0.57–1)
D-LACTATE SERPL-SCNC: 0.6 MMOL/L (ref 0.5–2)
DEPRECATED RDW RBC AUTO: 46.4 FL (ref 37–54)
EOSINOPHIL # BLD MANUAL: 0.1 10*3/MM3 (ref 0–0.4)
EOSINOPHIL NFR BLD MANUAL: 2 % (ref 0.3–6.2)
ERYTHROCYTE [DISTWIDTH] IN BLOOD BY AUTOMATED COUNT: 14.3 % (ref 12.3–15.4)
ETHANOL UR QL: 0.04 %
GFR SERPL CREATININE-BSD FRML MDRD: 118 ML/MIN/1.73
GLOBULIN UR ELPH-MCNC: 2.6 GM/DL
GLUCOSE BLD-MCNC: 82 MG/DL (ref 65–99)
GLUCOSE UR STRIP-MCNC: NEGATIVE MG/DL
HCO3 BLDA-SCNC: 21.9 MMOL/L (ref 20–26)
HCT VFR BLD AUTO: 38.1 % (ref 34–46.6)
HGB BLD-MCNC: 13.3 G/DL (ref 12–15.9)
HGB UR QL STRIP.AUTO: NEGATIVE
KETONES UR QL STRIP: ABNORMAL
LEUKOCYTE ESTERASE UR QL STRIP.AUTO: NEGATIVE
LIPASE SERPL-CCNC: 7 U/L (ref 13–60)
LYMPHOCYTES # BLD MANUAL: 1.94 10*3/MM3 (ref 0.7–3.1)
LYMPHOCYTES NFR BLD MANUAL: 2 % (ref 5–12)
LYMPHOCYTES NFR BLD MANUAL: 37.4 % (ref 19.6–45.3)
Lab: ABNORMAL
MCH RBC QN AUTO: 30.9 PG (ref 26.6–33)
MCHC RBC AUTO-ENTMCNC: 34.9 G/DL (ref 31.5–35.7)
MCV RBC AUTO: 88.4 FL (ref 79–97)
METHADONE UR QL SCN: NEGATIVE
MICROCYTES BLD QL: ABNORMAL
MODALITY: ABNORMAL
MONOCYTES # BLD AUTO: 0.1 10*3/MM3 (ref 0.1–0.9)
NEUTROPHILS # BLD AUTO: 2.94 10*3/MM3 (ref 1.7–7)
NEUTROPHILS NFR BLD MANUAL: 56.6 % (ref 42.7–76)
NITRITE UR QL STRIP: NEGATIVE
OPIATES UR QL: NEGATIVE
OSMOLALITY SERPL: 286 MOSM/KG (ref 289–308)
OXYCODONE UR QL SCN: POSITIVE
PCO2 BLDA: 43.1 MM HG (ref 35–45)
PCP UR QL SCN: NEGATIVE
PH BLDA: 7.31 PH UNITS (ref 7.35–7.45)
PH UR STRIP.AUTO: <=5 [PH] (ref 5–8)
PLATELET # BLD AUTO: 72 10*3/MM3 (ref 140–450)
PMV BLD AUTO: 10.9 FL (ref 6–12)
PO2 BLDA: 88.1 MM HG (ref 83–108)
POLYCHROMASIA BLD QL SMEAR: ABNORMAL
POTASSIUM BLD-SCNC: 3.5 MMOL/L (ref 3.5–5.2)
PROPOXYPH UR QL: NEGATIVE
PROT SERPL-MCNC: 6.9 G/DL (ref 6–8.5)
PROT UR QL STRIP: NEGATIVE
RBC # BLD AUTO: 4.31 10*6/MM3 (ref 3.77–5.28)
SALICYLATES SERPL-MCNC: <0.3 MG/DL
SAO2 % BLDCOA: 96.9 % (ref 94–99)
SMALL PLATELETS BLD QL SMEAR: ABNORMAL
SODIUM BLD-SCNC: 140 MMOL/L (ref 136–145)
SP GR UR STRIP: >=1.03 (ref 1–1.03)
TRICYCLICS UR QL SCN: NEGATIVE
TROPONIN T SERPL-MCNC: <0.01 NG/ML (ref 0–0.03)
UROBILINOGEN UR QL STRIP: ABNORMAL
VARIANT LYMPHS NFR BLD MANUAL: 1 % (ref 0–5)
VENTILATOR MODE: ABNORMAL
WBC MORPH BLD: NORMAL
WBC NRBC COR # BLD: 5.2 10*3/MM3 (ref 3.4–10.8)

## 2020-01-01 PROCEDURE — 83605 ASSAY OF LACTIC ACID: CPT | Performed by: NURSE PRACTITIONER

## 2020-01-01 PROCEDURE — 80307 DRUG TEST PRSMV CHEM ANLYZR: CPT | Performed by: NURSE PRACTITIONER

## 2020-01-01 PROCEDURE — 93005 ELECTROCARDIOGRAM TRACING: CPT | Performed by: NURSE PRACTITIONER

## 2020-01-01 PROCEDURE — 81003 URINALYSIS AUTO W/O SCOPE: CPT | Performed by: NURSE PRACTITIONER

## 2020-01-01 PROCEDURE — 80179 DRUG ASSAY SALICYLATE: CPT | Performed by: NURSE PRACTITIONER

## 2020-01-01 PROCEDURE — 74177 CT ABD & PELVIS W/CONTRAST: CPT

## 2020-01-01 PROCEDURE — 85007 BL SMEAR W/DIFF WBC COUNT: CPT | Performed by: NURSE PRACTITIONER

## 2020-01-01 PROCEDURE — 83930 ASSAY OF BLOOD OSMOLALITY: CPT | Performed by: NURSE PRACTITIONER

## 2020-01-01 PROCEDURE — 82803 BLOOD GASES ANY COMBINATION: CPT

## 2020-01-01 PROCEDURE — 93010 ELECTROCARDIOGRAM REPORT: CPT | Performed by: INTERNAL MEDICINE

## 2020-01-01 PROCEDURE — 83690 ASSAY OF LIPASE: CPT | Performed by: NURSE PRACTITIONER

## 2020-01-01 PROCEDURE — 36415 COLL VENOUS BLD VENIPUNCTURE: CPT

## 2020-01-01 PROCEDURE — 71045 X-RAY EXAM CHEST 1 VIEW: CPT

## 2020-01-01 PROCEDURE — 80053 COMPREHEN METABOLIC PANEL: CPT | Performed by: NURSE PRACTITIONER

## 2020-01-01 PROCEDURE — 85025 COMPLETE CBC W/AUTO DIFF WBC: CPT | Performed by: NURSE PRACTITIONER

## 2020-01-01 PROCEDURE — 82140 ASSAY OF AMMONIA: CPT | Performed by: NURSE PRACTITIONER

## 2020-01-01 PROCEDURE — 99285 EMERGENCY DEPT VISIT HI MDM: CPT

## 2020-01-01 PROCEDURE — 36600 WITHDRAWAL OF ARTERIAL BLOOD: CPT

## 2020-01-01 PROCEDURE — 84484 ASSAY OF TROPONIN QUANT: CPT | Performed by: NURSE PRACTITIONER

## 2020-01-01 PROCEDURE — 70450 CT HEAD/BRAIN W/O DYE: CPT

## 2020-01-01 PROCEDURE — 25010000002 IOPAMIDOL 61 % SOLUTION: Performed by: NURSE PRACTITIONER

## 2020-01-01 RX ORDER — SODIUM CHLORIDE 0.9 % (FLUSH) 0.9 %
10 SYRINGE (ML) INJECTION AS NEEDED
Status: DISCONTINUED | OUTPATIENT
Start: 2020-01-01 | End: 2020-01-02 | Stop reason: HOSPADM

## 2020-01-01 RX ADMIN — IOPAMIDOL 70 ML: 612 INJECTION, SOLUTION INTRAVENOUS at 18:56

## 2020-01-01 RX ADMIN — SODIUM CHLORIDE 500 ML: 9 INJECTION, SOLUTION INTRAVENOUS at 22:20

## 2020-01-01 RX ADMIN — SODIUM CHLORIDE 500 ML: 9 INJECTION, SOLUTION INTRAVENOUS at 17:50

## 2020-01-01 NOTE — ED PROVIDER NOTES
"Subjective   Patient is a 49-year-old female that presents to the ER today with complaint of upper abdominal pain.  The patient reports that her symptoms began today.  The patient states that she was visiting a friend at the [facility] and had \"1 drink \".  She states that this was an alcoholic drink.  The patient states that she used to drink frequently but has stopped until today.  She reports that she is having upper abdominal pain and has had pancreatitis in the past.  States that this feels is the same as when she had pancreatitis before.  She reports nausea.  Patient denies any illicit drug use.  The patient [place] and EMS was notified and brought the patient to the ER.  The patient presents here today for further evaluation.  The pt appears to be altered.      History provided by:  Patient   used: No    Abdominal Pain   Pain location:  Epigastric  Pain quality: aching and cramping    Pain radiates to:  Does not radiate  Pain severity:  Moderate  Onset quality:  Sudden  Duration:  1 day  Timing:  Constant  Progression:  Unchanged  Chronicity:  New  Context: alcohol use    Context: not awakening from sleep, not diet changes, not eating, not laxative use, not medication withdrawal, not previous surgeries, not recent illness, not recent sexual activity, not recent travel, not retching, not sick contacts, not suspicious food intake and not trauma    Relieved by:  Nothing  Worsened by:  Nothing  Ineffective treatments:  None tried  Associated symptoms: nausea    Associated symptoms: no anorexia, no belching, no chest pain, no chills, no constipation, no cough, no diarrhea, no dysuria, no fatigue, no fever, no flatus, no hematemesis, no hematochezia, no hematuria, no melena, no shortness of breath, no sore throat, no vaginal bleeding, no vaginal discharge and no vomiting    Risk factors: no alcohol abuse, no aspirin use, not elderly, has not had multiple " surgeries, no NSAID use, not obese, not pregnant and no recent hospitalization        Review of Systems   Constitutional: Negative for chills, fatigue and fever.   HENT: Negative for sore throat.    Respiratory: Negative for cough and shortness of breath.    Cardiovascular: Negative for chest pain.   Gastrointestinal: Positive for abdominal pain and nausea. Negative for anorexia, constipation, diarrhea, flatus, hematemesis, hematochezia, melena and vomiting.   Genitourinary: Negative for dysuria, hematuria, vaginal bleeding and vaginal discharge.   All other systems reviewed and are negative.      Past Medical History:   Diagnosis Date   • Alcohol abuse    • Anxiety and depression    • Chronic pain     back   • Gallstones    • Hypercholesteremia    • Hypertension    • Clarke-clarke disease    • Pancreatitis    • Stroke (cerebrum) (CMS/HCC)        No Known Allergies    Past Surgical History:   Procedure Laterality Date   • BRAIN SURGERY  2007    Clarke Clarke    • BREAST SURGERY     • CHOLECYSTECTOMY WITH INTRAOPERATIVE CHOLANGIOGRAM N/A 4/18/2019    Procedure: CHOLECYSTECTOMY LAPAROSCOPIC WITH LIVER BIOPSY;  Surgeon: Viviana Boles MD;  Location: [facility];  Service: General   • TUBAL ABDOMINAL LIGATION         Family History   Problem Relation Age of Onset   • Breast cancer Paternal Grandmother    • Breast cancer Other    • Lung cancer Mother    • Lymphoma Father        Social History     Socioeconomic History   • Marital status:      Spouse name: Not on file   • Number of children: Not on file   • Years of education: Not on file   • Highest education level: Not on file   Tobacco Use   • Smoking status: Current Every Day Smoker     Packs/day: 0.50     Types: Cigarettes   • Smokeless tobacco: Never Used   Substance and Sexual Activity   • Alcohol use: Yes     Alcohol/week: 1.0 standard drinks     Types: 1 Cans of beer per week     Frequency: Never     Comment: STATES SHE QUIT DRINKING BUT HAD A BEER TODAY   •  Drug use: No   • Sexual activity: Defer           Objective   Physical Exam   Constitutional: She is oriented to person, place, and time. She appears well-developed and well-nourished.   HENT:   Head: Normocephalic and atraumatic.   Eyes: Pupils are equal, round, and reactive to light. Conjunctivae are normal.   Cardiovascular: Normal rate, regular rhythm and normal heart sounds.   Pulmonary/Chest: Effort normal and breath sounds normal.   Abdominal: Soft. Bowel sounds are normal. There is tenderness in the epigastric area.   Neurological: She is alert and oriented to person, place, and time.   Skin: Skin is warm and dry. Capillary refill takes less than 2 seconds.   Psychiatric: She has a normal mood and affect.   Nursing note and vitals reviewed.      Procedures           ED Course  ED Course as of Jan 02 0001 Wed Jan 01, 2020 2123 I gone back in and reevaluated the patient several times.  The patient seems to be acting somewhat altered although her alcohol level is 0.03.  Her urine drug screen was positive for amphetamines and oxycodone.  She is prescribed Norco.  I do not see any medications on her medication list that would be quite the amphetamines on her drug screen.  The patient tells me she has not drank any grain alcohol, rubbing alcohol or other substances.  Gone ahead and added some more labs and a CT scan of the head on the patient.    [LF]   1135 CT scan of the head was read by stat read radiology.  This shows no acute infarct, hemorrhage, mass, or edema.  Patchy areas of hypoattenuation within the right cerebral hemisphere, likely remote infarct.  Evidence for right-sided craniotomy.  No acute fracture.  The patient's other labs are reevaluated.  I did go back and speak with the patient.  She continues to be altered.  She is able to tell me her first name but does mess up on her last name.  She is then able to tell me the correct last name.  She is able to tell me her date of birth.  She is able  to tell me where she is at.  She tells me that the month is March and then June.  She does eventually tell me that is January.  The patient is slurring her words.  Again she continues to be altered.    [LF]   Thu Jan 02, 2020   0000 At this time the case was discussed with the hospitalist via Dr. Mixon who is going to evaluate the patient.  The patient be admitted observation at this time.    [LF]      ED Course User Index  [LF] Rebecca Barrios, APRN                                     XR Chest 1 View   Final Result   1. No radiographic evidence of acute cardiopulmonary process.           This report was finalized on 01/01/2020 21:56 by Dr. Ben Atkins MD.      CT Abdomen Pelvis With Contrast   Final Result   1. There is an 8 mm pancreatic cystic lesion that most likely represents   a pseudocyst. This was not present on the previous exam. There are no   other signs of pancreatitis.    2. No other evidence of acute abdominopelvic process.           This report was finalized on 01/01/2020 19:08 by Dr. Ben Atkins MD.      CT Head Without Contrast    (Results Pending)     Labs Reviewed   COMPREHENSIVE METABOLIC PANEL - Abnormal; Notable for the following components:       Result Value    Creatinine 0.55 (*)     CO2 21.0 (*)     AST (SGOT) 47 (*)     Anion Gap 17.0 (*)     All other components within normal limits    Narrative:     GFR Normal >60  Chronic Kidney Disease <60  Kidney Failure <15     LIPASE - Abnormal; Notable for the following components:    Lipase 7 (*)     All other components within normal limits   URINALYSIS W/ CULTURE IF INDICATED - Abnormal; Notable for the following components:    Ketones, UA Trace (*)     All other components within normal limits    Narrative:     Urine microscopic not indicated.   URINE DRUG SCREEN - Abnormal; Notable for the following components:    Amphetamine Screen, Urine Positive (*)     Oxycodone Screen, Urine Positive (*)     All other components within normal  limits    Narrative:     Cutoff For Drugs Screened:    Amphetamines               500 ng/ml  Barbiturates               200 ng/ml  Benzodiazepines            150 ng/ml  Cocaine                    150 ng/ml  Methadone                  200 ng/ml  Opiates                    100 ng/ml  Phencyclidine               25 ng/ml  THC                            50 ng/ml  Methamphetamine            500 ng/ml  Tricyclic Antidepressants  300 ng/ml  Oxycodone                  100 ng/ml  Propoxyphene               300 ng/ml  Buprenorphine               10 ng/ml    The normal value for all drugs tested is negative. This report includes unconfirmed screening results, with the cutoff values listed, to be used for medical treatment purposes only.  Unconfirmed results must not be used for non-medical purposes such as employment or legal testing.  Clinical consideration should be applied to any drug of abuse test, particularly when unconfirmed results are used.     CBC WITH AUTO DIFFERENTIAL - Abnormal; Notable for the following components:    Platelets 72 (*)     All other components within normal limits   OSMOLALITY, SERUM - Abnormal; Notable for the following components:    Osmolality 286 (*)     All other components within normal limits   BLOOD GAS, ARTERIAL - Abnormal; Notable for the following components:    pH, Arterial 7.313 (*)     Base Excess, Arterial -4.3 (*)     All other components within normal limits   MANUAL DIFFERENTIAL - Abnormal; Notable for the following components:    Monocyte % 2.0 (*)     All other components within normal limits   AMMONIA - Normal   TROPONIN (IN-HOUSE) - Normal    Narrative:     Troponin T Reference Range:  <= 0.03 ng/mL-   Negative for AMI  >0.03 ng/mL-     Abnormal for myocardial necrosis.  Clinicians would have to utilize clinical acumen, EKG, Troponin and serial changes to determine if it is an Acute Myocardial Infarction or myocardial injury due to an underlying chronic condition.    LACTIC  ACID, PLASMA - Normal   SALICYLATE LEVEL - Normal   ETHANOL    Narrative:     Not for legal purposes. Chain of Custody not followed.    BLOOD GAS, ARTERIAL   CBC AND DIFFERENTIAL    Narrative:     The following orders were created for panel order CBC & Differential.  Procedure                               Abnormality         Status                     ---------                               -----------         ------                     CBC Auto Differential[[id number]]        Abnormal            Final result                 Please view results for these tests on the individual orders.               MDM  Number of Diagnoses or Management Options  Altered mental status, unspecified altered mental status type: new and requires workup  Pancreatic pseudocyst: new and requires workup     Amount and/or Complexity of Data Reviewed  Clinical lab tests: ordered and reviewed  Tests in the radiology section of CPT®: ordered and reviewed  Tests in the medicine section of CPT®: ordered and reviewed  Decide to obtain previous medical records or to obtain history from someone other than the patient: yes  Discuss the patient with other providers: yes    Patient Progress  Patient progress: stable      Final diagnoses:   Altered mental status, unspecified altered mental status type   Pancreatic pseudocyst            Rebecca Barrios, APRN  01/02/20 0001

## 2020-01-02 VITALS
OXYGEN SATURATION: 97 % | WEIGHT: 115.6 LBS | BODY MASS INDEX: 19.74 KG/M2 | DIASTOLIC BLOOD PRESSURE: 74 MMHG | RESPIRATION RATE: 16 BRPM | HEIGHT: 64 IN | SYSTOLIC BLOOD PRESSURE: 125 MMHG | TEMPERATURE: 97.7 F | HEART RATE: 78 BPM

## 2020-01-02 PROBLEM — G93.40 ACUTE ENCEPHALOPATHY: Status: ACTIVE | Noted: 2020-01-02

## 2020-01-02 PROBLEM — K86.3 PANCREATIC PSEUDOCYST: Status: ACTIVE | Noted: 2020-01-02

## 2020-01-02 PROBLEM — E87.29 HIGH ANION GAP METABOLIC ACIDOSIS: Status: ACTIVE | Noted: 2020-01-02

## 2020-01-02 PROBLEM — F15.10 METHAMPHETAMINE ABUSE (HCC): Status: ACTIVE | Noted: 2020-01-02

## 2020-01-02 PROBLEM — K86.1 CHRONIC PANCREATITIS (HCC): Status: ACTIVE | Noted: 2020-01-02

## 2020-01-02 PROBLEM — D69.6 THROMBOCYTOPENIA: Status: ACTIVE | Noted: 2020-01-02

## 2020-01-02 LAB
ALBUMIN SERPL-MCNC: 3.7 G/DL (ref 3.5–5.2)
ALBUMIN/GLOB SERPL: 1.4 G/DL
ALP SERPL-CCNC: 101 U/L (ref 39–117)
ALT SERPL W P-5'-P-CCNC: 25 U/L (ref 1–33)
AMPHET+METHAMPHET UR QL: POSITIVE
AMPHETAMINES UR QL: POSITIVE
ANION GAP SERPL CALCULATED.3IONS-SCNC: 19 MMOL/L (ref 5–15)
AST SERPL-CCNC: 38 U/L (ref 1–32)
BARBITURATES UR QL SCN: NEGATIVE
BENZODIAZ UR QL SCN: NEGATIVE
BILIRUB SERPL-MCNC: 1.5 MG/DL (ref 0.2–1.2)
BUN BLD-MCNC: 8 MG/DL (ref 6–20)
BUN/CREAT SERPL: 15.7 (ref 7–25)
BUPRENORPHINE SERPL-MCNC: NEGATIVE NG/ML
CALCIUM SPEC-SCNC: 8.2 MG/DL (ref 8.6–10.5)
CANNABINOIDS SERPL QL: NEGATIVE
CHLORIDE SERPL-SCNC: 102 MMOL/L (ref 98–107)
CO2 SERPL-SCNC: 16 MMOL/L (ref 22–29)
COCAINE UR QL: NEGATIVE
CREAT BLD-MCNC: 0.51 MG/DL (ref 0.57–1)
DEPRECATED RDW RBC AUTO: 47.6 FL (ref 37–54)
ERYTHROCYTE [DISTWIDTH] IN BLOOD BY AUTOMATED COUNT: 14.4 % (ref 12.3–15.4)
GFR SERPL CREATININE-BSD FRML MDRD: 129 ML/MIN/1.73
GLOBULIN UR ELPH-MCNC: 2.6 GM/DL
GLUCOSE BLD-MCNC: 60 MG/DL (ref 65–99)
HCT VFR BLD AUTO: 36.5 % (ref 34–46.6)
HGB BLD-MCNC: 12.5 G/DL (ref 12–15.9)
MCH RBC QN AUTO: 31.1 PG (ref 26.6–33)
MCHC RBC AUTO-ENTMCNC: 34.2 G/DL (ref 31.5–35.7)
MCV RBC AUTO: 90.8 FL (ref 79–97)
METHADONE UR QL SCN: NEGATIVE
OPIATES UR QL: NEGATIVE
OXYCODONE UR QL SCN: POSITIVE
PCP UR QL SCN: NEGATIVE
PLATELET # BLD AUTO: 71 10*3/MM3 (ref 140–450)
PMV BLD AUTO: 10.5 FL (ref 6–12)
POTASSIUM BLD-SCNC: 4.1 MMOL/L (ref 3.5–5.2)
PROPOXYPH UR QL: NEGATIVE
PROT SERPL-MCNC: 6.3 G/DL (ref 6–8.5)
RBC # BLD AUTO: 4.02 10*6/MM3 (ref 3.77–5.28)
SODIUM BLD-SCNC: 137 MMOL/L (ref 136–145)
TRICYCLICS UR QL SCN: NEGATIVE
WBC NRBC COR # BLD: 3.7 10*3/MM3 (ref 3.4–10.8)

## 2020-01-02 PROCEDURE — 94799 UNLISTED PULMONARY SVC/PX: CPT

## 2020-01-02 PROCEDURE — G0378 HOSPITAL OBSERVATION PER HR: HCPCS

## 2020-01-02 PROCEDURE — 92610 EVALUATE SWALLOWING FUNCTION: CPT | Performed by: SPEECH-LANGUAGE PATHOLOGIST

## 2020-01-02 PROCEDURE — 80307 DRUG TEST PRSMV CHEM ANLYZR: CPT | Performed by: INTERNAL MEDICINE

## 2020-01-02 PROCEDURE — 96372 THER/PROPH/DIAG INJ SC/IM: CPT

## 2020-01-02 PROCEDURE — 94760 N-INVAS EAR/PLS OXIMETRY 1: CPT

## 2020-01-02 PROCEDURE — 85027 COMPLETE CBC AUTOMATED: CPT | Performed by: INTERNAL MEDICINE

## 2020-01-02 PROCEDURE — 25010000002 ENOXAPARIN PER 10 MG: Performed by: INTERNAL MEDICINE

## 2020-01-02 PROCEDURE — 80053 COMPREHEN METABOLIC PANEL: CPT | Performed by: INTERNAL MEDICINE

## 2020-01-02 RX ORDER — HYDROCODONE BITARTRATE AND ACETAMINOPHEN 7.5; 325 MG/1; MG/1
1 TABLET ORAL EVERY 8 HOURS PRN
Status: DISCONTINUED | OUTPATIENT
Start: 2020-01-02 | End: 2020-01-02 | Stop reason: HOSPADM

## 2020-01-02 RX ORDER — SODIUM CHLORIDE, SODIUM LACTATE, POTASSIUM CHLORIDE, CALCIUM CHLORIDE 600; 310; 30; 20 MG/100ML; MG/100ML; MG/100ML; MG/100ML
100 INJECTION, SOLUTION INTRAVENOUS CONTINUOUS
Status: DISCONTINUED | OUTPATIENT
Start: 2020-01-02 | End: 2020-01-02 | Stop reason: HOSPADM

## 2020-01-02 RX ORDER — SODIUM CHLORIDE 0.9 % (FLUSH) 0.9 %
10 SYRINGE (ML) INJECTION EVERY 12 HOURS SCHEDULED
Status: DISCONTINUED | OUTPATIENT
Start: 2020-01-02 | End: 2020-01-02 | Stop reason: HOSPADM

## 2020-01-02 RX ORDER — TIZANIDINE 4 MG/1
4 TABLET ORAL EVERY 8 HOURS PRN
Status: DISCONTINUED | OUTPATIENT
Start: 2020-01-02 | End: 2020-01-02 | Stop reason: HOSPADM

## 2020-01-02 RX ORDER — SODIUM CHLORIDE 0.9 % (FLUSH) 0.9 %
10 SYRINGE (ML) INJECTION AS NEEDED
Status: DISCONTINUED | OUTPATIENT
Start: 2020-01-02 | End: 2020-01-02 | Stop reason: HOSPADM

## 2020-01-02 RX ORDER — ROSUVASTATIN CALCIUM 10 MG/1
10 TABLET, COATED ORAL DAILY
Status: DISCONTINUED | OUTPATIENT
Start: 2020-01-02 | End: 2020-01-02 | Stop reason: HOSPADM

## 2020-01-02 RX ORDER — THIAMINE MONONITRATE (VIT B1) 100 MG
100 TABLET ORAL DAILY
Status: DISCONTINUED | OUTPATIENT
Start: 2020-01-02 | End: 2020-01-02 | Stop reason: HOSPADM

## 2020-01-02 RX ORDER — FLUOXETINE HYDROCHLORIDE 20 MG/1
20 CAPSULE ORAL DAILY
Status: DISCONTINUED | OUTPATIENT
Start: 2020-01-02 | End: 2020-01-02 | Stop reason: HOSPADM

## 2020-01-02 RX ADMIN — HYDROCODONE BITARTRATE AND ACETAMINOPHEN 1 TABLET: 7.5; 325 TABLET ORAL at 10:56

## 2020-01-02 RX ADMIN — ENOXAPARIN SODIUM 40 MG: 40 INJECTION SUBCUTANEOUS at 07:58

## 2020-01-02 NOTE — PROGRESS NOTES
Continued Stay Note  [facility]     Patient Name: aMureen Best  MRN: [id number]  Today's Date: 1/2/2020    Admit Date: 1/1/2020    Discharge Plan     Row Name 01/02/20 1357       Plan    Final Discharge Disposition Code  01 - home or self-care    Final Note  PROVIDED PT WITH [facility]ER TO [facility]. NO OTHER DC NEEDS        Discharge Codes    No documentation.       Expected Discharge Date and Time     Expected Discharge Date Expected Discharge Time    Jan 2, 2020             DEMIAN Han

## 2020-01-02 NOTE — DISCHARGE SUMMARY
[facility] Medicine Services  DISCHARGE SUMMARY       Date of Admission: 1/1/2020  Date of Discharge:  1/2/2020  Primary Care Physician: Chaitanya Padilla DO    Presenting Problem/History of Present Illness:  confusion    Final Discharge Diagnoses:  Active Hospital Problems    Diagnosis   • **Acute encephalopathy   • High anion gap metabolic acidosis   • Thrombocytopenia (CMS/HCC)   • Pancreatic pseudocyst   • Chronic pancreatitis (CMS/HCC)   • Methamphetamine abuse (CMS/HCC)   • Tobacco abuse   • Alcohol abuse   • Anxiety and depression       Consults: None    Procedures Performed: None    Pertinent Test Results:   Imaging Results (Last 7 Days)     Procedure Component Value Units Date/Time    CT Head Without Contrast [[id number]] Collected:  01/02/20 0712     Updated:  01/02/20 0717    Narrative:       CT HEAD WO CONTRAST- 1/1/2020 10:04 PM CST     HISTORY: AMS      COMPARISON: 06/18/2019     DOSE LENGTH PRODUCT: 603 mGy cm. Automated exposure control was also  utilized to decrease patient radiation dose.     TECHNIQUE: Axial images of brain obtained without contrast.     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 There is encephalomalacia of the right frontal lobe  likely from old infarct. There are right craniotomy changes. There is no  intracranial hemorrhage or mass effect. No convincing acute signs of  ischemia. No extra-axial hematoma or subarachnoid hemorrhage.     The visible paranasal sinuses and mastoid air cells are well-aerated.  Bony calvarium appears intact.       Impression:       1. Old ischemic changes of the right cerebral hemisphere. No acute  intracranial process. Right craniotomy changes.     Comments: A preliminary report is issued to the ER by the [facility]  radiology service. I agree with this preliminary impression.  This report was finalized on 01/02/2020 07:14 by Dr. Monik Mccarthy MD.    XR Chest 1 View  [[id number]] Collected:  01/01/20 2156     Updated:  01/01/20 2159    Narrative:       XR CHEST 1 VW- 1/1/2020 9:50 PM CST     HISTORY: Altered mental status       COMPARISON: 06/18/2019.     FINDINGS:   The lungs are clear. The cardiomediastinal silhouette and pulmonary  vascularity are unchanged.      The osseous structures and surrounding soft tissues demonstrate no acute  abnormality.       Impression:       1. No radiographic evidence of acute cardiopulmonary process.        This report was finalized on 01/01/2020 21:56 by Dr. Ben Atkins MD.    CT Abdomen Pelvis With Contrast [[id number]] Collected:  01/01/20 1906     Updated:  01/01/20 1912    Narrative:       CT ABDOMEN PELVIS W CONTRAST- 1/1/2020 6:57 PM CST     HISTORY: upper abd pain, hx of pancreatitis, alcohol abuse       COMPARISON: None.      DOSE LENGTH PRODUCT: 181 mGy cm. Automated exposure control was also  utilized to decrease patient radiation dose.     TECHNIQUE: Following the intravenous administration of contrast, helical  CT tomographic images of the abdomen and pelvis were acquired.  Multiplanar reformatted images were provided for review.      FINDINGS:   LOWER CHEST: The lung bases and base of the heart are unremarkable.      LIVER: No focal liver lesion. The hepatic vasculature is patent.      BILIARY SYSTEM: The gallbladder has been removed. There is postoperative  dilation of the common bile duct.      PANCREAS: There is an 8 mm cystic structure in the body of the pancreas  on axial image 27. This was not appreciated on the previous study and is  likely due to a pseudocyst. No significant peripancreatic inflammation  is identified. No masses are seen.      SPLEEN: The spleen is borderline enlarged.      KIDNEYS: Bilateral kidneys are unremarkable. The ureters are  decompressed and normal in appearance.     ADRENALS: Unremarkable.     RETROPERITONEUM: No mass, lymphadenopathy or hemorrhage.      GI TRACT: No evidence of obstruction or  bowel wall thickening. The  appendix is visualized and unremarkable.     OTHER: There is no mesenteric mass, lymphadenopathy or fluid collection.  The abdominopelvic vasculature is patent. The osseous structures and  soft tissues demonstrate no worrisome lesions.     PELVIS: No mass lesion, fluid collection or significant lymphadenopathy  is seen in the pelvis. The urinary bladder is normal in appearance.       Impression:       1. There is an 8 mm pancreatic cystic lesion that most likely represents  a pseudocyst. This was not present on the previous exam. There are no  other signs of pancreatitis.   2. No other evidence of acute abdominopelvic process.        This report was finalized on 01/01/2020 19:08 by Dr. Ben Atkins MD.        Lab Results (last 7 days)     Procedure Component Value Units Date/Time    Comprehensive Metabolic Panel [[id number]]  (Abnormal) Collected:  01/02/20 0726    Specimen:  Blood Updated:  01/02/20 0805     Glucose 60 mg/dL      BUN 8 mg/dL      Creatinine 0.51 mg/dL      Sodium 137 mmol/L      Potassium 4.1 mmol/L      Chloride 102 mmol/L      CO2 16.0 mmol/L      Calcium 8.2 mg/dL      Total Protein 6.3 g/dL      Albumin 3.70 g/dL      ALT (SGPT) 25 U/L      AST (SGOT) 38 U/L      Alkaline Phosphatase 101 U/L      Total Bilirubin 1.5 mg/dL      eGFR Non African Amer 129 mL/min/1.73      Globulin 2.6 gm/dL      A/G Ratio 1.4 g/dL      BUN/Creatinine Ratio 15.7     Anion Gap 19.0 mmol/L     Narrative:       GFR Normal >60  Chronic Kidney Disease <60  Kidney Failure <15      CBC (No Diff) [[id number]]  (Abnormal) Collected:  01/02/20 0726    Specimen:  Blood Updated:  01/02/20 0747     WBC 3.70 10*3/mm3      RBC 4.02 10*6/mm3      Hemoglobin 12.5 g/dL      Hematocrit 36.5 %      MCV 90.8 fL      MCH 31.1 pg      MCHC 34.2 g/dL      RDW 14.4 %      RDW-SD 47.6 fl      MPV 10.5 fL      Platelets 71 10*3/mm3     Osmolality, Serum [[id number]]  (Abnormal) Collected:  01/01/20 2222     Specimen:  Blood Updated:  01/01/20 2339     Osmolality 286 mOsm/kg     Blood Gas, Arterial [[id number]]  (Abnormal) Collected:  01/01/20 2143    Specimen:  Arterial Blood Updated:  01/01/20 2249     Site Right Radial     Mark's Test Positive     pH, Arterial 7.313 pH units      Comment: 84 Value below reference range        pCO2, Arterial 43.1 mm Hg      pO2, Arterial 88.1 mm Hg      HCO3, Arterial 21.9 mmol/L      Base Excess, Arterial -4.3 mmol/L      Comment: 84 Value below reference range        O2 Saturation, Arterial 96.9 %      Temperature 37.0 C      Barometric Pressure for Blood Gas 746 mmHg      Modality Room Air     Ventilator Mode NA     Comment: Meter: [id number]     :  [id number]        Collected by [id number]     Comment: Appended report. These results have been appended to a previously final verified report.       Lactic Acid, Plasma [[id number]]  (Normal) Collected:  01/01/20 2223    Specimen:  Blood Updated:  01/01/20 2245     Lactate 0.6 mmol/L     Salicylate Level [[id number]]  (Normal) Collected:  01/01/20 1750    Specimen:  Blood Updated:  01/01/20 2134     Salicylate <0.3 mg/dL     Urine Drug Screen - Urine, Catheter In/Out [[id number]]  (Abnormal) Collected:  01/01/20 2034    Specimen:  Urine, Catheter In/Out Updated:  01/01/20 2055     THC, Screen, Urine Negative     Phencyclidine (PCP), Urine Negative     Cocaine Screen, Urine Negative     Methamphetamine, Ur Negative     Opiate Screen Negative     Amphetamine Screen, Urine Positive     Benzodiazepine Screen, Urine Negative     Tricyclic Antidepressants Screen Negative     Methadone Screen, Urine Negative     Barbiturates Screen, Urine Negative     Oxycodone Screen, Urine Positive     Propoxyphene Screen Negative     Buprenorphine, Screen, Urine Negative    Narrative:       Cutoff For Drugs Screened:    Amphetamines               500 ng/ml  Barbiturates               200 ng/ml  Benzodiazepines            150 ng/ml  Cocaine                     150 ng/ml  Methadone                  200 ng/ml  Opiates                    100 ng/ml  Phencyclidine               25 ng/ml  THC                            50 ng/ml  Methamphetamine            500 ng/ml  Tricyclic Antidepressants  300 ng/ml  Oxycodone                  100 ng/ml  Propoxyphene               300 ng/ml  Buprenorphine               10 ng/ml    The normal value for all drugs tested is negative. This report includes unconfirmed screening results, with the cutoff values listed, to be used for medical treatment purposes only.  Unconfirmed results must not be used for non-medical purposes such as employment or legal testing.  Clinical consideration should be applied to any drug of abuse test, particularly when unconfirmed results are used.      Urinalysis With Culture If Indicated - Urine, Catheter In/Out [[id number]]  (Abnormal) Collected:  01/01/20 2034    Specimen:  Urine, Catheter In/Out Updated:  01/01/20 2051     Color, UA Yellow     Appearance, UA Clear     pH, UA <=5.0     Specific Gravity, UA >=1.030     Glucose, UA Negative     Ketones, UA Trace     Bilirubin, UA Negative     Blood, UA Negative     Protein, UA Negative     Leuk Esterase, UA Negative     Nitrite, UA Negative     Urobilinogen, UA 0.2 E.U./dL    Narrative:       Urine microscopic not indicated.    CBC & Differential [[id number]] Collected:  01/01/20 1750    Specimen:  Blood Updated:  01/01/20 1821    Narrative:       The following orders were created for panel order CBC & Differential.  Procedure                               Abnormality         Status                     ---------                               -----------         ------                     CBC Auto Differential[[id number]]        Abnormal            Final result                 Please view results for these tests on the individual orders.    CBC Auto Differential [[id number]]  (Abnormal) Collected:  01/01/20 1750    Specimen:  Blood Updated:  01/01/20 1821     WBC  5.20 10*3/mm3      RBC 4.31 10*6/mm3      Hemoglobin 13.3 g/dL      Hematocrit 38.1 %      MCV 88.4 fL      MCH 30.9 pg      MCHC 34.9 g/dL      RDW 14.3 %      RDW-SD 46.4 fl      MPV 10.9 fL      Platelets 72 10*3/mm3     Manual Differential [[id number]]  (Abnormal) Collected:  01/01/20 1750    Specimen:  Blood Updated:  01/01/20 1821     Neutrophil % 56.6 %      Lymphocyte % 37.4 %      Monocyte % 2.0 %      Eosinophil % 2.0 %      Basophil % 1.0 %      Atypical Lymphocyte % 1.0 %      Neutrophils Absolute 2.94 10*3/mm3      Lymphocytes Absolute 1.94 10*3/mm3      Monocytes Absolute 0.10 10*3/mm3      Eosinophils Absolute 0.10 10*3/mm3      Basophils Absolute 0.05 10*3/mm3      Anisocytosis Slight/1+     Microcytes Slight/1+     Polychromasia Slight/1+     WBC Morphology Normal     Platelet Estimate Decreased    Ammonia [[id number]]  (Normal) Collected:  01/01/20 1750    Specimen:  Blood Updated:  01/01/20 1818     Ammonia 47 umol/L     Comprehensive Metabolic Panel [[id number]]  (Abnormal) Collected:  01/01/20 1750    Specimen:  Blood Updated:  01/01/20 1817     Glucose 82 mg/dL      BUN 6 mg/dL      Creatinine 0.55 mg/dL      Sodium 140 mmol/L      Potassium 3.5 mmol/L      Chloride 102 mmol/L      CO2 21.0 mmol/L      Calcium 8.7 mg/dL      Total Protein 6.9 g/dL      Albumin 4.30 g/dL      ALT (SGPT) 30 U/L      AST (SGOT) 47 U/L      Alkaline Phosphatase 104 U/L      Total Bilirubin 1.2 mg/dL      eGFR Non African Amer 118 mL/min/1.73      Globulin 2.6 gm/dL      A/G Ratio 1.7 g/dL      BUN/Creatinine Ratio 10.9     Anion Gap 17.0 mmol/L     Narrative:       GFR Normal >60  Chronic Kidney Disease <60  Kidney Failure <15      Lipase [[id number]]  (Abnormal) Collected:  01/01/20 1750    Specimen:  Blood Updated:  01/01/20 1817     Lipase 7 U/L     Troponin [[id number]]  (Normal) Collected:  01/01/20 1750    Specimen:  Blood Updated:  01/01/20 1817     Troponin T <0.010 ng/mL     Narrative:       Troponin T  "Reference Range:  <= 0.03 ng/mL-   Negative for AMI  >0.03 ng/mL-     Abnormal for myocardial necrosis.  Clinicians would have to utilize clinical acumen, EKG, Troponin and serial changes to determine if it is an Acute Myocardial Infarction or myocardial injury due to an underlying chronic condition.     Ethanol [[id number]] Collected:  01/01/20 1750    Specimen:  Blood Updated:  01/01/20 1817     Ethanol % 0.035 %     Narrative:       Not for legal purposes. Chain of Custody not followed.         Hospital Course:  The patient is a 49 y.o. female who presented to [facility] with confusion.    Ms. Best is a 49-year-old female known to me from previous admissions.  Patient has a long history of alcohol abuse, other substance abuse, tobacco abuse, chronic pancreatitis with numerous acute flares, pancreatic pseudocyst, and thrombocytopenia due to portal hypertension.  Patient presents last night as she was found \"wandering\" [place] [employer].  Patient had been to her friends house at the [facility], and indicates that she had only had \"one drink\".  However today she indicates that she may have had more, and she has relapsed.  Patient also reports that she recently did methamphetamine, but indicates that it was \"one-time only\".  The patient indicates that she is under a lot of stress and this caused her to relapse.     In the emergency department, the patient had a CT scan of her head, and her abdomen and pelvis.  These were grossly unremarkable, the CT of her abdomen and pelvis only showed a very small pseudocyst.  The patient is not complaining of any abdominal pain.  Her lipase was found to be normal.  Her urine drug screen was notable to have amphetamines and opiates.  She does have a prescription for opiates that she regularly takes.  Patient was also noted to have thrombocytopenia, but she has this at baseline, platelets were noted to be 71,000.  Patient was also noted to have a mild " "elevation in her bilirubin and her AST.  Lipase was normal.  Patient had a mild anion gap acidosis, likely due to starvation ketoacidosis and alcohol induced ketoacidosis.  She is completely sober this morning, and is back to her baseline.  Patient is requesting to go home.    Patient was treated with IV fluids.  Patient is noted to have a mild anion gap acidosis, likely due to ketoacidosis from starvation and alcohol induced.  Patient is back to her baseline mental status.  Patient appropriate for discharge.  Counseled patient extensively on alcohol, methamphetamine, and tobacco cessation.  I requested that the patient notify her sponsor about her relapse.  Recommend that she follow-up with her primary care provider in 1 to 2 weeks.        Physical Exam on Discharge:  /79 (BP Location: Right arm, Patient Position: Lying)   Pulse 84   Temp 97.8 °F (36.6 °C) (Oral)   Resp 16   Ht 161.5 cm (63.6\")   Wt 52.4 kg (115 lb 9.6 oz)   SpO2 98%   BMI 20.09 kg/m²   Physical Exam  Constitutional: She is oriented to person, place, and time. No distress.   HENT:   Head: Normocephalic and atraumatic.   Eyes: Conjunctivae are normal. No scleral icterus.   Neck: Neck supple. No JVD present.   Cardiovascular: Normal rate and regular rhythm.   No murmur heard.  Pulmonary/Chest: Effort normal and breath sounds normal. No stridor. No respiratory distress. She has no wheezes.   Abdominal: Soft. Bowel sounds are normal. She exhibits no distension. There is no tenderness. There is no guarding.   Neurological: She is alert and oriented to person, place, and time.   Skin: Skin is warm and dry. She is not diaphoretic. No erythema.   Psychiatric: She has a normal mood and affect. Her behavior is normal.   Nursing note and vitals reviewed.    Condition on Discharge: Stable    Discharge Disposition:  Home or Self Care    Discharge Medications:     Discharge Medications      Continue These Medications      Instructions Start Date "   aspirin 325 MG tablet   325 mg, Oral, Daily      diclofenac 75 MG EC tablet  Commonly known as:  VOLTAREN   75 mg, Oral, 2 Times Daily      FLUoxetine 20 MG capsule  Commonly known as:  PROzac   20 mg, Oral, Daily      folic acid 1 MG tablet  Commonly known as:  FOLVITE   1 mg, Oral, Daily      gabapentin 600 MG tablet  Commonly known as:  NEURONTIN   600 mg, Oral, 3 Times Daily      HYDROcodone-acetaminophen 7.5-325 MG per tablet  Commonly known as:  NORCO   1 tablet, Oral, Every 8 Hours PRN      lamoTRIgine 150 MG tablet  Commonly known as:  LaMICtal   150 mg, Oral, 2 Times Daily      nicotine 21 MG/24HR patch  Commonly known as:  NICODERM CQ   1 patch, Transdermal, Every 24 Hours Scheduled      ondansetron 4 MG tablet  Commonly known as:  ZOFRAN   4 mg, Oral, Every 8 Hours PRN      rosuvastatin 10 MG tablet  Commonly known as:  CRESTOR   10 mg, Oral, Daily      thiamine 100 MG tablet  Commonly known as:  VITAMIN B1   100 mg, Oral, Daily      tiZANidine 4 MG tablet  Commonly known as:  ZANAFLEX   4 mg, Oral, Every 8 Hours PRN      valsartan 160 MG tablet  Commonly known as:  DIOVAN   160 mg, Oral, Daily      venlafaxine  MG 24 hr capsule  Commonly known as:  EFFEXOR-XR   150 mg, Oral, Daily           Discharge Diet:   Activity at Discharge:     Follow-up Appointments:   No future appointments.    Test Results Pending at Discharge: Repeat UDS     Todd Chaparro MD  01/02/20  10:54 AM    Time: 30 minutes.

## 2020-01-02 NOTE — PLAN OF CARE
Problem: Patient Care Overview  Goal: Plan of Care Review  Outcome: Ongoing (interventions implemented as appropriate)  Flowsheets (Taken 1/2/2020 1386)  Progress: no change (Initial Evaluation)  Plan of Care Reviewed With: patient;other (see comments) (MARIBEL Ames)  Outcome Summary: Clinical bedside swallow evaluation completed. Patient admitted with AMS. Is alert and cooperative with SLP this AM. Oral motor assessment was unremarkable. The patient completed a full range of consistencies except for mechanical soft. No overt s/s of aspiration are observed. Vocal quality remains unchanged. Functional rotary chew with regular solid. SLP unable to fully rule out aspiration at bedside; however, with current level of alertness patient is at a low risk for aspiration. SLP recommends: 1) Regular diet 2) Thin liquids 3) Meds whole with thin liquids 4) Oral care and standard swallow precautions. SLP to sign off at this time. If concerns arise with toleration or decrease in alertness occurs, please re-consult SLP.

## 2020-01-02 NOTE — PLAN OF CARE
Problem: Patient Care Overview  Goal: Plan of Care Review  Outcome: Ongoing (interventions implemented as appropriate)  Flowsheets (Taken 1/2/2020 8892)  Progress: no change  Plan of Care Reviewed With: patient  Outcome Summary: Patient rested well through the night. C/O headache, but falls asleep. VSS, Safety maintained.

## 2020-01-02 NOTE — THERAPY DISCHARGE NOTE
Acute Care - Speech Language Pathology   Swallow Eval/Discharge [facility]     Patient Name: Maureen Best  : 1970  MRN: [id number]  Today's Date: 2020               Admit Date: 2020  Clinical bedside swallow evaluation completed. Patient admitted with AMS. Is alert and cooperative with SLP this AM. Oral motor assessment was unremarkable. The patient completed a full range of consistencies except for mechanical soft. No overt s/s of aspiration are observed. Vocal quality remains unchanged. Functional rotary chew with regular solid. SLP unable to fully rule out aspiration at bedside; however, with current level of alertness patient is at a low risk for aspiration. SLP recommends: 1) Regular diet 2) Thin liquids 3) Meds whole with thin liquids 4) Oral care and standard swallow precautions. SLP to sign off at this time. If concerns arise with toleration or decrease in alertness occurs, please re-consult SLP.   Jessica Bianchi, MS CCC-SLP 2020 9:00 AM    Visit Dx:    ICD-10-CM ICD-9-CM   1. Altered mental status, unspecified altered mental status type R41.82 780.97   2. Pancreatic pseudocyst K86.3 577.2   3. Dysphagia, unspecified type R13.10 787.20     Patient Active Problem List   Diagnosis   • Alcohol-induced acute pancreatitis   • Essential hypertension   • Hypercholesteremia   • Acute epigastric pain   • Anxiety and depression   • DTs (delirium tremens) (CMS/HCC)   • Thrombocytopenia concurrent with and due to alcoholism (CMS/HCC)   • Pancytopenia associated with pancreatitis (CMS/HCC)   • Hyponatremia   • Pain   • Acute pancreatitis   • Hypokalemia   • E. coli UTI (urinary tract infection)   • TIA (transient ischemic attack)   • Alcohol abuse   • Clarke-clarke disease   • Tobacco abuse   • Altered mental status   • Acute encephalopathy   • High anion gap metabolic acidosis   • Thrombocytopenia (CMS/HCC)     Past Medical History:   Diagnosis Date   • Alcohol abuse    • Anxiety and depression     • Chronic pain     back   • Gallstones    • Hypercholesteremia    • Hypertension    • Clarke-clarke disease    • Pancreatitis    • Stroke (cerebrum) (CMS/HCC)      Past Surgical History:   Procedure Laterality Date   • BRAIN SURGERY  2007    Clarke Clarke    • BREAST SURGERY     • CHOLECYSTECTOMY WITH INTRAOPERATIVE CHOLANGIOGRAM N/A 4/18/2019    Procedure: CHOLECYSTECTOMY LAPAROSCOPIC WITH LIVER BIOPSY;  Surgeon: Viviana Boles MD;  Location: [facility] OR;  Service: General   • TUBAL ABDOMINAL LIGATION            SWALLOW EVALUATION (last 72 hours)      SLP Adult Swallow Evaluation     Row Name 01/02/20 0800                   Rehab Evaluation    Document Type  evaluation  -MM        Subjective Information  no complaints  -MM        Patient Observations  alert;cooperative;agree to therapy  -MM        Patient/Family Observations  No family present.  -MM        Patient Effort  good  -MM        Symptoms Noted During/After Treatment  none  -MM           General Information    Patient Profile Reviewed  yes  -MM        Pertinent History Of Current Problem  Alcohol abuse, anxiety, depression, chronic back pain, previous CVA, clarke clarke disease, HTN, hx of brain sx, urine screen with amphetamines and oxycodone, CXR is clear, Head CT with old ischemic changes in R cerebral hemisphere  -MM        Current Method of Nutrition  NPO  -MM        Precautions/Limitations, Vision  WFL;for purposes of eval  -MM        Precautions/Limitations, Hearing  WFL;for purposes of eval  -MM        Prior Level of Function-Communication  WFL  -MM        Prior Level of Function-Swallowing  no diet consistency restrictions  -MM        Plans/Goals Discussed with  patient;other (see comments);agreed upon RN Hui   -MM        Barriers to Rehab  none identified  -MM        Patient's Goals for Discharge  return to PO diet  -MM           Pain Assessment    Additional Documentation  Pain Scale: FACES Pre/Post-Treatment (Group)  -MM           Pain Scale: FACES  Pre/Post-Treatment    Pain: FACES Scale, Pretreatment  0-->no hurt  -MM        Pain: FACES Scale, Post-Treatment  0-->no hurt  -MM           Oral Motor and Function    Dentition Assessment  natural, present and adequate  -MM        Secretion Management  WNL/WFL  -MM        Mucosal Quality  moist, healthy  -MM        Volitional Swallow  WFL  -MM        Volitional Cough  WFL  -MM           Oral Musculature and Cranial Nerve Assessment    Oral Motor General Assessment  WFL  -MM           General Eating/Swallowing Observations    Respiratory Support Currently in Use  room air  -MM        Eating/Swallowing Skills  self-fed  -MM        Positioning During Eating  upright in bed  -MM        Utensils Used  spoon;cup;straw  -MM        Consistencies Trialed  regular textures;honey-thick liquids;nectar/syrup-thick liquids;thin liquids;pureed  -MM           Clinical Swallow Eval    Oral Prep Phase  WFL  -MM        Oral Transit  WFL  -MM        Oral Residue  WFL  -MM        Pharyngeal Phase  no overt signs/symptoms of pharyngeal impairment  -MM        Esophageal Phase  unremarkable  -MM        Clinical Swallow Evaluation Summary  Clinical bedside swallow evaluation completed. Patient admitted with AMS. Is alert and cooperative with SLP this AM. Oral motor assessment was unremarkable. The patient completed a full range of consistencies except for mechanical soft. No overt s/s of aspiration are observed. Vocal quality remains unchanged. Functional rotary chew with regular solid. SLP unable to fully rule out aspiration at bedside; however, with current level of alertness patient is at a low risk for aspiration. SLP recommends: 1) Regular diet 2) Thin liquids 3) Meds whole with thin liquids 4) Oral care and standard swallow precautions. SLP to sign off at this time. If concerns arise with toleration or decrease in alertness occurs, please re-consult SLP.   -MM           Clinical Impression    SLP Swallowing Diagnosis  functional oral  phase  -MM        Functional Impact  no impact on function  -MM        Rehab Potential/Prognosis, Swallowing  good, to achieve stated therapy goals  -MM        Swallow Criteria for Skilled Therapeutic Interventions Met  no problems identified which require skilled intervention  -MM           Recommendations    Therapy Frequency (Swallow)  evaluation only  -MM        Predicted Duration Therapy Intervention (Days)  until discharge  -MM        SLP Diet Recommendation  regular textures;thin liquids  -MM        Recommended Precautions and Strategies  upright posture during/after eating;small bites of food and sips of liquid  -MM        SLP Rec. for Method of Medication Administration  meds whole;with thin liquids;as tolerated  -MM        Monitor for Signs of Aspiration  yes;notify SLP if any concerns  -MM        Anticipated Dischage Disposition  home  -MM          User Key  (r) = Recorded By, (t) = Taken By, (c) = Cosigned By    Initials Name Effective Dates    Jessica Lopes, MS CCC-SLP 05/24/19 -           EDUCATION  The patient has been educated in the following areas:   Dysphagia (Swallowing Impairment) Oral Care/Hydration.    SLP Recommendation and Plan  SLP Swallowing Diagnosis: functional oral phase  SLP Diet Recommendation: regular textures, thin liquids     Monitor for Signs of Aspiration: yes, notify SLP if any concerns     Swallow Criteria for Skilled Therapeutic Interventions Met: no problems identified which require skilled intervention  Anticipated Dischage Disposition: home  Rehab Potential/Prognosis, Swallowing: good, to achieve stated therapy goals  Therapy Frequency (Swallow): evaluation only  Predicted Duration Therapy Intervention (Days): until discharge       Plan of Care Reviewed With: patient, other (see comments)(MARIBEL Ames)  Progress: no change(Initial Evaluation)  Outcome Summary: Clinical bedside swallow evaluation completed. Patient admitted with AMS. Is alert and cooperative with SLP  this AM. Oral motor assessment was unremarkable. The patient completed a full range of consistencies except for mechanical soft. No overt s/s of aspiration are observed. Vocal quality remains unchanged. Functional rotary chew with regular solid. SLP unable to fully rule out aspiration at bedside; however, with current level of alertness patient is at a low risk for aspiration. SLP recommends: 1) Regular diet 2) Thin liquids 3) Meds whole with thin liquids 4) Oral care and standard swallow precautions. SLP to sign off at this time. If concerns arise with toleration or decrease in alertness occurs, please re-consult SLP.             Time Calculation:   Time Calculation- SLP     Row Name 01/02/20 0858             Time Calculation- SLP    SLP Start Time  0800  -MM      SLP Stop Time  0900  -MM      SLP Time Calculation (min)  60 min  -MM      SLP Received On  01/02/20  -MM        User Key  (r) = Recorded By, (t) = Taken By, (c) = Cosigned By    Initials Name Provider Type     Jessica Bianchi MS CCC-SLP Speech and Language Pathologist          Therapy Charges for Today     Code Description Service Date Service Provider Modifiers Qty    [id number] HC ST EVAL ORAL PHARYNG SWALLOW 4 1/2/2020 Jessica Bianchi MS CCC-SLP GN 1               SLP Discharge Summary  Anticipated Dischage Disposition: home  Reason for Discharge: all goals and outcomes met, no further needs identified  Progress Toward Achieving Short/long Term Goals: all goals met within established timelines  Discharge Destination: other (see comments)(Remains in acute care)    Jessica Bianchi MS CCC-SLP  1/2/2020

## 2020-01-02 NOTE — H&P
"    [facility] Medicine Services  HISTORY AND PHYSICAL    Date of Admission: 1/1/2020  Primary Care Physician: Chaitanya Padilla DO    Subjective     Chief Complaint: confusion    History of Present Illness    Ms. Best is a 49-year-old female known to me from previous admissions.  Patient has a long history of alcohol abuse, other substance abuse, tobacco abuse, chronic pancreatitis with numerous acute flares, pancreatic pseudocyst, and thrombocytopenia due to portal hypertension.  Patient presents last night as she was found \"wandering\" [place] [employer].  Patient had been to her friends house at the [facility], and indicates that she had only had \"one drink\".  However today she indicates that she may have had more, and she has relapsed.  Patient also reports that she recently did methamphetamine, but indicates that it was \"one-time only\".  The patient indicates that she is under a lot of stress and this caused her to relapse.    In the emergency department, the patient had a CT scan of her head, and her abdomen and pelvis.  These were grossly unremarkable, the CT of her abdomen and pelvis only showed a very small pseudocyst.  The patient is not complaining of any abdominal pain.  Her lipase was found to be normal.  Her urine drug screen was notable to have amphetamines and opiates.  She does have a prescription for opiates that she regularly takes.  Patient was also noted to have thrombocytopenia, but she has this at baseline, platelets were noted to be 71,000.  Patient was also noted to have a mild elevation in her bilirubin and her AST.  Lipase was normal.  Patient had a mild anion gap acidosis, likely due to starvation ketoacidosis and alcohol induced ketoacidosis.  She is completely sober this morning, and is back to her baseline.  Patient is requesting to go home.    Review of Systems   Constitutional: Negative for activity change, appetite change, chills, " fatigue and fever.   Respiratory: Negative for cough and shortness of breath.    Cardiovascular: Negative for chest pain and palpitations.   Gastrointestinal: Negative for abdominal distention, constipation, diarrhea, nausea and vomiting.   Skin: Negative for pallor, rash and wound.   Psychiatric/Behavioral: Positive for confusion.   All other systems reviewed and are negative.       Otherwise complete ROS reviewed and negative except as mentioned in the HPI.    Past Medical History:   Past Medical History:   Diagnosis Date   • Alcohol abuse    • Anxiety and depression    • Chronic pain     back   • Gallstones    • Hypercholesteremia    • Hypertension    • Clarke-clarke disease    • Pancreatitis    • Stroke (cerebrum) (CMS/HCC)      Past Surgical History:  Past Surgical History:   Procedure Laterality Date   • BRAIN SURGERY  2007    Clarke Clarke    • BREAST SURGERY     • CHOLECYSTECTOMY WITH INTRAOPERATIVE CHOLANGIOGRAM N/A 4/18/2019    Procedure: CHOLECYSTECTOMY LAPAROSCOPIC WITH LIVER BIOPSY;  Surgeon: Viviana Boles MD;  Location: [facility];  Service: General   • TUBAL ABDOMINAL LIGATION       Social History:  reports that she has been smoking cigarettes. She has been smoking about 0.50 packs per day. She has never used smokeless tobacco. She reports that she drinks about 1.0 standard drinks of alcohol per week. She reports that she does not use drugs.  She has been using methamphetamine.  She continues to drink ETOH and use tobacco.      Family History: family history includes Breast cancer in her paternal grandmother and another family member; Lung cancer in her mother; Lymphoma in her father.       Allergies:  No Known Allergies  Medications:  Prior to Admission medications    Medication Sig Start Date End Date Taking? Authorizing Provider   aspirin 325 MG tablet Take 325 mg by mouth Daily.   Yes Provider, MD Mckenzie   diclofenac (VOLTAREN) 75 MG EC tablet Take 75 mg by mouth 2 (Two) Times a Day.   Yes  "Mckenzie Moore MD   FLUoxetine (PROzac) 20 MG capsule Take 20 mg by mouth Daily.   Yes Mckenzie Moore MD   gabapentin (NEURONTIN) 600 MG tablet Take 600 mg by mouth 3 (Three) Times a Day.   Yes Mckenzie Moore MD   HYDROcodone-acetaminophen (NORCO) 7.5-325 MG per tablet Take 1 tablet by mouth Every 8 (Eight) Hours As Needed for Moderate Pain .   Yes Mckenzie Moore MD   lamoTRIgine (LaMICtal) 150 MG tablet Take 150 mg by mouth 2 (Two) Times a Day.   Yes Mckenzie Moore MD   ondansetron (ZOFRAN) 4 MG tablet Take 1 tablet by mouth Every 8 (Eight) Hours As Needed for Nausea or Vomiting. 5/30/19  Yes Monik Hood APRN   rosuvastatin (CRESTOR) 10 MG tablet Take 10 mg by mouth Daily.   Yes Mckenzie Moore MD   thiamine (VITAMIN B1) 100 MG tablet Take 1 tablet by mouth Daily. 6/19/19  Yes Marika Elliott DO   tiZANidine (ZANAFLEX) 4 MG tablet Take 4 mg by mouth Every 8 (Eight) Hours As Needed for Muscle Spasms.   Yes Mckenzie Moore MD   valsartan (DIOVAN) 160 MG tablet Take 1 tablet by mouth Daily. 2/20/19  Yes Lauren Lazaro APRN   folic acid (FOLVITE) 1 MG tablet Take 1 tablet by mouth Daily. 6/20/19   Marika Elliott DO   nicotine (NICODERM CQ) 21 MG/24HR patch Place 1 patch on the skin as directed by provider Daily. 6/20/19   Marika Elliott DO   venlafaxine XR (EFFEXOR-XR) 150 MG 24 hr capsule Take 150 mg by mouth Daily.    ProviderMckenzie MD     Objective     Vital Signs: /69 (BP Location: Right arm, Patient Position: Lying)   Pulse 77   Temp 97.7 °F (36.5 °C) (Oral)   Resp 16   Ht 161.5 cm (63.6\")   Wt 52.4 kg (115 lb 9.6 oz)   SpO2 98%   BMI 20.09 kg/m²   Physical Exam   Constitutional: She is oriented to person, place, and time. No distress.   HENT:   Head: Normocephalic and atraumatic.   Eyes: Conjunctivae are normal. No scleral icterus.   Neck: Neck supple. No JVD present.   Cardiovascular: Normal rate and regular " rhythm.   No murmur heard.  Pulmonary/Chest: Effort normal and breath sounds normal. No stridor. No respiratory distress. She has no wheezes.   Abdominal: Soft. Bowel sounds are normal. She exhibits no distension. There is no tenderness. There is no guarding.   Neurological: She is alert and oriented to person, place, and time.   Skin: Skin is warm and dry. She is not diaphoretic. No erythema.   Psychiatric: She has a normal mood and affect. Her behavior is normal.   Nursing note and vitals reviewed.          Results Reviewed:  Lab Results (last 24 hours)     Procedure Component Value Units Date/Time    Osmolality, Serum [[id number]]  (Abnormal) Collected:  01/01/20 2223    Specimen:  Blood Updated:  01/01/20 2339     Osmolality 286 mOsm/kg     Blood Gas, Arterial [[id number]]  (Abnormal) Collected:  01/01/20 2143    Specimen:  Arterial Blood Updated:  01/01/20 2249     Site Right Radial     Mark's Test Positive     pH, Arterial 7.313 pH units      Comment: 84 Value below reference range        pCO2, Arterial 43.1 mm Hg      pO2, Arterial 88.1 mm Hg      HCO3, Arterial 21.9 mmol/L      Base Excess, Arterial -4.3 mmol/L      Comment: 84 Value below reference range        O2 Saturation, Arterial 96.9 %      Temperature 37.0 C      Barometric Pressure for Blood Gas 746 mmHg      Modality Room Air     Ventilator Mode NA     Comment: Meter: [id number]     :  [id number]        Collected by [id number]     Comment: Appended report. These results have been appended to a previously final verified report.       Lactic Acid, Plasma [[id number]]  (Normal) Collected:  01/01/20 2223    Specimen:  Blood Updated:  01/01/20 2245     Lactate 0.6 mmol/L     Salicylate Level [[id number]]  (Normal) Collected:  01/01/20 1750    Specimen:  Blood Updated:  01/01/20 2134     Salicylate <0.3 mg/dL     Urine Drug Screen - Urine, Catheter In/Out [[id number]]  (Abnormal) Collected:  01/01/20 2034    Specimen:  Urine, Catheter In/Out Updated:   01/01/20 2055     THC, Screen, Urine Negative     Phencyclidine (PCP), Urine Negative     Cocaine Screen, Urine Negative     Methamphetamine, Ur Negative     Opiate Screen Negative     Amphetamine Screen, Urine Positive     Benzodiazepine Screen, Urine Negative     Tricyclic Antidepressants Screen Negative     Methadone Screen, Urine Negative     Barbiturates Screen, Urine Negative     Oxycodone Screen, Urine Positive     Propoxyphene Screen Negative     Buprenorphine, Screen, Urine Negative    Narrative:       Cutoff For Drugs Screened:    Amphetamines               500 ng/ml  Barbiturates               200 ng/ml  Benzodiazepines            150 ng/ml  Cocaine                    150 ng/ml  Methadone                  200 ng/ml  Opiates                    100 ng/ml  Phencyclidine               25 ng/ml  THC                            50 ng/ml  Methamphetamine            500 ng/ml  Tricyclic Antidepressants  300 ng/ml  Oxycodone                  100 ng/ml  Propoxyphene               300 ng/ml  Buprenorphine               10 ng/ml    The normal value for all drugs tested is negative. This report includes unconfirmed screening results, with the cutoff values listed, to be used for medical treatment purposes only.  Unconfirmed results must not be used for non-medical purposes such as employment or legal testing.  Clinical consideration should be applied to any drug of abuse test, particularly when unconfirmed results are used.      Urinalysis With Culture If Indicated - Urine, Catheter In/Out [[id number]]  (Abnormal) Collected:  01/01/20 2034    Specimen:  Urine, Catheter In/Out Updated:  01/01/20 2051     Color, UA Yellow     Appearance, UA Clear     pH, UA <=5.0     Specific Gravity, UA >=1.030     Glucose, UA Negative     Ketones, UA Trace     Bilirubin, UA Negative     Blood, UA Negative     Protein, UA Negative     Leuk Esterase, UA Negative     Nitrite, UA Negative     Urobilinogen, UA 0.2 E.U./dL    Narrative:        Urine microscopic not indicated.    CBC & Differential [[id number]] Collected:  01/01/20 1750    Specimen:  Blood Updated:  01/01/20 1821    Narrative:       The following orders were created for panel order CBC & Differential.  Procedure                               Abnormality         Status                     ---------                               -----------         ------                     CBC Auto Differential[[id number]]        Abnormal            Final result                 Please view results for these tests on the individual orders.    CBC Auto Differential [[id number]]  (Abnormal) Collected:  01/01/20 1750    Specimen:  Blood Updated:  01/01/20 1821     WBC 5.20 10*3/mm3      RBC 4.31 10*6/mm3      Hemoglobin 13.3 g/dL      Hematocrit 38.1 %      MCV 88.4 fL      MCH 30.9 pg      MCHC 34.9 g/dL      RDW 14.3 %      RDW-SD 46.4 fl      MPV 10.9 fL      Platelets 72 10*3/mm3     Manual Differential [[id number]]  (Abnormal) Collected:  01/01/20 1750    Specimen:  Blood Updated:  01/01/20 1821     Neutrophil % 56.6 %      Lymphocyte % 37.4 %      Monocyte % 2.0 %      Eosinophil % 2.0 %      Basophil % 1.0 %      Atypical Lymphocyte % 1.0 %      Neutrophils Absolute 2.94 10*3/mm3      Lymphocytes Absolute 1.94 10*3/mm3      Monocytes Absolute 0.10 10*3/mm3      Eosinophils Absolute 0.10 10*3/mm3      Basophils Absolute 0.05 10*3/mm3      Anisocytosis Slight/1+     Microcytes Slight/1+     Polychromasia Slight/1+     WBC Morphology Normal     Platelet Estimate Decreased    Ammonia [[id number]]  (Normal) Collected:  01/01/20 1750    Specimen:  Blood Updated:  01/01/20 1818     Ammonia 47 umol/L     Comprehensive Metabolic Panel [[id number]]  (Abnormal) Collected:  01/01/20 1750    Specimen:  Blood Updated:  01/01/20 1817     Glucose 82 mg/dL      BUN 6 mg/dL      Creatinine 0.55 mg/dL      Sodium 140 mmol/L      Potassium 3.5 mmol/L      Chloride 102 mmol/L      CO2 21.0 mmol/L      Calcium 8.7 mg/dL       Total Protein 6.9 g/dL      Albumin 4.30 g/dL      ALT (SGPT) 30 U/L      AST (SGOT) 47 U/L      Alkaline Phosphatase 104 U/L      Total Bilirubin 1.2 mg/dL      eGFR Non African Amer 118 mL/min/1.73      Globulin 2.6 gm/dL      A/G Ratio 1.7 g/dL      BUN/Creatinine Ratio 10.9     Anion Gap 17.0 mmol/L     Narrative:       GFR Normal >60  Chronic Kidney Disease <60  Kidney Failure <15      Lipase [[id number]]  (Abnormal) Collected:  01/01/20 1750    Specimen:  Blood Updated:  01/01/20 1817     Lipase 7 U/L     Troponin [[id number]]  (Normal) Collected:  01/01/20 1750    Specimen:  Blood Updated:  01/01/20 1817     Troponin T <0.010 ng/mL     Narrative:       Troponin T Reference Range:  <= 0.03 ng/mL-   Negative for AMI  >0.03 ng/mL-     Abnormal for myocardial necrosis.  Clinicians would have to utilize clinical acumen, EKG, Troponin and serial changes to determine if it is an Acute Myocardial Infarction or myocardial injury due to an underlying chronic condition.     Ethanol [[id number]] Collected:  01/01/20 1750    Specimen:  Blood Updated:  01/01/20 1817     Ethanol % 0.035 %     Narrative:       Not for legal purposes. Chain of Custody not followed.         Imaging Results (Last 24 Hours)     Procedure Component Value Units Date/Time    CT Head Without Contrast [[id number]] Collected:  01/02/20 0712     Updated:  01/02/20 0717    Narrative:       CT HEAD WO CONTRAST- 1/1/2020 10:04 PM CST     HISTORY: AMS      COMPARISON: 06/18/2019     DOSE LENGTH PRODUCT: 603 mGy cm. Automated exposure control was also  utilized to decrease patient radiation dose.     TECHNIQUE: Axial images of brain obtained without contrast.     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 There is encephalomalacia of the right frontal lobe  likely from old infarct. There are right craniotomy changes. There is no  intracranial hemorrhage or mass effect. No convincing acute signs of  ischemia. No  extra-axial hematoma or subarachnoid hemorrhage.     The visible paranasal sinuses and mastoid air cells are well-aerated.  Bony calvarium appears intact.       Impression:       1. Old ischemic changes of the right cerebral hemisphere. No acute  intracranial process. Right craniotomy changes.     Comments: A preliminary report is issued to the ER by the [facility]  radiology service. I agree with this preliminary impression.  This report was finalized on 01/02/2020 07:14 by Dr. Monik Mccarthy MD.    XR Chest 1 View [[id number]] Collected:  01/01/20 2156     Updated:  01/01/20 2159    Narrative:       XR CHEST 1 VW- 1/1/2020 9:50 PM CST     HISTORY: Altered mental status       COMPARISON: 06/18/2019.     FINDINGS:   The lungs are clear. The cardiomediastinal silhouette and pulmonary  vascularity are unchanged.      The osseous structures and surrounding soft tissues demonstrate no acute  abnormality.       Impression:       1. No radiographic evidence of acute cardiopulmonary process.        This report was finalized on 01/01/2020 21:56 by Dr. Ben Atkins MD.    CT Abdomen Pelvis With Contrast [[id number]] Collected:  01/01/20 1906     Updated:  01/01/20 1912    Narrative:       CT ABDOMEN PELVIS W CONTRAST- 1/1/2020 6:57 PM CST     HISTORY: upper abd pain, hx of pancreatitis, alcohol abuse       COMPARISON: None.      DOSE LENGTH PRODUCT: 181 mGy cm. Automated exposure control was also  utilized to decrease patient radiation dose.     TECHNIQUE: Following the intravenous administration of contrast, helical  CT tomographic images of the abdomen and pelvis were acquired.  Multiplanar reformatted images were provided for review.      FINDINGS:   LOWER CHEST: The lung bases and base of the heart are unremarkable.      LIVER: No focal liver lesion. The hepatic vasculature is patent.      BILIARY SYSTEM: The gallbladder has been removed. There is postoperative  dilation of the common bile duct.      PANCREAS: There is an  8 mm cystic structure in the body of the pancreas  on axial image 27. This was not appreciated on the previous study and is  likely due to a pseudocyst. No significant peripancreatic inflammation  is identified. No masses are seen.      SPLEEN: The spleen is borderline enlarged.      KIDNEYS: Bilateral kidneys are unremarkable. The ureters are  decompressed and normal in appearance.     ADRENALS: Unremarkable.     RETROPERITONEUM: No mass, lymphadenopathy or hemorrhage.      GI TRACT: No evidence of obstruction or bowel wall thickening. The  appendix is visualized and unremarkable.     OTHER: There is no mesenteric mass, lymphadenopathy or fluid collection.  The abdominopelvic vasculature is patent. The osseous structures and  soft tissues demonstrate no worrisome lesions.     PELVIS: No mass lesion, fluid collection or significant lymphadenopathy  is seen in the pelvis. The urinary bladder is normal in appearance.       Impression:       1. There is an 8 mm pancreatic cystic lesion that most likely represents  a pseudocyst. This was not present on the previous exam. There are no  other signs of pancreatitis.   2. No other evidence of acute abdominopelvic process.        This report was finalized on 01/01/2020 19:08 by Dr. Ben Atkins MD.        I have personally reviewed and interpreted the radiology studies and ECG obtained at time of admission.     Assessment / Plan     Assessment:   Active Hospital Problems    Diagnosis   • **Acute encephalopathy   • High anion gap metabolic acidosis   • Thrombocytopenia (CMS/HCC)   • Pancreatic pseudocyst   • Chronic pancreatitis (CMS/HCC)   • Methamphetamine abuse (CMS/HCC)   • Tobacco abuse   • Alcohol abuse   • Anxiety and depression         Plan:   1.  Admit to observation  2.  LR at 100 mL/hr  3.  Resume home medications as appropriate   4.  Counseled extensively on tobacco, methaphetamine, and alcohol abuse  5.  Requested she notify her AA sponsor  6.  Patient appears  euvolemic   7.  Continue vitamin supplementation with htiamine and folate   8.  Lovenox DVT PPx      Code Status: Full     I discussed the patient's findings and my recommendations with the patient and bedside RN     Estimated length of stay 0-1    Todd Chaparro MD   01/02/20   7:18 AM

## 2020-01-21 ENCOUNTER — HOSPITAL ENCOUNTER (INPATIENT)
Age: 50
LOS: 3 days | Discharge: HOME | DRG: 897 | End: 2020-01-24
Attending: EMERGENCY MEDICINE | Admitting: HOSPITALIST
Payer: MEDICAID

## 2020-01-21 PROBLEM — F10.21 ALCOHOL INTOXICATION IN RELAPSED ALCOHOLIC (HCC): Status: ACTIVE | Noted: 2020-01-21

## 2020-01-21 PROBLEM — D61.818 PANCYTOPENIA (HCC): Status: RESOLVED | Noted: 2019-07-26 | Resolved: 2020-01-21

## 2020-01-21 PROBLEM — Z86.59 HISTORY OF SUICIDAL IDEATION: Chronic | Status: ACTIVE | Noted: 2019-07-24

## 2020-01-21 PROBLEM — I67.5 MOYA MOYA DISEASE: Chronic | Status: ACTIVE | Noted: 2020-01-21

## 2020-01-21 PROBLEM — F10.930 ALCOHOL WITHDRAWAL SEIZURE WITHOUT COMPLICATION (HCC): Status: RESOLVED | Noted: 2019-07-26 | Resolved: 2020-01-21

## 2020-01-21 PROBLEM — R56.9 ALCOHOL WITHDRAWAL SEIZURE WITHOUT COMPLICATION (HCC): Status: RESOLVED | Noted: 2019-07-26 | Resolved: 2020-01-21

## 2020-01-21 PROBLEM — E87.6 HYPOKALEMIA: Status: RESOLVED | Noted: 2018-11-29 | Resolved: 2020-01-21

## 2020-01-21 PROBLEM — R45.89 SUICIDAL BEHAVIOR WITHOUT ATTEMPTED SELF-INJURY: Status: RESOLVED | Noted: 2019-08-09 | Resolved: 2020-01-21

## 2020-01-21 PROBLEM — F90.9 ADHD: Chronic | Status: ACTIVE | Noted: 2020-01-21

## 2020-01-21 PROBLEM — R74.01 TRANSAMINITIS: Status: RESOLVED | Noted: 2018-11-29 | Resolved: 2020-01-21

## 2020-01-21 PROBLEM — Z86.19 HISTORY OF CHICKEN POX: Chronic | Status: ACTIVE | Noted: 2020-01-21

## 2020-01-21 LAB
ACETAMINOPHEN LEVEL: <15 UG/ML
ALBUMIN SERPL-MCNC: 4.8 G/DL (ref 3.5–5.2)
ALP BLD-CCNC: 102 U/L (ref 35–104)
ALT SERPL-CCNC: 48 U/L (ref 5–33)
AMMONIA: 10 UMOL/L (ref 11–51)
AMPHETAMINE SCREEN, URINE: POSITIVE
ANION GAP SERPL CALCULATED.3IONS-SCNC: 17 MMOL/L (ref 7–19)
AST SERPL-CCNC: 125 U/L (ref 5–32)
BARBITURATE SCREEN URINE: NEGATIVE
BASOPHILS ABSOLUTE: 0 K/UL (ref 0–0.2)
BASOPHILS RELATIVE PERCENT: 0.6 % (ref 0–1)
BENZODIAZEPINE SCREEN, URINE: NEGATIVE
BILIRUB SERPL-MCNC: 0.4 MG/DL (ref 0.2–1.2)
BILIRUBIN URINE: NEGATIVE
BLOOD, URINE: NEGATIVE
BUN BLDV-MCNC: 7 MG/DL (ref 6–20)
CALCIUM SERPL-MCNC: 9.1 MG/DL (ref 8.6–10)
CANNABINOID SCREEN URINE: NEGATIVE
CHLORIDE BLD-SCNC: 104 MMOL/L (ref 98–111)
CLARITY: CLEAR
CO2: 23 MMOL/L (ref 22–29)
COCAINE METABOLITE SCREEN URINE: NEGATIVE
COLOR: YELLOW
CREAT SERPL-MCNC: 0.5 MG/DL (ref 0.5–0.9)
EOSINOPHILS ABSOLUTE: 0.1 K/UL (ref 0–0.6)
EOSINOPHILS RELATIVE PERCENT: 1.5 % (ref 0–5)
ETHANOL: 458 MG/DL (ref 0–0.08)
GFR NON-AFRICAN AMERICAN: >60
GLUCOSE BLD-MCNC: 118 MG/DL (ref 74–109)
GLUCOSE URINE: NEGATIVE MG/DL
HCT VFR BLD CALC: 45.2 % (ref 37–47)
HEMOGLOBIN: 14.6 G/DL (ref 12–16)
IMMATURE GRANULOCYTES #: 0 K/UL
KETONES, URINE: NEGATIVE MG/DL
LEUKOCYTE ESTERASE, URINE: NEGATIVE
LIPASE: 26 U/L (ref 13–60)
LYMPHOCYTES ABSOLUTE: 2.5 K/UL (ref 1.1–4.5)
LYMPHOCYTES RELATIVE PERCENT: 52.5 % (ref 20–40)
Lab: ABNORMAL
MCH RBC QN AUTO: 31 PG (ref 27–31)
MCHC RBC AUTO-ENTMCNC: 32.3 G/DL (ref 33–37)
MCV RBC AUTO: 96 FL (ref 81–99)
MONOCYTES ABSOLUTE: 0.2 K/UL (ref 0–0.9)
MONOCYTES RELATIVE PERCENT: 4.6 % (ref 0–10)
NEUTROPHILS ABSOLUTE: 1.9 K/UL (ref 1.5–7.5)
NEUTROPHILS RELATIVE PERCENT: 40.8 % (ref 50–65)
NITRITE, URINE: NEGATIVE
OPIATE SCREEN URINE: NEGATIVE
PDW BLD-RTO: 15 % (ref 11.5–14.5)
PH UA: 6 (ref 5–8)
PLATELET # BLD: 110 K/UL (ref 130–400)
PMV BLD AUTO: 8.9 FL (ref 9.4–12.3)
POTASSIUM SERPL-SCNC: 4.1 MMOL/L (ref 3.5–5)
PROTEIN UA: NEGATIVE MG/DL
RBC # BLD: 4.71 M/UL (ref 4.2–5.4)
SALICYLATE, SERUM: <3 MG/DL (ref 3–10)
SODIUM BLD-SCNC: 144 MMOL/L (ref 136–145)
SPECIFIC GRAVITY UA: 1 (ref 1–1.03)
TOTAL PROTEIN: 7.8 G/DL (ref 6.6–8.7)
URINE REFLEX TO CULTURE: NORMAL
UROBILINOGEN, URINE: 0.2 E.U./DL
WBC # BLD: 4.8 K/UL (ref 4.8–10.8)

## 2020-01-21 PROCEDURE — G0480 DRUG TEST DEF 1-7 CLASSES: HCPCS

## 2020-01-21 PROCEDURE — 99253 IP/OBS CNSLTJ NEW/EST LOW 45: CPT | Performed by: PSYCHIATRY & NEUROLOGY

## 2020-01-21 PROCEDURE — 6360000002 HC RX W HCPCS: Performed by: HOSPITALIST

## 2020-01-21 PROCEDURE — 82140 ASSAY OF AMMONIA: CPT

## 2020-01-21 PROCEDURE — 80307 DRUG TEST PRSMV CHEM ANLYZR: CPT

## 2020-01-21 PROCEDURE — 99284 EMERGENCY DEPT VISIT MOD MDM: CPT

## 2020-01-21 PROCEDURE — 85025 COMPLETE CBC W/AUTO DIFF WBC: CPT

## 2020-01-21 PROCEDURE — 83690 ASSAY OF LIPASE: CPT

## 2020-01-21 PROCEDURE — 6360000002 HC RX W HCPCS: Performed by: INTERNAL MEDICINE

## 2020-01-21 PROCEDURE — 36415 COLL VENOUS BLD VENIPUNCTURE: CPT

## 2020-01-21 PROCEDURE — 1210000000 HC MED SURG R&B

## 2020-01-21 PROCEDURE — 2580000003 HC RX 258: Performed by: HOSPITALIST

## 2020-01-21 PROCEDURE — 80053 COMPREHEN METABOLIC PANEL: CPT

## 2020-01-21 PROCEDURE — 6370000000 HC RX 637 (ALT 250 FOR IP): Performed by: PSYCHIATRY & NEUROLOGY

## 2020-01-21 PROCEDURE — 81003 URINALYSIS AUTO W/O SCOPE: CPT

## 2020-01-21 PROCEDURE — 6370000000 HC RX 637 (ALT 250 FOR IP): Performed by: HOSPITALIST

## 2020-01-21 RX ORDER — LORAZEPAM 2 MG/ML
2 INJECTION INTRAMUSCULAR
Status: DISCONTINUED | OUTPATIENT
Start: 2020-01-21 | End: 2020-01-24 | Stop reason: HOSPADM

## 2020-01-21 RX ORDER — LANOLIN ALCOHOL/MO/W.PET/CERES
3 CREAM (GRAM) TOPICAL NIGHTLY
Status: DISCONTINUED | OUTPATIENT
Start: 2020-01-21 | End: 2020-01-24 | Stop reason: HOSPADM

## 2020-01-21 RX ORDER — POTASSIUM CHLORIDE 7.45 MG/ML
10 INJECTION INTRAVENOUS PRN
Status: DISCONTINUED | OUTPATIENT
Start: 2020-01-21 | End: 2020-01-24 | Stop reason: HOSPADM

## 2020-01-21 RX ORDER — THIAMINE MONONITRATE (VIT B1) 100 MG
100 TABLET ORAL DAILY
Status: DISCONTINUED | OUTPATIENT
Start: 2020-01-21 | End: 2020-01-24 | Stop reason: HOSPADM

## 2020-01-21 RX ORDER — LORAZEPAM 2 MG/ML
3 INJECTION INTRAMUSCULAR
Status: DISCONTINUED | OUTPATIENT
Start: 2020-01-21 | End: 2020-01-24 | Stop reason: HOSPADM

## 2020-01-21 RX ORDER — NALTREXONE HYDROCHLORIDE 50 MG/1
50 TABLET, FILM COATED ORAL
Status: DISCONTINUED | OUTPATIENT
Start: 2020-01-22 | End: 2020-01-24 | Stop reason: HOSPADM

## 2020-01-21 RX ORDER — LAMOTRIGINE 100 MG/1
100 TABLET ORAL 2 TIMES DAILY
Status: DISCONTINUED | OUTPATIENT
Start: 2020-01-21 | End: 2020-01-24 | Stop reason: HOSPADM

## 2020-01-21 RX ORDER — LORAZEPAM 1 MG/1
3 TABLET ORAL
Status: DISCONTINUED | OUTPATIENT
Start: 2020-01-21 | End: 2020-01-24 | Stop reason: HOSPADM

## 2020-01-21 RX ORDER — LORAZEPAM 1 MG/1
1 TABLET ORAL
Status: DISCONTINUED | OUTPATIENT
Start: 2020-01-21 | End: 2020-01-24 | Stop reason: HOSPADM

## 2020-01-21 RX ORDER — FLUOXETINE HYDROCHLORIDE 20 MG/1
40 CAPSULE ORAL DAILY
Status: DISCONTINUED | OUTPATIENT
Start: 2020-01-21 | End: 2020-01-24 | Stop reason: HOSPADM

## 2020-01-21 RX ORDER — M-VIT,TX,IRON,MINS/CALC/FOLIC 27MG-0.4MG
1 TABLET ORAL DAILY
Status: DISCONTINUED | OUTPATIENT
Start: 2020-01-21 | End: 2020-01-24 | Stop reason: HOSPADM

## 2020-01-21 RX ORDER — LORAZEPAM 1 MG/1
2 TABLET ORAL
Status: DISCONTINUED | OUTPATIENT
Start: 2020-01-21 | End: 2020-01-24 | Stop reason: HOSPADM

## 2020-01-21 RX ORDER — LORAZEPAM 1 MG/1
4 TABLET ORAL
Status: DISCONTINUED | OUTPATIENT
Start: 2020-01-21 | End: 2020-01-24 | Stop reason: HOSPADM

## 2020-01-21 RX ORDER — SODIUM CHLORIDE 0.9 % (FLUSH) 0.9 %
10 SYRINGE (ML) INJECTION PRN
Status: DISCONTINUED | OUTPATIENT
Start: 2020-01-21 | End: 2020-01-24 | Stop reason: HOSPADM

## 2020-01-21 RX ORDER — IBUPROFEN 400 MG/1
800 TABLET ORAL EVERY 8 HOURS PRN
Status: DISCONTINUED | OUTPATIENT
Start: 2020-01-21 | End: 2020-01-24 | Stop reason: HOSPADM

## 2020-01-21 RX ORDER — SODIUM CHLORIDE 0.9 % (FLUSH) 0.9 %
10 SYRINGE (ML) INJECTION EVERY 12 HOURS SCHEDULED
Status: DISCONTINUED | OUTPATIENT
Start: 2020-01-21 | End: 2020-01-24 | Stop reason: HOSPADM

## 2020-01-21 RX ORDER — FOLIC ACID 1 MG/1
1 TABLET ORAL DAILY
Status: DISCONTINUED | OUTPATIENT
Start: 2020-01-21 | End: 2020-01-24 | Stop reason: HOSPADM

## 2020-01-21 RX ORDER — GABAPENTIN 300 MG/1
600 CAPSULE ORAL 3 TIMES DAILY
Status: DISCONTINUED | OUTPATIENT
Start: 2020-01-21 | End: 2020-01-24 | Stop reason: HOSPADM

## 2020-01-21 RX ORDER — LORAZEPAM 2 MG/ML
1 INJECTION INTRAMUSCULAR
Status: DISCONTINUED | OUTPATIENT
Start: 2020-01-21 | End: 2020-01-24 | Stop reason: HOSPADM

## 2020-01-21 RX ORDER — ONDANSETRON 2 MG/ML
4 INJECTION INTRAMUSCULAR; INTRAVENOUS EVERY 6 HOURS PRN
Status: DISCONTINUED | OUTPATIENT
Start: 2020-01-21 | End: 2020-01-24 | Stop reason: HOSPADM

## 2020-01-21 RX ORDER — VALSARTAN 80 MG/1
160 TABLET ORAL DAILY
Status: DISCONTINUED | OUTPATIENT
Start: 2020-01-21 | End: 2020-01-24 | Stop reason: HOSPADM

## 2020-01-21 RX ORDER — HYDROCODONE BITARTRATE AND ACETAMINOPHEN 7.5; 325 MG/1; MG/1
1 TABLET ORAL EVERY 8 HOURS PRN
Status: DISCONTINUED | OUTPATIENT
Start: 2020-01-21 | End: 2020-01-21

## 2020-01-21 RX ORDER — MAGNESIUM SULFATE 1 G/100ML
1 INJECTION INTRAVENOUS PRN
Status: DISCONTINUED | OUTPATIENT
Start: 2020-01-21 | End: 2020-01-24 | Stop reason: HOSPADM

## 2020-01-21 RX ORDER — LORAZEPAM 2 MG/ML
4 INJECTION INTRAMUSCULAR
Status: DISCONTINUED | OUTPATIENT
Start: 2020-01-21 | End: 2020-01-24 | Stop reason: HOSPADM

## 2020-01-21 RX ORDER — POTASSIUM CHLORIDE 20 MEQ/1
40 TABLET, EXTENDED RELEASE ORAL PRN
Status: DISCONTINUED | OUTPATIENT
Start: 2020-01-21 | End: 2020-01-24 | Stop reason: HOSPADM

## 2020-01-21 RX ORDER — TRAZODONE HYDROCHLORIDE 50 MG/1
50 TABLET ORAL NIGHTLY
Status: DISCONTINUED | OUTPATIENT
Start: 2020-01-21 | End: 2020-01-24 | Stop reason: HOSPADM

## 2020-01-21 RX ADMIN — LORAZEPAM 3 MG: 1 TABLET ORAL at 19:35

## 2020-01-21 RX ADMIN — FOLIC ACID 1 MG: 1 TABLET ORAL at 08:53

## 2020-01-21 RX ADMIN — MELATONIN 3 MG: at 19:35

## 2020-01-21 RX ADMIN — TRAZODONE HYDROCHLORIDE 50 MG: 50 TABLET ORAL at 19:35

## 2020-01-21 RX ADMIN — Medication 10 ML: at 08:55

## 2020-01-21 RX ADMIN — VALSARTAN 160 MG: 80 TABLET, FILM COATED ORAL at 08:53

## 2020-01-21 RX ADMIN — IBUPROFEN 800 MG: 400 TABLET, FILM COATED ORAL at 15:32

## 2020-01-21 RX ADMIN — ASPIRIN 325 MG: 325 TABLET, COATED ORAL at 08:53

## 2020-01-21 RX ADMIN — Medication 100 MG: at 08:53

## 2020-01-21 RX ADMIN — MULTIPLE VITAMINS W/ MINERALS TAB 1 TABLET: TAB at 08:53

## 2020-01-21 RX ADMIN — LAMOTRIGINE 100 MG: 100 TABLET ORAL at 19:35

## 2020-01-21 RX ADMIN — ENOXAPARIN SODIUM 40 MG: 40 INJECTION SUBCUTANEOUS at 13:50

## 2020-01-21 RX ADMIN — LAMOTRIGINE 100 MG: 100 TABLET ORAL at 08:53

## 2020-01-21 RX ADMIN — HYDROCODONE BITARTRATE AND ACETAMINOPHEN 1 TABLET: 7.5; 325 TABLET ORAL at 09:06

## 2020-01-21 RX ADMIN — GABAPENTIN 600 MG: 300 CAPSULE ORAL at 13:50

## 2020-01-21 RX ADMIN — GABAPENTIN 600 MG: 300 CAPSULE ORAL at 08:54

## 2020-01-21 RX ADMIN — LORAZEPAM 2 MG: 2 INJECTION INTRAMUSCULAR; INTRAVENOUS at 15:44

## 2020-01-21 RX ADMIN — Medication 10 ML: at 19:36

## 2020-01-21 RX ADMIN — GABAPENTIN 600 MG: 300 CAPSULE ORAL at 19:35

## 2020-01-21 RX ADMIN — FLUOXETINE HYDROCHLORIDE 40 MG: 20 CAPSULE ORAL at 08:53

## 2020-01-21 ASSESSMENT — PAIN DESCRIPTION - ORIENTATION: ORIENTATION: MID

## 2020-01-21 ASSESSMENT — PAIN SCALES - GENERAL
PAINLEVEL_OUTOF10: 3
PAINLEVEL_OUTOF10: 9
PAINLEVEL_OUTOF10: 8
PAINLEVEL_OUTOF10: 7

## 2020-01-21 ASSESSMENT — PAIN DESCRIPTION - LOCATION: LOCATION: ABDOMEN

## 2020-01-21 ASSESSMENT — ENCOUNTER SYMPTOMS
DIARRHEA: 0
SHORTNESS OF BREATH: 0
PHOTOPHOBIA: 0
SORE THROAT: 0
WHEEZING: 0
CHEST TIGHTNESS: 0
COLOR CHANGE: 0
COUGH: 0
EYE PAIN: 0
BACK PAIN: 1
ABDOMINAL PAIN: 1
ABDOMINAL DISTENTION: 0
TROUBLE SWALLOWING: 0
CONSTIPATION: 0
RHINORRHEA: 0
VOMITING: 1
NAUSEA: 1

## 2020-01-21 ASSESSMENT — PAIN - FUNCTIONAL ASSESSMENT: PAIN_FUNCTIONAL_ASSESSMENT: PREVENTS OR INTERFERES SOME ACTIVE ACTIVITIES AND ADLS

## 2020-01-21 ASSESSMENT — PAIN DESCRIPTION - PAIN TYPE: TYPE: ACUTE PAIN

## 2020-01-21 ASSESSMENT — PAIN DESCRIPTION - ONSET: ONSET: ON-GOING

## 2020-01-21 ASSESSMENT — PAIN DESCRIPTION - DESCRIPTORS: DESCRIPTORS: ACHING

## 2020-01-21 ASSESSMENT — PAIN DESCRIPTION - PROGRESSION: CLINICAL_PROGRESSION: NOT CHANGED

## 2020-01-21 ASSESSMENT — PAIN DESCRIPTION - FREQUENCY: FREQUENCY: CONTINUOUS

## 2020-01-21 NOTE — ED PROVIDER NOTES
[facility] EMERGENCY DEPT  eMERGENCY dEPARTMENT eNCOUnter      Pt Name: Kary Olivas  MRN: [id number]  [place] 1970  Date of evaluation: 1/21/2020  Provider: Chaz Martinez MD    CHIEF COMPLAINT       Chief Complaint   Patient presents with    Pancreatitis     pt states pancreatitis and \"pain everywhere\"    Alcohol Intoxication         HISTORY OF PRESENT ILLNESS   (Location/Symptom, Timing/Onset,Context/Setting, Quality, Duration, Modifying Factors, Severity)  Note limiting factors. Kary Olivas is a 51 y.o. female who presents to the emergency department for acute alcohol intox. Pt thinks that she is having a \"pancreatic attack\" and that her \"ammonia\" level is elevated. Pt told RN that she \"drank everything\". Pt is reporting mid-epigastric pain. The pain began about 3 hrs PTA. She has been seen for this same issue in the past. Pt has a difficult time quantifying the pain. She tells me it is 9/10. No radiation of the pain initially, but then tells me that the pain radiates to her back. Pt confirms vomiting. She has also been seen in the past for SI. Pt denies any SI at this time. Pt tells me that she wants help for the alcohol abuse. HPI    NursingNotes were reviewed. REVIEW OF SYSTEMS    (2-9 systems for level 4, 10 or more for level 5)     Review of Systems   Constitutional: Negative for activity change, appetite change, chills, fatigue and fever. HENT: Negative for congestion, ear pain, rhinorrhea, sore throat and trouble swallowing. Eyes: Negative for photophobia, pain and visual disturbance. Respiratory: Negative for cough, chest tightness, shortness of breath and wheezing. Cardiovascular: Negative for chest pain, palpitations and leg swelling. Gastrointestinal: Positive for abdominal pain (epigastric), nausea and vomiting. Negative for abdominal distention, constipation and diarrhea.    Genitourinary: Negative for difficulty urinating, dysuria, flank pain, urgency, vaginal bleeding and vaginal discharge. Musculoskeletal: Positive for back pain. Negative for myalgias and neck stiffness. Skin: Negative for color change, pallor and rash. Neurological: Negative for dizziness, weakness, light-headedness, numbness and headaches. Psychiatric/Behavioral: Negative for agitation, behavioral problems, confusion, hallucinations and suicidal ideas. PAST MEDICALHISTORY     Past Medical History:   Diagnosis Date    ADHD     Anxiety     Depressed     especially since kids removed from home    ETOH abuse     5th of etoh a day     History of chicken pox     Kuo kuo disease     2006         SURGICAL HISTORY       Past Surgical History:   Procedure Laterality Date    BRAIN SURGERY  2007    brain bypass for moyamoya    TUBAL LIGATION      2013         CURRENT MEDICATIONS     Previous Medications    ASPIRIN 325 MG EC TABLET    Take 325 mg by mouth daily    FLUOXETINE (PROZAC) 40 MG CAPSULE    Take 1 capsule by mouth daily    FOLIC ACID (FOLVITE) 1 MG TABLET    Take 1 tablet by mouth daily    GABAPENTIN (NEURONTIN) 300 MG CAPSULE    Take 600 mg by mouth 3 times daily. HYDROCODONE-ACETAMINOPHEN (NORCO) 7.5-325 MG PER TABLET    Take 1 tablet by mouth every 8 hours as needed for Pain.     LAMOTRIGINE (LAMICTAL) 100 MG TABLET    Take 1 tablet by mouth 2 times daily    MELATONIN 3 MG TABS TABLET    Take 1 tablet by mouth nightly    ONDANSETRON (ZOFRAN) 4 MG TABLET    Take 1 tablet by mouth every 8 hours as needed for Nausea or Vomiting    TRAZODONE (DESYREL) 50 MG TABLET    Take 1 tablet by mouth nightly    VALSARTAN (DIOVAN) 80 MG TABLET    Take 160 mg by mouth daily     VITAMIN B-1 100 MG TABLET    Take 1 tablet by mouth daily       ALLERGIES     Sulfa antibiotics    FAMILY HISTORY       Family History   Problem Relation Age of Onset    Cancer Paternal Grandfather         prostate CA    Cancer Paternal Grandmother         bone CA    Cancer Maternal Grandmother None   Social History Narrative    None       SCREENINGS             PHYSICAL EXAM    (up to 7 for level 4, 8 or more for level 5)     ED Triage Vitals [01/21/20 0243]   BP Temp Temp src Pulse Resp SpO2 Height Weight   (!) 149/92 98.3 °F (36.8 °C) -- 84 20 96 % 5' 3\" (1.6 m) 110 lb (49.9 kg)       Physical Exam  Vitals signs and nursing note reviewed. Constitutional:       General: She is not in acute distress. Appearance: She is well-developed. HENT:      Head: Normocephalic and atraumatic. Mouth/Throat:      Mouth: Mucous membranes are moist.      Pharynx: Oropharynx is clear. Eyes:      Conjunctiva/sclera: Conjunctivae normal.      Pupils: Pupils are equal, round, and reactive to light. Neck:      Musculoskeletal: Normal range of motion and neck supple. Vascular: No JVD. Cardiovascular:      Rate and Rhythm: Normal rate and regular rhythm. Heart sounds: Normal heart sounds. No murmur. Pulmonary:      Effort: Pulmonary effort is normal.      Breath sounds: Normal breath sounds. No wheezing or rales. Abdominal:      General: Abdomen is flat. Bowel sounds are normal. There is no distension. Palpations: Abdomen is soft. Tenderness: There is tenderness in the epigastric area. There is guarding. There is no rebound. Musculoskeletal: Normal range of motion. Skin:     General: Skin is warm and dry. Capillary Refill: Capillary refill takes less than 2 seconds. Neurological:      General: No focal deficit present. Mental Status: She is alert and oriented to person, place, and time. Cranial Nerves: No cranial nerve deficit. Psychiatric:         Speech: Speech is slurred. Behavior: Behavior is slowed. Thought Content:  Thought content normal.         Judgment: Judgment normal.         DIAGNOSTIC RESULTS     EKG: All EKG's areinterpreted by the Emergency Department Physician who either signs or Co-signs this chart in the absence of a cardiologist.        RADIOLOGY:  Non-plain film images such as CT, Ultrasound and MRI are read by the radiologist. Plain radiographic images are visualized and preliminarily interpreted bythe emergency physician with the below findings:          No orders to display           LABS:  Labs Reviewed   CBC WITH AUTO DIFFERENTIAL - Abnormal; Notable for the following components:       Result Value    MCHC 32.3 (*)     RDW 15.0 (*)     Platelets 777 (*)     MPV 8.9 (*)     Neutrophils % 40.8 (*)     Lymphocytes % 52.5 (*)     All other components within normal limits   COMPREHENSIVE METABOLIC PANEL - Abnormal; Notable for the following components:    Glucose 118 (*)     ALT 48 (*)      (*)     All other components within normal limits   AMMONIA - Abnormal; Notable for the following components:    Ammonia 10 (*)     All other components within normal limits   ACETAMINOPHEN LEVEL   ETHANOL   URINE DRUG SCREEN   URINE RT REFLEX TO CULTURE   SALICYLATE LEVEL   LIPASE       All other labs were within normal range or not returned as of this dictation. EMERGENCY DEPARTMENT COURSE and DIFFERENTIAL DIAGNOSIS/MDM:   Vitals:    Vitals:    01/21/20 0243 01/21/20 0248   BP: (!) 149/92    Pulse: 84 84   Resp: 20    Temp: 98.3 °F (36.8 °C)    SpO2: 96%    Weight: 110 lb (49.9 kg)    Height: 5' 3\" (1.6 m)        MDM  Number of Diagnoses or Management Options  Acute alcoholic intoxication without complication [facility]): new and requires workup  Diagnosis management comments: Patient found to be severely intoxicated with an alcohol level of 458. She denies any homicidal or suicidal ideations. She declares that she wants to get off alcohol at this time. I am concerned that by discharging her with an alcohol level was high as it is she runs the risk of developing severe DTs. I spoke to the hospitalist service and he is agreed to admit the patient for further evaluation and treatment.     Patient Progress  Patient progress:

## 2020-01-21 NOTE — H&P
Date    BRAIN SURGERY  2007    brain bypass for moyamoya    TUBAL LIGATION      2013     Social History     Tobacco History     Smoking Status  Current Every Day Smoker Smoking Frequency  1 pack/day for 34 years (34 pk yrs) Smoking Tobacco Type  Cigarettes    Smokeless Tobacco Use  Never Used    Tobacco Comment  NOW at 1/2 PPD, started at 16 years with a lifetime avrg of 1PPD          Alcohol History     Alcohol Use Status  Yes Comment  pt states multiple beers 3 x 24 oz and a pint or more of vodka daily 1/21/2020          Drug Use     Drug Use Status  Yes Types  Marijuana Comment  last 2-3 weeks ago          Sexual Activity     Sexually Active  Yes Comment  has 3 kids, is a divoriced lady            CODE STATUS: Full Code  HEALTH CARE PROXY: her sister by choice, Mrs. Luke Reyes, [phone]  AMBULATES: independently  DOMICILED: lives in a  Private home, has no stairs inside home, has 3 steps to enter the home, lives alone, has a dog and a cat    Family History   Problem Relation Age of Onset    Cancer Paternal Grandfather         prostate CA    Cancer Paternal Grandmother         bone CA    Cancer Maternal Grandmother         lymphoma, melanoma    Other Maternal Grandmother         Alzhemier disease    Cancer Maternal Grandfather         prostate CA with mets to bone    Hypertension Maternal Grandfather     Cancer Father         CNS lymphoma, smoker    Cancer Mother         Lung cancer, smoker    Cancer Sister         skin cancer, smoker    No Known Problems Son     No Known Problems Son     No Known Problems Daughter      Allergies   Allergen Reactions    Sulfa Antibiotics Other (See Comments)     \"makes me jittery\"     Current Facility-Administered Medications   Medication Dose Route Frequency Provider Last Rate Last Dose    aspirin EC tablet 325 mg  325 mg Oral Daily Evelyne Plasencia MD        FLUoxetine (PROZAC) capsule 40 mg  40 mg Oral Daily Evelyne Plasencia MD        folic acid LORazepam (ATIVAN) injection 4 mg  4 mg Intravenous Q1H PRN Caty Matamoros MD         Current Outpatient Medications   Medication Sig Dispense Refill    lamoTRIgine (LAMICTAL) 100 MG tablet Take 1 tablet by mouth 2 times daily 60 tablet 1    FLUoxetine (PROZAC) 40 MG capsule Take 1 capsule by mouth daily 30 capsule 1    traZODone (DESYREL) 50 MG tablet Take 1 tablet by mouth nightly 30 tablet 1    folic acid (FOLVITE) 1 MG tablet Take 1 tablet by mouth daily 30 tablet 3    melatonin 3 MG TABS tablet Take 1 tablet by mouth nightly 30 tablet 1    HYDROcodone-acetaminophen (NORCO) 7.5-325 MG per tablet Take 1 tablet by mouth every 8 hours as needed for Pain.  vitamin B-1 100 MG tablet Take 1 tablet by mouth daily 30 tablet 3    ondansetron (ZOFRAN) 4 MG tablet Take 1 tablet by mouth every 8 hours as needed for Nausea or Vomiting 30 tablet 0    gabapentin (NEURONTIN) 300 MG capsule Take 600 mg by mouth 3 times daily.  valsartan (DIOVAN) 80 MG tablet Take 160 mg by mouth daily       aspirin 325 MG EC tablet Take 325 mg by mouth daily           OBJECTIVE:    Vitals:    01/21/20 0248   BP:    Pulse: 84   Resp:    Temp:    SpO2:      BP (!) 149/92   Pulse 84   Temp 98.3 °F (36.8 °C)   Resp 20   Ht 5' 3\" (1.6 m)   Wt 110 lb (49.9 kg)   SpO2 96%   BMI 19.49 kg/m²     Physical Exam  Vitals signs reviewed. Constitutional:       General: She is not in acute distress. Appearance: She is not ill-appearing or toxic-appearing. HENT:      Head: Normocephalic and atraumatic. Nose: No congestion or rhinorrhea. Eyes:      General:         Right eye: No discharge. Left eye: No discharge. Neck:      Musculoskeletal: Neck supple. Comments: Trachea appears midline  Cardiovascular:      Rate and Rhythm: Normal rate and regular rhythm. Heart sounds: No murmur. No friction rub. No gallop. Pulmonary:      Effort: Pulmonary effort is normal. No respiratory distress.       Breath ug/mL <09     Salicylate, Serum Latest Ref Range: 3.0 - 10.0 mg/dL <3.0     WBC Latest Ref Range: 4.8 - 10.8 K/uL 4.8     RBC Latest Ref Range: 4.20 - 5.40 M/uL 4.71     Hemoglobin Quant Latest Ref Range: 12.0 - 16.0 g/dL 14.6     Hematocrit Latest Ref Range: 37.0 - 47.0 % 45.2     MCV Latest Ref Range: 81.0 - 99.0 fL 96.0     MCH Latest Ref Range: 27.0 - 31.0 pg 31.0     MCHC Latest Ref Range: 33.0 - 37.0 g/dL 32.3 (L)     MPV Latest Ref Range: 9.4 - 12.3 fL 8.9 (L)     RDW Latest Ref Range: 11.5 - 14.5 % 15.0 (H)     Platelet Count Latest Ref Range: 130 - 400 K/uL 110 (L)     Neutrophils % Latest Ref Range: 50.0 - 65.0 % 40.8 (L)     Lymphocyte % Latest Ref Range: 20.0 - 40.0 % 52.5 (H)     Monocytes % Latest Ref Range: 0.0 - 10.0 % 4.6     Eosinophils % Latest Ref Range: 0.0 - 5.0 % 1.5     Basophils % Latest Ref Range: 0.0 - 1.0 % 0.6     Neutrophils Absolute Latest Ref Range: 1.5 - 7.5 K/uL 1.9     Immature Granulocytes # Latest Units: K/uL 0.0     Lymphocytes Absolute Latest Ref Range: 1.1 - 4.5 K/uL 2.5     Monocytes Absolute Latest Ref Range: 0.00 - 0.90 K/uL 0.20     Eosinophils Absolute Latest Ref Range: 0.00 - 0.60 K/uL 0.10     Basophils Absolute Latest Ref Range: 0.00 - 0.20 K/uL 0.00     Urine Reflex to Culture Unknown   Not Indicated   Color, UA Latest Ref Range: Straw/Yellow    YELLOW   Clarity, UA Latest Ref Range: Clear    Clear   Glucose, UA Latest Ref Range: Negative mg/dL   Negative   Bilirubin, Urine Latest Ref Range: Negative    Negative   Ketones, Urine Latest Ref Range: Negative mg/dL   Negative   Specific Gravity, UA Latest Ref Range: 1.005 - 1.030    1.005   Blood, Urine Latest Ref Range: Negative    Negative   pH, UA Latest Ref Range: 5.0 - 8.0    6.0   Protein, UA Latest Ref Range: Negative mg/dL   Negative   Urobilinogen, Urine Latest Ref Range: <2.0 E.U./dL   0.2   Nitrite, Urine Latest Ref Range: Negative    Negative   Leukocyte Esterase, Urine Latest Ref Range: Negative    Negative

## 2020-01-22 LAB
ANION GAP SERPL CALCULATED.3IONS-SCNC: 15 MMOL/L (ref 7–19)
BASOPHILS ABSOLUTE: 0 K/UL (ref 0–0.2)
BASOPHILS RELATIVE PERCENT: 0.4 % (ref 0–1)
BUN BLDV-MCNC: 10 MG/DL (ref 6–20)
CALCIUM SERPL-MCNC: 9 MG/DL (ref 8.6–10)
CHLORIDE BLD-SCNC: 100 MMOL/L (ref 98–111)
CO2: 23 MMOL/L (ref 22–29)
CREAT SERPL-MCNC: 0.5 MG/DL (ref 0.5–0.9)
EOSINOPHILS ABSOLUTE: 0.1 K/UL (ref 0–0.6)
EOSINOPHILS RELATIVE PERCENT: 1.9 % (ref 0–5)
ETHANOL: <10 MG/DL (ref 0–0.08)
GFR NON-AFRICAN AMERICAN: >60
GLUCOSE BLD-MCNC: 124 MG/DL (ref 74–109)
HCT VFR BLD CALC: 38.9 % (ref 37–47)
HEMOGLOBIN: 12.9 G/DL (ref 12–16)
IMMATURE GRANULOCYTES #: 0 K/UL
LYMPHOCYTES ABSOLUTE: 1.2 K/UL (ref 1.1–4.5)
LYMPHOCYTES RELATIVE PERCENT: 43.3 % (ref 20–40)
MCH RBC QN AUTO: 31.1 PG (ref 27–31)
MCHC RBC AUTO-ENTMCNC: 33.2 G/DL (ref 33–37)
MCV RBC AUTO: 93.7 FL (ref 81–99)
MONOCYTES ABSOLUTE: 0.2 K/UL (ref 0–0.9)
MONOCYTES RELATIVE PERCENT: 8.1 % (ref 0–10)
NEUTROPHILS ABSOLUTE: 1.2 K/UL (ref 1.5–7.5)
NEUTROPHILS RELATIVE PERCENT: 45.9 % (ref 50–65)
PDW BLD-RTO: 14.6 % (ref 11.5–14.5)
PLATELET # BLD: 71 K/UL (ref 130–400)
PMV BLD AUTO: 9.4 FL (ref 9.4–12.3)
POTASSIUM REFLEX MAGNESIUM: 4.1 MMOL/L (ref 3.5–5)
RBC # BLD: 4.15 M/UL (ref 4.2–5.4)
SODIUM BLD-SCNC: 138 MMOL/L (ref 136–145)
WBC # BLD: 2.7 K/UL (ref 4.8–10.8)

## 2020-01-22 PROCEDURE — 1210000000 HC MED SURG R&B

## 2020-01-22 PROCEDURE — 99233 SBSQ HOSP IP/OBS HIGH 50: CPT | Performed by: PSYCHIATRY & NEUROLOGY

## 2020-01-22 PROCEDURE — G0480 DRUG TEST DEF 1-7 CLASSES: HCPCS

## 2020-01-22 PROCEDURE — 85025 COMPLETE CBC W/AUTO DIFF WBC: CPT

## 2020-01-22 PROCEDURE — 36415 COLL VENOUS BLD VENIPUNCTURE: CPT

## 2020-01-22 PROCEDURE — 2580000003 HC RX 258: Performed by: HOSPITALIST

## 2020-01-22 PROCEDURE — 80048 BASIC METABOLIC PNL TOTAL CA: CPT

## 2020-01-22 PROCEDURE — 6360000002 HC RX W HCPCS: Performed by: HOSPITALIST

## 2020-01-22 PROCEDURE — 6370000000 HC RX 637 (ALT 250 FOR IP): Performed by: PSYCHIATRY & NEUROLOGY

## 2020-01-22 PROCEDURE — 6370000000 HC RX 637 (ALT 250 FOR IP): Performed by: HOSPITALIST

## 2020-01-22 RX ADMIN — LORAZEPAM 1 MG: 1 TABLET ORAL at 20:00

## 2020-01-22 RX ADMIN — MELATONIN 3 MG: at 20:00

## 2020-01-22 RX ADMIN — ONDANSETRON 4 MG: 2 INJECTION INTRAMUSCULAR; INTRAVENOUS at 23:07

## 2020-01-22 RX ADMIN — MULTIPLE VITAMINS W/ MINERALS TAB 1 TABLET: TAB at 08:32

## 2020-01-22 RX ADMIN — GABAPENTIN 600 MG: 300 CAPSULE ORAL at 08:32

## 2020-01-22 RX ADMIN — FOLIC ACID 1 MG: 1 TABLET ORAL at 08:32

## 2020-01-22 RX ADMIN — VALSARTAN 160 MG: 80 TABLET, FILM COATED ORAL at 08:32

## 2020-01-22 RX ADMIN — SODIUM CHLORIDE, PRESERVATIVE FREE 10 ML: 5 INJECTION INTRAVENOUS at 23:07

## 2020-01-22 RX ADMIN — NALTREXONE HYDROCHLORIDE 50 MG: 50 TABLET, FILM COATED ORAL at 08:32

## 2020-01-22 RX ADMIN — LORAZEPAM 1 MG: 1 TABLET ORAL at 16:07

## 2020-01-22 RX ADMIN — Medication 10 ML: at 08:31

## 2020-01-22 RX ADMIN — LAMOTRIGINE 100 MG: 100 TABLET ORAL at 19:59

## 2020-01-22 RX ADMIN — ASPIRIN 325 MG: 325 TABLET, COATED ORAL at 08:32

## 2020-01-22 RX ADMIN — ONDANSETRON 4 MG: 2 INJECTION INTRAMUSCULAR; INTRAVENOUS at 15:40

## 2020-01-22 RX ADMIN — LAMOTRIGINE 100 MG: 100 TABLET ORAL at 08:31

## 2020-01-22 RX ADMIN — Medication 100 MG: at 08:32

## 2020-01-22 RX ADMIN — GABAPENTIN 600 MG: 300 CAPSULE ORAL at 13:45

## 2020-01-22 RX ADMIN — GABAPENTIN 600 MG: 300 CAPSULE ORAL at 19:59

## 2020-01-22 RX ADMIN — Medication 10 ML: at 20:00

## 2020-01-22 RX ADMIN — LORAZEPAM 1 MG: 1 TABLET ORAL at 11:47

## 2020-01-22 RX ADMIN — FLUOXETINE HYDROCHLORIDE 40 MG: 20 CAPSULE ORAL at 08:32

## 2020-01-22 RX ADMIN — TRAZODONE HYDROCHLORIDE 50 MG: 50 TABLET ORAL at 19:59

## 2020-01-22 NOTE — PROGRESS NOTES
Nightly Carol Salvador MD   50 mg at 01/21/20 1935    valsartan (DIOVAN) tablet 160 mg  160 mg Oral Daily Carol Salvador MD   160 mg at 01/21/20 7385    vitamin B-1 (THIAMINE) tablet 100 mg  100 mg Oral Daily Carol Salvador MD   100 mg at 01/21/20 8693    therapeutic multivitamin-minerals 1 tablet  1 tablet Oral Daily Carol Salvador MD   1 tablet at 01/21/20 0853    sodium chloride flush 0.9 % injection 10 mL  10 mL Intravenous 2 times per day Carol Salvador MD   10 mL at 01/21/20 1936    sodium chloride flush 0.9 % injection 10 mL  10 mL Intravenous PRN Carol Salvador MD        ondansetron [facility]) injection 4 mg  4 mg Intravenous Q6H PRN Carol Salvador MD        potassium chloride (KLOR-CON M) extended release tablet 40 mEq  40 mEq Oral PRN Carol Salvador MD        Or    potassium bicarb-citric acid (EFFER-K) effervescent tablet 40 mEq  40 mEq Oral PRN Carol Salvador MD        Or    potassium chloride 10 mEq/100 mL IVPB (Peripheral Line)  10 mEq Intravenous PRN Carol Salvador MD        magnesium sulfate 1 g in dextrose 5% 100 mL IVPB  1 g Intravenous PRN Carol Salvador MD        LORazepam (ATIVAN) tablet 1 mg  1 mg Oral Q1H PRN Carol Salvador MD        Or    LORazepam (ATIVAN) injection 1 mg  1 mg Intravenous Q1H PRN Carol Salvador MD        Or    LORazepam (ATIVAN) tablet 2 mg  2 mg Oral Q1H PRN Carol Salvador MD        Or    LORazepam (ATIVAN) injection 2 mg  2 mg Intravenous Q1H PRN Carol Salvador MD   2 mg at 01/21/20 1544    Or    LORazepam (ATIVAN) tablet 3 mg  3 mg Oral Q1H PRN Carol Salvador MD   3 mg at 01/21/20 1935    Or    LORazepam (ATIVAN) injection 3 mg  3 mg Intravenous Q1H PRN Carol Salvador MD        Or    LORazepam (ATIVAN) tablet 4 mg  4 mg Oral Q1H PRN Carol Salvador MD        Or    LORazepam (ATIVAN) injection 4 mg  4 mg Intravenous Q1H PRN Carol Salvador MD        enoxaparin (LOVENOX) injection 40 mg  40 mg Subcutaneous Q24H Nara Yuen MD   40 mg at 01/21/20 4906 lympadenopathy or bruit. Lungs: Patient in no acute respiratory distress, no increased work of breathing, \"clear to auscultation bilaterally\" without rales, rhonchi or wheezes  Heart: regular rate and rhythm, S1, S2 normal, no murmur, click, rub or gallop  Abdomen: soft, non-tender; non-distended normal bowel sounds no masses, no organomegaly  Musculoskeletal: ROM Intact in B/L Upper and LE. No joint tenderness or Deformity throughout. Extremities: extremities normal, atraumatic, no cyanosis or edema  Neurologic: No focal neurologic deficits, normal sensation, CN: II-XII intact, grossly non-focal.  Skin: Skin color, texture, turgor normal. No rashes or lesions  Psychiatric: Alert and oriented, affect and mood appropriate, thought content appropriate, normal insight. Intact memory. Assessment/plan:         Hospital Problems           Last Modified POA    ETOH abuse 1/21/2020 Yes    Overview Signed 7/26/2019  8:30 AM by Maribel Santos MD     5th of etoh a day          Alcohol use 1/21/2020 Yes    Acute alcoholic intoxication without complication ([address] 7/82/1540 Yes    Alcohol intoxication in relapsed alcoholic ([address] 1/32/8700 Yes          Active Problems:    ETOH abuse    Alcohol use    Acute alcoholic intoxication without complication ([address]    Alcohol intoxication in relapsed alcoholic ([address]  Resolved Problems:    * No resolved hospital problems. *      ETOH abuse, monitor for withdrawal   CIWA Protocol   Larazepam PRN as per CIWA Score   Gabapentin 600 mg p.o. 3 times daily   Banana Bag (Thiamine, Folic Acid, Multivatamins)   Seizure Precautions   Seen by psychiatry -appreciate recommendations   Plan to start Naltrexone, to help prevent possible relapse to abusing EtOH   Plan to switch to Naltrexone (Vivitrol) IM q4wk,  if patient tolerates PO naltrexone.    Naltrexone 50 mg p.o. daily  · Continue to monitor      Continue management of other chronic medical conditions - see above and orders. Advance Directive: Full Code    DIET GENERAL;     DVT prophylaxis: Enoxaparin    Consults Made:   IP CONSULT TO PSYCHIATRY  IP CONSULT TO SOCIAL WORK    Discharge planning: tbd    Time Spent is 25 mins in the examination, evaluation, counseling and review of medications, assessment and plan.      Electronically signed by   Alae Nichols MD, MPH,   Internal Medicine Hospitalist   1/22/2020 7:54 AM

## 2020-01-22 NOTE — PLAN OF CARE
Problem: Falls - Risk of:  Goal: Will remain free from falls  Description  Will remain free from falls  1/21/2020 2357 by Verna Waldron RN  Outcome: Ongoing  1/21/2020 1050 by Sony Woods RN  Outcome: Ongoing  Goal: Absence of physical injury  Description  Absence of physical injury  1/21/2020 2357 by Verna Waldron RN  Outcome: Ongoing  1/21/2020 1050 by Sony Woods RN  Outcome: Ongoing     Problem: Pain:  Goal: Pain level will decrease  Description  Pain level will decrease  Outcome: Ongoing  Goal: Control of acute pain  Description  Control of acute pain  Outcome: Ongoing  Goal: Control of chronic pain  Description  Control of chronic pain  Outcome: Ongoing

## 2020-01-22 NOTE — BH NOTE
Department of Psychiatry  Attending Progress Note      SUBJECTIVE:    The patient is a 51 y.o. female with previous psychiatric history of alcohol use disorder and bipolar disorder, who has been admitted to medical services secondary to alcohol intoxication with blood alcohol level 402, complicated by history of withdrawal seizures from alcohol and DTs. Patient has been seen in her room. She has been sitting on the edge of her bed and was wearing hospital attire. Patient reported that her condition mildly improved with prescribed medications for withdrawal symptoms from alcohol, and her mood is \"better, but a little anxious\" today. She endorses fair appetite and improved quality of sleep, stated that she did not experience any issues to fall asleep or stay asleep during the last night with prescribed medications. Patient is compliant with currently prescribed medications and denies any side effects. She continues to report mild withdrawal symptoms from alcohol - general muscle weakness, hand tremor, body shakes, increased level of anxiety, irritability. Patient continues to deny any active suicidal or homicidal ideations, denies any suicidal plans. Also, patient denies any auditory and visual hallucinations, denies any symptoms of paranoia or psychosis. OBJECTIVE    Physical  VITALS:  BP (!) 142/87   Pulse 89   Temp 97.4 °F (36.3 °C) (Temporal)   Resp 16   Ht 5' 3\" (1.6 m)   Wt 115 lb (52.2 kg)   SpO2 97%   BMI 20.37 kg/m²   TEMPERATURE:  Current - Temp: 97.4 °F (36.3 °C);  Max - Temp  Av.3 °F (36.3 °C)  Min: 96.2 °F (35.7 °C)  Max: 97.8 °F (36.6 °C)  RESPIRATIONS RANGE: Resp  Av.6  Min: 16  Max: 20  PULSE RANGE: Pulse  Av.2  Min: 71  Max: 94  BLOOD PRESSURE RANGE:  Systolic (41LFA), WGP:464 , Min:109 , QCK:199   ; Diastolic (22ZIZ), JOSIAH:73, Min:70, Max:87    Review of Systems: 14 point review of systems is negative for mild withdrawal symptoms from alcohol - hand tremor, body Medications  Current Facility-Administered Medications: aspirin EC tablet 325 mg, 325 mg, Oral, Daily  FLUoxetine (PROZAC) capsule 40 mg, 40 mg, Oral, Daily  folic acid (FOLVITE) tablet 1 mg, 1 mg, Oral, Daily  melatonin tablet 3 mg, 3 mg, Oral, Nightly  gabapentin (NEURONTIN) capsule 600 mg, 600 mg, Oral, TID  lamoTRIgine (LAMICTAL) tablet 100 mg, 100 mg, Oral, BID  traZODone (DESYREL) tablet 50 mg, 50 mg, Oral, Nightly  valsartan (DIOVAN) tablet 160 mg, 160 mg, Oral, Daily  vitamin B-1 (THIAMINE) tablet 100 mg, 100 mg, Oral, Daily  therapeutic multivitamin-minerals 1 tablet, 1 tablet, Oral, Daily  sodium chloride flush 0.9 % injection 10 mL, 10 mL, Intravenous, 2 times per day  sodium chloride flush 0.9 % injection 10 mL, 10 mL, Intravenous, PRN  ondansetron (ZOFRAN) injection 4 mg, 4 mg, Intravenous, Q6H PRN  potassium chloride (KLOR-CON M) extended release tablet 40 mEq, 40 mEq, Oral, PRN **OR** potassium bicarb-citric acid (EFFER-K) effervescent tablet 40 mEq, 40 mEq, Oral, PRN **OR** potassium chloride 10 mEq/100 mL IVPB (Peripheral Line), 10 mEq, Intravenous, PRN  magnesium sulfate 1 g in dextrose 5% 100 mL IVPB, 1 g, Intravenous, PRN  LORazepam (ATIVAN) tablet 1 mg, 1 mg, Oral, Q1H PRN **OR** LORazepam (ATIVAN) injection 1 mg, 1 mg, Intravenous, Q1H PRN **OR** LORazepam (ATIVAN) tablet 2 mg, 2 mg, Oral, Q1H PRN **OR** LORazepam (ATIVAN) injection 2 mg, 2 mg, Intravenous, Q1H PRN **OR** LORazepam (ATIVAN) tablet 3 mg, 3 mg, Oral, Q1H PRN **OR** LORazepam (ATIVAN) injection 3 mg, 3 mg, Intravenous, Q1H PRN **OR** LORazepam (ATIVAN) tablet 4 mg, 4 mg, Oral, Q1H PRN **OR** LORazepam (ATIVAN) injection 4 mg, 4 mg, Intravenous, Q1H PRN  enoxaparin (LOVENOX) injection 40 mg, 40 mg, Subcutaneous, Q24H  naltrexone (DEPADE) tablet 50 mg, 50 mg, Oral, Daily with breakfast  ibuprofen (ADVIL;MOTRIN) tablet 800 mg, 800 mg, Oral, Q8H PRN    ASSESSMENT AND PLAN    DSM 5 DIAGNOSIS:  Bipolar disorder  Alcohol use

## 2020-01-23 LAB
ANION GAP SERPL CALCULATED.3IONS-SCNC: 18 MMOL/L (ref 7–19)
BASOPHILS ABSOLUTE: 0 K/UL (ref 0–0.2)
BASOPHILS RELATIVE PERCENT: 0.9 % (ref 0–1)
BUN BLDV-MCNC: 6 MG/DL (ref 6–20)
CALCIUM SERPL-MCNC: 9 MG/DL (ref 8.6–10)
CHLORIDE BLD-SCNC: 106 MMOL/L (ref 98–111)
CO2: 14 MMOL/L (ref 22–29)
CREAT SERPL-MCNC: 0.5 MG/DL (ref 0.5–0.9)
EOSINOPHILS ABSOLUTE: 0 K/UL (ref 0–0.6)
EOSINOPHILS RELATIVE PERCENT: 1.8 % (ref 0–5)
ETHANOL: <10 MG/DL (ref 0–0.08)
GFR NON-AFRICAN AMERICAN: >60
GLUCOSE BLD-MCNC: 123 MG/DL (ref 74–109)
HCT VFR BLD CALC: 37.1 % (ref 37–47)
HEMOGLOBIN: 11.9 G/DL (ref 12–16)
IMMATURE GRANULOCYTES #: 0 K/UL
LYMPHOCYTES ABSOLUTE: 1 K/UL (ref 1.1–4.5)
LYMPHOCYTES RELATIVE PERCENT: 44.1 % (ref 20–40)
MCH RBC QN AUTO: 30.7 PG (ref 27–31)
MCHC RBC AUTO-ENTMCNC: 32.1 G/DL (ref 33–37)
MCV RBC AUTO: 95.6 FL (ref 81–99)
MONOCYTES ABSOLUTE: 0.2 K/UL (ref 0–0.9)
MONOCYTES RELATIVE PERCENT: 8.4 % (ref 0–10)
NEUTROPHILS ABSOLUTE: 1 K/UL (ref 1.5–7.5)
NEUTROPHILS RELATIVE PERCENT: 44.4 % (ref 50–65)
PDW BLD-RTO: 14.7 % (ref 11.5–14.5)
PLATELET # BLD: 71 K/UL (ref 130–400)
PMV BLD AUTO: 10.4 FL (ref 9.4–12.3)
POTASSIUM REFLEX MAGNESIUM: 4.1 MMOL/L (ref 3.5–5)
RBC # BLD: 3.88 M/UL (ref 4.2–5.4)
SODIUM BLD-SCNC: 138 MMOL/L (ref 136–145)
WBC # BLD: 2.3 K/UL (ref 4.8–10.8)

## 2020-01-23 PROCEDURE — G0480 DRUG TEST DEF 1-7 CLASSES: HCPCS

## 2020-01-23 PROCEDURE — 6370000000 HC RX 637 (ALT 250 FOR IP): Performed by: PSYCHIATRY & NEUROLOGY

## 2020-01-23 PROCEDURE — 36415 COLL VENOUS BLD VENIPUNCTURE: CPT

## 2020-01-23 PROCEDURE — 85025 COMPLETE CBC W/AUTO DIFF WBC: CPT

## 2020-01-23 PROCEDURE — 6370000000 HC RX 637 (ALT 250 FOR IP): Performed by: HOSPITALIST

## 2020-01-23 PROCEDURE — 80048 BASIC METABOLIC PNL TOTAL CA: CPT

## 2020-01-23 PROCEDURE — 6360000002 HC RX W HCPCS: Performed by: HOSPITALIST

## 2020-01-23 PROCEDURE — 1210000000 HC MED SURG R&B

## 2020-01-23 PROCEDURE — 2580000003 HC RX 258: Performed by: HOSPITALIST

## 2020-01-23 RX ADMIN — FOLIC ACID 1 MG: 1 TABLET ORAL at 09:07

## 2020-01-23 RX ADMIN — LORAZEPAM 1 MG: 1 TABLET ORAL at 13:54

## 2020-01-23 RX ADMIN — NALTREXONE HYDROCHLORIDE 50 MG: 50 TABLET, FILM COATED ORAL at 09:02

## 2020-01-23 RX ADMIN — Medication 100 MG: at 09:06

## 2020-01-23 RX ADMIN — GABAPENTIN 600 MG: 300 CAPSULE ORAL at 13:54

## 2020-01-23 RX ADMIN — TRAZODONE HYDROCHLORIDE 50 MG: 50 TABLET ORAL at 20:13

## 2020-01-23 RX ADMIN — FLUOXETINE HYDROCHLORIDE 40 MG: 20 CAPSULE ORAL at 09:02

## 2020-01-23 RX ADMIN — IBUPROFEN 800 MG: 400 TABLET, FILM COATED ORAL at 20:13

## 2020-01-23 RX ADMIN — LAMOTRIGINE 100 MG: 100 TABLET ORAL at 20:12

## 2020-01-23 RX ADMIN — GABAPENTIN 600 MG: 300 CAPSULE ORAL at 20:12

## 2020-01-23 RX ADMIN — LORAZEPAM 1 MG: 1 TABLET ORAL at 20:12

## 2020-01-23 RX ADMIN — ASPIRIN 325 MG: 325 TABLET, COATED ORAL at 09:02

## 2020-01-23 RX ADMIN — MELATONIN 3 MG: at 20:13

## 2020-01-23 RX ADMIN — LAMOTRIGINE 100 MG: 100 TABLET ORAL at 09:02

## 2020-01-23 RX ADMIN — Medication 10 ML: at 09:07

## 2020-01-23 RX ADMIN — VALSARTAN 160 MG: 80 TABLET, FILM COATED ORAL at 09:06

## 2020-01-23 RX ADMIN — GABAPENTIN 600 MG: 300 CAPSULE ORAL at 09:02

## 2020-01-23 RX ADMIN — LORAZEPAM 1 MG: 1 TABLET ORAL at 09:02

## 2020-01-23 RX ADMIN — MULTIPLE VITAMINS W/ MINERALS TAB 1 TABLET: TAB at 09:06

## 2020-01-23 RX ADMIN — ONDANSETRON 4 MG: 2 INJECTION INTRAMUSCULAR; INTRAVENOUS at 11:13

## 2020-01-23 ASSESSMENT — PAIN DESCRIPTION - FREQUENCY: FREQUENCY: INTERMITTENT

## 2020-01-23 ASSESSMENT — PAIN SCALES - GENERAL
PAINLEVEL_OUTOF10: 0
PAINLEVEL_OUTOF10: 7

## 2020-01-23 ASSESSMENT — PAIN DESCRIPTION - PAIN TYPE
TYPE: ACUTE PAIN
TYPE: ACUTE PAIN

## 2020-01-23 ASSESSMENT — PAIN DESCRIPTION - ONSET: ONSET: GRADUAL

## 2020-01-23 ASSESSMENT — PAIN DESCRIPTION - LOCATION
LOCATION: ABDOMEN
LOCATION: ABDOMEN

## 2020-01-23 ASSESSMENT — PAIN - FUNCTIONAL ASSESSMENT
PAIN_FUNCTIONAL_ASSESSMENT: ACTIVITIES ARE NOT PREVENTED
PAIN_FUNCTIONAL_ASSESSMENT: PREVENTS OR INTERFERES SOME ACTIVE ACTIVITIES AND ADLS

## 2020-01-23 ASSESSMENT — PAIN DESCRIPTION - DESCRIPTORS
DESCRIPTORS: ACHING
DESCRIPTORS: ACHING;CONSTANT

## 2020-01-23 ASSESSMENT — PAIN DESCRIPTION - PROGRESSION
CLINICAL_PROGRESSION: GRADUALLY IMPROVING
CLINICAL_PROGRESSION: GRADUALLY IMPROVING

## 2020-01-23 NOTE — PROGRESS NOTES
tablet 4 mg  4 mg Oral Q1H PRN Samantha Barth MD        Or    LORazepam (ATIVAN) injection 4 mg  4 mg Intravenous Q1H PRN Samantha Brath MD        enoxaparin (LOVENOX) injection 40 mg  40 mg Subcutaneous Q24H Jamil Craft MD   40 mg at 01/21/20 1350    naltrexone (DEPADE) tablet 50 mg  50 mg Oral Daily with breakfast Maryann Ch MD   50 mg at 01/22/20 4875    ibuprofen (ADVIL;MOTRIN) tablet 800 mg  800 mg Oral Q8H PRN Maryann Ch MD   800 mg at 01/21/20 1532           aspirin  325 mg Oral Daily    FLUoxetine  40 mg Oral Daily    folic acid  1 mg Oral Daily    melatonin  3 mg Oral Nightly    gabapentin  600 mg Oral TID    lamoTRIgine  100 mg Oral BID    traZODone  50 mg Oral Nightly    valsartan  160 mg Oral Daily    thiamine  100 mg Oral Daily    therapeutic multivitamin-minerals  1 tablet Oral Daily    sodium chloride flush  10 mL Intravenous 2 times per day    enoxaparin  40 mg Subcutaneous Q24H    naltrexone  50 mg Oral Daily with breakfast     sodium chloride flush, ondansetron, potassium chloride **OR** potassium alternative oral replacement **OR** potassium chloride, magnesium sulfate, LORazepam **OR** LORazepam **OR** LORazepam **OR** LORazepam **OR** LORazepam **OR** LORazepam **OR** LORazepam **OR** LORazepam, ibuprofen  DIET GENERAL;       Labs:   CBC with DIFF:  Recent Labs     01/21/20  0245 01/22/20  0428 01/23/20  0429   WBC 4.8 2.7* 2.3*   RBC 4.71 4.15* 3.88*   HGB 14.6 12.9 11.9*   HCT 45.2 38.9 37.1   MCV 96.0 93.7 95.6   MCH 31.0 31.1* 30.7   MCHC 32.3* 33.2 32.1*   RDW 15.0* 14.6* 14.7*   * 71* 71*   MPV 8.9* 9.4 10.4   NEUTOPHILPCT 40.8* 45.9* 44.4*   LYMPHOPCT 52.5* 43.3* 44.1*   MONOPCT 4.6 8.1 8.4   EOSRELPCT 1.5 1.9 1.8   BASOPCT 0.6 0.4 0.9   NEUTROABS 1.9 1.2* 1.0*   LYMPHSABS 2.5 1.2 1.0*   MONOSABS 0.20 0.20 0.20   EOSABS 0.10 0.10 0.00   BASOSABS 0.00 0.00 0.00       CMP/BMP:  Recent Labs     01/21/20  0245 01/22/20  0428 01/23/20  0429    138 138   K 4.1 4.1 4.1    100 106   CO2 23 23 14*   ANIONGAP 17 15 18   GLUCOSE 118* 124* 123*   BUN 7 10 6   CREATININE 0.5 0.5 0.5   LABGLOM >60 >60 >60   CALCIUM 9.1 9.0 9.0   PROT 7.8  --   --    LABALBU 4.8  --   --    BILITOT 0.4  --   --    ALKPHOS 102  --   --    ALT 48*  --   --    *  --   --          CRP:  No results for input(s): CRP in the last 72 hours. Sed Rate:  No results for input(s): SEDRATE in the last 72 hours. HgBA1c:  No components found for: HGBA1C  FLP:  No results found for: TRIG, HDL, LDLCALC, LDLDIRECT, LABVLDL  TSH:    Lab Results   Component Value Date    TSH 1.720 07/26/2019     Troponin T: No results for input(s): TROPONINI in the last 72 hours. Pro-BNP: No results for input(s): BNP in the last 72 hours. INR: No results for input(s): INR in the last 72 hours. ABGs: No results found for: PHART, PO2ART, ZAH9ENU  UA:  Recent Labs     01/21/20  0458   COLORU YELLOW   PHUR 6.0   CLARITYU Clear   SPECGRAV 1.005   LEUKOCYTESUR Negative   UROBILINOGEN 0.2   BILIRUBINUR Negative   BLOODU Negative   GLUCOSEU Negative         Culture Results:    No results for input(s): CXSURG in the last 720 hours. Blood Culture Recent: No results for input(s): BC in the last 720 hours. Cultures:   No results for input(s): CULTURE in the last 72 hours. No results for input(s): BC, Alem Desirae in the last 72 hours. No results for input(s): CXSURG in the last 72 hours. No results for input(s): MG, PHOS in the last 72 hours. Recent Labs     01/21/20  0245   *   ALT 48*   BILITOT 0.4   ALKPHOS 102         RAD:   No results found.         Objective:   Vitals: /75   Pulse 78   Temp 96.2 °F (35.7 °C)   Resp 20   Ht 5' 3\" (1.6 m)   Wt 115 lb (52.2 kg)   SpO2 96%   BMI 20.37 kg/m²   24HR INTAKE/OUTPUT:      Intake/Output Summary (Last 24 hours) at 1/23/2020 0832  Last data filed at 1/22/2020 2300  Gross per 24 hour   Intake 980 ml   Output 950 ml   Net 30 ml withdrawal   CIWA Protocol   Larazepam PRN as per CIWA Score   Gabapentin 600 mg p.o. 3 times daily   Banana Bag (Thiamine, Folic Acid, Multivatamins)   Seizure Precautions   Seen by psychiatry -appreciate recommendations   Plan to start Naltrexone, to help prevent possible relapse to abusing EtOH   Plan to switch to Naltrexone (Vivitrol) IM q4wk,  if patient tolerates PO naltrexone.  Naltrexone 50 mg p.o. daily  · Continue to monitor    Polysubstance abuse  · U tox positive for amphetamine  · Patient counseled  · Motivated to seek help  · Pending arrangement for outpatient EtOH rehab    Continue management of other chronic medical conditions - see above and orders. Advance Directive: Full Code    DIET GENERAL;     DVT prophylaxis:  SCDs    Consults Made:   IP CONSULT TO PSYCHIATRY  IP CONSULT TO SOCIAL WORK    Discharge planning: tbd -continue to monitor for withdrawal.  Likely discharge tomorrow (01/24/2020). Referral sent, by LETICIA, to [facility] outpatient EtOH rehab. Time Spent is 25 mins in the examination, evaluation, counseling and review of medications, assessment and plan.      Electronically signed by   Rocio Vieira MD, MPH,   Internal Medicine Hospitalist   1/23/2020 8:32 AM

## 2020-01-24 VITALS
TEMPERATURE: 98.2 F | DIASTOLIC BLOOD PRESSURE: 75 MMHG | BODY MASS INDEX: 20.38 KG/M2 | SYSTOLIC BLOOD PRESSURE: 134 MMHG | WEIGHT: 115 LBS | RESPIRATION RATE: 16 BRPM | HEIGHT: 63 IN | HEART RATE: 71 BPM | OXYGEN SATURATION: 98 %

## 2020-01-24 LAB
ANION GAP SERPL CALCULATED.3IONS-SCNC: 13 MMOL/L (ref 7–19)
BASOPHILS ABSOLUTE: 0 K/UL (ref 0–0.2)
BASOPHILS RELATIVE PERCENT: 0.3 % (ref 0–1)
BUN BLDV-MCNC: 6 MG/DL (ref 6–20)
CALCIUM SERPL-MCNC: 9.2 MG/DL (ref 8.6–10)
CHLORIDE BLD-SCNC: 102 MMOL/L (ref 98–111)
CO2: 25 MMOL/L (ref 22–29)
CREAT SERPL-MCNC: 0.7 MG/DL (ref 0.5–0.9)
EOSINOPHILS ABSOLUTE: 0.1 K/UL (ref 0–0.6)
EOSINOPHILS RELATIVE PERCENT: 2 % (ref 0–5)
ETHANOL: <10 MG/DL (ref 0–0.08)
GFR NON-AFRICAN AMERICAN: >60
GLUCOSE BLD-MCNC: 121 MG/DL (ref 74–109)
HCT VFR BLD CALC: 37.4 % (ref 37–47)
HEMOGLOBIN: 12.3 G/DL (ref 12–16)
IMMATURE GRANULOCYTES #: 0 K/UL
LYMPHOCYTES ABSOLUTE: 1.2 K/UL (ref 1.1–4.5)
LYMPHOCYTES RELATIVE PERCENT: 40.5 % (ref 20–40)
MCH RBC QN AUTO: 31.8 PG (ref 27–31)
MCHC RBC AUTO-ENTMCNC: 32.9 G/DL (ref 33–37)
MCV RBC AUTO: 96.6 FL (ref 81–99)
MONOCYTES ABSOLUTE: 0.2 K/UL (ref 0–0.9)
MONOCYTES RELATIVE PERCENT: 7 % (ref 0–10)
NEUTROPHILS ABSOLUTE: 1.5 K/UL (ref 1.5–7.5)
NEUTROPHILS RELATIVE PERCENT: 49.9 % (ref 50–65)
PDW BLD-RTO: 14.6 % (ref 11.5–14.5)
PLATELET # BLD: 71 K/UL (ref 130–400)
PMV BLD AUTO: 10.3 FL (ref 9.4–12.3)
POTASSIUM REFLEX MAGNESIUM: 4.1 MMOL/L (ref 3.5–5)
RBC # BLD: 3.87 M/UL (ref 4.2–5.4)
SODIUM BLD-SCNC: 140 MMOL/L (ref 136–145)
WBC # BLD: 3 K/UL (ref 4.8–10.8)

## 2020-01-24 PROCEDURE — 80048 BASIC METABOLIC PNL TOTAL CA: CPT

## 2020-01-24 PROCEDURE — 6370000000 HC RX 637 (ALT 250 FOR IP): Performed by: PSYCHIATRY & NEUROLOGY

## 2020-01-24 PROCEDURE — 2580000003 HC RX 258: Performed by: HOSPITALIST

## 2020-01-24 PROCEDURE — 85025 COMPLETE CBC W/AUTO DIFF WBC: CPT

## 2020-01-24 PROCEDURE — 36415 COLL VENOUS BLD VENIPUNCTURE: CPT

## 2020-01-24 PROCEDURE — 6370000000 HC RX 637 (ALT 250 FOR IP): Performed by: HOSPITALIST

## 2020-01-24 PROCEDURE — G0480 DRUG TEST DEF 1-7 CLASSES: HCPCS

## 2020-01-24 RX ORDER — NALTREXONE HYDROCHLORIDE 50 MG/1
50 TABLET, FILM COATED ORAL
Qty: 30 TABLET | Refills: 0 | Status: SHIPPED | OUTPATIENT
Start: 2020-01-25 | End: 2020-02-24

## 2020-01-24 RX ADMIN — VALSARTAN 160 MG: 80 TABLET, FILM COATED ORAL at 08:36

## 2020-01-24 RX ADMIN — MULTIPLE VITAMINS W/ MINERALS TAB 1 TABLET: TAB at 08:36

## 2020-01-24 RX ADMIN — ASPIRIN 325 MG: 325 TABLET, COATED ORAL at 08:36

## 2020-01-24 RX ADMIN — FLUOXETINE HYDROCHLORIDE 40 MG: 20 CAPSULE ORAL at 08:36

## 2020-01-24 RX ADMIN — NALTREXONE HYDROCHLORIDE 50 MG: 50 TABLET, FILM COATED ORAL at 08:36

## 2020-01-24 RX ADMIN — Medication 10 ML: at 08:36

## 2020-01-24 RX ADMIN — GABAPENTIN 600 MG: 300 CAPSULE ORAL at 08:36

## 2020-01-24 RX ADMIN — LAMOTRIGINE 100 MG: 100 TABLET ORAL at 08:36

## 2020-01-24 RX ADMIN — FOLIC ACID 1 MG: 1 TABLET ORAL at 08:36

## 2020-01-24 RX ADMIN — Medication 100 MG: at 08:36

## 2020-01-24 NOTE — DISCHARGE INSTR - COC
Continuity of Care Form    Patient Name: Sangeetha Marina   :  1970  MRN:  [id number]    Admit date:  2020  Discharge date:  ***    Code Status Order: Full Code   Advance Directives:   Advance Care Flowsheet Documentation     Date/Time Healthcare Directive Type of Healthcare Directive Copy in [address] Agent's Name Healthcare Agent's Phone Number    20 [phone]  No, patient does not have an advance directive for healthcare treatment -- -- -- -- --          Admitting Physician:  Gavin Gallardo MD  PCP: Renée Johns DO    Discharging Nurse: [facility] Unit/Room#: 200/65-12  Discharging Unit Phone Number: ***    Emergency Contact:   Extended Emergency Contact Information  Primary Emergency Contact: [address] [address] of [address] [place] [address] Phone: [phone]  Relation: Other    Past Surgical History:  Past Surgical History:   Procedure Laterality Date    BRAIN SURGERY      brain bypass for moyamoya    TUBAL LIGATION             Immunization History: There is no immunization history for the selected administration types on file for this patient.     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    History of chicken pox Z86.19    ADHD F90.9    Alcohol intoxication in relapsed alcoholic ([address] D03.79       Isolation/Infection:   Isolation          No Isolation        Patient Infection Status     None to display          Nurse Assessment:  Last Vital Signs: BP 91/65   Pulse 66   Temp 97.4 °F (36.3 °C)   Resp 18   Ht 5' 3\" (1.6 m)   Wt 115 lb (52.2 kg)   SpO2 96%   BMI 20.37 kg/m²     Last documented pain score (0-10 scale): Pain Level: 7  Last Weight:   Wt Readings from Last 1 Encounters:   01/21/20 115 lb (52.2 kg)     Mental Status:  {IP PT MENTAL STATUS:84313}    IV Access:  { [place] IV ACCESS:090409834}    Nursing Mobility/ADLs:  Walking   {CHP DME YHHD:623487499}  Transfer  {CHP DME XNUJ:477096640}  Bathing  {CHP DME XJHZ:427291126}  Dressing  {CHP DME STRN:956581770}  Toileting  {CHP DME MTXS:714387422}  Feeding  {CHP DME FXRU:093777624}  Med Admin  {CHP DME CIDR:412564666}  Med Delivery   { [place] MED Delivery:527531333}    Wound Care Documentation and Therapy:        Elimination:  Continence:   · Bowel: {YES / YN:31809}  · Bladder: {YES / XW:94642}  Urinary Catheter: {Urinary Catheter:276972316}   Colostomy/Ileostomy/Ileal Conduit: {YES / VQ:54839}       Date of Last BM: ***    Intake/Output Summary (Last 24 hours) at 1/24/2020 1028  Last data filed at 1/24/2020 0853  Gross per 24 hour   Intake 815 ml   Output 700 ml   Net 115 ml     I/O last 3 completed shifts:   In: 18 [P.O.:575]  Out: 700 [Urine:700]    Safety Concerns:     [facility] Safety Concerns:605672506}    Impairments/Disabilities:      [facility] Impairments/Disabilities:486530508}    Nutrition Therapy:  Current Nutrition Therapy:   [facility] Diet List:803127934}    Routes of Feeding: {CHP DME Other Feedings:643021565}  Liquids: {Slp liquid thickness:92993}  Daily Fluid Restriction: {CHP DME Yes amt example:505214661}  Last Modified Barium Swallow with Video (Video Swallowing Test): {Done Not Done [facility]:869908648}    Treatments at the Time of Hospital Discharge:   Respiratory Treatments: ***  Oxygen Therapy:  {Therapy; copd oxygen:90281}  Ventilator:    {[facility] CC Vent [facility]:566615158}    Rehab Therapies: {THERAPEUTIC INTERVENTION:0487147540}  Weight Bearing Status/Restrictions: [address] CC Weight Bearin}  Other Medical Equipment (for information only, NOT a DME order):  {EQUIPMENT:256816267}  Other Treatments: ***    Patient's personal belongings (please select all that are sent with patient):  {CHP DME Belongings:154107058}    RN SIGNATURE:  {Esignature:098942210}    CASE MANAGEMENT/SOCIAL WORK SECTION    Inpatient Status Date: ***    Readmission Risk Assessment Score:  Readmission Risk              Risk of Unplanned Readmission:        10           Discharging to Facility/ Agency   · Name:   · Address:  · Phone:  · Fax:    Dialysis Facility (if applicable)   · Name:  · Address:  · Dialysis Schedule:  · Phone:  · Fax:    / signature: {Esignature:824735025}    PHYSICIAN SECTION    Prognosis: {Prognosis:9368882806}    Condition at Discharge: [address] Patient Condition:178286190}    Rehab Potential (if transferring to Rehab): {Prognosis:6714068916}    Recommended Labs or Other Treatments After Discharge: ***    Physician Certification: I certify the above information and transfer of [address] [place] [address]  is necessary for the continuing treatment of the diagnosis listed and that she requires {Admit to Appropriate Level of Care:38266} for {GREATER/LESS:607207225} 30 days.      Update Admission H&P: {CHP DME Changes in AYNBX:426613483}    PHYSICIAN SIGNATURE:  {Esignature:977821253}

## 2020-01-24 NOTE — DISCHARGE SUMMARY
Stacey Arceo  :  1970  MRN:  [id number]    Admit date:  2020  Discharge date:  2020       Admitting Physician:  Balta Rayo MD    Advance Directive: Full Code    Consults Made:   IP CONSULT TO PSYCHIATRY  IP CONSULT TO SOCIAL WORK      Primary Care Physician:  Abby Lara, DO    Discharge Diagnoses: Active Problems:    ETOH abuse    Alcohol use    Acute alcoholic intoxication without complication (HCC)    Alcohol intoxication in relapsed alcoholic ([address]  Resolved Problems:    * No resolved hospital problems. *            Pertinent Labs:  CBC with DIFF:  Recent Labs     20  0518   WBC 2.7* 2.3* 3.0*   RBC 4.15* 3.88* 3.87*   HGB 12.9 11.9* 12.3   HCT 38.9 37.1 37.4   MCV 93.7 95.6 96.6   MCH 31.1* 30.7 31.8*   MCHC 33.2 32.1* 32.9*   RDW 14.6* 14.7* 14.6*   PLT 71* 71* 71*   MPV 9.4 10.4 10.3   NEUTOPHILPCT 45.9* 44.4* 49.9*   LYMPHOPCT 43.3* 44.1* 40.5*   MONOPCT 8.1 8.4 7.0   EOSRELPCT 1.9 1.8 2.0   BASOPCT 0.4 0.9 0.3   NEUTROABS 1.2* 1.0* 1.5   LYMPHSABS 1.2 1.0* 1.2   MONOSABS 0.20 0.20 0.20   EOSABS 0.10 0.00 0.10   BASOSABS 0.00 0.00 0.00       CMP/BMP:  Recent Labs     20  0518    138 140   K 4.1 4.1 4.1    106 102   CO2 23 14* 25   ANIONGAP 15 18 13   GLUCOSE 124* 123* 121*   BUN 10 6 6   CREATININE 0.5 0.5 0.7   LABGLOM >60 >60 >60   CALCIUM 9.0 9.0 9.2         CRP:  No results for input(s): CRP in the last 72 hours. Sed Rate:  No results for input(s): SEDRATE in the last 72 hours. HgBA1c:  No components found for: HGBA1C  FLP:  No results found for: TRIG, HDL, LDLCALC, LDLDIRECT, LABVLDL  TSH:    Lab Results   Component Value Date    TSH 1.720 2019     Troponin T: No results for input(s): TROPONINI in the last 72 hours. Pro-BNP: No results for input(s): BNP in the last 72 hours. INR: No results for input(s): INR in the last 72 hours.   ABGs: No results found for: PHART, PO2ART,

## 2020-02-10 ENCOUNTER — TELEPHONE (OUTPATIENT)
Dept: PSYCHIATRY | Age: 50
End: 2020-02-10

## 2020-02-10 NOTE — TELEPHONE ENCOUNTER
Received fax requesting refill for Trazodone per Dr Helena Price under his care only while inpt. Pt not seen here at [facility] outpt. F/U was at [facility] on 10/18/19 with Dr Lorie Garces. VM and fax left with [facility] with the above info and Dr Dc Crouch contact info.

## 2020-04-07 ENCOUNTER — HOSPITAL ENCOUNTER (EMERGENCY)
Facility: HOSPITAL | Age: 50
Discharge: HOME | End: 2020-04-07
Attending: FAMILY MEDICINE | Admitting: FAMILY MEDICINE

## 2020-04-07 VITALS
BODY MASS INDEX: 20.62 KG/M2 | DIASTOLIC BLOOD PRESSURE: 68 MMHG | RESPIRATION RATE: 17 BRPM | HEART RATE: 90 BPM | HEIGHT: 65 IN | WEIGHT: 123.8 LBS | SYSTOLIC BLOOD PRESSURE: 105 MMHG | OXYGEN SATURATION: 99 % | TEMPERATURE: 98.8 F

## 2020-04-07 DIAGNOSIS — F10.920 ALCOHOLIC INTOXICATION WITHOUT COMPLICATION (HCC): Primary | ICD-10-CM

## 2020-04-07 LAB
ALBUMIN SERPL-MCNC: 4.6 G/DL (ref 3.5–5.2)
ALBUMIN/GLOB SERPL: 1.6 G/DL
ALP SERPL-CCNC: 93 U/L (ref 39–117)
ALT SERPL W P-5'-P-CCNC: 29 U/L (ref 1–33)
AMPHET+METHAMPHET UR QL: NEGATIVE
AMPHETAMINES UR QL: NEGATIVE
ANION GAP SERPL CALCULATED.3IONS-SCNC: 18 MMOL/L (ref 5–15)
AST SERPL-CCNC: 43 U/L (ref 1–32)
BARBITURATES UR QL SCN: NEGATIVE
BASOPHILS # BLD AUTO: 0.06 10*3/MM3 (ref 0–0.2)
BASOPHILS NFR BLD AUTO: 0.5 % (ref 0–1.5)
BENZODIAZ UR QL SCN: NEGATIVE
BILIRUB SERPL-MCNC: 0.4 MG/DL (ref 0.2–1.2)
BUN BLD-MCNC: 9 MG/DL (ref 6–20)
BUN/CREAT SERPL: 20.5 (ref 7–25)
BUPRENORPHINE SERPL-MCNC: NEGATIVE NG/ML
CALCIUM SPEC-SCNC: 8.6 MG/DL (ref 8.6–10.5)
CANNABINOIDS SERPL QL: NEGATIVE
CHLORIDE SERPL-SCNC: 102 MMOL/L (ref 98–107)
CO2 SERPL-SCNC: 23 MMOL/L (ref 22–29)
COCAINE UR QL: NEGATIVE
CREAT BLD-MCNC: 0.44 MG/DL (ref 0.57–1)
DEPRECATED RDW RBC AUTO: 46.7 FL (ref 37–54)
EOSINOPHIL # BLD AUTO: 0.16 10*3/MM3 (ref 0–0.4)
EOSINOPHIL NFR BLD AUTO: 1.4 % (ref 0.3–6.2)
ERYTHROCYTE [DISTWIDTH] IN BLOOD BY AUTOMATED COUNT: 14.6 % (ref 12.3–15.4)
ETHANOL UR QL: 0.16 %
ETHANOL UR QL: 0.29 %
GFR SERPL CREATININE-BSD FRML MDRD: >150 ML/MIN/1.73
GLOBULIN UR ELPH-MCNC: 2.8 GM/DL
GLUCOSE BLD-MCNC: 82 MG/DL (ref 65–99)
HCT VFR BLD AUTO: 42 % (ref 34–46.6)
HGB BLD-MCNC: 14.5 G/DL (ref 12–15.9)
IMM GRANULOCYTES # BLD AUTO: 0.06 10*3/MM3 (ref 0–0.05)
IMM GRANULOCYTES NFR BLD AUTO: 0.5 % (ref 0–0.5)
LYMPHOCYTES # BLD AUTO: 2.81 10*3/MM3 (ref 0.7–3.1)
LYMPHOCYTES NFR BLD AUTO: 24 % (ref 19.6–45.3)
MCH RBC QN AUTO: 30.2 PG (ref 26.6–33)
MCHC RBC AUTO-ENTMCNC: 34.5 G/DL (ref 31.5–35.7)
MCV RBC AUTO: 87.5 FL (ref 79–97)
METHADONE UR QL SCN: NEGATIVE
MONOCYTES # BLD AUTO: 0.42 10*3/MM3 (ref 0.1–0.9)
MONOCYTES NFR BLD AUTO: 3.6 % (ref 5–12)
NEUTROPHILS # BLD AUTO: 8.22 10*3/MM3 (ref 1.7–7)
NEUTROPHILS NFR BLD AUTO: 70 % (ref 42.7–76)
NRBC BLD AUTO-RTO: 0 /100 WBC (ref 0–0.2)
OPIATES UR QL: NEGATIVE
OXYCODONE UR QL SCN: NEGATIVE
PCP UR QL SCN: NEGATIVE
PLATELET # BLD AUTO: 184 10*3/MM3 (ref 140–450)
PMV BLD AUTO: 9.1 FL (ref 6–12)
POTASSIUM BLD-SCNC: 4.2 MMOL/L (ref 3.5–5.2)
PROPOXYPH UR QL: NEGATIVE
PROT SERPL-MCNC: 7.4 G/DL (ref 6–8.5)
RBC # BLD AUTO: 4.8 10*6/MM3 (ref 3.77–5.28)
SODIUM BLD-SCNC: 143 MMOL/L (ref 136–145)
TRICYCLICS UR QL SCN: NEGATIVE
WBC NRBC COR # BLD: 11.73 10*3/MM3 (ref 3.4–10.8)

## 2020-04-07 PROCEDURE — 85025 COMPLETE CBC W/AUTO DIFF WBC: CPT | Performed by: FAMILY MEDICINE

## 2020-04-07 PROCEDURE — 99284 EMERGENCY DEPT VISIT MOD MDM: CPT

## 2020-04-07 PROCEDURE — 25010000002 THIAMINE PER 100 MG: Performed by: FAMILY MEDICINE

## 2020-04-07 PROCEDURE — 96365 THER/PROPH/DIAG IV INF INIT: CPT

## 2020-04-07 PROCEDURE — 25010000002 MAGNESIUM SULFATE PER 500 MG OF MAGNESIUM: Performed by: FAMILY MEDICINE

## 2020-04-07 PROCEDURE — 80053 COMPREHEN METABOLIC PANEL: CPT | Performed by: FAMILY MEDICINE

## 2020-04-07 PROCEDURE — 96366 THER/PROPH/DIAG IV INF ADDON: CPT

## 2020-04-07 PROCEDURE — 80307 DRUG TEST PRSMV CHEM ANLYZR: CPT | Performed by: FAMILY MEDICINE

## 2020-04-07 RX ADMIN — FOLIC ACID 1000 ML/HR: 5 INJECTION, SOLUTION INTRAMUSCULAR; INTRAVENOUS; SUBCUTANEOUS at 14:55

## 2020-04-07 NOTE — ED PROVIDER NOTES
Subjective   Patient was brought via EMS after being found stumbling at a gas station, clearly intoxicated.  She told EMS that she wanted to be brought to [facility] but when she arrived here she stated that she just wanted to go home.  I asked her number times if there is anything that I could help with, if there was any medical problem that she was concerned about and she said that she just wanted to go home.          Review of Systems   All other systems reviewed and are negative.      Past Medical History:   Diagnosis Date   • Alcohol abuse    • Anxiety and depression    • Chronic pain     back   • Gallstones    • Hypercholesteremia    • Hypertension    • Clarke-clarke disease    • Pancreatitis    • Stroke (cerebrum) (CMS/HCC)        No Known Allergies    Past Surgical History:   Procedure Laterality Date   • BRAIN SURGERY  2007    Clarke Clarke    • BREAST SURGERY     • CHOLECYSTECTOMY WITH INTRAOPERATIVE CHOLANGIOGRAM N/A 4/18/2019    Procedure: CHOLECYSTECTOMY LAPAROSCOPIC WITH LIVER BIOPSY;  Surgeon: Viviana Boles MD;  Location: [facility];  Service: General   • TUBAL ABDOMINAL LIGATION         Family History   Problem Relation Age of Onset   • Breast cancer Paternal Grandmother    • Breast cancer Other    • Lung cancer Mother    • Lymphoma Father        Social History     Socioeconomic History   • Marital status:      Spouse name: Not on file   • Number of children: Not on file   • Years of education: Not on file   • Highest education level: Not on file   Tobacco Use   • Smoking status: Current Every Day Smoker     Packs/day: 0.50     Types: Cigarettes   • Smokeless tobacco: Never Used   Substance and Sexual Activity   • Alcohol use: Yes     Alcohol/week: 1.0 standard drinks     Types: 1 Cans of beer per week     Frequency: Never     Comment: STATES SHE QUIT DRINKING BUT HAD A BEER TODAY   • Drug use: No   • Sexual activity: Defer           Objective   Physical Exam   Constitutional: She is oriented to  person, place, and time. She appears well-developed and well-nourished.   HENT:   Head: Normocephalic and atraumatic.   Mouth/Throat: Oropharynx is clear and moist.   Eyes: Conjunctivae and EOM are normal.   Neck: Normal range of motion. Neck supple.   Cardiovascular: Normal rate, regular rhythm, normal heart sounds and intact distal pulses.   Pulmonary/Chest: Effort normal and breath sounds normal.   Abdominal: Soft. Bowel sounds are normal.   Musculoskeletal: Normal range of motion.   Neurological: She is alert and oriented to person, place, and time.   Skin: Skin is warm and dry. Capillary refill takes less than 2 seconds.   Psychiatric: She has a normal mood and affect. Her behavior is normal. Judgment and thought content normal.   Nursing note and vitals reviewed.      Procedures           ED Course                                           MDM  Number of Diagnoses or Management Options  Critical Care  Total time providing critical care: < 30 minutes    Patient Progress  Patient progress: stable      Final diagnoses:   Alcoholic intoxication without complication (CMS/HCC)     The patient declined any investigations or interventions and prefers just to go home.  She was discharged with stable vital signs in stable condition and options for where she can go has been provided by case management.       Bipin Lockwood MD  04/07/20 1022       Bipin Lockwood MD  04/07/20 1518

## 2020-04-08 ENCOUNTER — HOSPITAL ENCOUNTER (EMERGENCY)
Age: 50
Discharge: HOME | End: 2020-04-08
Attending: EMERGENCY MEDICINE
Payer: MEDICAID

## 2020-04-08 ENCOUNTER — APPOINTMENT (OUTPATIENT)
Dept: CT IMAGING | Age: 50
End: 2020-04-08
Payer: MEDICAID

## 2020-04-08 VITALS
HEART RATE: 77 BPM | OXYGEN SATURATION: 99 % | RESPIRATION RATE: 16 BRPM | SYSTOLIC BLOOD PRESSURE: 118 MMHG | HEIGHT: 63 IN | BODY MASS INDEX: 23.04 KG/M2 | TEMPERATURE: 98.2 F | WEIGHT: 130 LBS | DIASTOLIC BLOOD PRESSURE: 68 MMHG

## 2020-04-08 LAB
ALBUMIN SERPL-MCNC: 4.5 G/DL (ref 3.5–5.2)
ALP BLD-CCNC: 97 U/L (ref 35–104)
ALT SERPL-CCNC: 27 U/L (ref 5–33)
AMPHETAMINE SCREEN, URINE: NEGATIVE
ANION GAP SERPL CALCULATED.3IONS-SCNC: 22 MMOL/L (ref 7–19)
AST SERPL-CCNC: 34 U/L (ref 5–32)
BARBITURATE SCREEN URINE: NEGATIVE
BASOPHILS ABSOLUTE: 0.1 K/UL (ref 0–0.2)
BASOPHILS RELATIVE PERCENT: 0.7 % (ref 0–1)
BENZODIAZEPINE SCREEN, URINE: NEGATIVE
BILIRUB SERPL-MCNC: 0.5 MG/DL (ref 0.2–1.2)
BUN BLDV-MCNC: 13 MG/DL (ref 6–20)
CALCIUM SERPL-MCNC: 8.6 MG/DL (ref 8.6–10)
CANNABINOID SCREEN URINE: NEGATIVE
CHLORIDE BLD-SCNC: 102 MMOL/L (ref 98–111)
CO2: 19 MMOL/L (ref 22–29)
COCAINE METABOLITE SCREEN URINE: NEGATIVE
CREAT SERPL-MCNC: <0.5 MG/DL (ref 0.5–0.9)
EOSINOPHILS ABSOLUTE: 0 K/UL (ref 0–0.6)
EOSINOPHILS RELATIVE PERCENT: 0.3 % (ref 0–5)
ETHANOL: 241 MG/DL (ref 0–0.08)
ETHANOL: 26 MG/DL (ref 0–0.08)
GFR NON-AFRICAN AMERICAN: >60
GLUCOSE BLD-MCNC: 81 MG/DL (ref 74–109)
HCT VFR BLD CALC: 43.5 % (ref 37–47)
HEMOGLOBIN: 14.5 G/DL (ref 12–16)
IMMATURE GRANULOCYTES #: 0.1 K/UL
LYMPHOCYTES ABSOLUTE: 2 K/UL (ref 1.1–4.5)
LYMPHOCYTES RELATIVE PERCENT: 18.3 % (ref 20–40)
Lab: NORMAL
MCH RBC QN AUTO: 30.1 PG (ref 27–31)
MCHC RBC AUTO-ENTMCNC: 33.3 G/DL (ref 33–37)
MCV RBC AUTO: 90.2 FL (ref 81–99)
MONOCYTES ABSOLUTE: 0.3 K/UL (ref 0–0.9)
MONOCYTES RELATIVE PERCENT: 2.3 % (ref 0–10)
NEUTROPHILS ABSOLUTE: 8.6 K/UL (ref 1.5–7.5)
NEUTROPHILS RELATIVE PERCENT: 77.8 % (ref 50–65)
OPIATE SCREEN URINE: NEGATIVE
PDW BLD-RTO: 14.7 % (ref 11.5–14.5)
PLATELET # BLD: 174 K/UL (ref 130–400)
PMV BLD AUTO: 9.2 FL (ref 9.4–12.3)
POTASSIUM SERPL-SCNC: 3.5 MMOL/L (ref 3.5–5)
RBC # BLD: 4.82 M/UL (ref 4.2–5.4)
SODIUM BLD-SCNC: 143 MMOL/L (ref 136–145)
TOTAL PROTEIN: 7.3 G/DL (ref 6.6–8.7)
WBC # BLD: 11.1 K/UL (ref 4.8–10.8)

## 2020-04-08 PROCEDURE — 99284 EMERGENCY DEPT VISIT MOD MDM: CPT

## 2020-04-08 PROCEDURE — 36415 COLL VENOUS BLD VENIPUNCTURE: CPT

## 2020-04-08 PROCEDURE — 70450 CT HEAD/BRAIN W/O DYE: CPT

## 2020-04-08 PROCEDURE — 90715 TDAP VACCINE 7 YRS/> IM: CPT | Performed by: EMERGENCY MEDICINE

## 2020-04-08 PROCEDURE — 80053 COMPREHEN METABOLIC PANEL: CPT

## 2020-04-08 PROCEDURE — 80307 DRUG TEST PRSMV CHEM ANLYZR: CPT

## 2020-04-08 PROCEDURE — 2580000003 HC RX 258: Performed by: EMERGENCY MEDICINE

## 2020-04-08 PROCEDURE — 96366 THER/PROPH/DIAG IV INF ADDON: CPT

## 2020-04-08 PROCEDURE — G0480 DRUG TEST DEF 1-7 CLASSES: HCPCS

## 2020-04-08 PROCEDURE — 96365 THER/PROPH/DIAG IV INF INIT: CPT

## 2020-04-08 PROCEDURE — 90471 IMMUNIZATION ADMIN: CPT | Performed by: EMERGENCY MEDICINE

## 2020-04-08 PROCEDURE — 2500000003 HC RX 250 WO HCPCS: Performed by: EMERGENCY MEDICINE

## 2020-04-08 PROCEDURE — 6360000002 HC RX W HCPCS: Performed by: EMERGENCY MEDICINE

## 2020-04-08 PROCEDURE — 85025 COMPLETE CBC W/AUTO DIFF WBC: CPT

## 2020-04-08 RX ADMIN — TETANUS TOXOID, REDUCED DIPHTHERIA TOXOID AND ACELLULAR PERTUSSIS VACCINE, ADSORBED 0.5 ML: 5; 2.5; 8; 8; 2.5 SUSPENSION INTRAMUSCULAR at 09:36

## 2020-04-08 RX ADMIN — THIAMINE HYDROCHLORIDE: 100 INJECTION, SOLUTION INTRAMUSCULAR; INTRAVENOUS at 09:36

## 2020-04-08 ASSESSMENT — ENCOUNTER SYMPTOMS
RHINORRHEA: 0
SHORTNESS OF BREATH: 0
NAUSEA: 0
DIARRHEA: 0
VOMITING: 0
ABDOMINAL PAIN: 0
BACK PAIN: 0
COUGH: 0
SORE THROAT: 0

## 2020-04-08 NOTE — ED NOTES
Bed: 07  Expected date:   Expected time:   Means of arrival:   Comments:  deo Osman RN  04/08/20 8035

## 2020-04-08 NOTE — ED PROVIDER NOTES
ear normal.      Left Ear: External ear normal.   Eyes:      Conjunctiva/sclera: Conjunctivae normal.   Neck:      Musculoskeletal: Normal range of motion. Trachea: No tracheal deviation. Cardiovascular:      Rate and Rhythm: Normal rate and regular rhythm. Heart sounds: Normal heart sounds. No murmur. Pulmonary:      Effort: No respiratory distress. Breath sounds: Normal breath sounds. No wheezing or rales. Abdominal:      Palpations: Abdomen is soft. There is no mass. Tenderness: There is no abdominal tenderness. Musculoskeletal: Normal range of motion. Skin:     General: Skin is warm and dry. Neurological:      Mental Status: She is alert and oriented to person, place, and time. GCS: GCS eye subscore is 4. GCS verbal subscore is 5. GCS motor subscore is 6. Psychiatric:         Thought Content: Thought content is not paranoid or delusional. Thought content does not include homicidal or suicidal ideation. DIAGNOSTIC RESULTS         RADIOLOGY:  Non-plain film images such as CT, Ultrasound and MRI are read by the radiologist. Plain radiographic images are visualized and preliminarily interpreted bythe emergency physician with the below findings:          CT Head WO Contrast   Final Result   1. No acute intracranial abnormality. 2. Prior right parietal craniotomy with focal areas of chronic   encephalomalacia in the right parietal lobe at the right   frontoparietal junction. 3. Small chronic lacunar infarct in the right caudate nucleus. Signed by Dr Maty Ramirez.  Brian on 4/8/2020 7:57 AM              LABS:  Labs Reviewed   CBC WITH AUTO DIFFERENTIAL - Abnormal; Notable for the following components:       Result Value    WBC 11.1 (*)     RDW 14.7 (*)     MPV 9.2 (*)     Neutrophils % 77.8 (*)     Lymphocytes % 18.3 (*)     Neutrophils Absolute 8.6 (*)     All other components within normal limits   COMPREHENSIVE METABOLIC PANEL - Abnormal; Notable for the following

## 2020-04-08 NOTE — ED NOTES
Mask and gloves worn by staff while in pt room. Pt masked during all contact with staff.      Rohan Pascual RN  20 4810

## 2020-04-09 ENCOUNTER — CARE COORDINATION (OUTPATIENT)
Dept: CARE COORDINATION | Age: 50
End: 2020-04-09

## 2020-04-16 ENCOUNTER — HOSPITAL ENCOUNTER (EMERGENCY)
Facility: HOSPITAL | Age: 50
Discharge: HOME | End: 2020-04-16
Attending: INTERNAL MEDICINE

## 2020-04-16 VITALS
RESPIRATION RATE: 20 BRPM | BODY MASS INDEX: 22.32 KG/M2 | HEIGHT: 63 IN | SYSTOLIC BLOOD PRESSURE: 133 MMHG | DIASTOLIC BLOOD PRESSURE: 88 MMHG | TEMPERATURE: 97.8 F | OXYGEN SATURATION: 100 % | HEART RATE: 114 BPM | WEIGHT: 126 LBS

## 2020-04-16 DIAGNOSIS — N39.0 ACUTE UTI: ICD-10-CM

## 2020-04-16 DIAGNOSIS — K29.00 ACUTE GASTRITIS WITHOUT HEMORRHAGE, UNSPECIFIED GASTRITIS TYPE: Primary | ICD-10-CM

## 2020-04-16 DIAGNOSIS — F19.10 SUBSTANCE ABUSE (HCC): ICD-10-CM

## 2020-04-16 LAB
ALBUMIN SERPL-MCNC: 4.4 G/DL (ref 3.5–5.2)
ALBUMIN/GLOB SERPL: 1.6 G/DL
ALP SERPL-CCNC: 112 U/L (ref 39–117)
ALT SERPL W P-5'-P-CCNC: 76 U/L (ref 1–33)
AMPHET+METHAMPHET UR QL: POSITIVE
AMPHETAMINES UR QL: POSITIVE
ANION GAP SERPL CALCULATED.3IONS-SCNC: 17 MMOL/L (ref 5–15)
AST SERPL-CCNC: 145 U/L (ref 1–32)
BACTERIA UR QL AUTO: ABNORMAL /HPF
BARBITURATES UR QL SCN: NEGATIVE
BASOPHILS # BLD AUTO: 0.03 10*3/MM3 (ref 0–0.2)
BASOPHILS NFR BLD AUTO: 0.4 % (ref 0–1.5)
BENZODIAZ UR QL SCN: POSITIVE
BILIRUB SERPL-MCNC: 1 MG/DL (ref 0.2–1.2)
BILIRUB UR QL STRIP: ABNORMAL
BUN BLD-MCNC: 10 MG/DL (ref 6–20)
BUN/CREAT SERPL: 10.6 (ref 7–25)
BUPRENORPHINE SERPL-MCNC: NEGATIVE NG/ML
CALCIUM SPEC-SCNC: 9.1 MG/DL (ref 8.6–10.5)
CANNABINOIDS SERPL QL: POSITIVE
CHLORIDE SERPL-SCNC: 104 MMOL/L (ref 98–107)
CLARITY UR: ABNORMAL
CO2 SERPL-SCNC: 21 MMOL/L (ref 22–29)
COCAINE UR QL: POSITIVE
COLOR UR: ABNORMAL
CREAT BLD-MCNC: 0.94 MG/DL (ref 0.57–1)
DEPRECATED RDW RBC AUTO: 44.3 FL (ref 37–54)
EOSINOPHIL # BLD AUTO: 0.08 10*3/MM3 (ref 0–0.4)
EOSINOPHIL NFR BLD AUTO: 1 % (ref 0.3–6.2)
ERYTHROCYTE [DISTWIDTH] IN BLOOD BY AUTOMATED COUNT: 14.6 % (ref 12.3–15.4)
GFR SERPL CREATININE-BSD FRML MDRD: 63 ML/MIN/1.73
GLOBULIN UR ELPH-MCNC: 2.7 GM/DL
GLUCOSE BLD-MCNC: 131 MG/DL (ref 65–99)
GLUCOSE UR STRIP-MCNC: NEGATIVE MG/DL
HCT VFR BLD AUTO: 38.4 % (ref 34–46.6)
HGB BLD-MCNC: 13.4 G/DL (ref 12–15.9)
HGB UR QL STRIP.AUTO: ABNORMAL
HOLD SPECIMEN: NORMAL
HOLD SPECIMEN: NORMAL
HYALINE CASTS UR QL AUTO: ABNORMAL /LPF
KETONES UR QL STRIP: ABNORMAL
LEUKOCYTE ESTERASE UR QL STRIP.AUTO: ABNORMAL
LIPASE SERPL-CCNC: 11 U/L (ref 13–60)
LYMPHOCYTES # BLD AUTO: 2.14 10*3/MM3 (ref 0.7–3.1)
LYMPHOCYTES NFR BLD AUTO: 26.4 % (ref 19.6–45.3)
MCH RBC QN AUTO: 29.8 PG (ref 26.6–33)
MCHC RBC AUTO-ENTMCNC: 34.9 G/DL (ref 31.5–35.7)
MCV RBC AUTO: 85.5 FL (ref 79–97)
METHADONE UR QL SCN: NEGATIVE
MONOCYTES # BLD AUTO: 0.35 10*3/MM3 (ref 0.1–0.9)
MONOCYTES NFR BLD AUTO: 4.3 % (ref 5–12)
NEUTROPHILS # BLD AUTO: 5.49 10*3/MM3 (ref 1.7–7)
NEUTROPHILS NFR BLD AUTO: 67.5 % (ref 42.7–76)
NITRITE UR QL STRIP: POSITIVE
OPIATES UR QL: POSITIVE
OXYCODONE UR QL SCN: POSITIVE
PCP UR QL SCN: NEGATIVE
PH UR STRIP.AUTO: <=5 [PH] (ref 5–8)
PLATELET # BLD AUTO: 91 10*3/MM3 (ref 140–450)
PMV BLD AUTO: 10.4 FL (ref 6–12)
POTASSIUM BLD-SCNC: 2.7 MMOL/L (ref 3.5–5.2)
PROPOXYPH UR QL: NEGATIVE
PROT SERPL-MCNC: 7.1 G/DL (ref 6–8.5)
PROT UR QL STRIP: ABNORMAL
RBC # BLD AUTO: 4.49 10*6/MM3 (ref 3.77–5.28)
RBC # UR: ABNORMAL /HPF
REF LAB TEST METHOD: ABNORMAL
SODIUM BLD-SCNC: 142 MMOL/L (ref 136–145)
SP GR UR STRIP: >1.03 (ref 1–1.03)
SQUAMOUS #/AREA URNS HPF: ABNORMAL /HPF
TRICYCLICS UR QL SCN: NEGATIVE
UROBILINOGEN UR QL STRIP: ABNORMAL
WBC NRBC COR # BLD: 8.12 10*3/MM3 (ref 3.4–10.8)
WBC UR QL AUTO: ABNORMAL /HPF
WHOLE BLOOD HOLD SPECIMEN: NORMAL
WHOLE BLOOD HOLD SPECIMEN: NORMAL

## 2020-04-16 PROCEDURE — 99283 EMERGENCY DEPT VISIT LOW MDM: CPT

## 2020-04-16 PROCEDURE — 80306 DRUG TEST PRSMV INSTRMNT: CPT | Performed by: INTERNAL MEDICINE

## 2020-04-16 PROCEDURE — 80053 COMPREHEN METABOLIC PANEL: CPT | Performed by: INTERNAL MEDICINE

## 2020-04-16 PROCEDURE — 83690 ASSAY OF LIPASE: CPT | Performed by: INTERNAL MEDICINE

## 2020-04-16 PROCEDURE — 81001 URINALYSIS AUTO W/SCOPE: CPT | Performed by: INTERNAL MEDICINE

## 2020-04-16 PROCEDURE — 25010000002 ONDANSETRON PER 1 MG: Performed by: INTERNAL MEDICINE

## 2020-04-16 PROCEDURE — 25010000002 CEFTRIAXONE PER 250 MG: Performed by: EMERGENCY MEDICINE

## 2020-04-16 PROCEDURE — 85025 COMPLETE CBC W/AUTO DIFF WBC: CPT | Performed by: INTERNAL MEDICINE

## 2020-04-16 PROCEDURE — 96375 TX/PRO/DX INJ NEW DRUG ADDON: CPT

## 2020-04-16 PROCEDURE — 96365 THER/PROPH/DIAG IV INF INIT: CPT

## 2020-04-16 RX ORDER — ONDANSETRON 2 MG/ML
4 INJECTION INTRAMUSCULAR; INTRAVENOUS ONCE
Status: COMPLETED | OUTPATIENT
Start: 2020-04-16 | End: 2020-04-16

## 2020-04-16 RX ORDER — CEPHALEXIN 500 MG/1
500 CAPSULE ORAL 3 TIMES DAILY
Qty: 21 CAPSULE | Refills: 0 | Status: SHIPPED | OUTPATIENT
Start: 2020-04-16

## 2020-04-16 RX ORDER — SODIUM CHLORIDE 0.9 % (FLUSH) 0.9 %
10 SYRINGE (ML) INJECTION AS NEEDED
Status: DISCONTINUED | OUTPATIENT
Start: 2020-04-16 | End: 2020-04-16 | Stop reason: HOSPADM

## 2020-04-16 RX ORDER — OMEPRAZOLE 20 MG/1
20 CAPSULE, DELAYED RELEASE ORAL 2 TIMES DAILY
Qty: 30 CAPSULE | Refills: 0 | Status: SHIPPED | OUTPATIENT
Start: 2020-04-16

## 2020-04-16 RX ADMIN — CEFTRIAXONE SODIUM 1 G: 1 INJECTION, POWDER, FOR SOLUTION INTRAMUSCULAR; INTRAVENOUS at 09:34

## 2020-04-16 RX ADMIN — SODIUM CHLORIDE 1000 ML: 9 INJECTION, SOLUTION INTRAVENOUS at 06:29

## 2020-04-16 RX ADMIN — ONDANSETRON HYDROCHLORIDE 4 MG: 2 SOLUTION INTRAMUSCULAR; INTRAVENOUS at 06:30

## 2020-04-16 NOTE — PROGRESS NOTES
"Continued Stay Note  [facility]     Patient Name: Maureen Best  MRN: [id number]  Today's Date: 4/16/2020    Admit Date: 4/16/2020    Discharge Plan     Row Name 04/16/20 1004       Plan    Plan Comments  SW received phone call in regards to pt requesting a hotel room for the night. Pt has been in this hospital numerous of times before with health problems due to addiction. Pt was positive for THC, Cocaine, Meth, Amphetamines, benzos, and oxy upon arrival today. SW discussed that SW is not able to get her a hotel room at this time and that shelters will not take anyone with positive drug screen. Pt states that she has been staying \"here and there\" at friends houses and does not have a place to call home. Pt states that she has just recently lost full custody of her 9 year old and that her oldest is now an adult and does not speak with her. Pt states that she walks everywhere because her ex boyfriend will not give back her car that is in her name. Pt also states that her SSI check goes into an account with her ex boyfriends name is on so her money is not ever in account. SW discussed options for pt which included getting police involved to get vehicle back from ex boyfriend and discussed steps to getting a new bank account that her SSI check can go in. Pt does not want to get police involved and states that she will walk to a friends house down the road when discharged from hospital. SW offered rehab to pt and pt states that she has been to rehab multiple times and that she does not want to go back at this time. Pt is refusing all resources given at this time. Pt has been discharged.          Discharge Codes    No documentation.             Barb Estevez    "

## 2020-04-16 NOTE — ED PROVIDER NOTES
Subjective   Ms. Best is a 50-year-old white female presents the hospital with abdominal pain she denies nausea vomiting or diarrhea.  She seems very jittery upon questioning and has very tangential and rapid thought.  Along the course of discussion I asked the patient if she is used drugs within the last 24 hours initially she stated that she had not but when asked further she states that she has taken some type of substance she does not know exactly what it is although she states that if it looks like it is bath salts on her lips that it probably is.  Of note it was noted during the exam that she had some pink crystals crusted along her upper and lower lips.  And when I asked her about what they were she was initially hesitant to tell me what they were but at that point time is when she told me that if they look like bath salts that they probably were bath salts.          Review of Systems   Constitutional: Negative for chills, fatigue and fever.   HENT: Negative for congestion and facial swelling.    Eyes: Negative for photophobia, discharge and visual disturbance.   Respiratory: Negative for cough, shortness of breath and wheezing.    Cardiovascular: Negative for chest pain, palpitations and leg swelling.   Gastrointestinal: Positive for abdominal pain. Negative for diarrhea, nausea and vomiting.   Endocrine: Negative for cold intolerance and heat intolerance.   Genitourinary: Negative for difficulty urinating and urgency.   Musculoskeletal: Negative for arthralgias, joint swelling and myalgias.   Skin: Negative for color change and pallor.   Neurological: Negative for dizziness and light-headedness.   Hematological: Negative for adenopathy. Does not bruise/bleed easily.   Psychiatric/Behavioral: Negative for agitation, behavioral problems and confusion.       Past Medical History:   Diagnosis Date   • Alcohol abuse    • Anxiety and depression    • Chronic pain     back   • Gallstones    • Hypercholesteremia     • Hypertension    • Clarke-clarke disease    • Pancreatitis    • Stroke (cerebrum) (CMS/HCC)        No Known Allergies    Past Surgical History:   Procedure Laterality Date   • BRAIN SURGERY  2007    Clarke Clarke    • BREAST SURGERY     • CHOLECYSTECTOMY WITH INTRAOPERATIVE CHOLANGIOGRAM N/A 4/18/2019    Procedure: CHOLECYSTECTOMY LAPAROSCOPIC WITH LIVER BIOPSY;  Surgeon: Viviana Boles MD;  Location: [facility];  Service: General   • TUBAL ABDOMINAL LIGATION         Family History   Problem Relation Age of Onset   • Breast cancer Paternal Grandmother    • Breast cancer Other    • Lung cancer Mother    • Lymphoma Father        Social History     Socioeconomic History   • Marital status:      Spouse name: Not on file   • Number of children: Not on file   • Years of education: Not on file   • Highest education level: Not on file   Tobacco Use   • Smoking status: Current Every Day Smoker     Packs/day: 0.50     Types: Cigarettes   • Smokeless tobacco: Never Used   Substance and Sexual Activity   • Alcohol use: Yes     Alcohol/week: 1.0 standard drinks     Types: 1 Cans of beer per week     Frequency: Never     Comment: STATES SHE QUIT DRINKING BUT HAD A BEER TODAY   • Drug use: No   • Sexual activity: Defer           Objective   Physical Exam   Constitutional: She is oriented to person, place, and time. She appears well-developed and well-nourished.   HENT:   Head: Normocephalic and atraumatic.   Mouth/Throat: Oropharynx is clear and moist.   Eyes: Pupils are equal, round, and reactive to light. Conjunctivae and EOM are normal.   Neck: Normal range of motion. Neck supple.   Cardiovascular: Normal rate, regular rhythm, normal heart sounds and intact distal pulses.   Pulmonary/Chest: Effort normal and breath sounds normal.   Abdominal: Soft. Bowel sounds are normal. She exhibits no distension.   Musculoskeletal: Normal range of motion. She exhibits no edema.   Neurological: She is alert and oriented to person,  place, and time. No cranial nerve deficit.   Skin: Skin is warm and dry.   Psychiatric: She has a normal mood and affect. Her behavior is normal. Thought content normal.   Patient appears anxious with rapid speech and tangential thought.   Nursing note and vitals reviewed.      Procedures           ED Course  ED Course as of Apr 16 2233   Thu Apr 16, 2020   0944 I took over from Dr. Mixon at shift change.  The patient is been stable here.  Her blood pressure is much better now just with some simple fluids.  Her drug screen is positive for multiple substances.  She says she does not normally smoke pot but did not talk about the other things in her drug screen.  She does have a urinary tract infection we will treat that.  When I asked her about her pain she tells me is in the epigastric area so I presume this is some type of gastritis we will treat that but certainly she has no signs of distress now.  She is discharged in stable condition.    [TR]      ED Course User Index  [TR] Jh Jon Jr., MD                                           [facility]    Final diagnoses:   Acute gastritis without hemorrhage, unspecified gastritis type   Acute UTI   Substance abuse (CMS/[facility])            Doug Mixon MD  04/16/20 [id number]

## 2020-04-17 ENCOUNTER — HOSPITAL ENCOUNTER (EMERGENCY)
Facility: HOSPITAL | Age: 50
Discharge: HOME | End: 2020-04-17
Attending: INTERNAL MEDICINE | Admitting: INTERNAL MEDICINE

## 2020-04-17 VITALS
DIASTOLIC BLOOD PRESSURE: 80 MMHG | WEIGHT: 126 LBS | TEMPERATURE: 97.9 F | RESPIRATION RATE: 16 BRPM | HEART RATE: 109 BPM | BODY MASS INDEX: 22.32 KG/M2 | HEIGHT: 63 IN | SYSTOLIC BLOOD PRESSURE: 143 MMHG | OXYGEN SATURATION: 99 %

## 2020-04-17 DIAGNOSIS — S90.822A BLISTER (NONTHERMAL), LEFT FOOT, INITIAL ENCOUNTER: Primary | ICD-10-CM

## 2020-04-17 PROCEDURE — 99283 EMERGENCY DEPT VISIT LOW MDM: CPT

## 2020-04-17 NOTE — ED PROVIDER NOTES
Subjective   Ms. Best is 50-year-old female who was just in the emergency room last 24 hours with polysubstance abuse and a urinary tract infection she is subsequently spent approximately 6 hours in [facility] now presents to the emergency room still under the influence of multiple controlled substances although she is more lucid now than she was previously complaining of foot pain she cannot tell me when it started nor how it is changed just states that she has increased pain from walking all day long.  She denies fevers chills nausea or vomiting.          Review of Systems   Constitutional: Negative for chills, fatigue and fever.   HENT: Negative for congestion and facial swelling.    Eyes: Negative for photophobia, discharge and visual disturbance.   Respiratory: Negative for cough, shortness of breath and wheezing.    Cardiovascular: Negative for chest pain, palpitations and leg swelling.   Gastrointestinal: Negative for abdominal pain, diarrhea, nausea and vomiting.   Endocrine: Negative for cold intolerance and heat intolerance.   Genitourinary: Negative for difficulty urinating and urgency.   Musculoskeletal: Negative for arthralgias, joint swelling and myalgias.        Bilateral foot pain   Skin: Negative for color change and pallor.   Neurological: Negative for dizziness and light-headedness.   Hematological: Negative for adenopathy. Does not bruise/bleed easily.   Psychiatric/Behavioral: Negative for agitation, behavioral problems and confusion.       Past Medical History:   Diagnosis Date   • Alcohol abuse    • Anxiety and depression    • Chronic pain     back   • Gallstones    • Hypercholesteremia    • Hypertension    • Clarke-clarke disease    • Pancreatitis    • Stroke (cerebrum) (CMS/HCC)        No Known Allergies    Past Surgical History:   Procedure Laterality Date   • BRAIN SURGERY  2007    Margareth Clarke    • BREAST SURGERY     • CHOLECYSTECTOMY WITH INTRAOPERATIVE CHOLANGIOGRAM N/A 4/18/2019    Procedure:  CHOLECYSTECTOMY LAPAROSCOPIC WITH LIVER BIOPSY;  Surgeon: Viviana Boles MD;  Location: [facility];  Service: General   • TUBAL ABDOMINAL LIGATION         Family History   Problem Relation Age of Onset   • Breast cancer Paternal Grandmother    • Breast cancer Other    • Lung cancer Mother    • Lymphoma Father        Social History     Socioeconomic History   • Marital status:      Spouse name: Not on file   • Number of children: Not on file   • Years of education: Not on file   • Highest education level: Not on file   Tobacco Use   • Smoking status: Current Every Day Smoker     Packs/day: 0.50     Types: Cigarettes   • Smokeless tobacco: Never Used   Substance and Sexual Activity   • Alcohol use: Yes     Alcohol/week: 1.0 standard drinks     Types: 1 Cans of beer per week     Frequency: Never     Comment: STATES SHE QUIT DRINKING BUT HAD A BEER TODAY   • Drug use: No   • Sexual activity: Defer           Objective   Physical Exam   Constitutional: She is oriented to person, place, and time. She appears well-developed and well-nourished.   HENT:   Head: Normocephalic and atraumatic.   Mouth/Throat: Oropharynx is clear and moist.   Eyes: Pupils are equal, round, and reactive to light. Conjunctivae and EOM are normal.   Neck: Normal range of motion. Neck supple.   Cardiovascular: Normal rate, regular rhythm, normal heart sounds and intact distal pulses.   Pulmonary/Chest: Effort normal and breath sounds normal.   Abdominal: Soft. Bowel sounds are normal. She exhibits no distension.   Musculoskeletal: Normal range of motion. She exhibits no edema.   Neurological: She is alert and oriented to person, place, and time. No cranial nerve deficit.   Skin: Skin is warm and dry.   Blisters to the bottom of bilateral feet.   Psychiatric: She has a normal mood and affect. Her behavior is normal. Thought content normal.   Nursing note and vitals reviewed.      Procedures           ED Course                                            MDM    Final diagnoses:   Blister (nonthermal), left foot, initial encounter            Doug Mixon MD  04/17/20 [id number]

## 2020-04-17 NOTE — DISCHARGE INSTRUCTIONS
Blisters, Adult    A blister is a raised bubble of skin filled with liquid. Blisters often develop in an area of the skin that repeatedly rubs or presses against another surface (friction blister). Friction blisters can occur on any part of the body, but they usually develop on the hands or feet. Long-term pressure on the same area of the skin can also lead to areas of hardened skin (calluses).  What are the causes?  A blister can be caused by:  · An injury.  · A burn.  · An allergic reaction.  · An infection.  · Exposure to irritating chemicals.  · Friction, especially in an area with a lot of heat and moisture.  Friction blisters often result from:  · Sports.  · Repetitive activities.  · Using tools and doing other activities without wearing gloves.  · Shoes that are too tight or too loose.  What are the signs or symptoms?  A blister is often round and looks like a bump. It may:  · Itch.  · Be painful to the touch.  Before a blister forms, the skin may:  · Become red.  · Feel warm.  · Itch.  · Be painful to the touch.  How is this diagnosed?  A blister is diagnosed with a physical exam.  How is this treated?  Treatment usually involves protecting the area where the blister has formed until the skin has healed. Other treatments may include:  · A bandage (dressing) to cover the blister.  · Extra padding around and over the blister, so that it does not rub on anything.  · Antibiotic ointment.  Most blisters break open, dry up, and go away on their own within 1-2 weeks. Blisters that are very painful may be drained before they break open on their own. If the blister is large or painful, it can be drained by:  1. Sterilizing a small needle with rubbing alcohol.  2. Washing your hands with soap and water.  3. Inserting the needle in the edge of the blister to make a small hole. Some fluid will drain out of the hole. Let the top or roof of the blister stay in place. This helps the skin heal.  4. Washing the blister with  mild soap and water.  5. Covering the blister with antibiotic ointment, if prescribed by your health care provider, and a dressing.  Some blisters may need to be drained by a health care provider.  Follow these instructions at home:  · Protect the area where the blister has formed as told by your health care provider.  · Keep your blister clean and dry. This helps to prevent infection.  · Do not pop your blister. This can cause infection.  · If you were prescribed an antibiotic, use it as told by your health care provider. Do not stop using the antibiotic even if your condition improves.  · Wear different shoes until the blister heals.  · Avoid the activity that caused the blister until your blister heals.  · Check your blister every day for signs of infection. Check for:  ? More redness, swelling, or pain.  ? More fluid or blood.  ? Warmth.  ? Pus or a bad smell.  ? The blister getting better and then getting worse.  How is this prevented?  Taking these steps can help to prevent blisters that are caused by friction. Make sure you:  · Wear comfortable shoes that fit well.  · Always wear socks with shoes.  · Wear extra socks or use tape, bandages, or pads over blister-prone areas as needed. You may also apply petroleum jelly under bandages in blister-prone areas.  · Wear protective gear, such as gloves, when participating in sports or activities that can cause blisters.  · Wear loose-fitting, moisture-wicking clothes when participating in sports or activities.  · Use powders as needed to keep your feet dry.  Contact a health care provider if:  · You have more redness, swelling, or pain around your blister.  · You have more fluid or blood coming from your blister.  · Your blister feels warm to the touch.  · You have pus or a bad smell coming from your blister.  · You have a fever or chills.  · Your blister gets better and then it gets worse.  This information is not intended to replace advice given to you by your  health care provider. Make sure you discuss any questions you have with your health care provider.  Document Released: 01/25/2006 Document Revised: 08/16/2017 Document Reviewed: 06/30/2017  [employer] Interactive Patient Education © 2020 [employer]

## 2021-03-05 NOTE — PLAN OF CARE
Chief Complaint  Pubic rami fractures    History Of Presenting Illness  Raine Reed 1936 presents with  2 months status post fracture of his left pubic rami  Sustained in January 20 20-,1/5  Treated nonoperatively  Patient presents for evaluation of x-rays  Patient complains of right buttock pain and some right low back pain  This started around 2-3 weeks ago  Patient has been asymptomatic for the last week or so        Current Medications  Current Outpatient Medications   Medication Sig Dispense Refill    acetaminophen (TYLENOL) 325 mg tablet Take 2 tablets (650 mg total) by mouth every 6 (six) hours as needed for mild pain, headaches or fever  0    atorvastatin (LIPITOR) 10 mg tablet TAKE 1 TABLET BY MOUTH  DAILY 90 tablet 3    B COMPLEX VITAMINS PO Take 1 tablet by mouth daily      cetirizine (ZyrTEC) 10 mg tablet TAKE ONE TABLET BY MOUTH DAILY 90 tablet 2    Cholecalciferol (VITAMIN D3) 2000 UNITS capsule Take 1 capsule by mouth 2 (two) times a day 5000 units       Coenzyme Q10 (CO Q10) 60 MG CAPS Take 1 capsule by mouth daily      gabapentin (NEURONTIN) 600 MG tablet TAKE 1 TABLET BY MOUTH  TWICE DAILY 180 tablet 3    Ginkgo Biloba 120 MG TABS Take 1 tablet by mouth daily      Inositol 500 MG TABS Take 1 tablet by mouth 2 (two) times a day      ipratropium (ATROVENT) 0 03 % nasal spray 2 sprays into each nostril 3 (three) times a day 90 mL 1    lutein 6 mg Take 1 capsule by mouth daily      metoprolol tartrate (LOPRESSOR) 25 mg tablet TAKE 1 TABLET BY MOUTH  EVERY 12 HOURS 180 tablet 3    MILK THISTLE PO Take 1 tablet by mouth daily      Misc Natural Products (SUPER SNOOZE) CAPS Take 1 capsule by mouth daily Bed time       Multiple Vitamins-Minerals (OCUVITE-LUTEIN) CAPS Take by mouth daily      sildenafil (VIAGRA) 50 MG tablet Viagra 50 mg tablet   TAKE 1 TABLET BY MOUTH UP TO 4 HOURS BEFORE INTERCOURSE AND MAX OF 1 TABLET IN 24 HOURS      famotidine (PEPCID) 40 MG tablet Problem: Patient Care Overview  Goal: Plan of Care Review  Outcome: Ongoing (interventions implemented as appropriate)   02/13/19 1506   Coping/Psychosocial   Plan of Care Reviewed With patient;family   Plan of Care Review   Progress no change   OTHER   Outcome Summary Pt continues to c/o constant abdominal pain; medicated q3 hours prn with IV pain meds. Scheduled ativan given as ordered. IVF. IV thiamine given. NPO except ice chips/sips. Lipase 7170, amylase 588. Tele on; sinus. MRCP done today. VSS. Cont to monitor. Elevated bp this am; monitoring; prn labetalol avaliable. Pt has had some auditory/tactile hallucinations today; APRN aware. Transferred to [facility].      Goal: Individualization and Mutuality  Outcome: Ongoing (interventions implemented as appropriate)    Goal: Discharge Needs Assessment  Outcome: Ongoing (interventions implemented as appropriate)      Problem: Pancreatitis, Acute/Chronic (Adult)  Goal: Signs and Symptoms of Listed Potential Problems Will be Absent, Minimized or Managed (Pancreatitis, Acute/Chronic)  Outcome: Ongoing (interventions implemented as appropriate)         Take 1 tablet (40 mg total) by mouth daily at bedtime (Patient not taking: Reported on 1/5/2021) 90 tablet 1    omeprazole (PriLOSEC) 40 MG capsule TAKE 1 CAPSULE BY MOUTH  TWICE DAILY (Patient not taking: Reported on 1/5/2021) 180 capsule 3    tadalafil (CIALIS) 20 MG tablet Take 1 tablet (20 mg total) by mouth daily as needed for erectile dysfunction (Patient not taking: Reported on 1/5/2021) 18 tablet 3     No current facility-administered medications for this visit          Current Problems    Active Problems:   Patient Active Problem List    Diagnosis Date Noted    Anemia 01/07/2021    Closed fracture of superior ramus of left pubis ([facility] [address] ) 01/05/2021    Closed fracture of inferior pubic ramus ([facility] [address] ) 01/05/2021    Fall 01/05/2021    Radiculopathy, lumbar region     Chronic obstructive pulmonary disease with acute exacerbation ([address] ) 03/19/2019    Von Willebrand's disease ([facility] [address] ) 03/19/2019    Alternating constipation and diarrhea 08/29/2018    Weight loss, non-intentional 08/29/2018    Nausea 08/29/2018    Hoarse voice quality 08/01/2018    Appetite loss 08/01/2018    Weight loss 08/01/2018    Gastroesophageal reflux disease without esophagitis 08/01/2018    Idiopathic peripheral neuropathy 06/29/2018    Cervical stenosis of spinal canal 06/29/2018    Spinal stenosis of lumbar region 06/29/2018    Lung nodule, solitary 02/20/2018    Bladder cancer ([facility] [address] ) 02/13/2018    High blood pressure 12/28/2016    Gait instability 05/31/2016    Erectile dysfunction 12/09/2014    Hypothyroidism 06/07/2013    Spondylosis of cervical region without myelopathy or radiculopathy 12/31/2012    Bilateral exudative age-related macular degeneration ([facility] [address] ) 06/26/2012         Review of Systems:    General: negative for - chills, fatigue, fever,  weight gain or weight loss  Psychological: negative for - anxiety, behavioral disorder, concentration difficulties  Ophthalmic: negative for - blurry vision, decreased vision, double vision,      Past Medical History:   Past Medical History:   Diagnosis Date    Anemia     Appendicitis     Coronary artery disease     Detached retina     GERD (gastroesophageal reflux disease)     Hyperlipidemia     Hypertension     Macular degeneration     Neuropathy     Von Willebrand disease ([address] )        Past Surgical History:   Past Surgical History:   Procedure Laterality Date    ADENOIDECTOMY      APPENDECTOMY      ARTHRODESIS  01/15/2014    Lumbar     BACK SURGERY      CATARACT EXTRACTION      COLONOSCOPY  12/09/2016    fiberoptic     CYSTOSCOPY      with resection of tumor / 1/12/17, 10/2/17 / diagnostic 12/8/16, 5/30/17     CYSTOSCOPY  09/20/2018    CYSTOSCOPY  06/22/2020    EGD AND COLONOSCOPY N/A 12/9/2016    Procedure: EGD AND COLONOSCOPY;  Surgeon: Karly Williamson MD;  Location: [facility];  Service:    Jaquelin Splinter EYE SURGERY      FRACTURE SURGERY Bilateral     ARM    NECK SURGERY      NEUROPLASTY / TRANSPOSITION MEDIAN NERVE AT [facility] Right 04/2015    [facility] COLONOSCOPY FLX DX W/COLLJ SPEC WHEN PFRMD N/A 2/5/2019    Procedure: COLONOSCOPY;  Surgeon: Promise Atkins MD;  Location: [facility];  Service: Gastroenterology    VA CYSTOURETHROSCOPY,FULGUR <0 5 CM LESN N/A 1/12/2017    Procedure: CYSTOSCOPY, BLADDER BIOPSY WITH FULGRATION, POSSIBLE TRANSURETHRAL RESECTION OF BLADDER TUMOR;  Surgeon: Andrew Delacruz MD;  [facility]: [facility];  Service: Urology    VA CYSTOURETHROSCOPY,FULGUR <0 5 CM LESN N/A 10/2/2017    Procedure: CYSTOSCOPY WITH  BLADDER BIOPSIES WITH FULGURATION;  Surgeon: Andrew Delacruz MD;  [facility]: [facility];  Service: Urology    VA ESOPHAGOGASTRODUODENOSCOPY TRANSORAL DIAGNOSTIC N/A 8/3/2018    Procedure: ESOPHAGOGASTRODUODENOSCOPY (EGD);   Surgeon: Promise Atkins MD;  [facility];  Service: Gastroenterology    VA INSTILL ANTICANCER AGENT IN BLADDER N/A 1/12/2017    Procedure: Jeremiah Glance;  Surgeon: Andrew Delacruz MD; [facility]: [facility];  Service: Urology    RI INSTILL ANTICANCER AGENT IN BLADDER N/A 10/2/2017    Procedure: [place];  Surgeon: Howard Veras MD;  [facility];  Service: Urology    RETINAL DETACHMENT SURGERY Right 03/2016    complete air fill confirmed     ROTATOR CUFF REPAIR      SHOULDER SURGERY  03/03/2015    proximal humerus fracture / [place]    3/6/15   R    1/3/18     TONSILECTOMY AND ADNOIDECTOMY      TONSILLECTOMY      TRANSURETHRAL RESECTION OF BLADDER TUMOR N/A 10/2/2017    Procedure: TRANSURETHRAL RESECTION OF BLADDER TUMOR (TURBT); Surgeon: Howard Veras MD;  [facility]: [facility];  Service: Urology    [address] (HISTORICAL)         Family History:  Family history reviewed and non-contributory  Family History   Problem Relation Age of Onset    Ulcers Father         acute gastric    Heart disease Mother    [facility] Sister        Social History:  Social History     Socioeconomic History    Marital status:      Spouse name: None    Number of children: None    Years of education: None    Highest education level: None   Occupational History    Occupation:    retired   Social Needs    Financial resource strain: None    Food insecurity     Worry: Never true     Inability: Never true    Transportation needs     Medical: No     Non-medical: No   Tobacco Use    Smoking status: Current Every Day Smoker     Packs/day: 0 25     Years: 60 00     Pack years: 15 00     Types: Cigarettes    Smokeless tobacco: Never Used    Tobacco comment: smokes a pipe -resolved    Substance and Sexual Activity    Alcohol use:  Yes     Alcohol/week: 2 0 standard drinks     Types: 1 Glasses of wine, 1 Shots of liquor per week     Frequency: 4 or more times a week     Drinks per session: 1 or 2     Comment: DAILY     Drug use: No    Sexual activity: None   Lifestyle    Physical activity     Days per week: None     Minutes per session: None    Stress: None   Relationships    Social connections     Talks on phone: None     Gets together: None     Attends Jain service: None     Active member of club or organization: None     Attends meetings of clubs or organizations: None     Relationship status: Living with partner    Intimate partner violence     Fear of current or ex partner: None     Emotionally abused: None     Physically abused: None     Forced sexual activity: None   Other Topics Concern    None   Social History Narrative    No advance directives     Patient has living will        Allergies:   No Known Allergies        Physical ExaminationBP 117/70   Pulse 89   Ht 5' 11" (1 803 m)   Wt 76 2 kg (168 lb)   BMI 23 43 kg/m²   Gen: Alert and oriented to person, place, time  HEENT: EOMI, eyes clear, moist mucus membranes, hearing intact      Orthopedic Exam    Lumbar spine mild discomfort of the right lower lumbar spine lateral aspect  Straight leg raise is 80° both sides with negative stretch test   pelvic compression does not cause any discomfort no distal deficits          Impression   healing pubic ramus fractures with the DJD of the lumbosacral spine causing right low back pain      Plan     discussed treatment with the patient   weightbear as tolerated   over-the-counter pain medication and icing   if the back pain recurs patient can start a physical therapy program    Arturo Avery MD        Portions of the record may have been created with voice recognition software  Occasional wrong word or "sound a like" substitutions may have occurred due to the inherent limitations of voice recognition software  Read the chart carefully and recognize, using context, where substitutions have occurred

## 2021-04-14 NOTE — PLAN OF CARE
Problem: Patient Care Overview  Goal: Plan of Care Review  Outcome: Ongoing (interventions implemented as appropriate)   02/19/19 0747   Coping/Psychosocial   Plan of Care Reviewed With patient   Plan of Care Review   Progress improving   OTHER   Outcome Summary Pt.continues to request IV pain meds. Pt rates her pain @ a 6-7. Patient states she ate some salad last p.m. although has clear liquid diet ordered. VSS. Safety maintained. No s/s alcohol withdrawal. Pt. states she wants to discuss taking Chantix with MD. Continue to monitor.     Goal: Individualization and Mutuality  Outcome: Ongoing (interventions implemented as appropriate)   02/19/19 0747   Individualization   Patient Specific Interventions Clear liquid diet; prn pain meds     Goal: Interprofessional Rounds/Family Conf  Outcome: Ongoing (interventions implemented as appropriate)   02/19/19 0747   Interdisciplinary Rounds/Family Conf   Participants nursing;patient       Problem: Pancreatitis, Acute/Chronic (Adult)  Goal: Signs and Symptoms of Listed Potential Problems Will be Absent, Minimized or Managed (Pancreatitis, Acute/Chronic)  Outcome: Ongoing (interventions implemented as appropriate)   02/19/19 0747   Goal/Outcome Evaluation   Problems Assessed (Pancreatitis) all   Problems Present (Pancreatitis) pain;situational response       Problem: Anxiety (Adult)  Goal: Identify Related Risk Factors and Signs and Symptoms  Outcome: Ongoing (interventions implemented as appropriate)   02/19/19 0747   Anxiety (Adult)   Related Risk Factors (Anxiety) psychosocial factor;knowledge deficit   Signs and Symptoms (Anxiety) dependence increased     Goal: Reduction/Resolution  Outcome: Ongoing (interventions implemented as appropriate)   02/19/19 0747   Anxiety (Adult)   Reduction/Resolution making progress toward outcome       Problem: Fall Risk (Adult)  Goal: Identify Related Risk Factors and Signs and Symptoms  Outcome: Ongoing (interventions implemented as  Spoke with Khoi Sousa who stated an APN Franchesca Bowser to sign. Awaiting fax. appropriate)   02/19/19 0747   Fall Risk (Adult)   Related Risk Factors (Fall Risk) environment unfamiliar   Signs and Symptoms (Fall Risk) presence of risk factors     Goal: Absence of Fall  Outcome: Ongoing (interventions implemented as appropriate)   02/19/19 0747   Fall Risk (Adult)   Absence of Fall making progress toward outcome

## 2022-02-13 NOTE — PLAN OF CARE
Problem: Falls - Risk of:  Goal: Will remain free from falls  Description  Will remain free from falls  8/11/2019 0341 by Addie Melo LPN  Outcome: Met This Shift  8/10/2019 1539 by Erik Aggarwal RN  Outcome: Ongoing  Goal: Absence of physical injury  Description  Absence of physical injury  8/11/2019 0341 by Addie Melo LPN  Outcome: Met This Shift  8/10/2019 1539 by Erik Aggarwal RN  Outcome: Ongoing     Problem: Risk for Impaired Skin Integrity  Goal: Tissue integrity - skin and mucous membranes  Description  Structural intactness and normal physiological function of skin and  mucous membranes.   8/11/2019 0341 by Addie Melo LPN  Outcome: Ongoing  8/10/2019 1539 by Erik Aggarwal RN  Outcome: Ongoing     Problem: Discharge Planning:  Goal: Participates in care planning  Description  Participates in care planning  8/11/2019 0341 by Addie Melo LPN  Outcome: Ongoing  8/10/2019 1539 by Erik Aggarwal RN  Outcome: Ongoing  Goal: Discharged to appropriate level of care  Description  Discharged to appropriate level of care  8/11/2019 0341 by Addie Melo LPN  Outcome: Ongoing  8/10/2019 1539 by Erik Aggarwal RN  Outcome: Ongoing     Problem: Anxiety/Stress:  Goal: Level of anxiety will decrease  Description  Level of anxiety will decrease  8/11/2019 0341 by Addie Melo LPN  Outcome: Ongoing  8/10/2019 1539 by Erik Aggarwal RN  Outcome: Ongoing     Problem: Fluid Volume - Deficit:  Goal: Absence of fluid volume deficit signs and symptoms  Description  Absence of fluid volume deficit signs and symptoms  8/11/2019 0341 by Addie Melo LPN  Outcome: Ongoing  8/10/2019 1539 by Erik Aggarwal RN  Outcome: Ongoing  Goal: Electrolytes within specified parameters  Description  Electrolytes within specified parameters  8/11/2019 0341 by Addie Melo LPN  Outcome: Ongoing  8/10/2019 1539 by Erik Aggarwal Self

## 2022-03-02 ENCOUNTER — TRANSCRIBE ORDERS (OUTPATIENT)
Dept: ADMINISTRATIVE | Facility: HOSPITAL | Age: 52
End: 2022-03-02

## 2022-03-02 DIAGNOSIS — Z12.31 BREAST CANCER SCREENING BY MAMMOGRAM: Primary | ICD-10-CM

## 2022-03-02 DIAGNOSIS — R07.9 CHEST PAIN, UNSPECIFIED TYPE: ICD-10-CM

## 2022-03-11 ENCOUNTER — TELEPHONE (OUTPATIENT)
Dept: PSYCHIATRY | Age: 52
End: 2022-03-11

## 2022-03-11 NOTE — TELEPHONE ENCOUNTER
Called pt to schedule an appt from a referral    No answer and VM not setup yet     Electronically signed by Leland Robles MA on 3/11/2022 at 2:24 PM

## 2022-03-18 ENCOUNTER — TELEPHONE (OUTPATIENT)
Dept: PSYCHIATRY | Age: 52
End: 2022-03-18

## 2022-03-18 NOTE — TELEPHONE ENCOUNTER
Called pt to schedule an appt from a referral    Schedule for therapy on 3/29 @ 3 with [id number] for medication management on  511 @ 3.     Electronically signed by Loretta Camacho on 3/18/2022 at 2:30 PM

## 2022-03-28 ENCOUNTER — TELEPHONE (OUTPATIENT)
Dept: PSYCHIATRY | Age: 52
End: 2022-03-28

## 2022-03-28 NOTE — TELEPHONE ENCOUNTER
Called pt for appointment reminder.   -left voicemail, requesting a return call      Electronically signed by Clemente Scott MA on 3/28/2022 at 1:30 PM

## 2022-03-29 ENCOUNTER — TELEPHONE (OUTPATIENT)
Dept: PSYCHIATRY | Age: 52
End: 2022-03-29

## 2022-03-29 ENCOUNTER — APPOINTMENT (OUTPATIENT)
Dept: MAMMOGRAPHY | Facility: HOSPITAL | Age: 52
End: 2022-03-29

## 2022-03-29 NOTE — TELEPHONE ENCOUNTER
Called pt was a no show.  Called to check on them and  -left voicemail, requesting a return call      Electronically signed by Braxton Connolly MA on 3/29/2022 at 3:47 PM

## 2022-03-29 NOTE — TELEPHONE ENCOUNTER
Pt called to reschedule her appt that she missed today    Rescheduled for 4/22 @ 1.     Electronically signed by Nicky Emmanuel MA on 3/29/2022 at 4:18 PM

## 2022-04-21 ENCOUNTER — TELEPHONE (OUTPATIENT)
Dept: PSYCHIATRY | Age: 52
End: 2022-04-21

## 2022-04-21 NOTE — TELEPHONE ENCOUNTER
Called pt for appointment reminder.   -left voicemail, requesting a return call      Electronically signed by Kel Gilliam MA on 4/21/2022 at 12:40 PM

## 2022-05-10 ENCOUNTER — TELEPHONE (OUTPATIENT)
Dept: PSYCHIATRY | Age: 52
End: 2022-05-10

## 2022-05-10 NOTE — TELEPHONE ENCOUNTER
Called and lvm reminding pt of her appt for [email or web address]    Electronically signed by Laura Ma on 5/10/2022 at 3:26 PM

## 2022-05-11 ENCOUNTER — TELEPHONE (OUTPATIENT)
Dept: PSYCHIATRY | Age: 52
End: 2022-05-11

## 2022-05-11 NOTE — TELEPHONE ENCOUNTER
Called pt was a no show.  Called to check on them and    -Pt asked to reschedule and was rescheduled      Electronically signed by Antonette Garibay on 5/11/2022 at 3:35 PM

## 2022-06-24 ENCOUNTER — TELEPHONE (OUTPATIENT)
Dept: PSYCHIATRY | Age: 52
End: 2022-06-24

## 2022-06-24 NOTE — TELEPHONE ENCOUNTER
Called and confirmed appt with pt for [email or web address]    Electronically signed by Matilde Shaw on 6/24/2022 at 2:23 PM

## 2022-06-27 ENCOUNTER — OFFICE VISIT (OUTPATIENT)
Dept: PSYCHIATRY | Age: 52
End: 2022-06-27
Payer: MEDICAID

## 2022-06-27 VITALS
HEIGHT: 63 IN | WEIGHT: 144.5 LBS | HEART RATE: 103 BPM | DIASTOLIC BLOOD PRESSURE: 86 MMHG | TEMPERATURE: 97 F | SYSTOLIC BLOOD PRESSURE: 133 MMHG | OXYGEN SATURATION: 99 % | BODY MASS INDEX: 25.6 KG/M2

## 2022-06-27 DIAGNOSIS — F41.1 GAD (GENERALIZED ANXIETY DISORDER): Primary | ICD-10-CM

## 2022-06-27 DIAGNOSIS — F31.9 BIPOLAR DEPRESSION (HCC): ICD-10-CM

## 2022-06-27 DIAGNOSIS — Z79.899 HIGH RISK MEDICATION USE: ICD-10-CM

## 2022-06-27 PROCEDURE — 99215 OFFICE O/P EST HI 40 MIN: CPT | Performed by: NURSE PRACTITIONER

## 2022-06-27 RX ORDER — CARIPRAZINE 1.5 MG/1
CAPSULE, GELATIN COATED ORAL
COMMUNITY
Start: 2022-06-17 | End: 2022-06-27 | Stop reason: SDUPTHER

## 2022-06-27 RX ORDER — MELOXICAM 15 MG/1
TABLET ORAL
COMMUNITY
Start: 2022-06-15

## 2022-06-27 RX ORDER — FLUOXETINE HYDROCHLORIDE 40 MG/1
80 CAPSULE ORAL DAILY
Qty: 60 CAPSULE | Refills: 2 | Status: SHIPPED | OUTPATIENT
Start: 2022-06-27 | End: 2022-06-27

## 2022-06-27 RX ORDER — TRAZODONE HYDROCHLORIDE 50 MG/1
50 TABLET ORAL NIGHTLY
Qty: 30 TABLET | Refills: 1 | Status: SHIPPED | OUTPATIENT
Start: 2022-06-27 | End: 2022-06-27

## 2022-06-27 RX ORDER — GABAPENTIN 300 MG/1
300 CAPSULE ORAL 3 TIMES DAILY
Qty: 90 CAPSULE | Refills: 0 | Status: SHIPPED | OUTPATIENT
Start: 2022-06-27 | End: 2022-07-27

## 2022-06-27 RX ORDER — FLUOXETINE HYDROCHLORIDE 40 MG/1
80 CAPSULE ORAL DAILY
Qty: 60 CAPSULE | Refills: 2 | Status: SHIPPED | OUTPATIENT
Start: 2022-06-27

## 2022-06-27 RX ORDER — TIZANIDINE 4 MG/1
4 TABLET ORAL EVERY 8 HOURS PRN
COMMUNITY

## 2022-06-27 RX ORDER — LAMOTRIGINE 150 MG/1
150 TABLET ORAL 2 TIMES DAILY
Qty: 60 TABLET | Refills: 2 | Status: SHIPPED | OUTPATIENT
Start: 2022-06-27 | End: 2022-06-27

## 2022-06-27 RX ORDER — LAMOTRIGINE 150 MG/1
150 TABLET ORAL 2 TIMES DAILY
COMMUNITY

## 2022-06-27 RX ORDER — BUPROPION HYDROCHLORIDE 150 MG/1
TABLET, EXTENDED RELEASE ORAL
COMMUNITY
Start: 2022-05-02 | End: 2022-06-27 | Stop reason: CLARIF

## 2022-06-27 RX ORDER — TRAZODONE HYDROCHLORIDE 50 MG/1
50 TABLET ORAL NIGHTLY
Qty: 30 TABLET | Refills: 1 | Status: SHIPPED | OUTPATIENT
Start: 2022-06-27

## 2022-06-27 RX ORDER — FLUOXETINE HYDROCHLORIDE 20 MG/1
80 CAPSULE ORAL DAILY
COMMUNITY

## 2022-06-27 RX ORDER — BUPROPION HYDROCHLORIDE 150 MG/1
150 TABLET ORAL EVERY MORNING
Qty: 30 TABLET | Refills: 3 | Status: SHIPPED | OUTPATIENT
Start: 2022-06-27 | End: 2022-06-27

## 2022-06-27 RX ORDER — CARIPRAZINE 1.5 MG/1
1.5 CAPSULE, GELATIN COATED ORAL DAILY
Qty: 30 CAPSULE | Refills: 2 | Status: SHIPPED | OUTPATIENT
Start: 2022-06-27

## 2022-06-27 RX ORDER — CARIPRAZINE 1.5 MG/1
1.5 CAPSULE, GELATIN COATED ORAL DAILY
Qty: 30 CAPSULE | Refills: 2 | Status: SHIPPED | OUTPATIENT
Start: 2022-06-27 | End: 2022-06-27

## 2022-06-27 RX ORDER — GABAPENTIN 300 MG/1
300 CAPSULE ORAL 3 TIMES DAILY
Qty: 90 CAPSULE | Refills: 0 | Status: SHIPPED | OUTPATIENT
Start: 2022-06-27 | End: 2022-06-27

## 2022-06-27 RX ORDER — LAMOTRIGINE 150 MG/1
150 TABLET ORAL 2 TIMES DAILY
Qty: 60 TABLET | Refills: 2 | Status: SHIPPED | OUTPATIENT
Start: 2022-06-27

## 2022-06-27 RX ORDER — BUPROPION HYDROCHLORIDE 150 MG/1
150 TABLET ORAL EVERY MORNING
Qty: 30 TABLET | Refills: 3 | Status: SHIPPED | OUTPATIENT
Start: 2022-06-27

## 2022-06-27 NOTE — PROGRESS NOTES
PSYCHIATRIC EVALUATION    Date of Service:  6/27/22     Chief Complaint   Patient presents with    New Patient       HISTORY OF PRESENT ILLNESS  The patient is a 47 y.o.   female who is here for psychiatric evaluation due to continual complaints of depression and anxiety. Patient has a history of being admitted to [facility] behavioral health for alcohol abuse. She has been seen multiple times by psychiatric consult services. Last contact in ER was April of 2020. She has completed a long term rehab program, she has been sober for 1.5 years or so. She was following up with PCP to get medications    Today: she feels like she has had some moments of depression and aniety. She reports having more bad days a week than good days. She was referred from [facility]. Reports that her depression and anxiety have been worse lately related to finances and car problems. 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 However she reports she is looking for a new job at this time. We discussed the importance of adequate sleep as well as taking medications as directed and therapy. She reports she is interested in beginning therapy at this time. She reports having history of depression anxiety and bipolar disorder as well as severe long-term alcohol use. Patient reports she has not used alcohol in 2 years. She denies SI HI AVH she denies side effects of medications at this time. Medication list was reviewed it was determined that her Wellbutrin to be changed to XL formula.   Patient is agreeable to this change patient reported that she was interested in stimulant medication for untreated ADHD however patient was informed of this office is prescribing philosophy on stimulant medication that she would need to be tested by a licensed psychologist at [facility] or counseling as well as being diagnosed with ADHD and having a job. Pt verbalized understanding of required criteria. She denies si hi avh at this time. Will follow up in 2 months. Sleep: 2-3 hours per night. Feels like it is not enough. occasionally takes naps during the day. Pt denies excessive spending,   Endorses mood swings, irritability     Pt denies SI, HI, Auditory, visual, and tactile hallucinations at this time. Appetite: gained some weight. Increased appetite    Caffeine use: tea all day, drinks water as well    Support:  son,  alyssa sawyer    PSYCHIATRIC HISTORY  Previous diagnoses: depression, anxiety, bipolar, alcohol use  Suicide attempts/gestures: Denies   Prior hospitalizations: none in the last year and a half, usually alcohol related    Prior Therapy: has been in therapy, wants therapy now    PREVIOUS MED TRIALS  Lamictal 100 twice daily  Prozac 40 mg daily  Trazodone 50 mg daily  Folvite 1 mg daily  Melatonin 3 mg nightly  Neurontin 300 mg 2 tablets 3 times a day    SUBSTANCE USE HISTORY  Alcohol: 1.5 years of sobriety now  Illicit drug use: history of use, sober for 2 years  Marijuana: denies   Tobacco: 1/2 pack per day  Vape: yes    FAMILY PSYCHIATRIC HISTORY  Mother- depression anxiety, alzheimer's        Social History  Marital status-  1x currently in a stable relationship  Trauma and/or Abuse - none  Children- 2 sons 1 daughter  Legal - probation for possession of illegal substance expires in 7 months  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 negative history of head trauma. See past medical history below.     Information obtained via patient and chart review    PCP is  Shagufta Alvarez DO    Allergies: Sulfa antibiotics      Review of Systems - 14 point review:  Negative except for stated: kuo kuo, depression anxiety, hx of pancreatitis, hx of adhd diagnosis    Constitutional: (fevers, chills, night sweats, wt loss/gain, change in appetite, fatigue, somnolence)    HEENT: (ear pain or discharge, hearing loss, ear ringing, sinus pressure, nosebleed, nasal discharge, sore throat, oral sores, tooth pain, bleeding gums, hoarse voice, neck pain)      Cardiovascular: (HTN, chest pain, palpitations, leg swelling, leg pain with walking)    Respiratory: (cough, wheezing, snoring, SOB with activity (dyspnea), SOB while lying flat (orthopnea), awakening with severe SOB (paroxysmal nocturnal dyspnea))    Gastrointestinal: (NVD, constipation, abdominal pain, bright red stools, black tarry stools, stool incontinence)     Genitourinary:  (pelvic pain, burning or frequency of urination, urinary urgency, blood in urine incomplete bladder emptying, urinary incontinence, STD; MEN: testicular pain or swelling, erectile dysfunction; WOMEN: LMP, heavy menstrual bleeding (menorrhagia), irregular periods, postmenopausal bleeding, menstrual pain (dymenorrhea, vaginal discharge)    Musculoskeletal: (bone pain/fracture, joint pain or swelling, musle pain)    Integumentary: (rashes, non-healing sores, itching, breast lumps, breast pain, nipple discharge, hair loss)    Neurologic: (HA, muscle weakness, paresthesias (numbness, coldness, crawling or prickling), memory loss, seizure, dizziness)    Psychiatric:  (anxiety, sadness, irritability/anger, insomnia, suicidality)    Endocrine: (heat or cold intolerance, excessive thirst (polydipsia), excessive hunger (polyphagia))    Immune/Allergic: (hives, seasonal or environmental allergies, HIV exposure)    Hematologic/Lymphatic: (lymph node enlargement, easy bleeding or bruising)      Prior to Admission medications    Medication Sig Start Date End Date Taking?  Authorizing Provider buPROPion (WELLBUTRIN XL) 150 MG extended release tablet Take 1 tablet by mouth every morning 6/27/22  Yes LAURY Bueno CNP   FLUoxetine (PROZAC) 40 MG capsule Take 2 capsules by mouth daily 6/27/22  Yes LAURY Bueno CNP   gabapentin (NEURONTIN) 300 MG capsule Take 1 capsule by mouth 3 times daily for 30 days.  Intended supply: 30 days 6/27/22 7/27/22 Yes LAURY Bueno CNP   lamoTRIgine (LAMICTAL) 150 MG tablet Take 1 tablet by mouth 2 times daily 6/27/22  Yes LAURY Bueno CNP   traZODone (DESYREL) 50 MG tablet Take 1 tablet by mouth nightly 6/27/22  Yes LAURY Bueno CNP   VRAYLAR 1.5 MG capsule Take 1 capsule by mouth daily 6/27/22  Yes LAURY Bueno CNP   meloxicam (MOBIC) 15 MG tablet TAKE 1 TABLET BY MOUTH EVERY DAY 6/15/22   Historical Provider, MD   tiZANidine (ZANAFLEX) 4 MG tablet Take 4 mg by mouth every 8 hours as needed    Historical Provider, MD   FLUoxetine (PROZAC) 20 MG capsule Take 80 mg by mouth daily    Historical Provider, MD   lamoTRIgine (LAMICTAL) 150 MG tablet Take 150 mg by mouth 2 times daily    Historical Provider, MD   valsartan (DIOVAN) 80 MG tablet Take 160 mg by mouth daily     Historical Provider, MD   aspirin 325 MG EC tablet Take 325 mg by mouth daily    Historical Provider, MD       Past Medical History:   Diagnosis Date    ADHD     Anxiety     Depressed     especially since kids removed from home    ETOH abuse     5th of etoh a day     History of chicken pox     Kuo kuo disease     2006       Past Surgical History:   Procedure Laterality Date    BRAIN SURGERY  2007    brain bypass for moyamoya    TUBAL LIGATION      2013       Family History   Problem Relation Age of Onset    Cancer Paternal Grandfather         prostate CA    Cancer Paternal Grandmother         bone CA    Cancer Maternal Grandmother         lymphoma, melanoma    Other Maternal Grandmother         Alzhemier disease    Cancer Maternal Grandfather         prostate CA with mets to bone    Hypertension Maternal Grandfather     Cancer Father         CNS lymphoma, smoker    Cancer Mother         Lung cancer, smoker    Cancer Sister         skin cancer, smoker    No Known Problems Son     No Known Problems Son     No Known Problems Daughter          Psychiatric Review of Systems    Mood Depression:  positive sadness,  tearfulness, decreased sleep,  increased appetite,  decreased energy, decreased concentration, decreased sexual function,  increased guilt,  decreased interest,    Samantha: positive: impulsivity, racing thoughts,     Mood Other:positive: Irritability,   lability,    Anxiety: positive (where, when, who, how long, how frequent) finances, not having a car    Panic: negative     OCD: negative    PTSD: negative    Psychosis: negative    Social anxiety symptoms:  negative    Simple phobias: positive for snakes    (heights, planes, spiders, etc.)    Paranoia: negative    ADHD symptoms: negative      Eating Disorder symptoms:  negative    (binging, purging, excessive exercising)    Delusions:  negative    (TV, radio, thought broadcasting, mind control, referential thinking)    (persecutory delusion - e.g., believing one is being followed and harassed by gangs)    (grandiose delusion - e.g., believing one is a billionaire  who owns casinos around the world)    (erotomanic delusion - e.g., believing a famous  is in love with them)    (somatic delusion - e.g., believing one's sinuses have been infested by worms)    (delusions of reference - e.g., believing dialogue on a TV program is directed specifically towards the patient)    (delusions of control - e.g., believing one's thoughts and movements are controlled by planetary overlords)     MENTAL STATUS EXAMINATION    Appearance: Appropriately groomed. Made good eye contact. Gait stable. No abnormal movements or tremor.    Behavior: Calm, cooperative, and socially appropriate. No psychomotor retardation/agitation appreciated. Speech: Normal in tone, volume, and quality. No slurring, dysarthria or pressured speech noted. Mood: \"fair\"   Affect: Mood congruent. Thought Process: Appears linear, logical and goal oriented. Causality appears intact. Thought Content: Denies active suicidal and homicidal ideations. No overt delusions or paranoia appreciated. Perceptions: Denies auditory or visual hallucinations at present time. Not responding to internal stimuli. Concentration: Intact. Orientation: to person, place, date, and situation. Language: Intact. Fund of information: Intact. Memory: Recent and remote appear intact. Impulsivity: Limited. Insight: Fair. Judgment: Fair. Cognition:    Can spell \"world\" backwards: Yes     Can do serial 7's:  Yes    Lab Results   Component Value Date     04/08/2020    K 3.5 04/08/2020     04/08/2020    CO2 19 (L) 04/08/2020    BUN 13 04/08/2020    CREATININE <0.5 04/08/2020    GLUCOSE 81 04/08/2020    CALCIUM 8.6 04/08/2020    PROT 7.3 04/08/2020    LABALBU 4.5 04/08/2020    BILITOT 0.5 04/08/2020    ALKPHOS 97 04/08/2020    AST 34 (H) 04/08/2020    ALT 27 04/08/2020    LABGLOM >60 04/08/2020     Lab Results   Component Value Date     04/08/2020     05/26/2011    K 3.5 04/08/2020    K 4.1 01/24/2020    K 3.8 05/26/2011     04/08/2020     05/26/2011    CO2 19 04/08/2020    BUN 13 04/08/2020    CREATININE <0.5 04/08/2020    CREATININE 0.6 05/26/2011    GLUCOSE 81 04/08/2020    CALCIUM 8.6 04/08/2020      No results found for: CHOL  No results found for: TRIG  No results found for: HDL  No results found for: LDLCHOLESTEROL, LDLCALC  No results found for: LABVLDL, VLDL  No results found for: [facility]  Lab Results   Component Value Date    LABA1C 5.5 08/10/2019     No results found for: EAG  Lab Results   Component Value Date    TSHFT4 0.73 10/08/2019    TSH 1.720 07/26/2019     Lab Results   Component Value Date    VITD25 37.4 10/08/2019     Lab Results   Component Value Date    NDQOAQFI16 429 10/08/2019     Lab Results   Component Value Date    FOLATE 14.2 07/26/2019       Assessment:   1. MANPREET (generalized anxiety disorder)    2. High risk medication use    3. Bipolar depression ([address]        There is no evidence of psychosis, suicidality or homicidality. Patient is psychiatrically stable. PLAN    1. Continue   prozac 40 mg two tablets daily  lamictal 150 mg bid  vraylar 1.5 mg daily  wellbutrin xl 150 mg daily  Trazodone 50 mg nightly     Start   Gabapentin 300 mg three times a day     Discontinue        The risks, benefits, side effects, indications, contraindications, and adverse effects of the medications have been discussed. Yes.  2. The pt has verbalized understanding and has capacity to give informed consent. 3. The Aquiles Bolanos report has been reviewed according to [facility] regulations. 4. Supportive therapy offered. 5. Follow up: Return in about 2 months (around 8/27/2022). 6. The patient has been advised to call with any problems. Advised to call 911 or go to Emergency Room if feeling suicidal  7. Controlled substance Treatment Plan: this is a maintenance dose. .  8. The above listed medications have been continued, modifications in meds and other orders/labs as follows:      Orders Placed This Encounter   Medications    DISCONTD: FLUoxetine (PROZAC) 40 MG capsule     Sig: Take 2 capsules by mouth daily     Dispense:  60 capsule     Refill:  2    DISCONTD: lamoTRIgine (LAMICTAL) 150 MG tablet     Sig: Take 1 tablet by mouth 2 times daily     Dispense:  60 tablet     Refill:  2    DISCONTD: VRAYLAR 1.5 MG capsule     Sig: Take 1 capsule by mouth daily     Dispense:  30 capsule     Refill:  2    DISCONTD: buPROPion (WELLBUTRIN XL) 150 MG extended release tablet     Sig: Take 1 tablet by mouth every morning     Dispense:  30 tablet     Refill:  3    DISCONTD: traZODone (DESYREL) 50 MG tablet     Sig: Take 1 tablet by mouth nightly     Dispense:  30 tablet     Refill:  1    DISCONTD: gabapentin (NEURONTIN) 300 MG capsule     Sig: Take 1 capsule by mouth 3 times daily for 30 days. Intended supply: 30 days     Dispense:  90 capsule     Refill:  0    buPROPion (WELLBUTRIN XL) 150 MG extended release tablet     Sig: Take 1 tablet by mouth every morning     Dispense:  30 tablet     Refill:  3    FLUoxetine (PROZAC) 40 MG capsule     Sig: Take 2 capsules by mouth daily     Dispense:  60 capsule     Refill:  2    gabapentin (NEURONTIN) 300 MG capsule     Sig: Take 1 capsule by mouth 3 times daily for 30 days. Intended supply: 30 days     Dispense:  90 capsule     Refill:  0    lamoTRIgine (LAMICTAL) 150 MG tablet     Sig: Take 1 tablet by mouth 2 times daily     Dispense:  60 tablet     Refill:  2    traZODone (DESYREL) 50 MG tablet     Sig: Take 1 tablet by mouth nightly     Dispense:  30 tablet     Refill:  1    VRAYLAR 1.5 MG capsule     Sig: Take 1 capsule by mouth daily     Dispense:  30 capsule     Refill:  2        Orders Placed This Encounter   Procedures    Urine Drug Screen     Standing Status:   Future     Standing Expiration Date:   6/27/2023       9. Additional comments: start therapy, discussed sleep hygiene, discussed the use of coping skills and relaxation strategies to manage symptoms. 10.Over 50% of the total visit time of   45  minutes was spent on counseling and/or coordination of care of:          1. MANPREET (generalized anxiety disorder)    2. High risk medication use    3. Bipolar depression ([facility]        Be[place] Distance, APRN - CNP    This dictation was generated by voice recognition computer software. Although all attempts are made to edit the dictation for accuracy, there may be errors in the transcription that are not intended.

## 2024-05-01 ENCOUNTER — TRANSCRIBE ORDERS (OUTPATIENT)
Dept: ADMINISTRATIVE | Facility: HOSPITAL | Age: 54
End: 2024-05-01
Payer: MEDICAID

## 2024-05-01 DIAGNOSIS — Z12.31 ENCOUNTER FOR SCREENING MAMMOGRAM FOR MALIGNANT NEOPLASM OF BREAST: Primary | ICD-10-CM

## 2024-05-30 ENCOUNTER — HOSPITAL ENCOUNTER (OUTPATIENT)
Dept: GENERAL RADIOLOGY | Facility: HOSPITAL | Age: 54
Discharge: HOME | End: 2024-05-30
Admitting: PHYSICAL MEDICINE & REHABILITATION
Payer: MEDICAID

## 2024-05-30 ENCOUNTER — TRANSCRIBE ORDERS (OUTPATIENT)
Dept: ADMINISTRATIVE | Facility: HOSPITAL | Age: 54
End: 2024-05-30
Payer: MEDICAID

## 2024-05-30 DIAGNOSIS — M47.816 LUMBAR SPONDYLOSIS: ICD-10-CM

## 2024-05-30 DIAGNOSIS — M47.816 LUMBAR SPONDYLOSIS: Primary | ICD-10-CM

## 2024-05-30 PROCEDURE — 73521 X-RAY EXAM HIPS BI 2 VIEWS: CPT

## 2024-05-30 PROCEDURE — 72110 X-RAY EXAM L-2 SPINE 4/>VWS: CPT

## 2024-06-04 NOTE — PLAN OF CARE
Patient called back and was given phone numbers to call for ENT and GI.   Problem: Patient Care Overview  Goal: Plan of Care Review  Outcome: Ongoing (interventions implemented as appropriate)   06/19/19 8373   Coping/Psychosocial   Plan of Care Reviewed With patient   Plan of Care Review   Progress no change   OTHER   Outcome Summary Pt passed swallow screen and currently started on regular solids diet with thin liquids. Pt able to communicate wants/needs effectively. No word finding difficuties noted at bedside. 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 Pt reports being back to her baseline status. Pt does state she occasionally has word finding troubles; however, is residual from previous stroke. ST services no longer warranted at this time. MD to reconsult if change or new concern.

## (undated) DEVICE — ELECTRD L HK EZ CLN 33CM

## (undated) DEVICE — ENDOPATH XCEL WITH OPTIVIEW TECHNOLOGY DILATING TIP TROCARS WITH STABILITY SLEEVES: Brand: ENDOPATH XCEL OPTIVIEW

## (undated) DEVICE — ELECTRD BLD EDGE/INSUL1P 2.4X5.1MM STRL

## (undated) DEVICE — TOWEL,OR,DSP,ST,BLUE,STD,4/PK,20PK/CS: Brand: MEDLINE

## (undated) DEVICE — ENDOPATH XCEL WITH OPTIVIEW TECHNOLOGY UNIVERSAL TROCAR STABILITY SLEEVES: Brand: ENDOPATH XCEL OPTIVIEW

## (undated) DEVICE — SUT VIC 0 SUTUPAK TIES 18IN J906G

## (undated) DEVICE — ENDOPOUCH RETRIEVER SPECIMEN RETRIEVAL BAGS: Brand: ENDOPOUCH RETRIEVER

## (undated) DEVICE — ANTIBACTERIAL UNDYED BRAIDED (POLYGLACTIN 910), SYNTHETIC ABSORBABLE SUTURE: Brand: COATED VICRYL

## (undated) DEVICE — MAX-CORE® DISPOSABLE CORE BIOPSY INSTRUMENT, 18G X 20CM: Brand: MAX-CORE

## (undated) DEVICE — TRY PREP SCRB VAG PVP

## (undated) DEVICE — 3M™ STERI-STRIP™ REINFORCED ADHESIVE SKIN CLOSURES, R1547, 1/2 IN X 4 IN (12 MM X 100 MM), 6 STRIPS/ENVELOPE: Brand: 3M™ STERI-STRIP™

## (undated) DEVICE — PK TURNOVER RM ADV

## (undated) DEVICE — MONOPOLAR METZENBAUM SCISSOR, MINI BLADE TIP, DISPOSABLE: Brand: MONOPOLAR METZENBAUM SCISSOR, MINI BLADE TIP, DISPOSABLE

## (undated) DEVICE — ENDOPATH XCEL DILATING TIP TROCARS WITH STABILITY SLEEVES: Brand: ENDOPATH XCEL

## (undated) DEVICE — PAD LAP CHOLE: Brand: MEDLINE INDUSTRIES, INC.

## (undated) DEVICE — 2, DISPOSABLE SUCTION/IRRIGATOR WITHOUT DISPOSABLE TIP: Brand: STRYKEFLOW

## (undated) DEVICE — 5MM HANDSWITCHING PROBE, 28CM: Brand: ABC

## (undated) DEVICE — CLIP APPLIER: Brand: ENDO CLIP

## (undated) DEVICE — STABILIZER EXT SETINTRAOSS EZ1O

## (undated) DEVICE — ENDOPATH PNEUMONEEDLE INSUFFLATION NEEDLES WITH LUER LOCK CONNECTORS 120MM: Brand: ENDOPATH

## (undated) DEVICE — GLV SURG BIOGEL LTX PF 6 1/2

## (undated) DEVICE — PAD GRND REM POLYHESIVE A/ DISP